# Patient Record
Sex: MALE | Race: WHITE | NOT HISPANIC OR LATINO
[De-identification: names, ages, dates, MRNs, and addresses within clinical notes are randomized per-mention and may not be internally consistent; named-entity substitution may affect disease eponyms.]

---

## 2017-02-15 ENCOUNTER — APPOINTMENT (OUTPATIENT)
Dept: COLORECTAL SURGERY | Facility: CLINIC | Age: 67
End: 2017-02-15

## 2017-02-15 VITALS
OXYGEN SATURATION: 90 % | HEART RATE: 64 BPM | SYSTOLIC BLOOD PRESSURE: 111 MMHG | TEMPERATURE: 98.3 F | DIASTOLIC BLOOD PRESSURE: 70 MMHG

## 2017-02-15 VITALS — HEIGHT: 71 IN | BODY MASS INDEX: 35.7 KG/M2 | WEIGHT: 255 LBS

## 2017-02-15 DIAGNOSIS — Z86.79 PERSONAL HISTORY OF OTHER DISEASES OF THE CIRCULATORY SYSTEM: ICD-10-CM

## 2017-02-15 DIAGNOSIS — Z87.891 PERSONAL HISTORY OF NICOTINE DEPENDENCE: ICD-10-CM

## 2017-02-15 DIAGNOSIS — Z80.9 FAMILY HISTORY OF MALIGNANT NEOPLASM, UNSPECIFIED: ICD-10-CM

## 2017-02-15 DIAGNOSIS — Z86.39 PERSONAL HISTORY OF OTHER ENDOCRINE, NUTRITIONAL AND METABOLIC DISEASE: ICD-10-CM

## 2017-02-15 DIAGNOSIS — E78.00 PURE HYPERCHOLESTEROLEMIA, UNSPECIFIED: ICD-10-CM

## 2017-02-17 ENCOUNTER — TRANSCRIPTION ENCOUNTER (OUTPATIENT)
Age: 67
End: 2017-02-17

## 2017-04-13 ENCOUNTER — APPOINTMENT (OUTPATIENT)
Dept: COLORECTAL SURGERY | Facility: CLINIC | Age: 67
End: 2017-04-13

## 2017-04-20 ENCOUNTER — RESULT REVIEW (OUTPATIENT)
Age: 67
End: 2017-04-20

## 2017-04-21 ENCOUNTER — OUTPATIENT (OUTPATIENT)
Dept: OUTPATIENT SERVICES | Facility: HOSPITAL | Age: 67
LOS: 1 days | End: 2017-04-21
Payer: MEDICARE

## 2017-04-21 ENCOUNTER — APPOINTMENT (OUTPATIENT)
Dept: COLORECTAL SURGERY | Facility: HOSPITAL | Age: 67
End: 2017-04-21

## 2017-04-21 DIAGNOSIS — K62.5 HEMORRHAGE OF ANUS AND RECTUM: ICD-10-CM

## 2017-04-21 DIAGNOSIS — Z90.89 ACQUIRED ABSENCE OF OTHER ORGANS: Chronic | ICD-10-CM

## 2017-04-21 DIAGNOSIS — Z90.49 ACQUIRED ABSENCE OF OTHER SPECIFIED PARTS OF DIGESTIVE TRACT: Chronic | ICD-10-CM

## 2017-04-21 DIAGNOSIS — Z95.5 PRESENCE OF CORONARY ANGIOPLASTY IMPLANT AND GRAFT: Chronic | ICD-10-CM

## 2017-04-21 DIAGNOSIS — Z98.890 OTHER SPECIFIED POSTPROCEDURAL STATES: Chronic | ICD-10-CM

## 2017-04-21 PROCEDURE — 88305 TISSUE EXAM BY PATHOLOGIST: CPT

## 2017-04-21 PROCEDURE — 88305 TISSUE EXAM BY PATHOLOGIST: CPT | Mod: 26

## 2017-04-21 PROCEDURE — 45380 COLONOSCOPY AND BIOPSY: CPT | Mod: PT

## 2017-04-25 LAB — SURGICAL PATHOLOGY STUDY: SIGNIFICANT CHANGE UP

## 2017-05-10 ENCOUNTER — APPOINTMENT (OUTPATIENT)
Dept: COLORECTAL SURGERY | Facility: CLINIC | Age: 67
End: 2017-05-10

## 2017-05-23 ENCOUNTER — OUTPATIENT (OUTPATIENT)
Dept: OUTPATIENT SERVICES | Facility: HOSPITAL | Age: 67
LOS: 1 days | End: 2017-05-23
Payer: MEDICARE

## 2017-05-23 ENCOUNTER — APPOINTMENT (OUTPATIENT)
Dept: CT IMAGING | Facility: CLINIC | Age: 67
End: 2017-05-23

## 2017-05-23 DIAGNOSIS — Z90.49 ACQUIRED ABSENCE OF OTHER SPECIFIED PARTS OF DIGESTIVE TRACT: Chronic | ICD-10-CM

## 2017-05-23 DIAGNOSIS — Z95.5 PRESENCE OF CORONARY ANGIOPLASTY IMPLANT AND GRAFT: Chronic | ICD-10-CM

## 2017-05-23 DIAGNOSIS — Z90.89 ACQUIRED ABSENCE OF OTHER ORGANS: Chronic | ICD-10-CM

## 2017-05-23 DIAGNOSIS — Z98.890 OTHER SPECIFIED POSTPROCEDURAL STATES: Chronic | ICD-10-CM

## 2017-05-23 DIAGNOSIS — Z00.8 ENCOUNTER FOR OTHER GENERAL EXAMINATION: ICD-10-CM

## 2017-05-23 PROCEDURE — 82565 ASSAY OF CREATININE: CPT

## 2017-05-23 PROCEDURE — 74177 CT ABD & PELVIS W/CONTRAST: CPT

## 2017-06-22 ENCOUNTER — OUTPATIENT (OUTPATIENT)
Dept: OUTPATIENT SERVICES | Facility: HOSPITAL | Age: 67
LOS: 1 days | End: 2017-06-22
Payer: MEDICARE

## 2017-06-22 VITALS
SYSTOLIC BLOOD PRESSURE: 128 MMHG | WEIGHT: 270.95 LBS | HEART RATE: 61 BPM | RESPIRATION RATE: 14 BRPM | DIASTOLIC BLOOD PRESSURE: 80 MMHG | OXYGEN SATURATION: 98 % | HEIGHT: 71 IN | TEMPERATURE: 98 F

## 2017-06-22 DIAGNOSIS — K62.9 DISEASE OF ANUS AND RECTUM, UNSPECIFIED: ICD-10-CM

## 2017-06-22 DIAGNOSIS — Z01.818 ENCOUNTER FOR OTHER PREPROCEDURAL EXAMINATION: ICD-10-CM

## 2017-06-22 DIAGNOSIS — G47.30 SLEEP APNEA, UNSPECIFIED: ICD-10-CM

## 2017-06-22 DIAGNOSIS — Z90.89 ACQUIRED ABSENCE OF OTHER ORGANS: Chronic | ICD-10-CM

## 2017-06-22 DIAGNOSIS — Z90.49 ACQUIRED ABSENCE OF OTHER SPECIFIED PARTS OF DIGESTIVE TRACT: Chronic | ICD-10-CM

## 2017-06-22 DIAGNOSIS — E11.9 TYPE 2 DIABETES MELLITUS WITHOUT COMPLICATIONS: ICD-10-CM

## 2017-06-22 DIAGNOSIS — Z98.890 OTHER SPECIFIED POSTPROCEDURAL STATES: Chronic | ICD-10-CM

## 2017-06-22 DIAGNOSIS — Z95.5 PRESENCE OF CORONARY ANGIOPLASTY IMPLANT AND GRAFT: ICD-10-CM

## 2017-06-22 DIAGNOSIS — Z95.5 PRESENCE OF CORONARY ANGIOPLASTY IMPLANT AND GRAFT: Chronic | ICD-10-CM

## 2017-06-22 LAB
ANION GAP SERPL CALC-SCNC: 16 MMOL/L — SIGNIFICANT CHANGE UP (ref 5–17)
BLD GP AB SCN SERPL QL: NEGATIVE — SIGNIFICANT CHANGE UP
BUN SERPL-MCNC: 18 MG/DL — SIGNIFICANT CHANGE UP (ref 7–23)
CALCIUM SERPL-MCNC: 9.9 MG/DL — SIGNIFICANT CHANGE UP (ref 8.4–10.5)
CHLORIDE SERPL-SCNC: 99 MMOL/L — SIGNIFICANT CHANGE UP (ref 96–108)
CO2 SERPL-SCNC: 26 MMOL/L — SIGNIFICANT CHANGE UP (ref 22–31)
CREAT SERPL-MCNC: 1.44 MG/DL — HIGH (ref 0.5–1.3)
GLUCOSE SERPL-MCNC: 152 MG/DL — HIGH (ref 70–99)
HBA1C BLD-MCNC: 7.8 % — HIGH (ref 4–5.6)
HCT VFR BLD CALC: 42.7 % — SIGNIFICANT CHANGE UP (ref 39–50)
HGB BLD-MCNC: 14.4 G/DL — SIGNIFICANT CHANGE UP (ref 13–17)
MCHC RBC-ENTMCNC: 29.5 PG — SIGNIFICANT CHANGE UP (ref 27–34)
MCHC RBC-ENTMCNC: 33.7 GM/DL — SIGNIFICANT CHANGE UP (ref 32–36)
MCV RBC AUTO: 87.5 FL — SIGNIFICANT CHANGE UP (ref 80–100)
PLATELET # BLD AUTO: 268 K/UL — SIGNIFICANT CHANGE UP (ref 150–400)
POTASSIUM SERPL-MCNC: 3.7 MMOL/L — SIGNIFICANT CHANGE UP (ref 3.5–5.3)
POTASSIUM SERPL-SCNC: 3.7 MMOL/L — SIGNIFICANT CHANGE UP (ref 3.5–5.3)
RBC # BLD: 4.88 M/UL — SIGNIFICANT CHANGE UP (ref 4.2–5.8)
RBC # FLD: 13.4 % — SIGNIFICANT CHANGE UP (ref 10.3–14.5)
RH IG SCN BLD-IMP: NEGATIVE — SIGNIFICANT CHANGE UP
SODIUM SERPL-SCNC: 141 MMOL/L — SIGNIFICANT CHANGE UP (ref 135–145)
WBC # BLD: 6.27 K/UL — SIGNIFICANT CHANGE UP (ref 3.8–10.5)
WBC # FLD AUTO: 6.27 K/UL — SIGNIFICANT CHANGE UP (ref 3.8–10.5)

## 2017-06-22 PROCEDURE — 86900 BLOOD TYPING SEROLOGIC ABO: CPT

## 2017-06-22 PROCEDURE — 85027 COMPLETE CBC AUTOMATED: CPT

## 2017-06-22 PROCEDURE — 86850 RBC ANTIBODY SCREEN: CPT

## 2017-06-22 PROCEDURE — 86901 BLOOD TYPING SEROLOGIC RH(D): CPT

## 2017-06-22 PROCEDURE — 80048 BASIC METABOLIC PNL TOTAL CA: CPT

## 2017-06-22 PROCEDURE — 83036 HEMOGLOBIN GLYCOSYLATED A1C: CPT

## 2017-06-22 PROCEDURE — G0463: CPT

## 2017-06-22 RX ORDER — CEFOTETAN DISODIUM 1 G
2 VIAL (EA) INJECTION ONCE
Qty: 0 | Refills: 0 | Status: DISCONTINUED | OUTPATIENT
Start: 2017-07-06 | End: 2017-07-06

## 2017-06-22 RX ORDER — SODIUM CHLORIDE 9 MG/ML
3 INJECTION INTRAMUSCULAR; INTRAVENOUS; SUBCUTANEOUS EVERY 8 HOURS
Qty: 0 | Refills: 0 | Status: DISCONTINUED | OUTPATIENT
Start: 2017-07-06 | End: 2017-07-06

## 2017-06-22 NOTE — H&P PST ADULT - HISTORY OF PRESENT ILLNESS
66 y/o male for robotic excision of rectal mass /transanal endoscopic microsurgical approach for excision of rectal tumor. Patient went for routine colonoscopy which was positive for mass. Patient had previous procedure but was not successful, needed further more  intervention internally. Referred to Dr Conde, evaluated and scheduled this procedure for treatment.

## 2017-06-22 NOTE — H&P PST ADULT - PSH
H/O heart artery stent  stent x 1 and ballon angioplasty - 2009  History of cholecystectomy    S/P tonsillectomy and adenoidectomy

## 2017-06-22 NOTE — H&P PST ADULT - PROBLEM SELECTOR PLAN 1
Robotic excision of rectal mass transanal endoscopic microsurgical approach for excision of rectal tumor to follow surgeons instruction bowel prep preop

## 2017-06-22 NOTE — H&P PST ADULT - PMH
Benign neoplasm of colon, unspecified part of colon  recent diagnosis positive colonoscopy tried outpatient  excision unable to deeper needed resurgery  Cataracts, bilateral    Diabetes mellitus, type 2    Essential (primary) hypertension    Hyperlipidemia, unspecified hyperlipidemia type    Obesity (BMI 30-39.9)    PVD (peripheral vascular disease)    Stented coronary artery    Tobacco use  quit Jan 2017  Vitiligo  hands bilaterally

## 2017-06-22 NOTE — H&P PST ADULT - FAMILY HISTORY
Mother  Still living? Unknown  Diabetes mellitus, type 2, Age at diagnosis: Age Unknown     Father  Still living? No  Family history of essential hypertension, Age at diagnosis: Age Unknown

## 2017-06-22 NOTE — H&P PST ADULT - PROBLEM SELECTOR PLAN 2
to continue asa until dos , seen  and evaluated by cardiologist  June 20,2017    to continue present medications as prescribed

## 2017-06-29 ENCOUNTER — APPOINTMENT (OUTPATIENT)
Dept: COLORECTAL SURGERY | Facility: HOSPITAL | Age: 67
End: 2017-06-29

## 2017-07-06 ENCOUNTER — INPATIENT (INPATIENT)
Facility: HOSPITAL | Age: 67
LOS: 0 days | Discharge: HOME | DRG: 376 | End: 2017-07-06
Attending: SURGERY | Admitting: SURGERY
Payer: MEDICARE

## 2017-07-06 ENCOUNTER — APPOINTMENT (OUTPATIENT)
Dept: COLORECTAL SURGERY | Facility: HOSPITAL | Age: 67
End: 2017-07-06

## 2017-07-06 ENCOUNTER — RESULT REVIEW (OUTPATIENT)
Age: 67
End: 2017-07-06

## 2017-07-06 VITALS
HEART RATE: 69 BPM | WEIGHT: 268.96 LBS | HEIGHT: 71 IN | DIASTOLIC BLOOD PRESSURE: 74 MMHG | OXYGEN SATURATION: 92 % | SYSTOLIC BLOOD PRESSURE: 131 MMHG | TEMPERATURE: 98 F | RESPIRATION RATE: 18 BRPM

## 2017-07-06 DIAGNOSIS — Z90.89 ACQUIRED ABSENCE OF OTHER ORGANS: Chronic | ICD-10-CM

## 2017-07-06 DIAGNOSIS — Z95.5 PRESENCE OF CORONARY ANGIOPLASTY IMPLANT AND GRAFT: Chronic | ICD-10-CM

## 2017-07-06 DIAGNOSIS — K62.9 DISEASE OF ANUS AND RECTUM, UNSPECIFIED: ICD-10-CM

## 2017-07-06 DIAGNOSIS — Z90.49 ACQUIRED ABSENCE OF OTHER SPECIFIED PARTS OF DIGESTIVE TRACT: Chronic | ICD-10-CM

## 2017-07-06 LAB
APPEARANCE UR: CLEAR — SIGNIFICANT CHANGE UP
BACTERIA # UR AUTO: ABNORMAL /HPF
BILIRUB UR-MCNC: NEGATIVE — SIGNIFICANT CHANGE UP
COLOR SPEC: YELLOW — SIGNIFICANT CHANGE UP
DIFF PNL FLD: ABNORMAL
GLUCOSE UR QL: >1000
KETONES UR-MCNC: NEGATIVE — SIGNIFICANT CHANGE UP
LEUKOCYTE ESTERASE UR-ACNC: ABNORMAL
NITRITE UR-MCNC: NEGATIVE — SIGNIFICANT CHANGE UP
PH UR: 6 — SIGNIFICANT CHANGE UP (ref 5–8)
PROT UR-MCNC: SIGNIFICANT CHANGE UP
RBC CASTS # UR COMP ASSIST: >50 /HPF (ref 0–2)
SP GR SPEC: 1.02 — SIGNIFICANT CHANGE UP (ref 1.01–1.02)
UROBILINOGEN FLD QL: NEGATIVE — SIGNIFICANT CHANGE UP
WBC UR QL: ABNORMAL /HPF (ref 0–5)

## 2017-07-06 PROCEDURE — 88305 TISSUE EXAM BY PATHOLOGIST: CPT

## 2017-07-06 PROCEDURE — C1889: CPT

## 2017-07-06 PROCEDURE — 88331 PATH CONSLTJ SURG 1 BLK 1SPC: CPT

## 2017-07-06 PROCEDURE — 88333 PATH CONSLTJ SURG CYTO XM 1: CPT

## 2017-07-06 PROCEDURE — S2900: CPT

## 2017-07-06 PROCEDURE — 88331 PATH CONSLTJ SURG 1 BLK 1SPC: CPT | Mod: 26

## 2017-07-06 PROCEDURE — 81001 URINALYSIS AUTO W/SCOPE: CPT

## 2017-07-06 PROCEDURE — 88305 TISSUE EXAM BY PATHOLOGIST: CPT | Mod: 26

## 2017-07-06 RX ORDER — TAMSULOSIN HYDROCHLORIDE 0.4 MG/1
1 CAPSULE ORAL
Qty: 10 | Refills: 0 | OUTPATIENT
Start: 2017-07-06 | End: 2017-07-16

## 2017-07-07 ENCOUNTER — TRANSCRIPTION ENCOUNTER (OUTPATIENT)
Age: 67
End: 2017-07-07

## 2017-07-11 ENCOUNTER — APPOINTMENT (OUTPATIENT)
Dept: COLORECTAL SURGERY | Facility: CLINIC | Age: 67
End: 2017-07-11

## 2017-07-12 ENCOUNTER — OUTPATIENT (OUTPATIENT)
Dept: OUTPATIENT SERVICES | Facility: HOSPITAL | Age: 67
LOS: 1 days | Discharge: HOME | End: 2017-07-12

## 2017-07-12 DIAGNOSIS — Z95.5 PRESENCE OF CORONARY ANGIOPLASTY IMPLANT AND GRAFT: Chronic | ICD-10-CM

## 2017-07-12 DIAGNOSIS — C19 MALIGNANT NEOPLASM OF RECTOSIGMOID JUNCTION: ICD-10-CM

## 2017-07-12 DIAGNOSIS — Z90.49 ACQUIRED ABSENCE OF OTHER SPECIFIED PARTS OF DIGESTIVE TRACT: Chronic | ICD-10-CM

## 2017-07-12 DIAGNOSIS — Z90.89 ACQUIRED ABSENCE OF OTHER ORGANS: Chronic | ICD-10-CM

## 2017-07-17 ENCOUNTER — RESULT REVIEW (OUTPATIENT)
Age: 67
End: 2017-07-17

## 2017-07-17 ENCOUNTER — APPOINTMENT (OUTPATIENT)
Dept: HEMATOLOGY ONCOLOGY | Facility: CLINIC | Age: 67
End: 2017-07-17

## 2017-07-17 VITALS
BODY MASS INDEX: 38.83 KG/M2 | WEIGHT: 277.34 LBS | SYSTOLIC BLOOD PRESSURE: 147 MMHG | RESPIRATION RATE: 16 BRPM | DIASTOLIC BLOOD PRESSURE: 81 MMHG | TEMPERATURE: 98 F | HEIGHT: 70.98 IN | HEART RATE: 62 BPM

## 2017-07-17 DIAGNOSIS — Z80.3 FAMILY HISTORY OF MALIGNANT NEOPLASM OF BREAST: ICD-10-CM

## 2017-07-17 LAB
BASOPHILS # BLD AUTO: 0.1 K/UL — SIGNIFICANT CHANGE UP (ref 0–0.2)
BASOPHILS NFR BLD AUTO: 0.9 % — SIGNIFICANT CHANGE UP (ref 0–2)
EOSINOPHIL # BLD AUTO: 0.2 K/UL — SIGNIFICANT CHANGE UP (ref 0–0.5)
EOSINOPHIL NFR BLD AUTO: 2.9 % — SIGNIFICANT CHANGE UP (ref 0–6)
HCT VFR BLD CALC: 41.3 % — SIGNIFICANT CHANGE UP (ref 39–50)
HGB BLD-MCNC: 14.4 G/DL — SIGNIFICANT CHANGE UP (ref 13–17)
LYMPHOCYTES # BLD AUTO: 1.1 K/UL — SIGNIFICANT CHANGE UP (ref 1–3.3)
LYMPHOCYTES # BLD AUTO: 17 % — SIGNIFICANT CHANGE UP (ref 13–44)
MCHC RBC-ENTMCNC: 31.2 PG — SIGNIFICANT CHANGE UP (ref 27–34)
MCHC RBC-ENTMCNC: 34.8 G/DL — SIGNIFICANT CHANGE UP (ref 32–36)
MCV RBC AUTO: 89.7 FL — SIGNIFICANT CHANGE UP (ref 80–100)
MONOCYTES # BLD AUTO: 0.5 K/UL — SIGNIFICANT CHANGE UP (ref 0–0.9)
MONOCYTES NFR BLD AUTO: 8 % — SIGNIFICANT CHANGE UP (ref 2–14)
NEUTROPHILS # BLD AUTO: 4.6 K/UL — SIGNIFICANT CHANGE UP (ref 1.8–7.4)
NEUTROPHILS NFR BLD AUTO: 71.2 % — SIGNIFICANT CHANGE UP (ref 43–77)
PLATELET # BLD AUTO: 237 K/UL — SIGNIFICANT CHANGE UP (ref 150–400)
RBC # BLD: 4.6 M/UL — SIGNIFICANT CHANGE UP (ref 4.2–5.8)
RBC # FLD: 12.7 % — SIGNIFICANT CHANGE UP (ref 10.3–14.5)
WBC # BLD: 6.5 K/UL — SIGNIFICANT CHANGE UP (ref 3.8–10.5)
WBC # FLD AUTO: 6.5 K/UL — SIGNIFICANT CHANGE UP (ref 3.8–10.5)

## 2017-07-17 RX ORDER — HYDROCORTISONE 1 %
12 CREAM (GRAM) TOPICAL
Qty: 400 | Refills: 0 | Status: DISCONTINUED | COMMUNITY
Start: 2016-08-06 | End: 2017-07-17

## 2017-07-17 RX ORDER — ATORVASTATIN CALCIUM 40 MG/1
40 TABLET, FILM COATED ORAL
Refills: 0 | Status: ACTIVE | COMMUNITY

## 2017-07-17 RX ORDER — POLYETHYLENE GLYCOL 3350, SODIUM SULFATE, SODIUM CHLORIDE, POTASSIUM CHLORIDE, ASCORBIC ACID, SODIUM ASCORBATE 7.5-2.691G
100 KIT ORAL
Qty: 1 | Refills: 0 | Status: DISCONTINUED | COMMUNITY
Start: 2017-02-15 | End: 2017-07-17

## 2017-07-17 RX ORDER — ISOSORBIDE DINITRATE 30 MG/1
30 TABLET ORAL
Refills: 0 | Status: DISCONTINUED | COMMUNITY
End: 2017-07-17

## 2017-07-17 RX ORDER — GLIPIZIDE 10 MG/1
TABLET, EXTENDED RELEASE ORAL
Refills: 0 | Status: DISCONTINUED | COMMUNITY
End: 2017-07-17

## 2017-07-18 DIAGNOSIS — C20 MALIGNANT NEOPLASM OF RECTUM: ICD-10-CM

## 2017-07-18 LAB
ALBUMIN SERPL ELPH-MCNC: 3.9 G/DL
ALP BLD-CCNC: 125 U/L
ALT SERPL-CCNC: 27 U/L
ANION GAP SERPL CALC-SCNC: 15 MMOL/L
AST SERPL-CCNC: 16 U/L
BILIRUB SERPL-MCNC: 0.7 MG/DL
BUN SERPL-MCNC: 17 MG/DL
CALCIUM SERPL-MCNC: 9.3 MG/DL
CEA SERPL-MCNC: 32.3 NG/ML
CHLORIDE SERPL-SCNC: 102 MMOL/L
CO2 SERPL-SCNC: 27 MMOL/L
CREAT SERPL-MCNC: 1.34 MG/DL
FERRITIN SERPL-MCNC: 135 NG/ML
GLUCOSE SERPL-MCNC: 148 MG/DL
IRON SATN MFR SERPL: 26 %
IRON SERPL-MCNC: 73 UG/DL
POTASSIUM SERPL-SCNC: 3.9 MMOL/L
PROT SERPL-MCNC: 7.6 G/DL
SODIUM SERPL-SCNC: 144 MMOL/L
TIBC SERPL-MCNC: 283 UG/DL
UIBC SERPL-MCNC: 210 UG/DL

## 2017-07-19 ENCOUNTER — RESULT REVIEW (OUTPATIENT)
Age: 67
End: 2017-07-19

## 2017-07-19 DIAGNOSIS — E55.9 VITAMIN D DEFICIENCY, UNSPECIFIED: ICD-10-CM

## 2017-07-19 LAB — 25(OH)D3 SERPL-MCNC: 19.8 NG/ML

## 2017-07-23 ENCOUNTER — FORM ENCOUNTER (OUTPATIENT)
Age: 67
End: 2017-07-23

## 2017-07-24 ENCOUNTER — APPOINTMENT (OUTPATIENT)
Dept: MRI IMAGING | Facility: IMAGING CENTER | Age: 67
End: 2017-07-24

## 2017-07-24 ENCOUNTER — OUTPATIENT (OUTPATIENT)
Dept: OUTPATIENT SERVICES | Facility: HOSPITAL | Age: 67
LOS: 1 days | End: 2017-07-24
Payer: MEDICARE

## 2017-07-24 ENCOUNTER — APPOINTMENT (OUTPATIENT)
Dept: CT IMAGING | Facility: IMAGING CENTER | Age: 67
End: 2017-07-24

## 2017-07-24 DIAGNOSIS — Z90.89 ACQUIRED ABSENCE OF OTHER ORGANS: Chronic | ICD-10-CM

## 2017-07-24 DIAGNOSIS — C20 MALIGNANT NEOPLASM OF RECTUM: ICD-10-CM

## 2017-07-24 DIAGNOSIS — Z90.49 ACQUIRED ABSENCE OF OTHER SPECIFIED PARTS OF DIGESTIVE TRACT: Chronic | ICD-10-CM

## 2017-07-24 DIAGNOSIS — Z95.5 PRESENCE OF CORONARY ANGIOPLASTY IMPLANT AND GRAFT: Chronic | ICD-10-CM

## 2017-07-24 PROCEDURE — 71260 CT THORAX DX C+: CPT

## 2017-07-24 PROCEDURE — A9585: CPT

## 2017-07-24 PROCEDURE — 72197 MRI PELVIS W/O & W/DYE: CPT

## 2017-08-01 ENCOUNTER — OUTPATIENT (OUTPATIENT)
Dept: OUTPATIENT SERVICES | Facility: HOSPITAL | Age: 67
LOS: 1 days | Discharge: HOME | End: 2017-08-01

## 2017-08-01 ENCOUNTER — APPOINTMENT (OUTPATIENT)
Dept: RADIATION ONCOLOGY | Facility: CLINIC | Age: 67
End: 2017-08-01
Payer: MEDICARE

## 2017-08-01 VITALS
HEART RATE: 61 BPM | DIASTOLIC BLOOD PRESSURE: 81 MMHG | SYSTOLIC BLOOD PRESSURE: 151 MMHG | HEIGHT: 71 IN | WEIGHT: 275.66 LBS | RESPIRATION RATE: 94 BRPM | BODY MASS INDEX: 38.59 KG/M2

## 2017-08-01 DIAGNOSIS — Z95.5 PRESENCE OF CORONARY ANGIOPLASTY IMPLANT AND GRAFT: Chronic | ICD-10-CM

## 2017-08-01 DIAGNOSIS — Z90.49 ACQUIRED ABSENCE OF OTHER SPECIFIED PARTS OF DIGESTIVE TRACT: Chronic | ICD-10-CM

## 2017-08-01 DIAGNOSIS — Z86.69 PERSONAL HISTORY OF OTHER DISEASES OF THE NERVOUS SYSTEM AND SENSE ORGANS: ICD-10-CM

## 2017-08-01 DIAGNOSIS — Z90.89 ACQUIRED ABSENCE OF OTHER ORGANS: Chronic | ICD-10-CM

## 2017-08-01 PROCEDURE — 99205 OFFICE O/P NEW HI 60 MIN: CPT | Mod: 25

## 2017-08-01 PROCEDURE — 77263 THER RADIOLOGY TX PLNG CPLX: CPT

## 2017-08-04 PROCEDURE — 77290 THER RAD SIMULAJ FIELD CPLX: CPT | Mod: 26

## 2017-08-04 PROCEDURE — 77333 RADIATION TREATMENT AID(S): CPT | Mod: 26

## 2017-08-07 PROCEDURE — 77470 SPECIAL RADIATION TREATMENT: CPT | Mod: 26

## 2017-08-11 ENCOUNTER — APPOINTMENT (OUTPATIENT)
Dept: HEMATOLOGY ONCOLOGY | Facility: CLINIC | Age: 67
End: 2017-08-11
Payer: MEDICARE

## 2017-08-11 VITALS
HEART RATE: 66 BPM | BODY MASS INDEX: 39.05 KG/M2 | SYSTOLIC BLOOD PRESSURE: 140 MMHG | DIASTOLIC BLOOD PRESSURE: 80 MMHG | TEMPERATURE: 98.3 F | WEIGHT: 279.99 LBS | OXYGEN SATURATION: 93 % | RESPIRATION RATE: 16 BRPM

## 2017-08-11 PROCEDURE — 99214 OFFICE O/P EST MOD 30 MIN: CPT

## 2017-08-15 PROCEDURE — 77300 RADIATION THERAPY DOSE PLAN: CPT | Mod: 26

## 2017-08-15 PROCEDURE — 77334 RADIATION TREATMENT AID(S): CPT | Mod: 26

## 2017-08-15 PROCEDURE — 77295 3-D RADIOTHERAPY PLAN: CPT | Mod: 26

## 2017-08-18 PROCEDURE — 77280 THER RAD SIMULAJ FIELD SMPL: CPT | Mod: 26

## 2017-08-21 PROCEDURE — 77387B: CUSTOM | Mod: 26

## 2017-08-22 PROCEDURE — 77387B: CUSTOM | Mod: 26

## 2017-08-23 VITALS
BODY MASS INDEX: 39.52 KG/M2 | OXYGEN SATURATION: 94 % | WEIGHT: 282.3 LBS | HEART RATE: 64 BPM | HEIGHT: 71 IN | DIASTOLIC BLOOD PRESSURE: 83 MMHG | RESPIRATION RATE: 16 BRPM | SYSTOLIC BLOOD PRESSURE: 155 MMHG | TEMPERATURE: 97.8 F

## 2017-08-23 PROCEDURE — 77387B: CUSTOM | Mod: 26

## 2017-08-24 PROCEDURE — 77387B: CUSTOM | Mod: 26

## 2017-08-25 PROCEDURE — 77427 RADIATION TX MANAGEMENT X5: CPT

## 2017-08-25 PROCEDURE — 77387B: CUSTOM | Mod: 26

## 2017-08-28 PROCEDURE — 77387B: CUSTOM | Mod: 26

## 2017-08-29 PROCEDURE — 77387B: CUSTOM | Mod: 26

## 2017-08-30 PROCEDURE — 77387B: CUSTOM | Mod: 26

## 2017-08-31 PROCEDURE — 77387B: CUSTOM | Mod: 26

## 2017-09-01 VITALS
SYSTOLIC BLOOD PRESSURE: 152 MMHG | DIASTOLIC BLOOD PRESSURE: 70 MMHG | HEART RATE: 70 BPM | WEIGHT: 282.63 LBS | BODY MASS INDEX: 39.42 KG/M2 | OXYGEN SATURATION: 93 % | RESPIRATION RATE: 18 BRPM

## 2017-09-01 PROCEDURE — 77387B: CUSTOM | Mod: 26

## 2017-09-01 PROCEDURE — 77427 RADIATION TX MANAGEMENT X5: CPT

## 2017-09-05 ENCOUNTER — OUTPATIENT (OUTPATIENT)
Dept: OUTPATIENT SERVICES | Facility: HOSPITAL | Age: 67
LOS: 1 days | Discharge: HOME | End: 2017-09-05

## 2017-09-05 DIAGNOSIS — Z90.89 ACQUIRED ABSENCE OF OTHER ORGANS: Chronic | ICD-10-CM

## 2017-09-05 DIAGNOSIS — C20 MALIGNANT NEOPLASM OF RECTUM: ICD-10-CM

## 2017-09-05 DIAGNOSIS — Z90.49 ACQUIRED ABSENCE OF OTHER SPECIFIED PARTS OF DIGESTIVE TRACT: Chronic | ICD-10-CM

## 2017-09-05 DIAGNOSIS — Z95.5 PRESENCE OF CORONARY ANGIOPLASTY IMPLANT AND GRAFT: Chronic | ICD-10-CM

## 2017-09-05 PROCEDURE — 77387B: CUSTOM | Mod: 26

## 2017-09-06 PROCEDURE — 77387B: CUSTOM | Mod: 26

## 2017-09-07 VITALS
HEART RATE: 64 BPM | DIASTOLIC BLOOD PRESSURE: 77 MMHG | WEIGHT: 280.85 LBS | OXYGEN SATURATION: 96 % | SYSTOLIC BLOOD PRESSURE: 142 MMHG | HEIGHT: 71 IN | BODY MASS INDEX: 39.32 KG/M2

## 2017-09-07 PROCEDURE — 77387B: CUSTOM | Mod: 26

## 2017-09-08 ENCOUNTER — APPOINTMENT (OUTPATIENT)
Dept: HEMATOLOGY ONCOLOGY | Facility: CLINIC | Age: 67
End: 2017-09-08
Payer: MEDICARE

## 2017-09-08 ENCOUNTER — RESULT REVIEW (OUTPATIENT)
Age: 67
End: 2017-09-08

## 2017-09-08 VITALS
BODY MASS INDEX: 39.36 KG/M2 | DIASTOLIC BLOOD PRESSURE: 70 MMHG | SYSTOLIC BLOOD PRESSURE: 120 MMHG | HEART RATE: 70 BPM | RESPIRATION RATE: 16 BRPM | WEIGHT: 282.19 LBS | TEMPERATURE: 98.4 F | OXYGEN SATURATION: 94 %

## 2017-09-08 LAB
BASOPHILS # BLD AUTO: 0 K/UL — SIGNIFICANT CHANGE UP (ref 0–0.2)
BASOPHILS NFR BLD AUTO: 0.4 % — SIGNIFICANT CHANGE UP (ref 0–2)
EOSINOPHIL # BLD AUTO: 0.2 K/UL — SIGNIFICANT CHANGE UP (ref 0–0.5)
EOSINOPHIL NFR BLD AUTO: 4.3 % — SIGNIFICANT CHANGE UP (ref 0–6)
HCT VFR BLD CALC: 39.3 % — SIGNIFICANT CHANGE UP (ref 39–50)
HGB BLD-MCNC: 14 G/DL — SIGNIFICANT CHANGE UP (ref 13–17)
LYMPHOCYTES # BLD AUTO: 0.5 K/UL — LOW (ref 1–3.3)
LYMPHOCYTES # BLD AUTO: 9.2 % — LOW (ref 13–44)
MCHC RBC-ENTMCNC: 32 PG — SIGNIFICANT CHANGE UP (ref 27–34)
MCHC RBC-ENTMCNC: 35.5 G/DL — SIGNIFICANT CHANGE UP (ref 32–36)
MCV RBC AUTO: 90.1 FL — SIGNIFICANT CHANGE UP (ref 80–100)
MONOCYTES # BLD AUTO: 0.5 K/UL — SIGNIFICANT CHANGE UP (ref 0–0.9)
MONOCYTES NFR BLD AUTO: 8.8 % — SIGNIFICANT CHANGE UP (ref 2–14)
NEUTROPHILS # BLD AUTO: 4.1 K/UL — SIGNIFICANT CHANGE UP (ref 1.8–7.4)
NEUTROPHILS NFR BLD AUTO: 77.3 % — HIGH (ref 43–77)
PLATELET # BLD AUTO: 156 K/UL — SIGNIFICANT CHANGE UP (ref 150–400)
RBC # BLD: 4.37 M/UL — SIGNIFICANT CHANGE UP (ref 4.2–5.8)
RBC # FLD: 14.2 % — SIGNIFICANT CHANGE UP (ref 10.3–14.5)
WBC # BLD: 5.3 K/UL — SIGNIFICANT CHANGE UP (ref 3.8–10.5)
WBC # FLD AUTO: 5.3 K/UL — SIGNIFICANT CHANGE UP (ref 3.8–10.5)

## 2017-09-08 PROCEDURE — 77387B: CUSTOM | Mod: 26

## 2017-09-08 PROCEDURE — 99214 OFFICE O/P EST MOD 30 MIN: CPT

## 2017-09-11 LAB
25(OH)D3 SERPL-MCNC: 53.5 NG/ML
ALBUMIN SERPL ELPH-MCNC: 3.9 G/DL
ALP BLD-CCNC: 118 U/L
ALT SERPL-CCNC: 25 U/L
ANION GAP SERPL CALC-SCNC: 13 MMOL/L
AST SERPL-CCNC: 20 U/L
BILIRUB SERPL-MCNC: 0.8 MG/DL
BUN SERPL-MCNC: 21 MG/DL
CALCIUM SERPL-MCNC: 9.5 MG/DL
CEA SERPL-MCNC: 15.4 NG/ML
CHLORIDE SERPL-SCNC: 100 MMOL/L
CO2 SERPL-SCNC: 29 MMOL/L
CREAT SERPL-MCNC: 1.41 MG/DL
GLUCOSE SERPL-MCNC: 171 MG/DL
POTASSIUM SERPL-SCNC: 4 MMOL/L
PROT SERPL-MCNC: 7.6 G/DL
SODIUM SERPL-SCNC: 142 MMOL/L

## 2017-09-11 PROCEDURE — 77387B: CUSTOM | Mod: 26

## 2017-09-11 PROCEDURE — 77427 RADIATION TX MANAGEMENT X5: CPT

## 2017-09-12 PROCEDURE — 77387B: CUSTOM | Mod: 26

## 2017-09-13 PROCEDURE — 77387B: CUSTOM | Mod: 26

## 2017-09-14 VITALS
BODY MASS INDEX: 39.48 KG/M2 | RESPIRATION RATE: 16 BRPM | WEIGHT: 281.97 LBS | HEART RATE: 70 BPM | OXYGEN SATURATION: 94 % | DIASTOLIC BLOOD PRESSURE: 80 MMHG | TEMPERATURE: 98.2 F | HEIGHT: 71 IN | SYSTOLIC BLOOD PRESSURE: 135 MMHG

## 2017-09-14 PROCEDURE — 77387B: CUSTOM | Mod: 26

## 2017-09-15 PROCEDURE — 77387B: CUSTOM | Mod: 26

## 2017-09-18 PROCEDURE — 77387B: CUSTOM | Mod: 26

## 2017-09-18 PROCEDURE — 77427 RADIATION TX MANAGEMENT X5: CPT

## 2017-09-19 PROCEDURE — 77387B: CUSTOM | Mod: 26

## 2017-09-20 PROCEDURE — 77387B: CUSTOM | Mod: 26

## 2017-09-21 PROCEDURE — 77387B: CUSTOM | Mod: 26

## 2017-09-21 PROCEDURE — 77280 THER RAD SIMULAJ FIELD SMPL: CPT | Mod: 26

## 2017-09-22 PROCEDURE — 77387B: CUSTOM | Mod: 26

## 2017-09-25 PROCEDURE — 77427 RADIATION TX MANAGEMENT X5: CPT

## 2017-09-25 PROCEDURE — 77387B: CUSTOM | Mod: 26

## 2017-09-26 PROCEDURE — 77387B: CUSTOM | Mod: 26

## 2017-09-27 ENCOUNTER — RESULT REVIEW (OUTPATIENT)
Age: 67
End: 2017-09-27

## 2017-09-27 ENCOUNTER — APPOINTMENT (OUTPATIENT)
Dept: HEMATOLOGY ONCOLOGY | Facility: CLINIC | Age: 67
End: 2017-09-27
Payer: MEDICARE

## 2017-09-27 VITALS
WEIGHT: 281.75 LBS | OXYGEN SATURATION: 91 % | HEART RATE: 64 BPM | SYSTOLIC BLOOD PRESSURE: 114 MMHG | BODY MASS INDEX: 39.3 KG/M2 | TEMPERATURE: 98.5 F | RESPIRATION RATE: 16 BRPM | DIASTOLIC BLOOD PRESSURE: 76 MMHG

## 2017-09-27 VITALS
HEIGHT: 71 IN | BODY MASS INDEX: 39.44 KG/M2 | DIASTOLIC BLOOD PRESSURE: 76 MMHG | OXYGEN SATURATION: 93 % | HEART RATE: 72 BPM | WEIGHT: 281.75 LBS | SYSTOLIC BLOOD PRESSURE: 147 MMHG | TEMPERATURE: 98.2 F | RESPIRATION RATE: 16 BRPM

## 2017-09-27 LAB
BASOPHILS # BLD AUTO: 0 K/UL — SIGNIFICANT CHANGE UP (ref 0–0.2)
BASOPHILS NFR BLD AUTO: 0.2 % — SIGNIFICANT CHANGE UP (ref 0–2)
EOSINOPHIL # BLD AUTO: 0.2 K/UL — SIGNIFICANT CHANGE UP (ref 0–0.5)
EOSINOPHIL NFR BLD AUTO: 4 % — SIGNIFICANT CHANGE UP (ref 0–6)
HCT VFR BLD CALC: 39 % — SIGNIFICANT CHANGE UP (ref 39–50)
HGB BLD-MCNC: 13.4 G/DL — SIGNIFICANT CHANGE UP (ref 13–17)
LYMPHOCYTES # BLD AUTO: 0.3 K/UL — LOW (ref 1–3.3)
LYMPHOCYTES # BLD AUTO: 6.4 % — LOW (ref 13–44)
MCHC RBC-ENTMCNC: 31.9 PG — SIGNIFICANT CHANGE UP (ref 27–34)
MCHC RBC-ENTMCNC: 34.3 G/DL — SIGNIFICANT CHANGE UP (ref 32–36)
MCV RBC AUTO: 92.9 FL — SIGNIFICANT CHANGE UP (ref 80–100)
MONOCYTES # BLD AUTO: 0.4 K/UL — SIGNIFICANT CHANGE UP (ref 0–0.9)
MONOCYTES NFR BLD AUTO: 8.2 % — SIGNIFICANT CHANGE UP (ref 2–14)
NEUTROPHILS # BLD AUTO: 4 K/UL — SIGNIFICANT CHANGE UP (ref 1.8–7.4)
NEUTROPHILS NFR BLD AUTO: 81.1 % — HIGH (ref 43–77)
PLATELET # BLD AUTO: 196 K/UL — SIGNIFICANT CHANGE UP (ref 150–400)
RBC # BLD: 4.2 M/UL — SIGNIFICANT CHANGE UP (ref 4.2–5.8)
RBC # FLD: 16.9 % — HIGH (ref 10.3–14.5)
WBC # BLD: 5 K/UL — SIGNIFICANT CHANGE UP (ref 3.8–10.5)
WBC # FLD AUTO: 5 K/UL — SIGNIFICANT CHANGE UP (ref 3.8–10.5)

## 2017-09-27 PROCEDURE — 99214 OFFICE O/P EST MOD 30 MIN: CPT

## 2017-09-27 PROCEDURE — 77387B: CUSTOM | Mod: 26

## 2017-09-28 LAB
ALBUMIN SERPL ELPH-MCNC: 3.7 G/DL
ALP BLD-CCNC: 116 U/L
ALT SERPL-CCNC: 23 U/L
ANION GAP SERPL CALC-SCNC: 15 MMOL/L
AST SERPL-CCNC: 17 U/L
BILIRUB SERPL-MCNC: 0.8 MG/DL
BUN SERPL-MCNC: 17 MG/DL
CALCIUM SERPL-MCNC: 8.8 MG/DL
CEA SERPL-MCNC: 8.3 NG/ML
CHLORIDE SERPL-SCNC: 100 MMOL/L
CO2 SERPL-SCNC: 27 MMOL/L
CREAT SERPL-MCNC: 1.36 MG/DL
GLUCOSE SERPL-MCNC: 183 MG/DL
POTASSIUM SERPL-SCNC: 4 MMOL/L
PROT SERPL-MCNC: 7.1 G/DL
SODIUM SERPL-SCNC: 142 MMOL/L

## 2017-09-28 PROCEDURE — 77387B: CUSTOM | Mod: 26

## 2017-09-28 PROCEDURE — 77427 RADIATION TX MANAGEMENT X5: CPT

## 2017-10-27 ENCOUNTER — APPOINTMENT (OUTPATIENT)
Dept: COLORECTAL SURGERY | Facility: CLINIC | Age: 67
End: 2017-10-27
Payer: MEDICARE

## 2017-10-27 PROCEDURE — 99213 OFFICE O/P EST LOW 20 MIN: CPT

## 2017-11-02 ENCOUNTER — APPOINTMENT (OUTPATIENT)
Dept: RADIATION ONCOLOGY | Facility: CLINIC | Age: 67
End: 2017-11-02
Payer: MEDICARE

## 2017-11-02 VITALS
DIASTOLIC BLOOD PRESSURE: 80 MMHG | SYSTOLIC BLOOD PRESSURE: 155 MMHG | HEART RATE: 70 BPM | HEIGHT: 71 IN | RESPIRATION RATE: 16 BRPM | OXYGEN SATURATION: 93 % | TEMPERATURE: 98.1 F

## 2017-11-02 PROCEDURE — 99024 POSTOP FOLLOW-UP VISIT: CPT

## 2017-11-24 ENCOUNTER — OUTPATIENT (OUTPATIENT)
Dept: OUTPATIENT SERVICES | Facility: HOSPITAL | Age: 67
LOS: 1 days | End: 2017-11-24
Payer: MEDICARE

## 2017-11-24 VITALS
RESPIRATION RATE: 16 BRPM | HEIGHT: 71 IN | TEMPERATURE: 98 F | SYSTOLIC BLOOD PRESSURE: 150 MMHG | WEIGHT: 279.99 LBS | OXYGEN SATURATION: 96 % | HEART RATE: 61 BPM | DIASTOLIC BLOOD PRESSURE: 81 MMHG

## 2017-11-24 DIAGNOSIS — Z90.89 ACQUIRED ABSENCE OF OTHER ORGANS: Chronic | ICD-10-CM

## 2017-11-24 DIAGNOSIS — E11.9 TYPE 2 DIABETES MELLITUS WITHOUT COMPLICATIONS: ICD-10-CM

## 2017-11-24 DIAGNOSIS — C20 MALIGNANT NEOPLASM OF RECTUM: ICD-10-CM

## 2017-11-24 DIAGNOSIS — Z95.5 PRESENCE OF CORONARY ANGIOPLASTY IMPLANT AND GRAFT: Chronic | ICD-10-CM

## 2017-11-24 DIAGNOSIS — Z95.5 PRESENCE OF CORONARY ANGIOPLASTY IMPLANT AND GRAFT: ICD-10-CM

## 2017-11-24 DIAGNOSIS — Z90.49 ACQUIRED ABSENCE OF OTHER SPECIFIED PARTS OF DIGESTIVE TRACT: Chronic | ICD-10-CM

## 2017-11-24 DIAGNOSIS — Z01.818 ENCOUNTER FOR OTHER PREPROCEDURAL EXAMINATION: ICD-10-CM

## 2017-11-24 DIAGNOSIS — G47.33 OBSTRUCTIVE SLEEP APNEA (ADULT) (PEDIATRIC): ICD-10-CM

## 2017-11-24 DIAGNOSIS — R89.7 ABNORMAL HISTOLOGICAL FINDINGS IN SPECIMENS FROM OTHER ORGANS, SYSTEMS AND TISSUES: Chronic | ICD-10-CM

## 2017-11-24 LAB
ALBUMIN SERPL ELPH-MCNC: 4.1 G/DL — SIGNIFICANT CHANGE UP (ref 3.3–5)
ALP SERPL-CCNC: 128 U/L — HIGH (ref 40–120)
ALT FLD-CCNC: 34 U/L — SIGNIFICANT CHANGE UP (ref 10–45)
ANION GAP SERPL CALC-SCNC: 16 MMOL/L — SIGNIFICANT CHANGE UP (ref 5–17)
AST SERPL-CCNC: 25 U/L — SIGNIFICANT CHANGE UP (ref 10–40)
BILIRUB SERPL-MCNC: 0.7 MG/DL — SIGNIFICANT CHANGE UP (ref 0.2–1.2)
BLD GP AB SCN SERPL QL: NEGATIVE — SIGNIFICANT CHANGE UP
BUN SERPL-MCNC: 21 MG/DL — SIGNIFICANT CHANGE UP (ref 7–23)
CALCIUM SERPL-MCNC: 9.8 MG/DL — SIGNIFICANT CHANGE UP (ref 8.4–10.5)
CHLORIDE SERPL-SCNC: 99 MMOL/L — SIGNIFICANT CHANGE UP (ref 96–108)
CO2 SERPL-SCNC: 25 MMOL/L — SIGNIFICANT CHANGE UP (ref 22–31)
CREAT SERPL-MCNC: 1.45 MG/DL — HIGH (ref 0.5–1.3)
GLUCOSE SERPL-MCNC: 187 MG/DL — HIGH (ref 70–99)
HBA1C BLD-MCNC: 7.6 % — HIGH (ref 4–5.6)
HCT VFR BLD CALC: 41.8 % — SIGNIFICANT CHANGE UP (ref 39–50)
HGB BLD-MCNC: 14.2 G/DL — SIGNIFICANT CHANGE UP (ref 13–17)
MCHC RBC-ENTMCNC: 32 PG — SIGNIFICANT CHANGE UP (ref 27–34)
MCHC RBC-ENTMCNC: 34 GM/DL — SIGNIFICANT CHANGE UP (ref 32–36)
MCV RBC AUTO: 94.1 FL — SIGNIFICANT CHANGE UP (ref 80–100)
MRSA PCR RESULT.: SIGNIFICANT CHANGE UP
PLATELET # BLD AUTO: 234 K/UL — SIGNIFICANT CHANGE UP (ref 150–400)
POTASSIUM SERPL-MCNC: 3.7 MMOL/L — SIGNIFICANT CHANGE UP (ref 3.5–5.3)
POTASSIUM SERPL-SCNC: 3.7 MMOL/L — SIGNIFICANT CHANGE UP (ref 3.5–5.3)
PROT SERPL-MCNC: 8.2 G/DL — SIGNIFICANT CHANGE UP (ref 6–8.3)
RBC # BLD: 4.44 M/UL — SIGNIFICANT CHANGE UP (ref 4.2–5.8)
RBC # FLD: 13.7 % — SIGNIFICANT CHANGE UP (ref 10.3–14.5)
RH IG SCN BLD-IMP: NEGATIVE — SIGNIFICANT CHANGE UP
S AUREUS DNA NOSE QL NAA+PROBE: SIGNIFICANT CHANGE UP
SODIUM SERPL-SCNC: 140 MMOL/L — SIGNIFICANT CHANGE UP (ref 135–145)
WBC # BLD: 4.93 K/UL — SIGNIFICANT CHANGE UP (ref 3.8–10.5)
WBC # FLD AUTO: 4.93 K/UL — SIGNIFICANT CHANGE UP (ref 3.8–10.5)

## 2017-11-24 PROCEDURE — 86850 RBC ANTIBODY SCREEN: CPT

## 2017-11-24 PROCEDURE — 85027 COMPLETE CBC AUTOMATED: CPT

## 2017-11-24 PROCEDURE — 87640 STAPH A DNA AMP PROBE: CPT

## 2017-11-24 PROCEDURE — 87641 MR-STAPH DNA AMP PROBE: CPT

## 2017-11-24 PROCEDURE — 87086 URINE CULTURE/COLONY COUNT: CPT

## 2017-11-24 PROCEDURE — 83036 HEMOGLOBIN GLYCOSYLATED A1C: CPT

## 2017-11-24 PROCEDURE — 80053 COMPREHEN METABOLIC PANEL: CPT

## 2017-11-24 PROCEDURE — 86901 BLOOD TYPING SEROLOGIC RH(D): CPT

## 2017-11-24 PROCEDURE — G0463: CPT

## 2017-11-24 PROCEDURE — 86900 BLOOD TYPING SEROLOGIC ABO: CPT

## 2017-11-24 RX ORDER — SODIUM CHLORIDE 9 MG/ML
3 INJECTION INTRAMUSCULAR; INTRAVENOUS; SUBCUTANEOUS EVERY 8 HOURS
Qty: 0 | Refills: 0 | Status: DISCONTINUED | OUTPATIENT
Start: 2017-12-06 | End: 2017-12-06

## 2017-11-24 RX ORDER — LIDOCAINE HCL 20 MG/ML
0.2 VIAL (ML) INJECTION ONCE
Qty: 0 | Refills: 0 | Status: DISCONTINUED | OUTPATIENT
Start: 2017-12-06 | End: 2017-12-06

## 2017-11-24 RX ORDER — CEFOTETAN DISODIUM 1 G
2 VIAL (EA) INJECTION ONCE
Qty: 0 | Refills: 0 | Status: DISCONTINUED | OUTPATIENT
Start: 2017-12-06 | End: 2017-12-07

## 2017-11-24 NOTE — H&P PST ADULT - HISTORY OF PRESENT ILLNESS
68 y/o male for robotic excision of rectal mass /transanal endoscopic microsurgical approach for excision of rectal tumor. Patient went for routine colonoscopy which was positive for mass. Patient had previous procedure but was not successful, needed further more  intervention internally. Referred to Dr Conde, evaluated and scheduled this procedure for treatment. 67 year old obese male with PMH of HTN, MI/CAD with stent x 1 2009, GLENYS cpap use with rectal mass s/p transanal excision/ biopsy 7/2017 s/p neoadjuvant chemoradiation planned for laparoscopic robotic assisted low anterior resection, cystoscopy with insertion of ureteral catheters.

## 2017-11-24 NOTE — H&P PST ADULT - ACTIVITY
Walks 4 blocks, ADLs, Climbs 2 FOS w/o symptoms Walks 4 blocks, ADLs, Climbs 2 flights of stairs w/o symptoms

## 2017-11-24 NOTE — H&P PST ADULT - PEDAL EDEMA TYPE
non-pitting/chronic  bilateral with lower leg erythema benign non-pitting/chronic bilateral LE edema non-pitting/chronic bilateral LE edema, dry scaly hard LE skin

## 2017-11-24 NOTE — H&P PST ADULT - OTHER CARE PROVIDERS
pulmonologist  Dr Silva [phone], cardiologist  Dr Houston Fuchs ( same office as PCP). pulmonologist  Dr Silva [phone], cardiologist  Dr Houston Fuchs ( same office as PCP). Dr. Cheema Uro

## 2017-11-24 NOTE — H&P PST ADULT - MUSCULOSKELETAL
details… No joint pain, swelling or deformity; no limitation of movement detailed exam no joint swelling/no joint erythema/no joint warmth/no calf tenderness

## 2017-11-24 NOTE — H&P PST ADULT - CONSTITUTIONAL DETAILS
well-nourished/well-developed/obese/well-groomed well-nourished/well-developed/well-groomed/no distress/obese

## 2017-11-24 NOTE — H&P PST ADULT - GASTROINTESTINAL DETAILS
obese surgical scar well healed left side of abdomen/bowel sounds normal/normal/soft/nontender bowel sounds normal/normal/Obese abdomen, areas of vitiligo noted/no rebound tenderness/nontender/soft

## 2017-11-24 NOTE — H&P PST ADULT - PMH
Benign neoplasm of colon, unspecified part of colon  recent diagnosis positive colonoscopy tried outpatient  excision unable to deeper needed resurgery  Cataracts, bilateral    Diabetes mellitus, type 2    Essential (primary) hypertension    Hyperlipidemia, unspecified hyperlipidemia type    Obesity (BMI 30-39.9)    PVD (peripheral vascular disease)    Stented coronary artery    Tobacco use  quit Jan 2017  Vitiligo  hands bilaterally Cataracts, bilateral    Diabetes mellitus, type 2    Essential (primary) hypertension    Hyperlipidemia, unspecified hyperlipidemia type    Obesity (BMI 30-39.9)    PVD (peripheral vascular disease)    Rectal cancer  s/p chemo radiation  Stented coronary artery    Tobacco use  quit Jan 2017  Vitiligo

## 2017-11-24 NOTE — H&P PST ADULT - PSH
H/O heart artery stent  stent x 1 and ballon angioplasty - 2009  History of cholecystectomy    S/P tonsillectomy and adenoidectomy Abnormal rectal biopsy  7/2017 rectal tumor excision  H/O heart artery stent  stent x 1 and balloon angioplasty - 2009  History of cholecystectomy    S/P tonsillectomy and adenoidectomy

## 2017-11-24 NOTE — H&P PST ADULT - VENOUS THROMBOEMBOLISM CURRENT STATUS
swollen legs/major surgery, including arthroscopic and laparoscopic (greater than 1 hr) varicose veins/present cancer or chemotherapy/swollen legs/major surgery, including arthroscopic and laparoscopic (greater than 1 hr)

## 2017-11-24 NOTE — H&P PST ADULT - PROBLEM SELECTOR PLAN 1
planned for laparoscopic robotic assisted low anterior resection, cystoscopy with insertion of ureteral catheters. planned for laparoscopic robotic assisted low anterior resection, cystoscopy with insertion of ureteral catheters.  PST Labs labs, MRSA send  preprocedure surgical scrub instructions discussed

## 2017-11-24 NOTE — H&P PST ADULT - ANESTHESIA, PREVIOUS REACTION, PROFILE
2016 Polypectomy aborted due to respiratory complication with [place] hospital stay/ diagnosed with GLENYS 2016 Polypectomy aborted due to respiratory complication with [facility] hospital stay/ diagnosed with GLENYS ( denies any intubation hx). 2016 Polypectomy aborted due difficulty breathing with [facility] stay/ diagnosed with GLENYS ( denies any intubation hx). 7/2017 [facility] s/p rectal tumor excision with no reactions.

## 2017-11-25 LAB
CULTURE RESULTS: NO GROWTH — SIGNIFICANT CHANGE UP
SPECIMEN SOURCE: SIGNIFICANT CHANGE UP

## 2017-12-04 ENCOUNTER — LABORATORY RESULT (OUTPATIENT)
Age: 67
End: 2017-12-04

## 2017-12-04 ENCOUNTER — APPOINTMENT (OUTPATIENT)
Dept: UROLOGY | Facility: CLINIC | Age: 67
End: 2017-12-04
Payer: MEDICARE

## 2017-12-04 VITALS
WEIGHT: 280 LBS | BODY MASS INDEX: 39.05 KG/M2 | RESPIRATION RATE: 16 BRPM | OXYGEN SATURATION: 92 % | HEART RATE: 92 BPM | DIASTOLIC BLOOD PRESSURE: 74 MMHG | SYSTOLIC BLOOD PRESSURE: 132 MMHG | TEMPERATURE: 98.5 F

## 2017-12-04 PROCEDURE — 51798 US URINE CAPACITY MEASURE: CPT

## 2017-12-04 PROCEDURE — 99203 OFFICE O/P NEW LOW 30 MIN: CPT

## 2017-12-06 ENCOUNTER — APPOINTMENT (OUTPATIENT)
Dept: COLORECTAL SURGERY | Facility: HOSPITAL | Age: 67
End: 2017-12-06
Payer: MEDICARE

## 2017-12-06 ENCOUNTER — INPATIENT (INPATIENT)
Facility: HOSPITAL | Age: 67
LOS: 15 days | Discharge: NURSING, REHAB OR LONG-TERM CARE | DRG: 329 | End: 2017-12-22
Attending: SURGERY | Admitting: SURGERY
Payer: MEDICARE

## 2017-12-06 ENCOUNTER — RESULT REVIEW (OUTPATIENT)
Age: 67
End: 2017-12-06

## 2017-12-06 ENCOUNTER — TRANSCRIPTION ENCOUNTER (OUTPATIENT)
Age: 67
End: 2017-12-06

## 2017-12-06 VITALS
HEART RATE: 74 BPM | SYSTOLIC BLOOD PRESSURE: 128 MMHG | TEMPERATURE: 98 F | DIASTOLIC BLOOD PRESSURE: 66 MMHG | OXYGEN SATURATION: 98 % | RESPIRATION RATE: 16 BRPM

## 2017-12-06 DIAGNOSIS — Z90.49 ACQUIRED ABSENCE OF OTHER SPECIFIED PARTS OF DIGESTIVE TRACT: Chronic | ICD-10-CM

## 2017-12-06 DIAGNOSIS — Z90.89 ACQUIRED ABSENCE OF OTHER ORGANS: Chronic | ICD-10-CM

## 2017-12-06 DIAGNOSIS — Z95.5 PRESENCE OF CORONARY ANGIOPLASTY IMPLANT AND GRAFT: Chronic | ICD-10-CM

## 2017-12-06 DIAGNOSIS — C20 MALIGNANT NEOPLASM OF RECTUM: ICD-10-CM

## 2017-12-06 DIAGNOSIS — R89.7 ABNORMAL HISTOLOGICAL FINDINGS IN SPECIMENS FROM OTHER ORGANS, SYSTEMS AND TISSUES: Chronic | ICD-10-CM

## 2017-12-06 LAB
ANION GAP SERPL CALC-SCNC: 14 MMOL/L — SIGNIFICANT CHANGE UP (ref 5–17)
ANION GAP SERPL CALC-SCNC: 16 MMOL/L — SIGNIFICANT CHANGE UP (ref 5–17)
APTT BLD: 27.3 SEC — LOW (ref 27.5–37.4)
BUN SERPL-MCNC: 16 MG/DL — SIGNIFICANT CHANGE UP (ref 7–23)
BUN SERPL-MCNC: 19 MG/DL — SIGNIFICANT CHANGE UP (ref 7–23)
CALCIUM SERPL-MCNC: 6.8 MG/DL — LOW (ref 8.4–10.5)
CALCIUM SERPL-MCNC: 7.3 MG/DL — LOW (ref 8.4–10.5)
CHLORIDE SERPL-SCNC: 103 MMOL/L — SIGNIFICANT CHANGE UP (ref 96–108)
CHLORIDE SERPL-SCNC: 104 MMOL/L — SIGNIFICANT CHANGE UP (ref 96–108)
CO2 SERPL-SCNC: 21 MMOL/L — LOW (ref 22–31)
CO2 SERPL-SCNC: 21 MMOL/L — LOW (ref 22–31)
CREAT SERPL-MCNC: 1.48 MG/DL — HIGH (ref 0.5–1.3)
CREAT SERPL-MCNC: 1.68 MG/DL — HIGH (ref 0.5–1.3)
GAS PNL BLDA: SIGNIFICANT CHANGE UP
GLUCOSE BLDC GLUCOMTR-MCNC: 15 MG/DL — CRITICAL LOW (ref 70–99)
GLUCOSE BLDC GLUCOMTR-MCNC: 156 MG/DL — HIGH (ref 70–99)
GLUCOSE BLDC GLUCOMTR-MCNC: 192 MG/DL — HIGH (ref 70–99)
GLUCOSE BLDC GLUCOMTR-MCNC: 212 MG/DL — HIGH (ref 70–99)
GLUCOSE BLDC GLUCOMTR-MCNC: 214 MG/DL — HIGH (ref 70–99)
GLUCOSE SERPL-MCNC: 192 MG/DL — HIGH (ref 70–99)
GLUCOSE SERPL-MCNC: 234 MG/DL — HIGH (ref 70–99)
HCT VFR BLD CALC: 31.7 % — LOW (ref 39–50)
HCT VFR BLD CALC: 34.6 % — LOW (ref 39–50)
HGB BLD-MCNC: 11.4 G/DL — LOW (ref 13–17)
HGB BLD-MCNC: 12.3 G/DL — LOW (ref 13–17)
INR BLD: 1.24 RATIO — HIGH (ref 0.88–1.16)
MAGNESIUM SERPL-MCNC: 1.9 MG/DL — SIGNIFICANT CHANGE UP (ref 1.6–2.6)
MAGNESIUM SERPL-MCNC: 1.9 MG/DL — SIGNIFICANT CHANGE UP (ref 1.6–2.6)
MCHC RBC-ENTMCNC: 33.8 PG — SIGNIFICANT CHANGE UP (ref 27–34)
MCHC RBC-ENTMCNC: 34.4 PG — HIGH (ref 27–34)
MCHC RBC-ENTMCNC: 35.6 GM/DL — SIGNIFICANT CHANGE UP (ref 32–36)
MCHC RBC-ENTMCNC: 36.1 GM/DL — HIGH (ref 32–36)
MCV RBC AUTO: 94.9 FL — SIGNIFICANT CHANGE UP (ref 80–100)
MCV RBC AUTO: 95.3 FL — SIGNIFICANT CHANGE UP (ref 80–100)
PHOSPHATE SERPL-MCNC: 3 MG/DL — SIGNIFICANT CHANGE UP (ref 2.5–4.5)
PHOSPHATE SERPL-MCNC: 3.4 MG/DL — SIGNIFICANT CHANGE UP (ref 2.5–4.5)
PLATELET # BLD AUTO: 246 K/UL — SIGNIFICANT CHANGE UP (ref 150–400)
PLATELET # BLD AUTO: 278 K/UL — SIGNIFICANT CHANGE UP (ref 150–400)
POTASSIUM SERPL-MCNC: 3.6 MMOL/L — SIGNIFICANT CHANGE UP (ref 3.5–5.3)
POTASSIUM SERPL-MCNC: 3.7 MMOL/L — SIGNIFICANT CHANGE UP (ref 3.5–5.3)
POTASSIUM SERPL-SCNC: 3.6 MMOL/L — SIGNIFICANT CHANGE UP (ref 3.5–5.3)
POTASSIUM SERPL-SCNC: 3.7 MMOL/L — SIGNIFICANT CHANGE UP (ref 3.5–5.3)
PROTHROM AB SERPL-ACNC: 13.4 SEC — HIGH (ref 9.8–12.7)
RBC # BLD: 3.33 M/UL — LOW (ref 4.2–5.8)
RBC # BLD: 3.65 M/UL — LOW (ref 4.2–5.8)
RBC # FLD: 12.6 % — SIGNIFICANT CHANGE UP (ref 10.3–14.5)
RBC # FLD: 12.8 % — SIGNIFICANT CHANGE UP (ref 10.3–14.5)
SODIUM SERPL-SCNC: 139 MMOL/L — SIGNIFICANT CHANGE UP (ref 135–145)
SODIUM SERPL-SCNC: 140 MMOL/L — SIGNIFICANT CHANGE UP (ref 135–145)
WBC # BLD: 10 K/UL — SIGNIFICANT CHANGE UP (ref 3.8–10.5)
WBC # BLD: 12.9 K/UL — HIGH (ref 3.8–10.5)
WBC # FLD AUTO: 10 K/UL — SIGNIFICANT CHANGE UP (ref 3.8–10.5)
WBC # FLD AUTO: 12.9 K/UL — HIGH (ref 3.8–10.5)

## 2017-12-06 PROCEDURE — 71010: CPT | Mod: 26

## 2017-12-06 PROCEDURE — 45110 REMOVAL OF RECTUM: CPT

## 2017-12-06 PROCEDURE — 52000 CYSTOURETHROSCOPY: CPT

## 2017-12-06 PROCEDURE — 88309 TISSUE EXAM BY PATHOLOGIST: CPT | Mod: 26

## 2017-12-06 PROCEDURE — 45110 REMOVAL OF RECTUM: CPT | Mod: 80

## 2017-12-06 PROCEDURE — 45330 DIAGNOSTIC SIGMOIDOSCOPY: CPT

## 2017-12-06 PROCEDURE — 93010 ELECTROCARDIOGRAM REPORT: CPT

## 2017-12-06 PROCEDURE — 88341 IMHCHEM/IMCYTCHM EA ADD ANTB: CPT | Mod: 26

## 2017-12-06 PROCEDURE — 88342 IMHCHEM/IMCYTCHM 1ST ANTB: CPT | Mod: 26

## 2017-12-06 PROCEDURE — 99291 CRITICAL CARE FIRST HOUR: CPT

## 2017-12-06 RX ORDER — ALBUMIN HUMAN 25 %
250 VIAL (ML) INTRAVENOUS ONCE
Qty: 0 | Refills: 0 | Status: COMPLETED | OUTPATIENT
Start: 2017-12-06 | End: 2017-12-06

## 2017-12-06 RX ORDER — GLUCAGON INJECTION, SOLUTION 0.5 MG/.1ML
1 INJECTION, SOLUTION SUBCUTANEOUS ONCE
Qty: 0 | Refills: 0 | Status: DISCONTINUED | OUTPATIENT
Start: 2017-12-06 | End: 2017-12-07

## 2017-12-06 RX ORDER — ONDANSETRON 8 MG/1
4 TABLET, FILM COATED ORAL EVERY 6 HOURS
Qty: 0 | Refills: 0 | Status: DISCONTINUED | OUTPATIENT
Start: 2017-12-06 | End: 2017-12-10

## 2017-12-06 RX ORDER — HYDROMORPHONE HYDROCHLORIDE 2 MG/ML
30 INJECTION INTRAMUSCULAR; INTRAVENOUS; SUBCUTANEOUS
Qty: 0 | Refills: 0 | Status: DISCONTINUED | OUTPATIENT
Start: 2017-12-06 | End: 2017-12-10

## 2017-12-06 RX ORDER — DEXTROSE 50 % IN WATER 50 %
25 SYRINGE (ML) INTRAVENOUS ONCE
Qty: 0 | Refills: 0 | Status: DISCONTINUED | OUTPATIENT
Start: 2017-12-06 | End: 2017-12-07

## 2017-12-06 RX ORDER — SODIUM CHLORIDE 9 MG/ML
1000 INJECTION, SOLUTION INTRAVENOUS
Qty: 0 | Refills: 0 | Status: DISCONTINUED | OUTPATIENT
Start: 2017-12-06 | End: 2017-12-07

## 2017-12-06 RX ORDER — HYDROMORPHONE HYDROCHLORIDE 2 MG/ML
0.5 INJECTION INTRAMUSCULAR; INTRAVENOUS; SUBCUTANEOUS
Qty: 0 | Refills: 0 | Status: DISCONTINUED | OUTPATIENT
Start: 2017-12-06 | End: 2017-12-10

## 2017-12-06 RX ORDER — PHENYLEPHRINE HYDROCHLORIDE 10 MG/ML
1 INJECTION INTRAVENOUS
Qty: 80 | Refills: 0 | Status: DISCONTINUED | OUTPATIENT
Start: 2017-12-06 | End: 2017-12-06

## 2017-12-06 RX ORDER — ONDANSETRON 8 MG/1
4 TABLET, FILM COATED ORAL ONCE
Qty: 0 | Refills: 0 | Status: DISCONTINUED | OUTPATIENT
Start: 2017-12-06 | End: 2017-12-06

## 2017-12-06 RX ORDER — DEXTROSE 50 % IN WATER 50 %
12.5 SYRINGE (ML) INTRAVENOUS ONCE
Qty: 0 | Refills: 0 | Status: DISCONTINUED | OUTPATIENT
Start: 2017-12-06 | End: 2017-12-07

## 2017-12-06 RX ORDER — NALOXONE HYDROCHLORIDE 4 MG/.1ML
0.1 SPRAY NASAL
Qty: 0 | Refills: 0 | Status: DISCONTINUED | OUTPATIENT
Start: 2017-12-06 | End: 2017-12-10

## 2017-12-06 RX ORDER — CALCIUM GLUCONATE 100 MG/ML
1 VIAL (ML) INTRAVENOUS ONCE
Qty: 0 | Refills: 0 | Status: COMPLETED | OUTPATIENT
Start: 2017-12-06 | End: 2017-12-06

## 2017-12-06 RX ORDER — INSULIN LISPRO 100/ML
VIAL (ML) SUBCUTANEOUS
Qty: 0 | Refills: 0 | Status: DISCONTINUED | OUTPATIENT
Start: 2017-12-06 | End: 2017-12-07

## 2017-12-06 RX ORDER — HYDROMORPHONE HYDROCHLORIDE 2 MG/ML
0.5 INJECTION INTRAMUSCULAR; INTRAVENOUS; SUBCUTANEOUS
Qty: 0 | Refills: 0 | Status: DISCONTINUED | OUTPATIENT
Start: 2017-12-06 | End: 2017-12-06

## 2017-12-06 RX ORDER — SODIUM CHLORIDE 9 MG/ML
1000 INJECTION, SOLUTION INTRAVENOUS
Qty: 0 | Refills: 0 | Status: DISCONTINUED | OUTPATIENT
Start: 2017-12-06 | End: 2017-12-10

## 2017-12-06 RX ORDER — HEPARIN SODIUM 5000 [USP'U]/ML
5000 INJECTION INTRAVENOUS; SUBCUTANEOUS EVERY 8 HOURS
Qty: 0 | Refills: 0 | Status: DISCONTINUED | OUTPATIENT
Start: 2017-12-06 | End: 2017-12-13

## 2017-12-06 RX ORDER — DEXTROSE 50 % IN WATER 50 %
1 SYRINGE (ML) INTRAVENOUS ONCE
Qty: 0 | Refills: 0 | Status: DISCONTINUED | OUTPATIENT
Start: 2017-12-06 | End: 2017-12-07

## 2017-12-06 RX ADMIN — Medication 200 GRAM(S): at 18:56

## 2017-12-06 RX ADMIN — PHENYLEPHRINE HYDROCHLORIDE 47.63 MICROGRAM(S)/KG/MIN: 10 INJECTION INTRAVENOUS at 18:12

## 2017-12-06 RX ADMIN — SODIUM CHLORIDE 125 MILLILITER(S): 9 INJECTION, SOLUTION INTRAVENOUS at 21:12

## 2017-12-06 RX ADMIN — HYDROMORPHONE HYDROCHLORIDE 0.5 MILLIGRAM(S): 2 INJECTION INTRAMUSCULAR; INTRAVENOUS; SUBCUTANEOUS at 19:00

## 2017-12-06 RX ADMIN — HYDROMORPHONE HYDROCHLORIDE 0.5 MILLIGRAM(S): 2 INJECTION INTRAMUSCULAR; INTRAVENOUS; SUBCUTANEOUS at 19:15

## 2017-12-06 RX ADMIN — HYDROMORPHONE HYDROCHLORIDE 30 MILLILITER(S): 2 INJECTION INTRAMUSCULAR; INTRAVENOUS; SUBCUTANEOUS at 22:05

## 2017-12-06 RX ADMIN — Medication 250 MILLILITER(S): at 19:04

## 2017-12-06 RX ADMIN — HEPARIN SODIUM 5000 UNIT(S): 5000 INJECTION INTRAVENOUS; SUBCUTANEOUS at 21:11

## 2017-12-06 RX ADMIN — SODIUM CHLORIDE 125 MILLILITER(S): 9 INJECTION, SOLUTION INTRAVENOUS at 17:11

## 2017-12-06 RX ADMIN — HYDROMORPHONE HYDROCHLORIDE 30 MILLILITER(S): 2 INJECTION INTRAMUSCULAR; INTRAVENOUS; SUBCUTANEOUS at 19:52

## 2017-12-06 RX ADMIN — Medication 4: at 18:21

## 2017-12-06 RX ADMIN — Medication 2: at 21:12

## 2017-12-06 NOTE — CHART NOTE - NSCHARTNOTEFT_GEN_A_CORE
Called to Bedside@ 1735 By Primary RN Pt's SBP in 80's . Pt on CPAP of 12, Abd moderately distended, non tender HR 72 SBP 82. Started Phenylephrine gtt @ 1mcg, ABG with LActate sent. Called [facility] and Dr. Brito Pt on increasing doses of Phenylephrine. Lactate 2.7. Reduced Cpap to 8 with minimal effect on SBP. Pt receiving pushed of Phenylephrine still with minimal effect on B/P Phenylephrine gtt at 2mcg/kg/min. Pt completely mentating A and O times 4 At 1805 removed Cpap. Dr Khan at bedside. Pt SBP slowly increasing to 140's. [facility] resident at bedside. Currently Phenylephrine gtt at 0.5 mcg. Pts ionized Calcium 0.95 giving 1gm Calcium gluconate, D/W Dr Khan, pt to be given 5% albumin. D/W [facility] resident.

## 2017-12-06 NOTE — CHART NOTE - NSCHARTNOTEFT_GEN_A_CORE
Post Operative Check    Patient is post op from a Abdominal perineal resection and is seen at bedside.  Denies chest pain, dyspnea, nausea, vomiting.    Vitals    T(C): 36 (12-06-17 @ 18:00), Max: 36.9 (12-06-17 @ 06:20)  HR: 89 (12-06-17 @ 20:30) (66 - 92)  BP: 111/64 (12-06-17 @ 20:00) (82/44 - 145/91)  RR: 18 (12-06-17 @ 20:30) (14 - 18)  SpO2: 98% (12-06-17 @ 20:30) (92% - 100%)      12-06 @ 07:01  -  12-06 @ 20:38  --------------------------------------------------------  IN:    Albumin 5%  - 250 mL: 250 mL    lactated ringers.: 500 mL    phenylephrine   Infusion: 119 mL  Total IN: 869 mL    OUT:    Indwelling Catheter - Urethral: 125 mL  Total OUT: 125 mL    Total NET: 744 mL          Labs                        12.3   12.9  )-----------( 278      ( 06 Dec 2017 17:17 )             34.6       CBC Full  -  ( 06 Dec 2017 17:17 )  WBC Count : 12.9 K/uL  Hemoglobin : 12.3 g/dL  Hematocrit : 34.6 %  Platelet Count - Automated : 278 K/uL  Mean Cell Volume : 94.9 fl  Mean Cell Hemoglobin : 33.8 pg  Mean Cell Hemoglobin Concentration : 35.6 gm/dL  Auto Neutrophil # : x  Auto Lymphocyte # : x  Auto Monocyte # : x  Auto Eosinophil # : x  Auto Basophil # : x  Auto Neutrophil % : x  Auto Lymphocyte % : x  Auto Monocyte % : x  Auto Eosinophil % : x  Auto Basophil % : x      Physical Exam  General: Lying in bed, NAD  CV: RRR  Pulm: clear, no increased work of breathing  Abdomen: soft, ND, NT  Extremities: warm and well perfused. Grossly intact.      Patient is a 67y old Male s/p robotic low anterior resection converted to open abdominal perineal resection    - Pain control PCA  - DVT ppx  - NPO  - F/u SICU consult Post Operative Check    Patient is post op from robotic low anterior resection converted to open abdominal perineal resection and is seen at bedside.  Denies chest pain, dyspnea, nausea, vomiting.    Vitals    T(C): 36 (12-06-17 @ 18:00), Max: 36.9 (12-06-17 @ 06:20)  HR: 89 (12-06-17 @ 20:30) (66 - 92)  BP: 111/64 (12-06-17 @ 20:00) (82/44 - 145/91)  RR: 18 (12-06-17 @ 20:30) (14 - 18)  SpO2: 98% (12-06-17 @ 20:30) (92% - 100%)      12-06 @ 07:01  -  12-06 @ 20:38  --------------------------------------------------------  IN:    Albumin 5%  - 250 mL: 250 mL    lactated ringers.: 500 mL    phenylephrine   Infusion: 119 mL  Total IN: 869 mL    OUT:    Indwelling Catheter - Urethral: 125 mL  Total OUT: 125 mL    Total NET: 744 mL          Labs                        12.3   12.9  )-----------( 278      ( 06 Dec 2017 17:17 )             34.6       CBC Full  -  ( 06 Dec 2017 17:17 )  WBC Count : 12.9 K/uL  Hemoglobin : 12.3 g/dL  Hematocrit : 34.6 %  Platelet Count - Automated : 278 K/uL  Mean Cell Volume : 94.9 fl  Mean Cell Hemoglobin : 33.8 pg  Mean Cell Hemoglobin Concentration : 35.6 gm/dL      Physical Exam  General: Lying in bed, NAD  CV: RRR  Pulm: clear, no increased work of breathing  Abdomen: Distended, NT, staples with small amounts of bleeding, ostomy without function, wound vac midline  Extremities: warm and well perfused. Grossly intact.  D[place] in place    Patient is a 67y old Male s/p robotic low anterior resection converted to open abdominal perineal resection    - Pain control w/ PCA  - DVT ppx  - NPO  - F/u SICU consult

## 2017-12-06 NOTE — CONSULT NOTE ADULT - SUBJECTIVE AND OBJECTIVE BOX
SICU Consult Note      HISTORY OF PRESENT ILLNESS:  67M PMHx CKD, HLD, CAD s/p PCI, DM, HTN, s/p choleystectomy, Rectal CA who presented to [facility] for planned robotic LAR. Patient went to OR and underwent robotic converted to laparoscopic converted to open APR on 12/6/17. The details of his OR course are as follows:    Anesthesia time: 9h20m  Surgical time: 6h46m  Procedure: robotic low anterior resection converted to open abdominal perineal resection  IVF: 6L; 1U pRBC; UOP 1L;     PAST MEDICAL HISTORY: Rectal cancer  Vitiligo  Stented coronary artery  CKD (chronic kidney disease), stage 3 (moderate)  Obesity (BMI 30-39.9)  Benign neoplasm of colon, unspecified part of colon  Cataracts, bilateral  Hyperlipidemia, unspecified hyperlipidemia type  Tobacco use  PVD (peripheral vascular disease)  PVD (posterior vitreous detachment), bilateral  Diabetes mellitus, type 2  Essential (primary) hypertension      PAST SURGICAL HISTORY: Abnormal rectal biopsy  History of myringotomy  History of cholecystectomy  S/P tonsillectomy and adenoidectomy  H/O heart artery stent      FAMILY HISTORY: Family history of essential hypertension  Diabetes mellitus, type 2      SOCIAL HISTORY:    CODE STATUS:     HOME MEDICATIONS:    ALLERGIES: No Known Allergies      VITAL SIGNS:  ICU Vital Signs Last 24 Hrs  T(C): 36 (06 Dec 2017 18:00), Max: 36.9 (06 Dec 2017 06:20)  T(F): 96.8 (06 Dec 2017 18:00), Max: 98.4 (06 Dec 2017 06:20)  HR: 82 (06 Dec 2017 19:30) (66 - 92)  BP: 97/81 (06 Dec 2017 18:45) (82/44 - 145/91)  BP(mean): 86 (06 Dec 2017 18:45) (55 - 113)  ABP: 123/66 (06 Dec 2017 19:30) (88/53 - 131/67)  ABP(mean): 83 (06 Dec 2017 19:30) (63 - 88)  RR: 15 (06 Dec 2017 19:30) (14 - 18)  SpO2: 96% (06 Dec 2017 19:30) (92% - 100%)      NEURO  Exam:  Meds:HYDROmorphone  Injectable 0.5 milliGRAM(s) IV Push every 10 minutes PRN Moderate Pain (4 - 6)  HYDROmorphone PCA (1 mG/mL) 30 milliLiter(s) PCA Continuous PCA Continuous  HYDROmorphone PCA (1 mG/mL) Rescue Clinician Bolus 0.5 milliGRAM(s) IV Push every 15 minutes PRN for Pain Scale GREATER THAN 6  ondansetron Injectable 4 milliGRAM(s) IV Push every 6 hours PRN Nausea  ondansetron Injectable 4 milliGRAM(s) IV Push once PRN Nausea and/or Vomiting      RESPIRATORY  Mechanical Ventilation:   ABG - ( 06 Dec 2017 17:50 )  pH: 7.33  /  pCO2: 43    /  pO2: 59    / HCO3: 22    / Base Excess: -3.3  /  SaO2: 88      Lactate: x                Exam:  Meds:    CARDIOVASCULAR    Exam:  Cardiac Rhythm:  Meds:phenylephrine    Infusion 1 MICROgram(s)/kG/Min IV Continuous <Continuous>      GI/NUTRITION  Exam:  Diet:  Meds:    GENITOURINARY/RENAL  Meds:dextrose 5%. 1000 milliLiter(s) IV Continuous <Continuous>  lactated ringers. 1000 milliLiter(s) IV Continuous <Continuous>      12-06 @ 07:01  -  12-06 @ 19:49  --------------------------------------------------------  IN:    Albumin 5%  - 250 mL: 250 mL    lactated ringers.: 500 mL    phenylephrine   Infusion: 119 mL  Total IN: 869 mL    OUT:    Indwelling Catheter - Urethral: 125 mL  Total OUT: 125 mL    Total NET: 744 mL        Weight (kg): 127.006 (12-06 @ 14:56)  12-06    140  |  103  |  16  ----------------------------<  234<H>  3.7   |  21<L>  |  1.48<H>    Ca    6.8<L>      06 Dec 2017 17:17  Phos  3.4     12-06  Mg     1.9     12-06      [ ] Daley catheter, indication: urine output monitoring in critically ill patient    HEMATOLOGIC  [ ] VTE Prophylaxis:  heparin  Injectable 5000 Unit(s) SubCutaneous every 8 hours                          12.3   12.9  )-----------( 278      ( 06 Dec 2017 17:17 )             34.6       Transfusion: [ ] PRBC	[ ] Platelets	[ ] FFP	[ ] Cryoprecipitate      INFECTIOUS DISEASES  Meds:cefoTEtan  IVPB 2 Gram(s) IV Intermittent once    RECENT CULTURES:      ENDOCRINE  Meds:dextrose 50% Injectable 12.5 Gram(s) IV Push once  dextrose 50% Injectable 25 Gram(s) IV Push once  dextrose 50% Injectable 25 Gram(s) IV Push once  dextrose Gel 1 Dose(s) Oral once PRN  glucagon  Injectable 1 milliGRAM(s) IntraMuscular once PRN  insulin lispro (HumaLOG) corrective regimen sliding scale   SubCutaneous every 3 hours    CAPILLARY BLOOD GLUCOSE      POCT Blood Glucose.: 214 mg/dL (06 Dec 2017 18:20)  POCT Blood Glucose.: 212 mg/dL (06 Dec 2017 17:08)  POCT Blood Glucose.: 156 mg/dL (06 Dec 2017 06:41)  POCT Blood Glucose.: 15 mg/dL (06 Dec 2017 06:39)      PATIENT CARE ACCESS DEVICES:  [ ] Peripheral IV  [ ] Central Venous Line	[ ] R	[ ] L	[ ] IJ	[ ] Fem	[ ] SC	Placed:   [ ] Arterial Line		[ ] R	[ ] L	[ ] Fem	[ ] Rad	[ ] Ax	Placed:   [ ] PICC:					[ ] Mediport  [ ] Urinary Catheter, Date Placed:   [x] Necessity of urinary, arterial, and venous catheters discussed    OTHER MEDICATIONS: naloxone Injectable 0.1 milliGRAM(s) IV Push every 3 minutes PRN      IMAGING STUDIES: SICU Consult Note      HISTORY OF PRESENT ILLNESS:  67M PMHx CKD, HLD, CAD s/p PCI (2009), GLENYS (home bipap), DM, HTN, s/p choleystectomy, Rectal CA s/p transanal excision/biopsy (7/2017), s/p neoadjuvant chemoradiation who presented to [facility] for planned robotic LAR resection of Rectal CA. Patient went to OR and underwent robotic converted to laparoscopic converted to open APR on 12/6/17. The details of his OR course are as follows:    Anesthesia time: 9h20m  Surgical time: 6h46m  Procedure: robotic low anterior resection converted to open abdominal perineal resection  IVF: 6L, 1U pRBC. UOP: 1L, EBL: 1L.   Airway: Grade 2, Mac 3, 8.0 ETT.     In PACU patient was started on CPAP, and became hypotensive to SBP 80's. Started on phenylephrine gtt, w/ increasing doses and rising lactate on ABG, but he maintained his mental status throughout. CPAP removed & BP recovered. Received 250mL 5% Albumen & SICU consulted as patient continued to required phenylephrine to maintain MAP>65mmHg.      Out patient providers:  Pulmonologist  Dr Silva [phone],   Cardiologist  Dr Houston Fuchs (same office as PCP).   PMD: Dr. Nikki Russell  [phone]  Uro: Dr. Cheema    PAST MEDICAL HISTORY:   Rectal cancer  Vitiligo  Stented coronary artery  CKD (chronic kidney disease), stage 3 (moderate)  Obesity (BMI 30-39.9)  Benign neoplasm of colon, unspecified part of colon  Cataracts, bilateral  Hyperlipidemia, unspecified hyperlipidemia type  Tobacco use  PVD (peripheral vascular disease)  PVD (posterior vitreous detachment), bilateral  Diabetes mellitus, type 2  Essential (primary) hypertension      PAST SURGICAL HISTORY: Abnormal rectal biopsy  History of myringotomy  History of cholecystectomy  S/P tonsillectomy and adenoidectomy  H/O heart artery stent      FAMILY HISTORY: Family history of essential hypertension  Diabetes mellitus, type 2      SOCIAL HISTORY: .     CODE STATUS: FULL    HOME MEDICATIONS:  amLODIPine 10 mg oral tablet: 1 tab(s) orally once a day (06 Dec 2017 06:27)  aspirin 81 mg oral tablet: 1 tab(s) orally once a day (06 Dec 2017 06:27)  atorvastatin 40 mg oral tablet: 1 tab(s) orally once a day (06 Dec 2017 06:27)  carvedilol 25 mg oral tablet: 1 tab(s) orally 2 times a day (06 Dec 2017 06:27)  furosemide 20 mg oral tablet: 1 tab(s) orally once a day (06 Dec 2017 06:27)  glipiZIDE 2.5 mg oral tablet, extended release: 1 tab(s) orally 2 times a day (06 Dec 2017 06:27)  isosorbide mononitrate 30 mg oral tablet, extended release: 1 tab(s) orally once a day (in the morning) (06 Dec 2017 06:27)  ramipril 10 mg oral capsule: 1 cap(s) orally once a day (06 Dec 2017 06:27)  Vitamin D3 2000 intl units oral tablet: 1 tab(s) orally once a day (06 Dec 2017 06:27)      ALLERGIES: No Known Allergies      VITAL SIGNS:  ICU Vital Signs Last 24 Hrs  T(C): 36 (06 Dec 2017 18:00), Max: 36.9 (06 Dec 2017 06:20)  T(F): 96.8 (06 Dec 2017 18:00), Max: 98.4 (06 Dec 2017 06:20)  HR: 82 (06 Dec 2017 19:30) (66 - 92)  BP: 97/81 (06 Dec 2017 18:45) (82/44 - 145/91)  BP(mean): 86 (06 Dec 2017 18:45) (55 - 113)  ABP: 123/66 (06 Dec 2017 19:30) (88/53 - 131/67)  ABP(mean): 83 (06 Dec 2017 19:30) (63 - 88)  RR: 15 (06 Dec 2017 19:30) (14 - 18)  SpO2: 96% (06 Dec 2017 19:30) (92% - 100%)      NEURO  Exam: A&Ox3, reports pain controlled  Meds:HYDROmorphone  Injectable 0.5 milliGRAM(s) IV Push every 10 minutes PRN Moderate Pain (4 - 6)  HYDROmorphone PCA (1 mG/mL) 30 milliLiter(s) PCA Continuous PCA Continuous  HYDROmorphone PCA (1 mG/mL) Rescue Clinician Bolus 0.5 milliGRAM(s) IV Push every 15 minutes PRN for Pain Scale GREATER THAN 6  ondansetron Injectable 4 milliGRAM(s) IV Push every 6 hours PRN Nausea  ondansetron Injectable 4 milliGRAM(s) IV Push once PRN Nausea and/or Vomiting      RESPIRATORY  Mechanical Ventilation: none   ABG - ( 06 Dec 2017 17:50 )  pH: 7.33  /  pCO2: 43    /  pO2: 59    / HCO3: 22    / Base Excess: -3.3  /  SaO2: 88      Lactate: x        Exam: breathing comfortably on NC  Meds: none      CARDIOVASCULAR    Exam: regular  Cardiac Rhythm: sinus on telemetry  Meds: phenylephrine    Infusion 1 MICROgram(s)/kG/Min IV Continuous <Continuous>      GI/NUTRITION  Exam: soft, mildly protuberant abdomen; midline line incision w/ VAC dressing in place holding vaccuum. L ostomy, pink, no output in appliance.  Diet: NPO  Meds: none    GENITOURINARY/RENAL  Meds:  dextrose 5%. 1000 milliLiter(s) IV Continuous <Continuous>  lactated ringers. 1000 milliLiter(s) IV Continuous <Continuous>      12-06 @ 07:01  -  12-06 @ 19:49  --------------------------------------------------------  IN:    Albumin 5%  - 250 mL: 250 mL    lactated ringers.: 500 mL    phenylephrine   Infusion: 119 mL  Total IN: 869 mL    OUT:    Indwelling Catheter - Urethral: 125 mL  Total OUT: 125 mL    Total NET: 744 mL        Weight (kg): 127.006 (12-06 @ 14:56)  12-06    140  |  103  |  16  ----------------------------<  234<H>  3.7   |  21<L>  |  1.48<H>    Ca    6.8<L>      06 Dec 2017 17:17  Phos  3.4     12-06  Mg     1.9     12-06      [x ] Daley catheter, indication: urine output monitoring in critically ill patient  Exam: draining Red tinged urine    HEMATOLOGIC  [x ] VTE Prophylaxis:  heparin  Injectable 5000 Unit(s) SubCutaneous every 8 hours                          12.3   12.9  )-----------( 278      ( 06 Dec 2017 17:17 )             34.6       Transfusion: [1 ] PRBC	[ ] Platelets	[ ] FFP	[ ] Cryoprecipitate      INFECTIOUS DISEASES  Meds:  cefoTEtan  IVPB 2 Gram(s) IV Intermittent once    RECENT CULTURES: none      ENDOCRINE  Meds:  dextrose 50% Injectable 12.5 Gram(s) IV Push once  dextrose 50% Injectable 25 Gram(s) IV Push once  dextrose 50% Injectable 25 Gram(s) IV Push once  dextrose Gel 1 Dose(s) Oral once PRN  glucagon  Injectable 1 milliGRAM(s) IntraMuscular once PRN  insulin lispro (HumaLOG) corrective regimen sliding scale   SubCutaneous every 3 hours    CAPILLARY BLOOD GLUCOSE      POCT Blood Glucose.: 214 mg/dL (06 Dec 2017 18:20)  POCT Blood Glucose.: 212 mg/dL (06 Dec 2017 17:08)  POCT Blood Glucose.: 156 mg/dL (06 Dec 2017 06:41)  POCT Blood Glucose.: 15 mg/dL (06 Dec 2017 06:39)      PATIENT CARE ACCESS DEVICES:  [x ] Peripheral IV  [ ] Central Venous Line	[ ] R	[ ] L	[ ] IJ	[ ] Fem	[ ] SC	Placed:   [x ] Arterial Line		[x ] R	[ ] L	[ ] Fem	[x ] Rad	[ ] Ax	Placed: 12/6/17  [ ] PICC:					[ ] Mediport  [x ] Urinary Catheter, Date Placed: 12/6/17  [x] Necessity of urinary, arterial, and venous catheters discussed    OTHER MEDICATIONS: naloxone Injectable 0.1 milliGRAM(s) IV Push every 3 minutes PRN      IMAGING STUDIES:  CT Abdomen and Pelvis w/ Oral Cont and w/ IV Cont (05.23.17 @ 12:16) >  BOWEL: No bowel obstruction. Appendix unremarkable.  PERITONEUM: No ascites.  VESSELS:  Mild ectasia of the infrarenal abdominal aorta which measures   2.6 cm.  RETROPERITONEUM: Subcentimeter  common iliac nodes.  Mildly prominent   bilateral inguinal nodes mostly measuring 1 cm on the right.  ABDOMINAL WALL: Within normal limits.  BONES: Sclerosis of the left inferior ischio tuberosity.    IMPRESSION: No riki pelvic mass nor significant intra-abdominal   adenopathy.  Moderate prostatic hypertrophy.      CT Chest w/ IV Cont (07.24.17 @ 11:26) >  PRESSION:     Peripheral patchy opacity within the dependent portion of the right upper   lobe may represent dependent atelectasis.  Supine and prone three-month   follow-up CT can be performed for further evaluation.    2 tiny nonspecific left upper lobe pulmonary nodules may represent foci   of impacted distal airways. These can be monitored on the follow-up   short-term CT given the above history.    Ectasia of the ascending aorta measuring about 4.1 cm.          MRI Pelvis w/wo Cont (07.24.17 @ 14:21) >  TUMOR LOCATION: Mid (5.1-10.0 cm)  Distance of lowest extent of tumor from anal verge (cm): 7.1 cm.  Distance of lowest extent of tumor from top of anal sphincter: 2.6 cm  Relationship to anterior peritoneal reflection: Superior margin of the   tumor is at the level of the anterior peritoneal reflection    TUMOR CHARACTERISTICS:   The lesion measures 3.2 x 1.5 x 3.3 cm (AP by transverse by craniocaudal)   and is located at the right lateral wall. The lesion demonstrates   internal increased T2 signal suggesting mucinous component. There is a   focal bulge of the right lateral wall however the overlying muscularis   appears intact.    T CATEGORY: T2    DISTANCE TO THE MESORECTAL FASCIA (MRF):  Shortest distance of definitive tumor border/spiculations to the MRF: 2.4   cm    EXTRAMURAL VASCULAR INVASION (EMVI): Absent    MESORECTAL LYMPH NODES AND TUMOR DEPOSITS:No    EXTRAMESORECTAL LYMPH NODES: No    ADDITIONAL COMMENTS: Enlarged prostate.    IMPRESSION:     T2 mid rectal cancer. Heterogeneous internal T2 signal suggests mucinous   component.    No evidence of marlyn disease. SICU Consult Note      HISTORY OF PRESENT ILLNESS:  67M PMHx CKD, HLD, CAD s/p PCI (2009), GLENYS (home bipap), DM, HTN, s/p choleystectomy, Rectal CA s/p transanal excision/biopsy (7/2017), s/p neoadjuvant chemoradiation who presented to [facility] for planned robotic LAR resection of Rectal CA. Patient went to OR and underwent robotic converted to laparoscopic converted to open APR on 12/6/17. The details of his OR course are as follows:    Anesthesia time: 9h20m  Surgical time: 6h46m  Procedure: robotic low anterior resection converted to open abdominal perineal resection  IVF: 6L, 1U pRBC. UOP: 1L, EBL: 1L.   Airway: Grade 2, Mac 3, 8.0 ETT.     In PACU patient was started on CPAP, and became hypotensive to SBP 80's. Started on phenylephrine gtt, w/ increasing doses and rising lactate on ABG, but he maintained his mental status throughout. CPAP removed & BP recovered. Received 250mL 5% Albumen & SICU consulted as patient continued to required phenylephrine to maintain MAP>65mmHg.      Out patient providers:  Pulmonologist  Dr Silva [phone],   Cardiologist  Dr Houston Fuchs (same office as PCP).   PMD: Dr. Nikki Russell  [phone]  Uro: Dr. Lyle    PAST MEDICAL HISTORY:   Rectal cancer  Vitiligo  Stented coronary artery  CKD (chronic kidney disease), stage 3 (moderate)  Obesity (BMI 30-39.9)  Benign neoplasm of colon, unspecified part of colon  Cataracts, bilateral  Hyperlipidemia, unspecified hyperlipidemia type  Tobacco use  PVD (peripheral vascular disease)  PVD (posterior vitreous detachment), bilateral  Diabetes mellitus, type 2  Essential (primary) hypertension      PAST SURGICAL HISTORY: Abnormal rectal biopsy  History of myringotomy  History of cholecystectomy  S/P tonsillectomy and adenoidectomy  H/O heart artery stent      FAMILY HISTORY: Family history of essential hypertension  Diabetes mellitus, type 2      SOCIAL HISTORY: .     CODE STATUS: FULL    HOME MEDICATIONS:  amLODIPine 10 mg oral tablet: 1 tab(s) orally once a day (06 Dec 2017 06:27)  aspirin 81 mg oral tablet: 1 tab(s) orally once a day (06 Dec 2017 06:27)  atorvastatin 40 mg oral tablet: 1 tab(s) orally once a day (06 Dec 2017 06:27)  carvedilol 25 mg oral tablet: 1 tab(s) orally 2 times a day (06 Dec 2017 06:27)  furosemide 20 mg oral tablet: 1 tab(s) orally once a day (06 Dec 2017 06:27)  glipiZIDE 2.5 mg oral tablet, extended release: 1 tab(s) orally 2 times a day (06 Dec 2017 06:27)  isosorbide mononitrate 30 mg oral tablet, extended release: 1 tab(s) orally once a day (in the morning) (06 Dec 2017 06:27)  ramipril 10 mg oral capsule: 1 cap(s) orally once a day (06 Dec 2017 06:27)  Vitamin D3 2000 intl units oral tablet: 1 tab(s) orally once a day (06 Dec 2017 06:27)      ALLERGIES: No Known Allergies      VITAL SIGNS:  ICU Vital Signs Last 24 Hrs  T(C): 36 (06 Dec 2017 18:00), Max: 36.9 (06 Dec 2017 06:20)  T(F): 96.8 (06 Dec 2017 18:00), Max: 98.4 (06 Dec 2017 06:20)  HR: 82 (06 Dec 2017 19:30) (66 - 92)  BP: 97/81 (06 Dec 2017 18:45) (82/44 - 145/91)  BP(mean): 86 (06 Dec 2017 18:45) (55 - 113)  ABP: 123/66 (06 Dec 2017 19:30) (88/53 - 131/67)  ABP(mean): 83 (06 Dec 2017 19:30) (63 - 88)  RR: 15 (06 Dec 2017 19:30) (14 - 18)  SpO2: 96% (06 Dec 2017 19:30) (92% - 100%)      NEURO  Exam: A&Ox3, reports pain controlled  Meds:HYDROmorphone  Injectable 0.5 milliGRAM(s) IV Push every 10 minutes PRN Moderate Pain (4 - 6)  HYDROmorphone PCA (1 mG/mL) 30 milliLiter(s) PCA Continuous PCA Continuous  HYDROmorphone PCA (1 mG/mL) Rescue Clinician Bolus 0.5 milliGRAM(s) IV Push every 15 minutes PRN for Pain Scale GREATER THAN 6  ondansetron Injectable 4 milliGRAM(s) IV Push every 6 hours PRN Nausea  ondansetron Injectable 4 milliGRAM(s) IV Push once PRN Nausea and/or Vomiting      RESPIRATORY  Mechanical Ventilation: none   ABG - ( 06 Dec 2017 17:50 )  pH: 7.33  /  pCO2: 43    /  pO2: 59    / HCO3: 22    / Base Excess: -3.3  /  SaO2: 88      Lactate: x        Exam: breathing comfortably on NC  Meds: none      CARDIOVASCULAR    Exam: regular  Cardiac Rhythm: sinus on telemetry  Meds: phenylephrine    Infusion 1 MICROgram(s)/kG/Min IV Continuous <Continuous>      GI/NUTRITION  Exam: soft, mildly protuberant abdomen; midline line incision w/ VAC dressing in place holding vaccuum. L ostomy, pink, no output in appliance.  Diet: NPO  Meds: none    GENITOURINARY/RENAL  Meds:  dextrose 5%. 1000 milliLiter(s) IV Continuous <Continuous>  lactated ringers. 1000 milliLiter(s) IV Continuous <Continuous>      12-06 @ 07:01  -  12-06 @ 19:49  --------------------------------------------------------  IN:    Albumin 5%  - 250 mL: 250 mL    lactated ringers.: 500 mL    phenylephrine   Infusion: 119 mL  Total IN: 869 mL    OUT:    Indwelling Catheter - Urethral: 125 mL  Total OUT: 125 mL    Total NET: 744 mL        Weight (kg): 127.006 (12-06 @ 14:56)  12-06    140  |  103  |  16  ----------------------------<  234<H>  3.7   |  21<L>  |  1.48<H>    Ca    6.8<L>      06 Dec 2017 17:17  Phos  3.4     12-06  Mg     1.9     12-06      [x ] Daley catheter, indication: urine output monitoring in critically ill patient  Exam: draining Red tinged urine    HEMATOLOGIC  [x ] VTE Prophylaxis:  heparin  Injectable 5000 Unit(s) SubCutaneous every 8 hours                          12.3   12.9  )-----------( 278      ( 06 Dec 2017 17:17 )             34.6       Transfusion: [1 ] PRBC	[ ] Platelets	[ ] FFP	[ ] Cryoprecipitate      INFECTIOUS DISEASES  Meds:  cefoTEtan  IVPB 2 Gram(s) IV Intermittent once    RECENT CULTURES: none      ENDOCRINE  Meds:  dextrose 50% Injectable 12.5 Gram(s) IV Push once  dextrose 50% Injectable 25 Gram(s) IV Push once  dextrose 50% Injectable 25 Gram(s) IV Push once  dextrose Gel 1 Dose(s) Oral once PRN  glucagon  Injectable 1 milliGRAM(s) IntraMuscular once PRN  insulin lispro (HumaLOG) corrective regimen sliding scale   SubCutaneous every 3 hours    CAPILLARY BLOOD GLUCOSE      POCT Blood Glucose.: 214 mg/dL (06 Dec 2017 18:20)  POCT Blood Glucose.: 212 mg/dL (06 Dec 2017 17:08)  POCT Blood Glucose.: 156 mg/dL (06 Dec 2017 06:41)  POCT Blood Glucose.: 15 mg/dL (06 Dec 2017 06:39)      PATIENT CARE ACCESS DEVICES:  [x ] Peripheral IV  [ ] Central Venous Line	[ ] R	[ ] L	[ ] IJ	[ ] Fem	[ ] SC	Placed:   [x ] Arterial Line		[x ] R	[ ] L	[ ] Fem	[x ] Rad	[ ] Ax	Placed: 12/6/17  [ ] PICC:					[ ] Mediport  [x ] Urinary Catheter, Date Placed: 12/6/17  [x] Necessity of urinary, arterial, and venous catheters discussed    OTHER MEDICATIONS: naloxone Injectable 0.1 milliGRAM(s) IV Push every 3 minutes PRN      IMAGING STUDIES:  CT Abdomen and Pelvis w/ Oral Cont and w/ IV Cont (05.23.17 @ 12:16) >  BOWEL: No bowel obstruction. Appendix unremarkable.  PERITONEUM: No ascites.  VESSELS:  Mild ectasia of the infrarenal abdominal aorta which measures   2.6 cm.  RETROPERITONEUM: Subcentimeter  common iliac nodes.  Mildly prominent   bilateral inguinal nodes mostly measuring 1 cm on the right.  ABDOMINAL WALL: Within normal limits.  BONES: Sclerosis of the left inferior ischio tuberosity.    IMPRESSION: No riki pelvic mass nor significant intra-abdominal   adenopathy.  Moderate prostatic hypertrophy.      CT Chest w/ IV Cont (07.24.17 @ 11:26) >  PRESSION:     Peripheral patchy opacity within the dependent portion of the right upper   lobe may represent dependent atelectasis.  Supine and prone three-month   follow-up CT can be performed for further evaluation.    2 tiny nonspecific left upper lobe pulmonary nodules may represent foci   of impacted distal airways. These can be monitored on the follow-up   short-term CT given the above history.    Ectasia of the ascending aorta measuring about 4.1 cm.          MRI Pelvis w/wo Cont (07.24.17 @ 14:21) >  TUMOR LOCATION: Mid (5.1-10.0 cm)  Distance of lowest extent of tumor from anal verge (cm): 7.1 cm.  Distance of lowest extent of tumor from top of anal sphincter: 2.6 cm  Relationship to anterior peritoneal reflection: Superior margin of the   tumor is at the level of the anterior peritoneal reflection    TUMOR CHARACTERISTICS:   The lesion measures 3.2 x 1.5 x 3.3 cm (AP by transverse by craniocaudal)   and is located at the right lateral wall. The lesion demonstrates   internal increased T2 signal suggesting mucinous component. There is a   focal bulge of the right lateral wall however the overlying muscularis   appears intact.    T CATEGORY: T2    DISTANCE TO THE MESORECTAL FASCIA (MRF):  Shortest distance of definitive tumor border/spiculations to the MRF: 2.4   cm    EXTRAMURAL VASCULAR INVASION (EMVI): Absent    MESORECTAL LYMPH NODES AND TUMOR DEPOSITS:No    EXTRAMESORECTAL LYMPH NODES: No    ADDITIONAL COMMENTS: Enlarged prostate.    IMPRESSION:     T2 mid rectal cancer. Heterogeneous internal T2 signal suggests mucinous   component.    No evidence of marlyn disease.

## 2017-12-06 NOTE — CONSULT NOTE ADULT - ASSESSMENT
67M PMHx Rectal CA s/p transanal excision/biopsy (7/2017), s/p neoadjuvant chemoradiation now s/p open APR, admitted to SICU for hemodynamic monitoring requiring phenylephrine gtt post-operatively.     Neuro: post-operative pain  Dilaudid PCA, acute pain following    Pulm: GLENYS   -CPAP at night as tolerated    CV: post-operative hypotension, HTN, HLD  -con't phenylephrine gtt, MAP>65mmHg   -hold home amlodipine atorvastatin, carvedilol, isosorbide mononitrate, ramipril, ASA    GI: Rectal CA s/p open APR  -NPO    /Renal: CKD  -con't bartholomew  -hold home lasix    Endo: DM, Hgb A1C 7.6%  -lispro ISS    ID: no acute issues    Dispo:   -FULL CODE  -con't SICU care      Please contact SICU resident [phone] or [phone] with questions/concerns. 67M PMHx Rectal CA s/p transanal excision/biopsy (7/2017), s/p neoadjuvant chemoradiation now s/p open APR, admitted to SICU for hemodynamic monitoring requiring phenylephrine gtt post-operatively.     Neuro: post-operative pain  -Dilaudid PCA, acute pain following    Pulm: GLENYS   -CPAP at night as tolerated    CV: post-operative hypotension, HTN, HLD  -con't phenylephrine gtt, MAP>65mmHg  -con't R radial A-line  -hold home amlodipine atorvastatin, carvedilol, isosorbide mononitrate, ramipril, ASA    GI: Rectal CA s/p open APR  -NPO    Heme: DVTppx; operative blood loss  -SQHq8  -serial CBC    /Renal: CKD  -con't bartholomew  -hold home lasix    Endo: DM, Hgb A1C 7.6%  -lispro ISS    ID: no acute issues    Dispo:   -FULL CODE  -con't SICU care      Please contact SICU resident [phone] or [phone] with questions/concerns.

## 2017-12-07 ENCOUNTER — APPOINTMENT (OUTPATIENT)
Age: 67
End: 2017-12-07

## 2017-12-07 LAB
ANION GAP SERPL CALC-SCNC: 13 MMOL/L — SIGNIFICANT CHANGE UP (ref 5–17)
APPEARANCE: CLEAR
BACTERIA UR CULT: NORMAL
BILIRUBIN URINE: NEGATIVE
BLD GP AB SCN SERPL QL: NEGATIVE — SIGNIFICANT CHANGE UP
BLOOD URINE: NEGATIVE
BUN SERPL-MCNC: 22 MG/DL — SIGNIFICANT CHANGE UP (ref 7–23)
CALCIUM SERPL-MCNC: 7.5 MG/DL — LOW (ref 8.4–10.5)
CHLORIDE SERPL-SCNC: 102 MMOL/L — SIGNIFICANT CHANGE UP (ref 96–108)
CO2 SERPL-SCNC: 23 MMOL/L — SIGNIFICANT CHANGE UP (ref 22–31)
COLOR: YELLOW
CREAT ?TM UR-MCNC: 222 MG/DL — SIGNIFICANT CHANGE UP
CREAT SERPL-MCNC: 1.86 MG/DL — HIGH (ref 0.5–1.3)
GAS PNL BLDA: SIGNIFICANT CHANGE UP
GLUCOSE BLDC GLUCOMTR-MCNC: 156 MG/DL — HIGH (ref 70–99)
GLUCOSE BLDC GLUCOMTR-MCNC: 167 MG/DL — HIGH (ref 70–99)
GLUCOSE BLDC GLUCOMTR-MCNC: 178 MG/DL — HIGH (ref 70–99)
GLUCOSE BLDC GLUCOMTR-MCNC: 183 MG/DL — HIGH (ref 70–99)
GLUCOSE BLDC GLUCOMTR-MCNC: 186 MG/DL — HIGH (ref 70–99)
GLUCOSE BLDC GLUCOMTR-MCNC: 193 MG/DL — HIGH (ref 70–99)
GLUCOSE BLDC GLUCOMTR-MCNC: 199 MG/DL — HIGH (ref 70–99)
GLUCOSE QUALITATIVE U: 100 MG/DL
GLUCOSE SERPL-MCNC: 211 MG/DL — HIGH (ref 70–99)
HCT VFR BLD CALC: 32.1 % — LOW (ref 39–50)
HGB BLD-MCNC: 11 G/DL — LOW (ref 13–17)
KETONES URINE: NEGATIVE
LEUKOCYTE ESTERASE URINE: NEGATIVE
MAGNESIUM SERPL-MCNC: 2.3 MG/DL — SIGNIFICANT CHANGE UP (ref 1.6–2.6)
MCHC RBC-ENTMCNC: 32.8 PG — SIGNIFICANT CHANGE UP (ref 27–34)
MCHC RBC-ENTMCNC: 34.2 GM/DL — SIGNIFICANT CHANGE UP (ref 32–36)
MCV RBC AUTO: 95.8 FL — SIGNIFICANT CHANGE UP (ref 80–100)
NITRITE URINE: NEGATIVE
OSMOLALITY UR: 605 MOS/KG — SIGNIFICANT CHANGE UP (ref 300–900)
PH URINE: 6.5
PHOSPHATE SERPL-MCNC: 3.2 MG/DL — SIGNIFICANT CHANGE UP (ref 2.5–4.5)
PLATELET # BLD AUTO: 198 K/UL — SIGNIFICANT CHANGE UP (ref 150–400)
POTASSIUM SERPL-MCNC: 3.9 MMOL/L — SIGNIFICANT CHANGE UP (ref 3.5–5.3)
POTASSIUM SERPL-SCNC: 3.9 MMOL/L — SIGNIFICANT CHANGE UP (ref 3.5–5.3)
PROTEIN URINE: 100 MG/DL
RBC # BLD: 3.35 M/UL — LOW (ref 4.2–5.8)
RBC # FLD: 12.9 % — SIGNIFICANT CHANGE UP (ref 10.3–14.5)
RH IG SCN BLD-IMP: NEGATIVE — SIGNIFICANT CHANGE UP
SODIUM SERPL-SCNC: 138 MMOL/L — SIGNIFICANT CHANGE UP (ref 135–145)
SODIUM UR-SCNC: 20 MMOL/L — SIGNIFICANT CHANGE UP
SPECIFIC GRAVITY URINE: 1.02
UROBILINOGEN URINE: 1 MG/DL
WBC # BLD: 9.7 K/UL — SIGNIFICANT CHANGE UP (ref 3.8–10.5)
WBC # FLD AUTO: 9.7 K/UL — SIGNIFICANT CHANGE UP (ref 3.8–10.5)

## 2017-12-07 PROCEDURE — 71010: CPT | Mod: 26

## 2017-12-07 PROCEDURE — 99232 SBSQ HOSP IP/OBS MODERATE 35: CPT

## 2017-12-07 RX ORDER — CALCIUM GLUCONATE 100 MG/ML
2 VIAL (ML) INTRAVENOUS ONCE
Qty: 0 | Refills: 0 | Status: COMPLETED | OUTPATIENT
Start: 2017-12-07 | End: 2017-12-07

## 2017-12-07 RX ORDER — DEXTROSE 50 % IN WATER 50 %
1 SYRINGE (ML) INTRAVENOUS ONCE
Qty: 0 | Refills: 0 | Status: DISCONTINUED | OUTPATIENT
Start: 2017-12-07 | End: 2017-12-12

## 2017-12-07 RX ORDER — INSULIN LISPRO 100/ML
VIAL (ML) SUBCUTANEOUS EVERY 4 HOURS
Qty: 0 | Refills: 0 | Status: DISCONTINUED | OUTPATIENT
Start: 2017-12-07 | End: 2017-12-07

## 2017-12-07 RX ORDER — POTASSIUM CHLORIDE 20 MEQ
10 PACKET (EA) ORAL
Qty: 0 | Refills: 0 | Status: COMPLETED | OUTPATIENT
Start: 2017-12-07 | End: 2017-12-07

## 2017-12-07 RX ORDER — GLUCAGON INJECTION, SOLUTION 0.5 MG/.1ML
1 INJECTION, SOLUTION SUBCUTANEOUS ONCE
Qty: 0 | Refills: 0 | Status: DISCONTINUED | OUTPATIENT
Start: 2017-12-07 | End: 2017-12-12

## 2017-12-07 RX ORDER — DEXTROSE 50 % IN WATER 50 %
12.5 SYRINGE (ML) INTRAVENOUS ONCE
Qty: 0 | Refills: 0 | Status: DISCONTINUED | OUTPATIENT
Start: 2017-12-07 | End: 2017-12-12

## 2017-12-07 RX ORDER — MAGNESIUM SULFATE 500 MG/ML
1 VIAL (ML) INJECTION ONCE
Qty: 0 | Refills: 0 | Status: COMPLETED | OUTPATIENT
Start: 2017-12-07 | End: 2017-12-07

## 2017-12-07 RX ORDER — INSULIN LISPRO 100/ML
VIAL (ML) SUBCUTANEOUS AT BEDTIME
Qty: 0 | Refills: 0 | Status: DISCONTINUED | OUTPATIENT
Start: 2017-12-07 | End: 2017-12-12

## 2017-12-07 RX ORDER — DEXTROSE 50 % IN WATER 50 %
25 SYRINGE (ML) INTRAVENOUS ONCE
Qty: 0 | Refills: 0 | Status: DISCONTINUED | OUTPATIENT
Start: 2017-12-07 | End: 2017-12-12

## 2017-12-07 RX ORDER — INSULIN LISPRO 100/ML
VIAL (ML) SUBCUTANEOUS
Qty: 0 | Refills: 0 | Status: DISCONTINUED | OUTPATIENT
Start: 2017-12-07 | End: 2017-12-12

## 2017-12-07 RX ORDER — SODIUM CHLORIDE 9 MG/ML
1000 INJECTION, SOLUTION INTRAVENOUS
Qty: 0 | Refills: 0 | Status: DISCONTINUED | OUTPATIENT
Start: 2017-12-07 | End: 2017-12-12

## 2017-12-07 RX ADMIN — Medication 100 MILLIEQUIVALENT(S): at 04:14

## 2017-12-07 RX ADMIN — Medication 2: at 05:57

## 2017-12-07 RX ADMIN — Medication 100 GRAM(S): at 01:23

## 2017-12-07 RX ADMIN — Medication 200 GRAM(S): at 06:07

## 2017-12-07 RX ADMIN — HYDROMORPHONE HYDROCHLORIDE 30 MILLILITER(S): 2 INJECTION INTRAMUSCULAR; INTRAVENOUS; SUBCUTANEOUS at 07:20

## 2017-12-07 RX ADMIN — HYDROMORPHONE HYDROCHLORIDE 30 MILLILITER(S): 2 INJECTION INTRAMUSCULAR; INTRAVENOUS; SUBCUTANEOUS at 19:03

## 2017-12-07 RX ADMIN — HEPARIN SODIUM 5000 UNIT(S): 5000 INJECTION INTRAVENOUS; SUBCUTANEOUS at 14:02

## 2017-12-07 RX ADMIN — Medication 2: at 04:09

## 2017-12-07 RX ADMIN — Medication 100 MILLIEQUIVALENT(S): at 01:23

## 2017-12-07 RX ADMIN — HEPARIN SODIUM 5000 UNIT(S): 5000 INJECTION INTRAVENOUS; SUBCUTANEOUS at 05:57

## 2017-12-07 RX ADMIN — SODIUM CHLORIDE 125 MILLILITER(S): 9 INJECTION, SOLUTION INTRAVENOUS at 01:22

## 2017-12-07 RX ADMIN — SODIUM CHLORIDE 125 MILLILITER(S): 9 INJECTION, SOLUTION INTRAVENOUS at 13:02

## 2017-12-07 RX ADMIN — Medication 2: at 09:25

## 2017-12-07 RX ADMIN — Medication 2: at 18:42

## 2017-12-07 RX ADMIN — Medication 2: at 01:22

## 2017-12-07 RX ADMIN — Medication 100 MILLIEQUIVALENT(S): at 02:53

## 2017-12-07 RX ADMIN — HEPARIN SODIUM 5000 UNIT(S): 5000 INJECTION INTRAVENOUS; SUBCUTANEOUS at 21:49

## 2017-12-07 RX ADMIN — Medication 2: at 14:03

## 2017-12-07 NOTE — PROGRESS NOTE ADULT - SUBJECTIVE AND OBJECTIVE BOX
SICU Progress Note    HISTORY  67M PMHx CKD, HLD, CAD s/p PCI (2009), GLENYS (home bipap), DM, HTN, s/p choleystectomy, Rectal CA s/p transanal excision/biopsy (7/2017), s/p neoadjuvant chemoradiation who presented to [facility] for planned robotic LAR resection of Rectal CA. Patient went to OR and underwent robotic converted to laparoscopic converted to open APR on 12/6/17. The details of his OR course are as follows:    Anesthesia time: 9h20m  Surgical time: 6h46m  Procedure: robotic low anterior resection converted to open abdominal perineal resection  IVF: 6L, 1U pRBC. UOP: 1L, EBL: 1L.   Airway: Grade 2, Mac 3, 8.0 ETT.     In PACU patient was started on CPAP, and became hypotensive to SBP 80's. Started on phenylephrine gtt, w/ increasing doses and rising lactate on ABG, but he maintained his mental status throughout. CPAP removed & BP recovered. Received 250mL 5% Albumen & SICU consulted as patient continued to required phenylephrine to maintain MAP>65mmHg.      24 HOUR EVENTS:  -weaned off Alexandre  -tolerating Bipap    SUBJECTIVE/ROS:  [x ] A ten-point review of systems was otherwise negative except as noted.  [ ] Due to altered mental status/intubation, subjective information were not able to be obtained from the patient. History was obtained, to the extent possible, from review of the chart and collateral sources of information.      NEURO  RASS: 0  Exam: A&Ox3  Meds: HYDROmorphone PCA (1 mG/mL) 30 milliLiter(s) PCA Continuous PCA Continuous  HYDROmorphone PCA (1 mG/mL) Rescue Clinician Bolus 0.5 milliGRAM(s) IV Push every 15 minutes PRN for Pain Scale GREATER THAN 6  ondansetron Injectable 4 milliGRAM(s) IV Push every 6 hours PRN Nausea    [x] Adequacy of sedation and pain control has been assessed and adjusted      RESPIRATORY  RR: 14 (12-07-17 @ 04:00) (14 - 26)  SpO2: 94% (12-07-17 @ 04:34) (84% - 100%)  Wt(kg): --  Exam: unlabored, breathing comfortably on NC  Mechanical Ventilation:   ABG - ( 07 Dec 2017 03:06 )  pH: 7.32  /  pCO2: 47    /  pO2: 104   / HCO3: 23    / Base Excess: -2.2  /  SaO2: 98      Lactate: x        [n/a ] Extubation Readiness Assessed  Meds: none      CARDIOVASCULAR  HR: 91 (12-07-17 @ 04:34) (66 - 98)  BP: 106/62 (12-06-17 @ 21:50) (82/44 - 145/91)  BP(mean): 78 (12-06-17 @ 21:50) (55 - 113)  ABP: 116/62 (12-07-17 @ 04:00) (74/62 - 131/67)  ABP(mean): 79 (12-07-17 @ 04:00) (63 - 88)  Wt(kg): --  CVP(cm H2O): --      Exam: regular  Cardiac Rhythm: sinus  Perfusion     [ x]Adequate   [ ]Inadequate  Mentation   [ x]Normal       [ ]Reduced  Extremities  [ x]Warm         [ ]Cool  Volume Status [ ]Hypervolemic [x ]Euvolemic [ ]Hypovolemic  Meds: none      GI/NUTRITION  Exam: soft, midline incision w/ VAC dressing in place; ostomy pink, no output in appliance  Diet: NPO  Meds: none    GENITOURINARY  I&O's Detail    12-06 @ 07:01  -  12-07 @ 05:06  --------------------------------------------------------  IN:    Albumin 5%  - 250 mL: 250 mL    lactated ringers.: 1500 mL    phenylephrine   Infusion: 129 mL    Solution: 100 mL    Solution: 300 mL  Total IN: 2279 mL    OUT:    Indwelling Catheter - Urethral: 515 mL  Total OUT: 515 mL    Total NET: 1764 mL        Weight (kg): 130.9 (12-06 @ 21:50)  12-07    138  |  102  |  22  ----------------------------<  211<H>  3.9   |  23  |  1.86<H>    Ca    7.5<L>      07 Dec 2017 03:04  Phos  3.2     12-07  Mg     2.3     12-07      [x] Daley catheter, indication: critically ill   Meds: calcium gluconate IVPB 2 Gram(s) IV Intermittent once  lactated ringers. 1000 milliLiter(s) IV Continuous <Continuous>        HEMATOLOGIC  Meds: heparin  Injectable 5000 Unit(s) SubCutaneous every 8 hours    [x] VTE Prophylaxis                        11.0   9.7   )-----------( 198      ( 07 Dec 2017 03:04 )             32.1     PT/INR - ( 06 Dec 2017 23:02 )   PT: 13.4 sec;   INR: 1.24 ratio    PTT - ( 06 Dec 2017 23:02 )  PTT:27.3 sec    Transfusion: 24hr     [1 ] PRBC   [ ] Platelets   [ ] FFP   [ ] Cryoprecipitate      INFECTIOUS DISEASES  T(C): 36.9 (12-07-17 @ 03:00), Max: 36.9 (12-06-17 @ 06:20)  Wt(kg): --  WBC Count: 9.7 K/uL (12-07 @ 03:04)  WBC Count: 10.0 K/uL (12-06 @ 23:02)  WBC Count: 12.9 K/uL (12-06 @ 17:17)    Recent Cultures: none    Meds: none      ENDOCRINE  Capillary Blood Glucose    Meds: insulin lispro (HumaLOG) corrective regimen sliding scale   SubCutaneous every 3 hours        ACCESS DEVICES:  [x ] Peripheral IV  [ ] Central Venous Line	[ ] R	[ ] L	[ ] IJ	[ ] Fem	[ ] SC	Placed:   [x ] Arterial Line		[x ] R	[ ] L	[ ] Fem	[x ] Rad	[ ] Ax	Placed: 12/6/17  [ ] PICC:					[ ] Mediport  [x ] Urinary Catheter, Date Placed: 12/6/17  [x] Necessity of urinary, arterial, and venous catheters discussed    OTHER MEDICATIONS:  naloxone Injectable 0.1 milliGRAM(s) IV Push every 3 minutes PRN      CODE STATUS: FULL    IMAGING: none past 24hr SICU Progress Note    HISTORY  67M PMHx CKD, HLD, CAD s/p PCI (2009), GLENYS (home bipap), DM, HTN, s/p choleystectomy, Rectal CA s/p transanal excision/biopsy (7/2017), s/p neoadjuvant chemoradiation who presented to [facility] for planned robotic LAR resection of Rectal CA. Patient went to OR and underwent robotic converted to laparoscopic converted to open APR on 12/6/17. The details of his OR course are as follows:    Anesthesia time: 9h20m  Surgical time: 6h46m  Procedure: robotic low anterior resection converted to open abdominal perineal resection  IVF: 6L, 1U pRBC. UOP: 1L, EBL: 1L.   Airway: Grade 2, Mac 3, 8.0 ETT.     In PACU patient was started on CPAP, and became hypotensive to SBP 80's. Started on phenylephrine gtt, w/ increasing doses and rising lactate on ABG, but he maintained his mental status throughout. CPAP removed & BP recovered. Received 250mL 5% Albumen & SICU consulted as patient continued to required phenylephrine to maintain MAP>65mmHg.      24 HOUR EVENTS:  -weaned off Alexandre  -tolerating Bipap    SUBJECTIVE/ROS:  [x ] A ten-point review of systems was otherwise negative except as noted.  [ ] Due to altered mental status/intubation, subjective information were not able to be obtained from the patient. History was obtained, to the extent possible, from review of the chart and collateral sources of information.      NEURO  RASS: 0  Exam: A&Ox3  Meds: HYDROmorphone PCA (1 mG/mL) 30 milliLiter(s) PCA Continuous PCA Continuous  HYDROmorphone PCA (1 mG/mL) Rescue Clinician Bolus 0.5 milliGRAM(s) IV Push every 15 minutes PRN for Pain Scale GREATER THAN 6  ondansetron Injectable 4 milliGRAM(s) IV Push every 6 hours PRN Nausea    [x] Adequacy of sedation and pain control has been assessed and adjusted      RESPIRATORY  RR: 14 (12-07-17 @ 04:00) (14 - 26)  SpO2: 94% (12-07-17 @ 04:34) (84% - 100%)  Wt(kg): --  Exam: unlabored, breathing comfortably on NC  Mechanical Ventilation:   ABG - ( 07 Dec 2017 03:06 )  pH: 7.32  /  pCO2: 47    /  pO2: 104   / HCO3: 23    / Base Excess: -2.2  /  SaO2: 98      Lactate: x        [n/a ] Extubation Readiness Assessed  Meds: none      CARDIOVASCULAR  HR: 91 (12-07-17 @ 04:34) (66 - 98)  BP: 106/62 (12-06-17 @ 21:50) (82/44 - 145/91)  BP(mean): 78 (12-06-17 @ 21:50) (55 - 113)  ABP: 116/62 (12-07-17 @ 04:00) (74/62 - 131/67)  ABP(mean): 79 (12-07-17 @ 04:00) (63 - 88)  Wt(kg): --  CVP(cm H2O): --      Exam: regular  Cardiac Rhythm: sinus  Perfusion     [ x]Adequate   [ ]Inadequate  Mentation   [ x]Normal       [ ]Reduced  Extremities  [ x]Warm         [ ]Cool  Volume Status [ ]Hypervolemic [x ]Euvolemic [ ]Hypovolemic  Meds: none      GI/NUTRITION  Exam: soft, midline incision w/ VAC dressing in place; ostomy pink, no output in appliance  Diet: NPO  Meds: none    GENITOURINARY  I&O's Detail    06 Dec 2017 07:01  -  07 Dec 2017 07:00  --------------------------------------------------------  IN:    Albumin 5%  - 250 mL: 250 mL    lactated ringers.: 1875 mL    phenylephrine   Infusion: 129 mL    Solution: 100 mL    Solution: 300 mL    Solution: 100 mL  Total IN: 2754 mL    OUT:    Indwelling Catheter - Urethral: 610 mL  Total OUT: 610 mL    Total NET: 2144 mL          Weight (kg): 130.9 (12-06 @ 21:50)  12-07    138  |  102  |  22  ----------------------------<  211<H>  3.9   |  23  |  1.86<H>    Ca    7.5<L>      07 Dec 2017 03:04  Phos  3.2     12-07  Mg     2.3     12-07      [x] Daley catheter, indication: critically ill   Meds: calcium gluconate IVPB 2 Gram(s) IV Intermittent once  lactated ringers. 1000 milliLiter(s) IV Continuous <Continuous>        HEMATOLOGIC  Meds: heparin  Injectable 5000 Unit(s) SubCutaneous every 8 hours    [x] VTE Prophylaxis                        11.0   9.7   )-----------( 198      ( 07 Dec 2017 03:04 )             32.1     PT/INR - ( 06 Dec 2017 23:02 )   PT: 13.4 sec;   INR: 1.24 ratio    PTT - ( 06 Dec 2017 23:02 )  PTT:27.3 sec    Transfusion: 24hr     [1 ] PRBC   [ ] Platelets   [ ] FFP   [ ] Cryoprecipitate      INFECTIOUS DISEASES  T(C): 36.9 (12-07-17 @ 03:00), Max: 36.9 (12-06-17 @ 06:20)  Wt(kg): --  WBC Count: 9.7 K/uL (12-07 @ 03:04)  WBC Count: 10.0 K/uL (12-06 @ 23:02)  WBC Count: 12.9 K/uL (12-06 @ 17:17)    Recent Cultures: none    Meds: none      ENDOCRINE  Capillary Blood Glucose    Meds: insulin lispro (HumaLOG) corrective regimen sliding scale   SubCutaneous every 3 hours        ACCESS DEVICES:  [x ] Peripheral IV  [ ] Central Venous Line	[ ] R	[ ] L	[ ] IJ	[ ] Fem	[ ] SC	Placed:   [x ] Arterial Line		[x ] R	[ ] L	[ ] Fem	[x ] Rad	[ ] Ax	Placed: 12/6/17  [ ] PICC:					[ ] Mediport  [x ] Urinary Catheter, Date Placed: 12/6/17  [x] Necessity of urinary, arterial, and venous catheters discussed    OTHER MEDICATIONS:  naloxone Injectable 0.1 milliGRAM(s) IV Push every 3 minutes PRN      CODE STATUS: FULL    IMAGING: none past 24hr

## 2017-12-07 NOTE — PROGRESS NOTE ADULT - SUBJECTIVE AND OBJECTIVE BOX
Red Team Surgery Floor Accept Note    SUBJECTIVE: Pt seen and examined at bedside. Patient comfortable and in no-apparent distress. Patient has nasal cannula on 4 liters and his saturation is in the low 90's. He does not have chest pain or SOB. No nausea, vomiting, or diarrhea. Tolerating diet. Pain is controlled with PCA. Patient is waiting for assistance to get into chair. Wife at bedside.     Vital Signs Last 24 Hrs  T(C): 36.8 (07 Dec 2017 15:01), Max: 37 (07 Dec 2017 07:00)  T(F): 98.3 (07 Dec 2017 15:01), Max: 98.6 (07 Dec 2017 07:00)  HR: 91 (07 Dec 2017 15:01) (66 - 98)  BP: 127/76 (07 Dec 2017 15:01) (82/44 - 131/72)  BP(mean): 88 (07 Dec 2017 12:00) (55 - 96)  RR: 20 (07 Dec 2017 15:01) (12 - 26)  SpO2: 92% (07 Dec 2017 15:01) (84% - 100%)    Physical Exam:  General Appearance: Appears well, NAD  Chest: Able to speak in full sentences, no labored breathing  CV: Pulse regular presently  Abdomen: Soft, nontense. Distended, Mildly tense. Mildly tender to palpation appropriate to context of surgery. VAC in place to suction. Incisions CDI.   Extremities: Grossly symmetric    LABS:                        11.0   9.7   )-----------( 198      ( 07 Dec 2017 03:04 )             32.1     12-07    138  |  102  |  22  ----------------------------<  211<H>  3.9   |  23  |  1.86<H>    Ca    7.5<L>      07 Dec 2017 03:04  Phos  3.2     12-07  Mg     2.3     12-07      PT/INR - ( 06 Dec 2017 23:02 )   PT: 13.4 sec;   INR: 1.24 ratio         PTT - ( 06 Dec 2017 23:02 )  PTT:27.3 sec      INs and OUTs:    12-06-17 @ 07:01  -  12-07-17 @ 07:00  --------------------------------------------------------  IN: 2754 mL / OUT: 610 mL / NET: 2144 mL    12-07-17 @ 07:01  -  12-07-17 @ 17:17  --------------------------------------------------------  IN: 875 mL / OUT: 290 mL / NET: 585 mL

## 2017-12-07 NOTE — PROGRESS NOTE ADULT - ASSESSMENT
67M PMHx Rectal CA s/p transanal excision/biopsy (7/2017), s/p neoadjuvant chemoradiation now s/p open APR, admitted to SICU for hemodynamic monitoring requiring phenylephrine gtt post-operatively.     Neuro: post-operative pain  -Dilaudid PCA, acute pain following    Pulm: GLENYS   -CPAP at night as tolerated    CV: post-operative hypotension, HTN, HLD  -off phenylephrine gtt, MAP>65mmHg  -con't R radial A-line  -hold home amlodipine atorvastatin, carvedilol, isosorbide mononitrate, ramipril, ASA    GI: Rectal CA s/p open APR  -NPO    Heme: DVTppx; operative blood loss  -SQHq8  -serial CBC    /Renal: CKD  -con't bartholomew  -hold home lasix    Endo: DM, Hgb A1C 7.6%  -lispro ISS    ID: no acute issues    Dispo:   -FULL CODE  -con't SICU care      Please contact SICU resident [phone] or [phone] with questions/concerns.

## 2017-12-07 NOTE — PROGRESS NOTE ADULT - SUBJECTIVE AND OBJECTIVE BOX
Pain Management Attending Addendum    SUBJECTIVE:    Therapy:	  [x ] IV PCA	   [ ] Epidural           [ ] s/p Spinal Opoid              [ ] Postpartum infusion	  [ ] Patient controlled regional anesthesia (PCRA)    [ ] prn Analgesics    OBJECTIVE:   [ x] No new signs     [ ] Other:    Side Effects:  [ x] None			[ ] Other:    Assessment of Catheter Site:		[ ] Intact		[ ] Other:    ASSESSMENT/PLAN  [x ] Continue current therapy    [ ] Therapy changed to:    [ ] IV PCA       [ ] Epidural     [ ] prn Analgesics     [ ] post partum infusion    Comments:

## 2017-12-07 NOTE — PROGRESS NOTE ADULT - ASSESSMENT
67M s/p APR     - C/w VAC therapy, monitor for leaks  - Pain control with PCA  - IVF with LR at 125 cc/hr  - BIPAP tonight, at bedside  - Clear liquid diet  - Nausea control with zofran IV  - Continuous pulse oximetry  - OOB to chair with assistance  - C/w puma  - F/u am labs

## 2017-12-07 NOTE — PROGRESS NOTE ADULT - ASSESSMENT
67M PMHx Rectal CA s/p transanal excision/biopsy (7/2017), s/p neoadjuvant chemoradiation now s/p open APR, admitted to SICU for hemodynamic monitoring requiring phenylephrine gtt post-operatively. Currently hemodynamically stable but with low oxygen saturations ( low 90's). Currently off pressors  - Appreciate SICU care  - c/w bartholomew  - Trend urine output, Trend serum creatinine.   - Monitor ostomy function   - Currently NPO  - OOB if possible  - PCA for pain control  - IVF  - HSQ for DVT ppx

## 2017-12-07 NOTE — PROGRESS NOTE ADULT - SUBJECTIVE AND OBJECTIVE BOX
Red Team Surgery Progress Note    SUBJECTIVE: Pt seen and examined at bedside. No overnight events. Patient comfortable and in no-apparent distress. No nausea, vomiting. Patient was on bipap overnight, on nasal cannula when seen on rounds.  Pain is controlled with medications    Vital Signs Last 24 Hrs  T(C): 37 (07 Dec 2017 07:00), Max: 37 (07 Dec 2017 07:00)  T(F): 98.6 (07 Dec 2017 07:00), Max: 98.6 (07 Dec 2017 07:00)  HR: 92 (07 Dec 2017 08:52) (66 - 98)  BP: 128/62 (07 Dec 2017 07:00) (82/44 - 145/91)  BP(mean): 78 (07 Dec 2017 07:00) (55 - 113)  RR: 18 (07 Dec 2017 08:52) (12 - 26)  SpO2: 91% (07 Dec 2017 08:52) (84% - 100%)    Physical Exam:  General Appearance: Appears well, NAD  Chest: Able to speak in full sentences, no labored breathing  CV: Pulse regular presently  Abdomen: Soft, nontense, TTP appropriate in context of surgery, mildly distended, ostomy viable with bowel sweat in the bag. Vac in place with suctions  Extremities: Grossly symmetric    LABS:                        11.0   9.7   )-----------( 198      ( 07 Dec 2017 03:04 )             32.1     12-07    138  |  102  |  22  ----------------------------<  211<H>  3.9   |  23  |  1.86<H>    Ca    7.5<L>      07 Dec 2017 03:04  Phos  3.2     12-07  Mg     2.3     12-07      PT/INR - ( 06 Dec 2017 23:02 )   PT: 13.4 sec;   INR: 1.24 ratio         PTT - ( 06 Dec 2017 23:02 )  PTT:27.3 sec      INs and OUTs:    12-06-17 @ 07:01  -  12-07-17 @ 07:00  --------------------------------------------------------  IN: 2754 mL / OUT: 610 mL / NET: 2144 mL    12-07-17 @ 07:01  -  12-07-17 @ 09:05  --------------------------------------------------------  IN: 125 mL / OUT: 50 mL / NET: 75 mL

## 2017-12-07 NOTE — PROGRESS NOTE ADULT - SUBJECTIVE AND OBJECTIVE BOX
Day 1 of Anesthesia Pain Management Service    SUBJECTIVE: Patient is in OR    Pain Scale Score:	[X] Refer to charted pain scores    THERAPY:    [ ] IV PCA Morphine		[ ] 5 mg/mL	[ ] 1 mg/mL  [X] IV PCA Hydromorphone	[ ] 5 mg/mL	[X] 1 mg/mL  [ ] IV PCA Fentanyl		[ ] 50 micrograms/mL    Demand dose: 0.3 mg     Lockout: 6 minutes   Continuous Rate: 0 mg/hr  4 Hour Limit: 4 mg    MEDICATIONS  (STANDING):  heparin  Injectable 5000 Unit(s) SubCutaneous every 8 hours  HYDROmorphone PCA (1 mG/mL) 30 milliLiter(s) PCA Continuous PCA Continuous  insulin lispro (HumaLOG) corrective regimen sliding scale   SubCutaneous every 3 hours  lactated ringers. 1000 milliLiter(s) (125 mL/Hr) IV Continuous <Continuous>    MEDICATIONS  (PRN):  HYDROmorphone PCA (1 mG/mL) Rescue Clinician Bolus 0.5 milliGRAM(s) IV Push every 15 minutes PRN for Pain Scale GREATER THAN 6  naloxone Injectable 0.1 milliGRAM(s) IV Push every 3 minutes PRN For ANY of the following changes in patient status:  A. RR LESS THAN 10 breaths per minute, B. Oxygen saturation LESS THAN 90%, C. Sedation score of 6  ondansetron Injectable 4 milliGRAM(s) IV Push every 6 hours PRN Nausea      OBJECTIVE:    Sedation Score:	[  ] Alert	[ ] Drowsy 	[ ] Arousable	[ ] Asleep	[ ] Unresponsive    Side Effects:	[  ] None	[ ] Nausea	[ ] Vomiting	[ ] Pruritus  		[ ] Other:    Vital Signs Last 24 Hrs  T(C): 37 (07 Dec 2017 07:00), Max: 37 (07 Dec 2017 07:00)  T(F): 98.6 (07 Dec 2017 07:00), Max: 98.6 (07 Dec 2017 07:00)  HR: 88 (07 Dec 2017 10:00) (66 - 98)  BP: 128/62 (07 Dec 2017 07:00) (82/44 - 145/91)  BP(mean): 78 (07 Dec 2017 07:00) (55 - 113)  RR: 15 (07 Dec 2017 10:00) (12 - 26)  SpO2: 91% (07 Dec 2017 10:00) (84% - 100%)    ASSESSMENT/ PLAN    Therapy to  be:               [X] Continued   [ ] Discontinued   [ ] Changed to PRN Analgesics    Documentation and Verification of current medications:   [X] Done	[ ] Not done, not eligible    Comments: PCA D\C'd prior to OR Day 1 of Anesthesia Pain Management Service    SUBJECTIVE: Pain is controlled    Pain Scale Score:	[X] Refer to charted pain scores    THERAPY:    [ ] IV PCA Morphine		[ ] 5 mg/mL	[ ] 1 mg/mL  [X] IV PCA Hydromorphone	[ ] 5 mg/mL	[X] 1 mg/mL  [ ] IV PCA Fentanyl		[ ] 50 micrograms/mL    Demand dose: 0.2 mg     Lockout: 6 minutes   Continuous Rate: 0 mg/hr  4 Hour Limit: 4 mg    MEDICATIONS  (STANDING):  heparin  Injectable 5000 Unit(s) SubCutaneous every 8 hours  HYDROmorphone PCA (1 mG/mL) 30 milliLiter(s) PCA Continuous PCA Continuous  insulin lispro (HumaLOG) corrective regimen sliding scale   SubCutaneous every 3 hours  lactated ringers. 1000 milliLiter(s) (125 mL/Hr) IV Continuous <Continuous>    MEDICATIONS  (PRN):  HYDROmorphone PCA (1 mG/mL) Rescue Clinician Bolus 0.5 milliGRAM(s) IV Push every 15 minutes PRN for Pain Scale GREATER THAN 6  naloxone Injectable 0.1 milliGRAM(s) IV Push every 3 minutes PRN For ANY of the following changes in patient status:  A. RR LESS THAN 10 breaths per minute, B. Oxygen saturation LESS THAN 90%, C. Sedation score of 6  ondansetron Injectable 4 milliGRAM(s) IV Push every 6 hours PRN Nausea      OBJECTIVE:    Sedation Score:	[X  ] Alert	[ ] Drowsy 	[ ] Arousable	[ ] Asleep	[ ] Unresponsive    Side Effects:	[ X ] None	[ ] Nausea	[ ] Vomiting	[ ] Pruritus  		[ ] Other:    Vital Signs Last 24 Hrs  T(C): 37 (07 Dec 2017 07:00), Max: 37 (07 Dec 2017 07:00)  T(F): 98.6 (07 Dec 2017 07:00), Max: 98.6 (07 Dec 2017 07:00)  HR: 88 (07 Dec 2017 10:00) (66 - 98)  BP: 128/62 (07 Dec 2017 07:00) (82/44 - 145/91)  BP(mean): 78 (07 Dec 2017 07:00) (55 - 113)  RR: 15 (07 Dec 2017 10:00) (12 - 26)  SpO2: 91% (07 Dec 2017 10:00) (84% - 100%)    ASSESSMENT/ PLAN    Therapy to  be:               [X] Continued   [ ] Discontinued   [ ] Changed to PRN Analgesics    Documentation and Verification of current medications:   [X] Done	[ ] Not done, not eligible    Comments: Using 1-2x/hr. Reeducated to use.

## 2017-12-07 NOTE — PROGRESS NOTE ADULT - ATTENDING COMMENTS
weaned off brayden o/n, pain controlled    AAOx3  abd soft, VAC in place, colostomy pink/viable    A/P POD#1 s/p robotic converted to open APR  clears  BIPAP  OOB ambulate  pain control PACU events noted, weaned off brayden o/n, pain controlled    AAOx3  abd soft, VAC in place, colostomy pink/viable    A/P POD#1 s/p robotic converted to open APR  likely hypovolemic shock that responded to albumin  clears  BIPAP  OOB ambulate  pain control

## 2017-12-07 NOTE — PROGRESS NOTE ADULT - ASSESSMENT
Mr. Ballard is a 67 man with PMHx Rectal CA s/p transanal excision/biopsy (7/2017), s/p neoadjuvant chemoradiation who presented to [facility] on 12/6 and is now s/p open APR POD1, admitted to SICU for hemodynamic monitoring requiring phenylephrine gtt post-operatively. Currently hemodynamically stable but with low oxygen saturations ( low 90's). Currently off pressors    - DVT ppx: SQH  - Diet: NPO  - Pain control: PCA  - Monitor ostomy function  - Replete electrolyte as necessary  - c/w bartholomew, monitor urine output ans serum creatinine (FeNa 0.1%)  - Appreciate SICU care

## 2017-12-07 NOTE — PROGRESS NOTE ADULT - SUBJECTIVE AND OBJECTIVE BOX
Surgery Red Team Progress Note    STATUS POST:  Lap robotic assisted LAR, cystoscopy insertion of ureteral catheters     POST OPERATIVE DAY # 1    SUBJECTIVE:   Patient was seen and examined this morning. There was no acute event overnight. He is resting comfortably in bed. He has been intermittently on BiPAP overnight. This morning, he is on 3L of NC. He is off pressors now. He was hypotensive post operation, but it has been resolved now . He does not report pain, fever, n/v, chest pain, or shortness of breath.     OBJECTIVE:   T(C): 37 (12-07-17 @ 07:00), Max: 37 (12-07-17 @ 07:00)  HR: 88 (12-07-17 @ 10:00) (66 - 98)  BP: 128/62 (12-07-17 @ 07:00) (82/44 - 145/91)  RR: 15 (12-07-17 @ 10:00) (12 - 26)  SpO2: 91% (12-07-17 @ 10:00) (84% - 100%)  Wt(kg): --  CAPILLARY BLOOD GLUCOSE      POCT Blood Glucose.: 183 mg/dL (07 Dec 2017 09:20)  POCT Blood Glucose.: 199 mg/dL (07 Dec 2017 05:52)  POCT Blood Glucose.: 193 mg/dL (07 Dec 2017 04:08)  POCT Blood Glucose.: 186 mg/dL (07 Dec 2017 01:16)  POCT Blood Glucose.: 192 mg/dL (06 Dec 2017 21:08)  POCT Blood Glucose.: 214 mg/dL (06 Dec 2017 18:20)  POCT Blood Glucose.: 212 mg/dL (06 Dec 2017 17:08)    I&O's Detail    06 Dec 2017 07:01  -  07 Dec 2017 07:00  --------------------------------------------------------  IN:    Albumin 5%  - 250 mL: 250 mL    lactated ringers.: 1875 mL    phenylephrine   Infusion: 129 mL    Solution: 100 mL    Solution: 300 mL    Solution: 100 mL  Total IN: 2754 mL    OUT:    Indwelling Catheter - Urethral: 610 mL  Total OUT: 610 mL    Total NET: 2144 mL      07 Dec 2017 07:01  -  07 Dec 2017 10:07  --------------------------------------------------------  IN:    lactated ringers.: 375 mL  Total IN: 375 mL    OUT:    Indwelling Catheter - Urethral: 160 mL  Total OUT: 160 mL    Total NET: 215 mL        PHYSICAL EXAM:  Gen: Well-nourished, well-developed, A&O x3, resting in bed in no acute distress  CV: RRR  Resp: Patent airways, unlabored, wheezing  Abdomen: Soft, appropiriately tender, distended, with midline vac and ostomy viable  Extremities: All 4 extremities warm and well perfused, no edema

## 2017-12-08 LAB
ANION GAP SERPL CALC-SCNC: 12 MMOL/L — SIGNIFICANT CHANGE UP (ref 5–17)
APTT BLD: 30.3 SEC — SIGNIFICANT CHANGE UP (ref 27.5–37.4)
BUN SERPL-MCNC: 23 MG/DL — SIGNIFICANT CHANGE UP (ref 7–23)
CA-I BLD-SCNC: 1.11 MMOL/L — LOW (ref 1.12–1.3)
CALCIUM SERPL-MCNC: 8 MG/DL — LOW (ref 8.4–10.5)
CHLORIDE SERPL-SCNC: 101 MMOL/L — SIGNIFICANT CHANGE UP (ref 96–108)
CO2 SERPL-SCNC: 24 MMOL/L — SIGNIFICANT CHANGE UP (ref 22–31)
CREAT SERPL-MCNC: 1.42 MG/DL — HIGH (ref 0.5–1.3)
GLUCOSE BLDC GLUCOMTR-MCNC: 136 MG/DL — HIGH (ref 70–99)
GLUCOSE BLDC GLUCOMTR-MCNC: 148 MG/DL — HIGH (ref 70–99)
GLUCOSE BLDC GLUCOMTR-MCNC: 169 MG/DL — HIGH (ref 70–99)
GLUCOSE BLDC GLUCOMTR-MCNC: 184 MG/DL — HIGH (ref 70–99)
GLUCOSE SERPL-MCNC: 157 MG/DL — HIGH (ref 70–99)
HCT VFR BLD CALC: 26 % — LOW (ref 39–50)
HCT VFR BLD CALC: 27 % — LOW (ref 39–50)
HGB BLD-MCNC: 8.5 G/DL — LOW (ref 13–17)
HGB BLD-MCNC: 9.4 G/DL — LOW (ref 13–17)
INR BLD: 1.15 RATIO — SIGNIFICANT CHANGE UP (ref 0.88–1.16)
MAGNESIUM SERPL-MCNC: 2.4 MG/DL — SIGNIFICANT CHANGE UP (ref 1.6–2.6)
MCHC RBC-ENTMCNC: 31.1 PG — SIGNIFICANT CHANGE UP (ref 27–34)
MCHC RBC-ENTMCNC: 32.7 GM/DL — SIGNIFICANT CHANGE UP (ref 32–36)
MCHC RBC-ENTMCNC: 34.2 PG — HIGH (ref 27–34)
MCHC RBC-ENTMCNC: 34.9 GM/DL — SIGNIFICANT CHANGE UP (ref 32–36)
MCV RBC AUTO: 95.2 FL — SIGNIFICANT CHANGE UP (ref 80–100)
MCV RBC AUTO: 97.8 FL — SIGNIFICANT CHANGE UP (ref 80–100)
PHOSPHATE SERPL-MCNC: 2.5 MG/DL — SIGNIFICANT CHANGE UP (ref 2.5–4.5)
PLATELET # BLD AUTO: 204 K/UL — SIGNIFICANT CHANGE UP (ref 150–400)
PLATELET # BLD AUTO: 214 K/UL — SIGNIFICANT CHANGE UP (ref 150–400)
POTASSIUM SERPL-MCNC: 4.9 MMOL/L — SIGNIFICANT CHANGE UP (ref 3.5–5.3)
POTASSIUM SERPL-SCNC: 4.9 MMOL/L — SIGNIFICANT CHANGE UP (ref 3.5–5.3)
PROTHROM AB SERPL-ACNC: 13 SEC — SIGNIFICANT CHANGE UP (ref 10–13.1)
RBC # BLD: 2.73 M/UL — LOW (ref 4.2–5.8)
RBC # BLD: 2.76 M/UL — LOW (ref 4.2–5.8)
RBC # FLD: 13.1 % — SIGNIFICANT CHANGE UP (ref 10.3–14.5)
RBC # FLD: 14.4 % — SIGNIFICANT CHANGE UP (ref 10.3–14.5)
SODIUM SERPL-SCNC: 137 MMOL/L — SIGNIFICANT CHANGE UP (ref 135–145)
WBC # BLD: 11.2 K/UL — HIGH (ref 3.8–10.5)
WBC # BLD: 9.2 K/UL — SIGNIFICANT CHANGE UP (ref 3.8–10.5)
WBC # FLD AUTO: 11.2 K/UL — HIGH (ref 3.8–10.5)
WBC # FLD AUTO: 9.2 K/UL — SIGNIFICANT CHANGE UP (ref 3.8–10.5)

## 2017-12-08 PROCEDURE — 71010: CPT | Mod: 26

## 2017-12-08 RX ORDER — TAMSULOSIN HYDROCHLORIDE 0.4 MG/1
0.4 CAPSULE ORAL AT BEDTIME
Qty: 0 | Refills: 0 | Status: DISCONTINUED | OUTPATIENT
Start: 2017-12-08 | End: 2017-12-10

## 2017-12-08 RX ADMIN — HEPARIN SODIUM 5000 UNIT(S): 5000 INJECTION INTRAVENOUS; SUBCUTANEOUS at 21:51

## 2017-12-08 RX ADMIN — HYDROMORPHONE HYDROCHLORIDE 30 MILLILITER(S): 2 INJECTION INTRAMUSCULAR; INTRAVENOUS; SUBCUTANEOUS at 08:21

## 2017-12-08 RX ADMIN — Medication 2: at 08:48

## 2017-12-08 RX ADMIN — HEPARIN SODIUM 5000 UNIT(S): 5000 INJECTION INTRAVENOUS; SUBCUTANEOUS at 07:03

## 2017-12-08 RX ADMIN — TAMSULOSIN HYDROCHLORIDE 0.4 MILLIGRAM(S): 0.4 CAPSULE ORAL at 21:48

## 2017-12-08 RX ADMIN — HEPARIN SODIUM 5000 UNIT(S): 5000 INJECTION INTRAVENOUS; SUBCUTANEOUS at 13:35

## 2017-12-08 RX ADMIN — Medication 2: at 13:35

## 2017-12-08 RX ADMIN — HYDROMORPHONE HYDROCHLORIDE 30 MILLILITER(S): 2 INJECTION INTRAMUSCULAR; INTRAVENOUS; SUBCUTANEOUS at 19:04

## 2017-12-08 NOTE — PHYSICAL THERAPY INITIAL EVALUATION ADULT - PERTINENT HX OF CURRENT PROBLEM, REHAB EVAL
Pt is a 67 year old male admitted to [facility] for rectal mass pt with PMH of HTN, MI/CAD with stent x 1 2009, GLENYS cpap use with rectal mass s/p transanal excision/ biopsy 7/2017 s/p neoadjuvant chemoradiation planned for laparoscopic robotic assisted low anterior resection, cystoscopy with insertion of ureteral catheters.

## 2017-12-08 NOTE — PROGRESS NOTE ADULT - ASSESSMENT
68yo M POD 2 s/p APR    -VAC change with PT today  -Monitor GI function  -C/W CLD   -gentle IVF  -BiPAP overnight (home device)  -OOB/IS  -PT eval  -DVT ppx ([facility])  -Possibly YE bartholomew today if OOB

## 2017-12-08 NOTE — PHYSICAL THERAPY INITIAL EVALUATION ADULT - PLANNED THERAPY INTERVENTIONS, PT EVAL
gait training/strengthening/transfer training/PT vac 3x aweek 10-12 wks/balance training/bed mobility training

## 2017-12-08 NOTE — PROGRESS NOTE ADULT - SUBJECTIVE AND OBJECTIVE BOX
Day 2 of Anesthesia Pain Management Service    SUBJECTIVE: Patient is doing well with IV PCA    Pain Scale Score:	[X] Refer to charted pain scores    THERAPY:    [ ] IV PCA Morphine		[ ] 5 mg/mL	[ ] 1 mg/mL  [X] IV PCA Hydromorphone	[ ] 5 mg/mL	[X] 1 mg/mL  [ ] IV PCA Fentanyl		[ ] 50 micrograms/mL    Demand dose: 0.2 mg     Lockout: 6 minutes   Continuous Rate: 0 mg/hr  4 Hour Limit: 4 mg    MEDICATIONS  (STANDING):  dextrose 5%. 1000 milliLiter(s) (50 mL/Hr) IV Continuous <Continuous>  dextrose 50% Injectable 12.5 Gram(s) IV Push once  dextrose 50% Injectable 25 Gram(s) IV Push once  dextrose 50% Injectable 25 Gram(s) IV Push once  heparin  Injectable 5000 Unit(s) SubCutaneous every 8 hours  HYDROmorphone PCA (1 mG/mL) 30 milliLiter(s) PCA Continuous PCA Continuous  insulin lispro (HumaLOG) corrective regimen sliding scale   SubCutaneous three times a day before meals  insulin lispro (HumaLOG) corrective regimen sliding scale   SubCutaneous at bedtime  lactated ringers. 1000 milliLiter(s) (125 mL/Hr) IV Continuous <Continuous>  tamsulosin 0.4 milliGRAM(s) Oral at bedtime    MEDICATIONS  (PRN):  dextrose Gel 1 Dose(s) Oral once PRN Blood Glucose LESS THAN 70 milliGRAM(s)/deciliter  glucagon  Injectable 1 milliGRAM(s) IntraMuscular once PRN Glucose LESS THAN 70 milligrams/deciliter  HYDROmorphone PCA (1 mG/mL) Rescue Clinician Bolus 0.5 milliGRAM(s) IV Push every 15 minutes PRN for Pain Scale GREATER THAN 6  naloxone Injectable 0.1 milliGRAM(s) IV Push every 3 minutes PRN For ANY of the following changes in patient status:  A. RR LESS THAN 10 breaths per minute, B. Oxygen saturation LESS THAN 90%, C. Sedation score of 6  ondansetron Injectable 4 milliGRAM(s) IV Push every 6 hours PRN Nausea      OBJECTIVE:    Sedation Score:	[ X] Alert	[ ] Drowsy 	[ ] Arousable	[ ] Asleep	[ ] Unresponsive    Side Effects:	[X ] None	[ ] Nausea	[ ] Vomiting	[ ] Pruritus  		[ ] Other:    Vital Signs Last 24 Hrs  T(C): 36.7 (08 Dec 2017 06:53), Max: 37 (07 Dec 2017 11:00)  T(F): 98.1 (08 Dec 2017 06:53), Max: 98.6 (07 Dec 2017 11:00)  HR: 96 (08 Dec 2017 06:53) (88 - 96)  BP: 109/67 (08 Dec 2017 06:53) (107/73 - 138/79)  BP(mean): 88 (07 Dec 2017 12:00) (88 - 96)  RR: 18 (08 Dec 2017 06:53) (14 - 20)  SpO2: 94% (08 Dec 2017 06:53) (88% - 95%)    ASSESSMENT/ PLAN    Therapy to  be:               [X] Continued   [ ] Discontinued   [ ] Changed to PRN Analgesics    Documentation and Verification of current medications:   [X] Done	[ ] Not done, not eligible    Comments: Doing well. Continue PCA

## 2017-12-08 NOTE — CHART NOTE - NSCHARTNOTEFT_GEN_A_CORE
Pain Management Attending Addendum    SUBJECTIVE: Patient doing well with IV PCA, will continue current therapy.    Therapy:    [X] IV PCA         [ ] PRN Analgesics    OBJECTIVE:   [X] Pain appropriately controlled    [ ] Other:    Side Effects:  [X] None	             [ ] Nausea              [ ] Pruritis                	[ ] Other:    ASSESSMENT/PLAN: Continue current therapy

## 2017-12-08 NOTE — PROGRESS NOTE ADULT - SUBJECTIVE AND OBJECTIVE BOX
STATUS POST:  Abdominal perineal resection      POST OPERATIVE DAY #: 2    Vital Signs Last 24 Hrs  T(C): 36.7 (08 Dec 2017 06:53), Max: 37 (07 Dec 2017 07:00)  T(F): 98.1 (08 Dec 2017 06:53), Max: 98.6 (07 Dec 2017 07:00)  HR: 96 (08 Dec 2017 06:53) (86 - 96)  BP: 109/67 (08 Dec 2017 06:53) (107/73 - 138/79)  BP(mean): 88 (07 Dec 2017 12:00) (78 - 96)  RR: 18 (08 Dec 2017 06:53) (12 - 20)  SpO2: 94% (08 Dec 2017 06:53) (88% - 95%)    I&O's Summary    06 Dec 2017 07:01  -  07 Dec 2017 07:00  --------------------------------------------------------  IN: 2754 mL / OUT: 610 mL / NET: 2144 mL    07 Dec 2017 07:01  -  08 Dec 2017 06:57  --------------------------------------------------------  IN: 2615 mL / OUT: 890 mL / NET: 1725 mL        SUBJECTIVE: Pt seen and examined.     Pain: [ ] YES [ ] NO  Pain (0-10):              Pain Control Adequate: [ ] YES [ ] NO  SOB: [ ]YES [ ] NO  Chest Discomfort: [ ] YES [ ] NO    Nausea: [ ] YES [ ] NO           Vomiting: [ ] YES [ ] NO  Flatus: [ ] YES [ ] NO             Bowel Movement: [ ] YES [ ] NO     Void: [ ]YES [ ]No    Physical Exam:  General Appearance: Appears well, NAD  Neck: Supple  Chest: Equal expansion bilaterally, equal breath sounds  CV: Pulse regular presently  Abdomen: Soft, nontense, appropriate incisional tenderness, dressings clean and dry and intact  Extremities: Grossly symmetric, SCD's in place     LABS:                        11.0   9.7   )-----------( 198      ( 07 Dec 2017 03:04 )             32.1     12-07    138  |  102  |  22  ----------------------------<  211<H>  3.9   |  23  |  1.86<H>    Ca    7.5<L>      07 Dec 2017 03:04  Phos  3.2     12-07  Mg     2.3     12-07      PT/INR - ( 06 Dec 2017 23:02 )   PT: 13.4 sec;   INR: 1.24 ratio         PTT - ( 06 Dec 2017 23:02 )  PTT:27.3 sec      RADIOLOGY & ADDITIONAL STUDIES:      Kennedi Davila PA-C  p#[phone] STATUS POST:  Abdominal perineal resection      POST OPERATIVE DAY #: 2    Vital Signs Last 24 Hrs  T(C): 36.7 (08 Dec 2017 06:53), Max: 37 (07 Dec 2017 07:00)  T(F): 98.1 (08 Dec 2017 06:53), Max: 98.6 (07 Dec 2017 07:00)  HR: 96 (08 Dec 2017 06:53) (86 - 96)  BP: 109/67 (08 Dec 2017 06:53) (107/73 - 138/79)  BP(mean): 88 (07 Dec 2017 12:00) (78 - 96)  RR: 18 (08 Dec 2017 06:53) (12 - 20)  SpO2: 94% (08 Dec 2017 06:53) (88% - 95%)    I&O's Summary    06 Dec 2017 07:01  -  07 Dec 2017 07:00  --------------------------------------------------------  IN: 2754 mL / OUT: 610 mL / NET: 2144 mL    07 Dec 2017 07:01  -  08 Dec 2017 06:57  --------------------------------------------------------  IN: 2615 mL / OUT: 890 mL / NET: 1725 mL        SUBJECTIVE: Pt seen and examined. No complaints this AM. Tolerating BiPAP overnight. Denies N/V and ABD pain. Tolerating clears. Yet to ambulate. No ostomy function at this time but patient reports passing air.       Physical Exam:  General Appearance: Appears well, NAD  Abdomen: Soft, distended, appropriate incisional tenderness, dressings clean and dry and intact, VAC in place, ostomy pink and viable  Extremities: Grossly symmetric, SCD's in place     LABS:                        11.0   9.7   )-----------( 198      ( 07 Dec 2017 03:04 )             32.1     12-07    138  |  102  |  22  ----------------------------<  211<H>  3.9   |  23  |  1.86<H>    Ca    7.5<L>      07 Dec 2017 03:04  Phos  3.2     12-07  Mg     2.3     12-07      PT/INR - ( 06 Dec 2017 23:02 )   PT: 13.4 sec;   INR: 1.24 ratio         PTT - ( 06 Dec 2017 23:02 )  PTT:27.3 sec        Kennedi Davila PA-C  p#[phone]

## 2017-12-08 NOTE — PROGRESS NOTE ADULT - ATTENDING COMMENTS
Pt reports some abd pain that is well controlled with meds. Not OOB yet since OR. Jammie clears.     AAOx3  abd obese, softly distended, tender at incisions, VAC in place, LUQ colostomy edematous with dark spots but with visible areas of pink mucosa, viable.     A/P POD#2 s/p robotic - open APR  OOB ambulate  PT to evaluate  cont BIPAP as needed  colostomy with dark spots but viable - cont to monitor.   clears, await colostomy function.   pain control

## 2017-12-09 LAB
ANION GAP SERPL CALC-SCNC: 10 MMOL/L — SIGNIFICANT CHANGE UP (ref 5–17)
BUN SERPL-MCNC: 18 MG/DL — SIGNIFICANT CHANGE UP (ref 7–23)
CALCIUM SERPL-MCNC: 8 MG/DL — LOW (ref 8.4–10.5)
CHLORIDE SERPL-SCNC: 103 MMOL/L — SIGNIFICANT CHANGE UP (ref 96–108)
CO2 SERPL-SCNC: 26 MMOL/L — SIGNIFICANT CHANGE UP (ref 22–31)
CREAT SERPL-MCNC: 1.16 MG/DL — SIGNIFICANT CHANGE UP (ref 0.5–1.3)
GLUCOSE BLDC GLUCOMTR-MCNC: 133 MG/DL — HIGH (ref 70–99)
GLUCOSE BLDC GLUCOMTR-MCNC: 136 MG/DL — HIGH (ref 70–99)
GLUCOSE BLDC GLUCOMTR-MCNC: 146 MG/DL — HIGH (ref 70–99)
GLUCOSE BLDC GLUCOMTR-MCNC: 157 MG/DL — HIGH (ref 70–99)
GLUCOSE SERPL-MCNC: 142 MG/DL — HIGH (ref 70–99)
HCT VFR BLD CALC: 24.9 % — LOW (ref 39–50)
HCT VFR BLD CALC: 25.5 % — LOW (ref 39–50)
HGB BLD-MCNC: 8.1 G/DL — LOW (ref 13–17)
HGB BLD-MCNC: 9 G/DL — LOW (ref 13–17)
MAGNESIUM SERPL-MCNC: 2.3 MG/DL — SIGNIFICANT CHANGE UP (ref 1.6–2.6)
MCHC RBC-ENTMCNC: 31.2 PG — SIGNIFICANT CHANGE UP (ref 27–34)
MCHC RBC-ENTMCNC: 32.5 GM/DL — SIGNIFICANT CHANGE UP (ref 32–36)
MCHC RBC-ENTMCNC: 34.7 PG — HIGH (ref 27–34)
MCHC RBC-ENTMCNC: 35.4 GM/DL — SIGNIFICANT CHANGE UP (ref 32–36)
MCV RBC AUTO: 95.8 FL — SIGNIFICANT CHANGE UP (ref 80–100)
MCV RBC AUTO: 98.1 FL — SIGNIFICANT CHANGE UP (ref 80–100)
PHOSPHATE SERPL-MCNC: 1.6 MG/DL — LOW (ref 2.5–4.5)
PLATELET # BLD AUTO: 186 K/UL — SIGNIFICANT CHANGE UP (ref 150–400)
PLATELET # BLD AUTO: 213 K/UL — SIGNIFICANT CHANGE UP (ref 150–400)
POTASSIUM SERPL-MCNC: 4.2 MMOL/L — SIGNIFICANT CHANGE UP (ref 3.5–5.3)
POTASSIUM SERPL-SCNC: 4.2 MMOL/L — SIGNIFICANT CHANGE UP (ref 3.5–5.3)
RBC # BLD: 2.6 M/UL — LOW (ref 4.2–5.8)
RBC # BLD: 2.6 M/UL — LOW (ref 4.2–5.8)
RBC # FLD: 12.9 % — SIGNIFICANT CHANGE UP (ref 10.3–14.5)
RBC # FLD: 14.4 % — SIGNIFICANT CHANGE UP (ref 10.3–14.5)
SODIUM SERPL-SCNC: 139 MMOL/L — SIGNIFICANT CHANGE UP (ref 135–145)
WBC # BLD: 8.2 K/UL — SIGNIFICANT CHANGE UP (ref 3.8–10.5)
WBC # BLD: 9.5 K/UL — SIGNIFICANT CHANGE UP (ref 3.8–10.5)
WBC # FLD AUTO: 8.2 K/UL — SIGNIFICANT CHANGE UP (ref 3.8–10.5)
WBC # FLD AUTO: 9.5 K/UL — SIGNIFICANT CHANGE UP (ref 3.8–10.5)

## 2017-12-09 RX ORDER — LISINOPRIL 2.5 MG/1
40 TABLET ORAL DAILY
Qty: 0 | Refills: 0 | Status: DISCONTINUED | OUTPATIENT
Start: 2017-12-09 | End: 2017-12-12

## 2017-12-09 RX ORDER — AMLODIPINE BESYLATE 2.5 MG/1
10 TABLET ORAL DAILY
Qty: 0 | Refills: 0 | Status: DISCONTINUED | OUTPATIENT
Start: 2017-12-09 | End: 2017-12-12

## 2017-12-09 RX ORDER — ATORVASTATIN CALCIUM 80 MG/1
40 TABLET, FILM COATED ORAL AT BEDTIME
Qty: 0 | Refills: 0 | Status: DISCONTINUED | OUTPATIENT
Start: 2017-12-09 | End: 2017-12-22

## 2017-12-09 RX ORDER — MAGNESIUM SULFATE 500 MG/ML
2 VIAL (ML) INJECTION ONCE
Qty: 0 | Refills: 0 | Status: DISCONTINUED | OUTPATIENT
Start: 2017-12-09 | End: 2017-12-09

## 2017-12-09 RX ADMIN — ONDANSETRON 4 MILLIGRAM(S): 8 TABLET, FILM COATED ORAL at 22:18

## 2017-12-09 RX ADMIN — HEPARIN SODIUM 5000 UNIT(S): 5000 INJECTION INTRAVENOUS; SUBCUTANEOUS at 06:23

## 2017-12-09 RX ADMIN — AMLODIPINE BESYLATE 10 MILLIGRAM(S): 2.5 TABLET ORAL at 22:17

## 2017-12-09 RX ADMIN — HEPARIN SODIUM 5000 UNIT(S): 5000 INJECTION INTRAVENOUS; SUBCUTANEOUS at 13:40

## 2017-12-09 RX ADMIN — TAMSULOSIN HYDROCHLORIDE 0.4 MILLIGRAM(S): 0.4 CAPSULE ORAL at 22:17

## 2017-12-09 RX ADMIN — HYDROMORPHONE HYDROCHLORIDE 30 MILLILITER(S): 2 INJECTION INTRAMUSCULAR; INTRAVENOUS; SUBCUTANEOUS at 19:07

## 2017-12-09 RX ADMIN — HEPARIN SODIUM 5000 UNIT(S): 5000 INJECTION INTRAVENOUS; SUBCUTANEOUS at 22:17

## 2017-12-09 RX ADMIN — ATORVASTATIN CALCIUM 40 MILLIGRAM(S): 80 TABLET, FILM COATED ORAL at 22:24

## 2017-12-09 RX ADMIN — LISINOPRIL 40 MILLIGRAM(S): 2.5 TABLET ORAL at 12:47

## 2017-12-09 RX ADMIN — Medication 2: at 12:47

## 2017-12-09 RX ADMIN — Medication 63.75 MILLIMOLE(S): at 12:47

## 2017-12-09 RX ADMIN — HYDROMORPHONE HYDROCHLORIDE 30 MILLILITER(S): 2 INJECTION INTRAMUSCULAR; INTRAVENOUS; SUBCUTANEOUS at 07:17

## 2017-12-09 NOTE — PROVIDER CONTACT NOTE (OTHER) - ACTION/TREATMENT ORDERED:
MD assessed the stoma. Stoma is viable and no change from shift hand off. MD assessed the stoma. Stoma is viable and no change from shift hand off. will continue to monitor

## 2017-12-09 NOTE — PROVIDER CONTACT NOTE (OTHER) - ASSESSMENT
Patient denies pain. Abd is distention with bowel sounds. Pt is passing flatus. Stoma is red with black spots with scant serosanguinous drainage Patient denies pain. Abd is distended with + bowel sounds. Pt w/ + flatus. Stoma is red with black spots with scant serosanguinous drainage

## 2017-12-09 NOTE — PROGRESS NOTE ADULT - ASSESSMENT
66yo M POD 2 s/p APR    -VAC changed yesterday  -Monitor GI/Ostomy function  -C/W CLD   -gentle IVF  - PO meds  -BiPAP overnight (home device)  -OOB/IS  -PT eval  -DVT ppx ([facility])  - C/w puma

## 2017-12-09 NOTE — PROGRESS NOTE ADULT - SUBJECTIVE AND OBJECTIVE BOX
Red Team Surgery Progress Note    SUBJECTIVE: Pt seen and examined at bedside. No overnight events. Patient comfortable and in no-apparent distress. No nausea, vomiting, or diarrhea.       Vital Signs Last 24 Hrs  T(C): 37.2 (09 Dec 2017 08:55), Max: 37.3 (09 Dec 2017 06:23)  T(F): 98.9 (09 Dec 2017 08:55), Max: 99.1 (09 Dec 2017 06:23)  HR: 88 (09 Dec 2017 08:55) (86 - 99)  BP: 121/70 (09 Dec 2017 08:55) (112/68 - 138/78)  BP(mean): --  RR: 18 (09 Dec 2017 08:55) (18 - 18)  SpO2: 92% (09 Dec 2017 08:55) (90% - 96%)    Physical Exam:  General Appearance: Appears well, NAD  Chest: Able to speak in full sentences, no labored breathing  CV: Pulse regular presently  Abdomen: Soft, nontense, Distended, ttp appropriate in context of surgery. Ostomy dark appearing with dark output. Will monitor function  Extremities: Grossly symmetric    LABS:                        9.4    11.2  )-----------( 214      ( 08 Dec 2017 14:18 )             27.0     12-08    137  |  101  |  23  ----------------------------<  157<H>  4.9   |  24  |  1.42<H>    Ca    8.0<L>      08 Dec 2017 10:16  Phos  2.5     12-08  Mg     2.4     12-08      PT/INR - ( 08 Dec 2017 10:16 )   PT: 13.0 sec;   INR: 1.15 ratio         PTT - ( 08 Dec 2017 10:16 )  PTT:30.3 sec      INs and OUTs:    12-08-17 @ 07:01  -  12-09-17 @ 07:00  --------------------------------------------------------  IN: 3300 mL / OUT: 1585 mL / NET: 1715 mL    12-09-17 @ 07:01  -  12-09-17 @ 10:11  --------------------------------------------------------  IN: 300 mL / OUT: 0 mL / NET: 300 mL

## 2017-12-09 NOTE — PROGRESS NOTE ADULT - SUBJECTIVE AND OBJECTIVE BOX
s/p APR.  Pain well controlled.  tolerating clears    abd soft, inc intact.  stoma dusky, but with decreased edema      Objective:    MEDICATIONS  (STANDING):  dextrose 5%. 1000 milliLiter(s) (50 mL/Hr) IV Continuous <Continuous>  dextrose 50% Injectable 12.5 Gram(s) IV Push once  dextrose 50% Injectable 25 Gram(s) IV Push once  dextrose 50% Injectable 25 Gram(s) IV Push once  heparin  Injectable 5000 Unit(s) SubCutaneous every 8 hours  HYDROmorphone PCA (1 mG/mL) 30 milliLiter(s) PCA Continuous PCA Continuous  insulin lispro (HumaLOG) corrective regimen sliding scale   SubCutaneous three times a day before meals  insulin lispro (HumaLOG) corrective regimen sliding scale   SubCutaneous at bedtime  lactated ringers. 1000 milliLiter(s) (125 mL/Hr) IV Continuous <Continuous>  tamsulosin 0.4 milliGRAM(s) Oral at bedtime    MEDICATIONS  (PRN):  dextrose Gel 1 Dose(s) Oral once PRN Blood Glucose LESS THAN 70 milliGRAM(s)/deciliter  glucagon  Injectable 1 milliGRAM(s) IntraMuscular once PRN Glucose LESS THAN 70 milligrams/deciliter  HYDROmorphone PCA (1 mG/mL) Rescue Clinician Bolus 0.5 milliGRAM(s) IV Push every 15 minutes PRN for Pain Scale GREATER THAN 6  naloxone Injectable 0.1 milliGRAM(s) IV Push every 3 minutes PRN For ANY of the following changes in patient status:  A. RR LESS THAN 10 breaths per minute, B. Oxygen saturation LESS THAN 90%, C. Sedation score of 6  ondansetron Injectable 4 milliGRAM(s) IV Push every 6 hours PRN Nausea      Vital Signs Last 24 Hrs  T(C): 37.3 (09 Dec 2017 06:23), Max: 37.3 (09 Dec 2017 06:23)  T(F): 99.1 (09 Dec 2017 06:23), Max: 99.1 (09 Dec 2017 06:23)  HR: 93 (09 Dec 2017 06:23) (86 - 99)  BP: 138/78 (09 Dec 2017 06:23) (112/68 - 138/78)  BP(mean): --  RR: 18 (09 Dec 2017 06:23) (18 - 18)  SpO2: 93% (09 Dec 2017 06:23) (90% - 96%)    I&O's Detail    08 Dec 2017 07:01  -  09 Dec 2017 07:00  --------------------------------------------------------  IN:    lactated ringers.: 3000 mL    Oral Fluid: 300 mL  Total IN: 3300 mL    OUT:    Colostomy: 60 mL    Indwelling Catheter - Urethral: 1525 mL  Total OUT: 1585 mL    Total NET: 1715 mL          Daily     Daily     LABS:                        9.4    11.2  )-----------( 214      ( 08 Dec 2017 14:18 )             27.0     12-08    137  |  101  |  23  ----------------------------<  157<H>  4.9   |  24  |  1.42<H>    Ca    8.0<L>      08 Dec 2017 10:16  Phos  2.5     12-08  Mg     2.4     12-08      PT/INR - ( 08 Dec 2017 10:16 )   PT: 13.0 sec;   INR: 1.15 ratio         PTT - ( 08 Dec 2017 10:16 )  PTT:30.3 sec      RADIOLOGY & ADDITIONAL STUDIES:

## 2017-12-09 NOTE — PROGRESS NOTE ADULT - SUBJECTIVE AND OBJECTIVE BOX
Day __3_ of Anesthesia Pain Management Service    SUBJECTIVE:    Pain Scale Score	At rest: ___ 	With Activity: ___ 	[x ] Refer to charted pain scores    THERAPY:    [ ] IV PCA Morphine		[ ] 5 mg/mL	[ ] 1 mg/mL  [x ] IV PCA Hydromorphone	[ ] 5 mg/mL	[ ] 1 mg/mL  [ ] IV PCA Fentanyl		[ ] 50 micrograms/mL    Demand dose    lockout    (minutes) Continuous Rate    Total:     Daily      MEDICATIONS  (STANDING):  amLODIPine   Tablet 10 milliGRAM(s) Oral daily  atorvastatin 40 milliGRAM(s) Oral at bedtime  dextrose 5%. 1000 milliLiter(s) (50 mL/Hr) IV Continuous <Continuous>  dextrose 50% Injectable 12.5 Gram(s) IV Push once  dextrose 50% Injectable 25 Gram(s) IV Push once  dextrose 50% Injectable 25 Gram(s) IV Push once  heparin  Injectable 5000 Unit(s) SubCutaneous every 8 hours  HYDROmorphone PCA (1 mG/mL) 30 milliLiter(s) PCA Continuous PCA Continuous  insulin lispro (HumaLOG) corrective regimen sliding scale   SubCutaneous three times a day before meals  insulin lispro (HumaLOG) corrective regimen sliding scale   SubCutaneous at bedtime  lactated ringers. 1000 milliLiter(s) (125 mL/Hr) IV Continuous <Continuous>  lisinopril 40 milliGRAM(s) Oral daily  tamsulosin 0.4 milliGRAM(s) Oral at bedtime    MEDICATIONS  (PRN):  dextrose Gel 1 Dose(s) Oral once PRN Blood Glucose LESS THAN 70 milliGRAM(s)/deciliter  glucagon  Injectable 1 milliGRAM(s) IntraMuscular once PRN Glucose LESS THAN 70 milligrams/deciliter  HYDROmorphone PCA (1 mG/mL) Rescue Clinician Bolus 0.5 milliGRAM(s) IV Push every 15 minutes PRN for Pain Scale GREATER THAN 6  naloxone Injectable 0.1 milliGRAM(s) IV Push every 3 minutes PRN For ANY of the following changes in patient status:  A. RR LESS THAN 10 breaths per minute, B. Oxygen saturation LESS THAN 90%, C. Sedation score of 6  ondansetron Injectable 4 milliGRAM(s) IV Push every 6 hours PRN Nausea      OBJECTIVE:    Sedation Score:	x[ ] Alert	[ ] Drowsy 	[ ] Arousable	[ ] Asleep	[ ] Unresponsive    Side Effects:	[ x] None	[ ] Nausea	[ ] Vomiting	[ ] Pruritus  		[ ] Other:    Vital Signs Last 24 Hrs  T(C): 37.2 (09 Dec 2017 08:55), Max: 37.3 (09 Dec 2017 06:23)  T(F): 98.9 (09 Dec 2017 08:55), Max: 99.1 (09 Dec 2017 06:23)  HR: 88 (09 Dec 2017 12:30) (86 - 99)  BP: 120/70 (09 Dec 2017 12:30) (112/68 - 138/78)  BP(mean): --  RR: 18 (09 Dec 2017 08:55) (18 - 18)  SpO2: 92% (09 Dec 2017 08:55) (92% - 96%)    ASSESSMENT/ PLAN    Therapy to  be:	[x ] Continue   [ ] Discontinued   [ ] Change to prn Analgesics    Documentation and Verification of current medications:   [X] Done	[ ] Not done, not elligible    Comments:

## 2017-12-09 NOTE — PROVIDER CONTACT NOTE (OTHER) - BACKGROUND
s/p LAR converted to abd perineal resection, cysto, intra ab sigmoidoscopy, colostomy wound vac s/p LAR converted to abd perineal resection, cysto, flex sigmoidoscopy, colostomy, wound vac

## 2017-12-10 LAB
ANION GAP SERPL CALC-SCNC: 8 MMOL/L — SIGNIFICANT CHANGE UP (ref 5–17)
BUN SERPL-MCNC: 15 MG/DL — SIGNIFICANT CHANGE UP (ref 7–23)
CALCIUM SERPL-MCNC: 8.3 MG/DL — LOW (ref 8.4–10.5)
CHLORIDE SERPL-SCNC: 100 MMOL/L — SIGNIFICANT CHANGE UP (ref 96–108)
CO2 SERPL-SCNC: 28 MMOL/L — SIGNIFICANT CHANGE UP (ref 22–31)
CREAT SERPL-MCNC: 0.96 MG/DL — SIGNIFICANT CHANGE UP (ref 0.5–1.3)
GLUCOSE BLDC GLUCOMTR-MCNC: 128 MG/DL — HIGH (ref 70–99)
GLUCOSE BLDC GLUCOMTR-MCNC: 132 MG/DL — HIGH (ref 70–99)
GLUCOSE BLDC GLUCOMTR-MCNC: 135 MG/DL — HIGH (ref 70–99)
GLUCOSE BLDC GLUCOMTR-MCNC: 164 MG/DL — HIGH (ref 70–99)
GLUCOSE SERPL-MCNC: 132 MG/DL — HIGH (ref 70–99)
HCT VFR BLD CALC: 27.2 % — LOW (ref 39–50)
HGB BLD-MCNC: 8.7 G/DL — LOW (ref 13–17)
MAGNESIUM SERPL-MCNC: 2.1 MG/DL — SIGNIFICANT CHANGE UP (ref 1.6–2.6)
MCHC RBC-ENTMCNC: 31.3 PG — SIGNIFICANT CHANGE UP (ref 27–34)
MCHC RBC-ENTMCNC: 32 GM/DL — SIGNIFICANT CHANGE UP (ref 32–36)
MCV RBC AUTO: 97.8 FL — SIGNIFICANT CHANGE UP (ref 80–100)
PHOSPHATE SERPL-MCNC: 2 MG/DL — LOW (ref 2.5–4.5)
PLATELET # BLD AUTO: 237 K/UL — SIGNIFICANT CHANGE UP (ref 150–400)
POTASSIUM SERPL-MCNC: 3.8 MMOL/L — SIGNIFICANT CHANGE UP (ref 3.5–5.3)
POTASSIUM SERPL-SCNC: 3.8 MMOL/L — SIGNIFICANT CHANGE UP (ref 3.5–5.3)
RBC # BLD: 2.78 M/UL — LOW (ref 4.2–5.8)
RBC # FLD: 14 % — SIGNIFICANT CHANGE UP (ref 10.3–14.5)
SODIUM SERPL-SCNC: 136 MMOL/L — SIGNIFICANT CHANGE UP (ref 135–145)
WBC # BLD: 8.1 K/UL — SIGNIFICANT CHANGE UP (ref 3.8–10.5)
WBC # FLD AUTO: 8.1 K/UL — SIGNIFICANT CHANGE UP (ref 3.8–10.5)

## 2017-12-10 RX ORDER — OXYCODONE HYDROCHLORIDE 5 MG/1
5 TABLET ORAL EVERY 6 HOURS
Qty: 0 | Refills: 0 | Status: DISCONTINUED | OUTPATIENT
Start: 2017-12-10 | End: 2017-12-10

## 2017-12-10 RX ORDER — POTASSIUM PHOSPHATE, MONOBASIC POTASSIUM PHOSPHATE, DIBASIC 236; 224 MG/ML; MG/ML
30 INJECTION, SOLUTION INTRAVENOUS ONCE
Qty: 0 | Refills: 0 | Status: DISCONTINUED | OUTPATIENT
Start: 2017-12-10 | End: 2017-12-10

## 2017-12-10 RX ORDER — ACETAMINOPHEN 500 MG
650 TABLET ORAL EVERY 6 HOURS
Qty: 0 | Refills: 0 | Status: DISCONTINUED | OUTPATIENT
Start: 2017-12-10 | End: 2017-12-14

## 2017-12-10 RX ORDER — TAMSULOSIN HYDROCHLORIDE 0.4 MG/1
0.8 CAPSULE ORAL AT BEDTIME
Qty: 0 | Refills: 0 | Status: DISCONTINUED | OUTPATIENT
Start: 2017-12-10 | End: 2017-12-12

## 2017-12-10 RX ORDER — OXYCODONE HYDROCHLORIDE 5 MG/1
10 TABLET ORAL EVERY 6 HOURS
Qty: 0 | Refills: 0 | Status: DISCONTINUED | OUTPATIENT
Start: 2017-12-10 | End: 2017-12-10

## 2017-12-10 RX ORDER — OXYCODONE HYDROCHLORIDE 5 MG/1
10 TABLET ORAL EVERY 4 HOURS
Qty: 0 | Refills: 0 | Status: DISCONTINUED | OUTPATIENT
Start: 2017-12-10 | End: 2017-12-12

## 2017-12-10 RX ORDER — OXYCODONE HYDROCHLORIDE 5 MG/1
5 TABLET ORAL EVERY 4 HOURS
Qty: 0 | Refills: 0 | Status: DISCONTINUED | OUTPATIENT
Start: 2017-12-10 | End: 2017-12-12

## 2017-12-10 RX ORDER — SODIUM CHLORIDE 9 MG/ML
1000 INJECTION, SOLUTION INTRAVENOUS
Qty: 0 | Refills: 0 | Status: DISCONTINUED | OUTPATIENT
Start: 2017-12-10 | End: 2017-12-11

## 2017-12-10 RX ADMIN — OXYCODONE HYDROCHLORIDE 10 MILLIGRAM(S): 5 TABLET ORAL at 16:43

## 2017-12-10 RX ADMIN — HYDROMORPHONE HYDROCHLORIDE 30 MILLILITER(S): 2 INJECTION INTRAMUSCULAR; INTRAVENOUS; SUBCUTANEOUS at 06:59

## 2017-12-10 RX ADMIN — OXYCODONE HYDROCHLORIDE 10 MILLIGRAM(S): 5 TABLET ORAL at 16:13

## 2017-12-10 RX ADMIN — HEPARIN SODIUM 5000 UNIT(S): 5000 INJECTION INTRAVENOUS; SUBCUTANEOUS at 05:49

## 2017-12-10 RX ADMIN — HEPARIN SODIUM 5000 UNIT(S): 5000 INJECTION INTRAVENOUS; SUBCUTANEOUS at 14:48

## 2017-12-10 RX ADMIN — LISINOPRIL 40 MILLIGRAM(S): 2.5 TABLET ORAL at 05:49

## 2017-12-10 RX ADMIN — TAMSULOSIN HYDROCHLORIDE 0.8 MILLIGRAM(S): 0.4 CAPSULE ORAL at 21:39

## 2017-12-10 RX ADMIN — OXYCODONE HYDROCHLORIDE 10 MILLIGRAM(S): 5 TABLET ORAL at 21:38

## 2017-12-10 RX ADMIN — Medication 63.75 MILLIMOLE(S): at 11:01

## 2017-12-10 RX ADMIN — ATORVASTATIN CALCIUM 40 MILLIGRAM(S): 80 TABLET, FILM COATED ORAL at 21:38

## 2017-12-10 RX ADMIN — Medication 63.75 MILLIMOLE(S): at 14:48

## 2017-12-10 RX ADMIN — AMLODIPINE BESYLATE 10 MILLIGRAM(S): 2.5 TABLET ORAL at 05:50

## 2017-12-10 RX ADMIN — OXYCODONE HYDROCHLORIDE 10 MILLIGRAM(S): 5 TABLET ORAL at 22:08

## 2017-12-10 RX ADMIN — OXYCODONE HYDROCHLORIDE 10 MILLIGRAM(S): 5 TABLET ORAL at 11:31

## 2017-12-10 RX ADMIN — Medication 2: at 18:19

## 2017-12-10 RX ADMIN — OXYCODONE HYDROCHLORIDE 10 MILLIGRAM(S): 5 TABLET ORAL at 11:01

## 2017-12-10 RX ADMIN — HEPARIN SODIUM 5000 UNIT(S): 5000 INJECTION INTRAVENOUS; SUBCUTANEOUS at 21:39

## 2017-12-10 NOTE — PROGRESS NOTE ADULT - SUBJECTIVE AND OBJECTIVE BOX
Day _4__ of Anesthesia Pain Management Service    SUBJECTIVE:    Pain Scale Score	At rest: __2_ 	With Activity: ___ 	[ ] Refer to charted pain scores    THERAPY:    [ ] IV PCA Morphine		[ ] 5 mg/mL	[ ] 1 mg/mL  [x ] IV PCA Hydromorphone	[ ] 5 mg/mL	[ ] 1 mg/mL  [ ] IV PCA Fentanyl		[ ] 50 micrograms/mL    Demand dose0.2    lockout 6  (minutes) Continuous Rate  0  Total:     Daily      MEDICATIONS  (STANDING):  amLODIPine   Tablet 10 milliGRAM(s) Oral daily  atorvastatin 40 milliGRAM(s) Oral at bedtime  dextrose 5%. 1000 milliLiter(s) (50 mL/Hr) IV Continuous <Continuous>  dextrose 50% Injectable 12.5 Gram(s) IV Push once  dextrose 50% Injectable 25 Gram(s) IV Push once  dextrose 50% Injectable 25 Gram(s) IV Push once  heparin  Injectable 5000 Unit(s) SubCutaneous every 8 hours  HYDROmorphone PCA (1 mG/mL) 30 milliLiter(s) PCA Continuous PCA Continuous  insulin lispro (HumaLOG) corrective regimen sliding scale   SubCutaneous three times a day before meals  insulin lispro (HumaLOG) corrective regimen sliding scale   SubCutaneous at bedtime  lactated ringers. 1000 milliLiter(s) (50 mL/Hr) IV Continuous <Continuous>  lisinopril 40 milliGRAM(s) Oral daily  tamsulosin 0.4 milliGRAM(s) Oral at bedtime    MEDICATIONS  (PRN):  dextrose Gel 1 Dose(s) Oral once PRN Blood Glucose LESS THAN 70 milliGRAM(s)/deciliter  glucagon  Injectable 1 milliGRAM(s) IntraMuscular once PRN Glucose LESS THAN 70 milligrams/deciliter  HYDROmorphone PCA (1 mG/mL) Rescue Clinician Bolus 0.5 milliGRAM(s) IV Push every 15 minutes PRN for Pain Scale GREATER THAN 6  naloxone Injectable 0.1 milliGRAM(s) IV Push every 3 minutes PRN For ANY of the following changes in patient status:  A. RR LESS THAN 10 breaths per minute, B. Oxygen saturation LESS THAN 90%, C. Sedation score of 6  ondansetron Injectable 4 milliGRAM(s) IV Push every 6 hours PRN Nausea      OBJECTIVE:    Sedation Score:	[x ] Alert	[ ] Drowsy 	[ ] Arousable	[ ] Asleep	[ ] Unresponsive    Side Effects:	[x ] None	[ ] Nausea	[ ] Vomiting	[ ] Pruritus  		[ ] Other:    Vital Signs Last 24 Hrs  T(C): 36.8 (10 Dec 2017 05:08), Max: 36.9 (10 Dec 2017 01:43)  T(F): 98.2 (10 Dec 2017 05:08), Max: 98.5 (10 Dec 2017 01:43)  HR: 91 (10 Dec 2017 05:08) (88 - 96)  BP: 130/78 (10 Dec 2017 05:08) (108/61 - 135/80)  BP(mean): --  RR: 18 (10 Dec 2017 05:08) (18 - 18)  SpO2: 97% (10 Dec 2017 05:08) (95% - 98%)    ASSESSMENT/ PLAN    Therapy to  be:	[ ] Continue   [x ] Discontinued    [ x] Change to prn Analgesics    Documentation and Verification of current medications:   [X] Done	[ ] Not done, not elligible    Comments:

## 2017-12-10 NOTE — PROGRESS NOTE ADULT - SUBJECTIVE AND OBJECTIVE BOX
Surgery Red Team Progress Note    STATUS POST:  LAR converted to APR    POST OPERATIVE DAY # 5    SUBJECTIVE:   Patient was seen and examined this morning. There was no acute event overnight. He is resting comfortably in bed. Pain is well controlled. He does not report pain, fever, n/v, chest pain, or shortness of breath. He is on 3 L O2 NC with O2 sat 93%.     OBJECTIVE:   T(C): 36.8 (12-10-17 @ 05:08), Max: 36.9 (12-10-17 @ 01:43)  HR: 91 (12-10-17 @ 05:08) (88 - 96)  BP: 130/78 (12-10-17 @ 05:08) (108/61 - 135/80)  RR: 18 (12-10-17 @ 05:08) (18 - 18)  SpO2: 97% (12-10-17 @ 05:08) (95% - 98%)  Wt(kg): --  CAPILLARY BLOOD GLUCOSE      POCT Blood Glucose.: 132 mg/dL (10 Dec 2017 08:35)  POCT Blood Glucose.: 136 mg/dL (09 Dec 2017 21:44)  POCT Blood Glucose.: 133 mg/dL (09 Dec 2017 18:24)  POCT Blood Glucose.: 157 mg/dL (09 Dec 2017 12:44)    I&O's Detail    09 Dec 2017 07:01  -  10 Dec 2017 07:00  --------------------------------------------------------  IN:    lactated ringers.: 3000 mL    Oral Fluid: 420 mL    Solution: 250 mL  Total IN: 3670 mL    OUT:    Colostomy: 100 mL    Indwelling Catheter - Urethral: 1500 mL  Total OUT: 1600 mL    Total NET: 2070 mL        PHYSICAL EXAM:  Gen: Well-nourished, well-developed, A&O x3, resting in bed in no acute distress  CV: RRR  Resp: Patent airways, unlabored, on 3L O2   Abdomen: Soft, nontender, nondistended, incision: clean/ Dry/ Intact , ostomy : functional  Extremities: All 4 extremities warm and well perfused, moderate edema on UE bilaterally

## 2017-12-10 NOTE — PROGRESS NOTE ADULT - SUBJECTIVE AND OBJECTIVE BOX
improving pain.  no events      abd soft obese.  inc intact.  stoma w/ stable duskiness and viable mucosa at internal orfice        Objective:    MEDICATIONS  (STANDING):  amLODIPine   Tablet 10 milliGRAM(s) Oral daily  atorvastatin 40 milliGRAM(s) Oral at bedtime  dextrose 5%. 1000 milliLiter(s) (50 mL/Hr) IV Continuous <Continuous>  dextrose 50% Injectable 12.5 Gram(s) IV Push once  dextrose 50% Injectable 25 Gram(s) IV Push once  dextrose 50% Injectable 25 Gram(s) IV Push once  heparin  Injectable 5000 Unit(s) SubCutaneous every 8 hours  HYDROmorphone PCA (1 mG/mL) 30 milliLiter(s) PCA Continuous PCA Continuous  insulin lispro (HumaLOG) corrective regimen sliding scale   SubCutaneous three times a day before meals  insulin lispro (HumaLOG) corrective regimen sliding scale   SubCutaneous at bedtime  lactated ringers. 1000 milliLiter(s) (50 mL/Hr) IV Continuous <Continuous>  lisinopril 40 milliGRAM(s) Oral daily  tamsulosin 0.4 milliGRAM(s) Oral at bedtime    MEDICATIONS  (PRN):  dextrose Gel 1 Dose(s) Oral once PRN Blood Glucose LESS THAN 70 milliGRAM(s)/deciliter  glucagon  Injectable 1 milliGRAM(s) IntraMuscular once PRN Glucose LESS THAN 70 milligrams/deciliter  HYDROmorphone PCA (1 mG/mL) Rescue Clinician Bolus 0.5 milliGRAM(s) IV Push every 15 minutes PRN for Pain Scale GREATER THAN 6  naloxone Injectable 0.1 milliGRAM(s) IV Push every 3 minutes PRN For ANY of the following changes in patient status:  A. RR LESS THAN 10 breaths per minute, B. Oxygen saturation LESS THAN 90%, C. Sedation score of 6  ondansetron Injectable 4 milliGRAM(s) IV Push every 6 hours PRN Nausea      Vital Signs Last 24 Hrs  T(C): 36.8 (10 Dec 2017 05:08), Max: 37.2 (09 Dec 2017 08:55)  T(F): 98.2 (10 Dec 2017 05:08), Max: 98.9 (09 Dec 2017 08:55)  HR: 91 (10 Dec 2017 05:08) (88 - 96)  BP: 130/78 (10 Dec 2017 05:08) (108/61 - 135/80)  BP(mean): --  RR: 18 (10 Dec 2017 05:08) (18 - 18)  SpO2: 97% (10 Dec 2017 05:08) (92% - 98%)    I&O's Detail    09 Dec 2017 07:01  -  10 Dec 2017 07:00  --------------------------------------------------------  IN:    lactated ringers.: 3000 mL    Oral Fluid: 420 mL    Solution: 250 mL  Total IN: 3670 mL    OUT:    Colostomy: 100 mL    Indwelling Catheter - Urethral: 1500 mL  Total OUT: 1600 mL    Total NET: 2070 mL          Daily     Daily     LABS:                        9.0    9.5   )-----------( 186      ( 09 Dec 2017 13:33 )             25.5     12-09    139  |  103  |  18  ----------------------------<  142<H>  4.2   |  26  |  1.16    Ca    8.0<L>      09 Dec 2017 10:15  Phos  1.6     12-09  Mg     2.3     12-09      PT/INR - ( 08 Dec 2017 10:16 )   PT: 13.0 sec;   INR: 1.15 ratio         PTT - ( 08 Dec 2017 10:16 )  PTT:30.3 sec      RADIOLOGY & ADDITIONAL STUDIES:

## 2017-12-10 NOTE — PROGRESS NOTE ADULT - ASSESSMENT
Mr. Ballard is a 67 year old patient with PMHx Rectal CA s/p transanal excision/biopsy (7/2017), s/p neoadjuvant chemoradiation who presented to [facility] on 12/6 and is now s/p open APR POD5. He is hemodynamically stable. AM labs are pending.    - DVT ppx: SQH  - Diet: on CLD  - ABx regimen:   - on IVF, decreased the rate to 50 cc/hr because of edema  - Monitor GI function  - will f/u with AM labs and replete electrolyte as necessary  - Dispo: THANIA

## 2017-12-11 LAB
ANION GAP SERPL CALC-SCNC: 8 MMOL/L — SIGNIFICANT CHANGE UP (ref 5–17)
BUN SERPL-MCNC: 12 MG/DL — SIGNIFICANT CHANGE UP (ref 7–23)
CALCIUM SERPL-MCNC: 8.5 MG/DL — SIGNIFICANT CHANGE UP (ref 8.4–10.5)
CHLORIDE SERPL-SCNC: 104 MMOL/L — SIGNIFICANT CHANGE UP (ref 96–108)
CO2 SERPL-SCNC: 32 MMOL/L — HIGH (ref 22–31)
CREAT SERPL-MCNC: 1.07 MG/DL — SIGNIFICANT CHANGE UP (ref 0.5–1.3)
GLUCOSE BLDC GLUCOMTR-MCNC: 140 MG/DL — HIGH (ref 70–99)
GLUCOSE BLDC GLUCOMTR-MCNC: 148 MG/DL — HIGH (ref 70–99)
GLUCOSE BLDC GLUCOMTR-MCNC: 176 MG/DL — HIGH (ref 70–99)
GLUCOSE BLDC GLUCOMTR-MCNC: 181 MG/DL — HIGH (ref 70–99)
GLUCOSE SERPL-MCNC: 157 MG/DL — HIGH (ref 70–99)
HCT VFR BLD CALC: 26.1 % — LOW (ref 39–50)
HGB BLD-MCNC: 8.8 G/DL — LOW (ref 13–17)
MAGNESIUM SERPL-MCNC: 2.1 MG/DL — SIGNIFICANT CHANGE UP (ref 1.6–2.6)
MCHC RBC-ENTMCNC: 33.4 PG — SIGNIFICANT CHANGE UP (ref 27–34)
MCHC RBC-ENTMCNC: 33.7 GM/DL — SIGNIFICANT CHANGE UP (ref 32–36)
MCV RBC AUTO: 98.9 FL — SIGNIFICANT CHANGE UP (ref 80–100)
PHOSPHATE SERPL-MCNC: 2.5 MG/DL — SIGNIFICANT CHANGE UP (ref 2.5–4.5)
PLATELET # BLD AUTO: 245 K/UL — SIGNIFICANT CHANGE UP (ref 150–400)
POTASSIUM SERPL-MCNC: 3.7 MMOL/L — SIGNIFICANT CHANGE UP (ref 3.5–5.3)
POTASSIUM SERPL-SCNC: 3.7 MMOL/L — SIGNIFICANT CHANGE UP (ref 3.5–5.3)
RBC # BLD: 2.64 M/UL — LOW (ref 4.2–5.8)
RBC # FLD: 12.8 % — SIGNIFICANT CHANGE UP (ref 10.3–14.5)
SODIUM SERPL-SCNC: 144 MMOL/L — SIGNIFICANT CHANGE UP (ref 135–145)
WBC # BLD: 6.8 K/UL — SIGNIFICANT CHANGE UP (ref 3.8–10.5)
WBC # FLD AUTO: 6.8 K/UL — SIGNIFICANT CHANGE UP (ref 3.8–10.5)

## 2017-12-11 RX ORDER — FUROSEMIDE 40 MG
20 TABLET ORAL DAILY
Qty: 0 | Refills: 0 | Status: DISCONTINUED | OUTPATIENT
Start: 2017-12-11 | End: 2017-12-13

## 2017-12-11 RX ADMIN — OXYCODONE HYDROCHLORIDE 10 MILLIGRAM(S): 5 TABLET ORAL at 03:54

## 2017-12-11 RX ADMIN — OXYCODONE HYDROCHLORIDE 10 MILLIGRAM(S): 5 TABLET ORAL at 09:30

## 2017-12-11 RX ADMIN — HEPARIN SODIUM 5000 UNIT(S): 5000 INJECTION INTRAVENOUS; SUBCUTANEOUS at 05:30

## 2017-12-11 RX ADMIN — AMLODIPINE BESYLATE 10 MILLIGRAM(S): 2.5 TABLET ORAL at 05:30

## 2017-12-11 RX ADMIN — OXYCODONE HYDROCHLORIDE 10 MILLIGRAM(S): 5 TABLET ORAL at 13:27

## 2017-12-11 RX ADMIN — OXYCODONE HYDROCHLORIDE 5 MILLIGRAM(S): 5 TABLET ORAL at 21:40

## 2017-12-11 RX ADMIN — Medication 20 MILLIGRAM(S): at 13:28

## 2017-12-11 RX ADMIN — OXYCODONE HYDROCHLORIDE 5 MILLIGRAM(S): 5 TABLET ORAL at 21:10

## 2017-12-11 RX ADMIN — HEPARIN SODIUM 5000 UNIT(S): 5000 INJECTION INTRAVENOUS; SUBCUTANEOUS at 21:05

## 2017-12-11 RX ADMIN — HEPARIN SODIUM 5000 UNIT(S): 5000 INJECTION INTRAVENOUS; SUBCUTANEOUS at 13:27

## 2017-12-11 RX ADMIN — Medication 2: at 17:38

## 2017-12-11 RX ADMIN — TAMSULOSIN HYDROCHLORIDE 0.8 MILLIGRAM(S): 0.4 CAPSULE ORAL at 21:05

## 2017-12-11 RX ADMIN — OXYCODONE HYDROCHLORIDE 10 MILLIGRAM(S): 5 TABLET ORAL at 03:24

## 2017-12-11 RX ADMIN — OXYCODONE HYDROCHLORIDE 10 MILLIGRAM(S): 5 TABLET ORAL at 09:00

## 2017-12-11 RX ADMIN — OXYCODONE HYDROCHLORIDE 10 MILLIGRAM(S): 5 TABLET ORAL at 13:57

## 2017-12-11 RX ADMIN — LISINOPRIL 40 MILLIGRAM(S): 2.5 TABLET ORAL at 05:30

## 2017-12-11 RX ADMIN — ATORVASTATIN CALCIUM 40 MILLIGRAM(S): 80 TABLET, FILM COATED ORAL at 21:05

## 2017-12-11 NOTE — PROGRESS NOTE ADULT - ASSESSMENT
Mr. Ballard is a 67 year old patient with PMHx Rectal CA s/p transanal excision/biopsy (7/2017), s/p neoadjuvant chemoradiation who presented to [facility] on 12/6 and is now s/p open APR POD6. He is hemodynamically stable. AM labs are pending.    - DVT ppx: SQH  - Pain medication: PCA was DC yesterday, he is on Oxycodone and Tylenol. pain is well managed.  - Diet: on CLD, will advance once have ostomy is functional with gas and more formed stool  - on IVF at decreased rate to 50 cc/hr in order to prevent BL edema, will restart home dose of Lasix (20 mg PO daily)  - Monitor ostomy output  - will f/u with AM labs and replete electrolyte as necessary  - continue with Flomax 0.8 mg  - Dispo: THANIA Mr. Ballard is a 67 year old patient with PMHx Rectal CA s/p transanal excision/biopsy (7/2017), s/p neoadjuvant chemoradiation who presented to [facility] on 12/6 and is now s/p open APR POD6. He is hemodynamically stable. AM labs are pending.    - DVT ppx: SQH  - Pain medication: PCA was DC yesterday, he is on Oxycodone and Tylenol. pain is well managed.  - Diet: on CLD, will advance once have ostomy is functional with gas and more formed stool  - on IVF at decreased rate to 50 cc/hr in order to prevent BL edema, will restart home dose of Lasix (20 mg PO daily)  - Monitor ostomy output  - will f/u with AM labs and replete electrolyte as necessary  - continue with Flomax 0.8 mg, likely to DC bartholomew today  - PT ordered  - Dispo: THANIA

## 2017-12-11 NOTE — PROGRESS NOTE ADULT - ATTENDING COMMENTS
+ gas in colostomy, OOB to chair yesterday, kelsey clears, pain controlled    AAOx3  Abd softly distended, obese, tender at incisions, incisions c/d/i, colostomy dusky but viable, edematous    A/P POD #4 s/p robotic APR  LRD  d/c IVF  D/C bartholomew  restart home meds including lasix  PT for ambulation + gas in colostomy, OOB to chair yesterday, kelsey clears, pain controlled    AAOx3  Abd softly distended, obese, tender at incisions, incisions c/d/i, colostomy dusky but viable, edematous    A/P POD #5 s/p robotic APR  LRD  d/c IVF  D/C bartholomew  restart home meds including lasix  PT for ambulation

## 2017-12-11 NOTE — PROGRESS NOTE ADULT - SUBJECTIVE AND OBJECTIVE BOX
STATUS POST:  LAR converted to APR    POST OPERATIVE DAY # 6    SUBJECTIVE:   Patient was seen and examined this morning. There was no acute event overnight. He is resting comfortably in bed. Pain is well controlled. He does not report pain, fever, n/v, chest pain, or shortness of breath. He is on  O2 NC with O2 sat around 91-95%. He is on CLD and is tolerating it well.      OBJECTIVE:   T(C): 37 (12-11-17 @ 01:35), Max: 37 (12-11-17 @ 01:35)  HR: 84 (12-11-17 @ 01:35) (84 - 92)  BP: 104/64 (12-11-17 @ 01:35) (104/64 - 145/74)  RR: 18 (12-11-17 @ 01:35) (18 - 18)  SpO2: 93% (12-11-17 @ 01:35) (91% - 97%)  Wt(kg): --  CAPILLARY BLOOD GLUCOSE      POCT Blood Glucose.: 135 mg/dL (10 Dec 2017 21:17)  POCT Blood Glucose.: 164 mg/dL (10 Dec 2017 18:17)  POCT Blood Glucose.: 128 mg/dL (10 Dec 2017 12:01)  POCT Blood Glucose.: 132 mg/dL (10 Dec 2017 08:35)    I&O's Detail    09 Dec 2017 07:01  -  10 Dec 2017 07:00  --------------------------------------------------------  IN:    lactated ringers.: 3000 mL    Oral Fluid: 420 mL    Solution: 250 mL  Total IN: 3670 mL    OUT:    Colostomy: 100 mL    Indwelling Catheter - Urethral: 1500 mL  Total OUT: 1600 mL    Total NET: 2070 mL      10 Dec 2017 07:01  -  11 Dec 2017 01:59  --------------------------------------------------------  IN:    lactated ringers.: 600 mL    Oral Fluid: 600 mL    Solution: 250 mL  Total IN: 1450 mL    OUT:    Colostomy: 75 mL    Indwelling Catheter - Urethral: 1500 mL  Total OUT: 1575 mL    Total NET: -125 mL      PHYSICAL EXAM:  Gen: Well-nourished, well-developed, A&O x3, resting in bed in no acute distress  CV: RRR  Resp: Patent airways, unlabored, on 3L O2   Abdomen: Soft, nontender, nondistended, incision: clean/ Dry/ Intact , ostomy : functional  Extremities: All 4 extremities warm and well perfused, UE are less edematous

## 2017-12-12 LAB
ANION GAP SERPL CALC-SCNC: 10 MMOL/L — SIGNIFICANT CHANGE UP (ref 5–17)
ANION GAP SERPL CALC-SCNC: 9 MMOL/L — SIGNIFICANT CHANGE UP (ref 5–17)
APTT BLD: 24.2 SEC — LOW (ref 27.5–37.4)
BASE EXCESS BLDA CALC-SCNC: 12.6 MMOL/L — HIGH (ref -2–2)
BASOPHILS # BLD AUTO: 0.1 K/UL — SIGNIFICANT CHANGE UP (ref 0–0.2)
BLD GP AB SCN SERPL QL: NEGATIVE — SIGNIFICANT CHANGE UP
BUN SERPL-MCNC: 7 MG/DL — SIGNIFICANT CHANGE UP (ref 7–23)
BUN SERPL-MCNC: 9 MG/DL — SIGNIFICANT CHANGE UP (ref 7–23)
CALCIUM SERPL-MCNC: 8.7 MG/DL — SIGNIFICANT CHANGE UP (ref 8.4–10.5)
CALCIUM SERPL-MCNC: 9 MG/DL — SIGNIFICANT CHANGE UP (ref 8.4–10.5)
CHLORIDE SERPL-SCNC: 97 MMOL/L — SIGNIFICANT CHANGE UP (ref 96–108)
CHLORIDE SERPL-SCNC: 97 MMOL/L — SIGNIFICANT CHANGE UP (ref 96–108)
CK MB BLD-MCNC: 0.6 % — SIGNIFICANT CHANGE UP (ref 0–3.5)
CK MB CFR SERPL CALC: 1 NG/ML — SIGNIFICANT CHANGE UP (ref 0–6.7)
CK SERPL-CCNC: 154 U/L — SIGNIFICANT CHANGE UP (ref 30–200)
CO2 BLDA-SCNC: 40 MMOL/L — HIGH (ref 22–30)
CO2 SERPL-SCNC: 37 MMOL/L — HIGH (ref 22–31)
CO2 SERPL-SCNC: 38 MMOL/L — HIGH (ref 22–31)
CREAT SERPL-MCNC: 1 MG/DL — SIGNIFICANT CHANGE UP (ref 0.5–1.3)
CREAT SERPL-MCNC: 1.03 MG/DL — SIGNIFICANT CHANGE UP (ref 0.5–1.3)
EOSINOPHIL # BLD AUTO: 0.1 K/UL — SIGNIFICANT CHANGE UP (ref 0–0.5)
EOSINOPHIL NFR BLD AUTO: 2 % — SIGNIFICANT CHANGE UP (ref 0–6)
GAS PNL BLDA: SIGNIFICANT CHANGE UP
GAS PNL BLDA: SIGNIFICANT CHANGE UP
GAS PNL BLDV: SIGNIFICANT CHANGE UP
GLUCOSE BLDC GLUCOMTR-MCNC: 160 MG/DL — HIGH (ref 70–99)
GLUCOSE BLDC GLUCOMTR-MCNC: 174 MG/DL — HIGH (ref 70–99)
GLUCOSE SERPL-MCNC: 154 MG/DL — HIGH (ref 70–99)
GLUCOSE SERPL-MCNC: 178 MG/DL — HIGH (ref 70–99)
HCO3 BLDA-SCNC: 38 MMOL/L — HIGH (ref 21–29)
HCT VFR BLD CALC: 26.5 % — LOW (ref 39–50)
HCT VFR BLD CALC: 28.5 % — LOW (ref 39–50)
HGB BLD-MCNC: 9.3 G/DL — LOW (ref 13–17)
HGB BLD-MCNC: 9.6 G/DL — LOW (ref 13–17)
INR BLD: 1.13 RATIO — SIGNIFICANT CHANGE UP (ref 0.88–1.16)
LYMPHOCYTES # BLD AUTO: 0.5 K/UL — LOW (ref 1–3.3)
LYMPHOCYTES # BLD AUTO: 6 % — LOW (ref 13–44)
MAGNESIUM SERPL-MCNC: 1.9 MG/DL — SIGNIFICANT CHANGE UP (ref 1.6–2.6)
MAGNESIUM SERPL-MCNC: 1.9 MG/DL — SIGNIFICANT CHANGE UP (ref 1.6–2.6)
MCHC RBC-ENTMCNC: 32.7 PG — SIGNIFICANT CHANGE UP (ref 27–34)
MCHC RBC-ENTMCNC: 33.7 GM/DL — SIGNIFICANT CHANGE UP (ref 32–36)
MCHC RBC-ENTMCNC: 34.3 PG — HIGH (ref 27–34)
MCHC RBC-ENTMCNC: 35.1 GM/DL — SIGNIFICANT CHANGE UP (ref 32–36)
MCV RBC AUTO: 96.9 FL — SIGNIFICANT CHANGE UP (ref 80–100)
MCV RBC AUTO: 97.8 FL — SIGNIFICANT CHANGE UP (ref 80–100)
MONOCYTES # BLD AUTO: 0.8 K/UL — SIGNIFICANT CHANGE UP (ref 0–0.9)
MONOCYTES NFR BLD AUTO: 8 % — SIGNIFICANT CHANGE UP (ref 2–14)
NEUTROPHILS # BLD AUTO: 7.5 K/UL — HIGH (ref 1.8–7.4)
NEUTROPHILS NFR BLD AUTO: 83 % — HIGH (ref 43–77)
NT-PROBNP SERPL-SCNC: 1322 PG/ML — HIGH (ref 0–300)
PCO2 BLDA: 56 MMHG — HIGH (ref 32–46)
PH BLDA: 7.45 — SIGNIFICANT CHANGE UP (ref 7.35–7.45)
PHOSPHATE SERPL-MCNC: 1.8 MG/DL — LOW (ref 2.5–4.5)
PHOSPHATE SERPL-MCNC: 2.2 MG/DL — LOW (ref 2.5–4.5)
PLATELET # BLD AUTO: 277 K/UL — SIGNIFICANT CHANGE UP (ref 150–400)
PLATELET # BLD AUTO: 294 K/UL — SIGNIFICANT CHANGE UP (ref 150–400)
PO2 BLDA: 42 MMHG — CRITICAL LOW (ref 74–108)
POTASSIUM SERPL-MCNC: 3.4 MMOL/L — LOW (ref 3.5–5.3)
POTASSIUM SERPL-MCNC: 3.7 MMOL/L — SIGNIFICANT CHANGE UP (ref 3.5–5.3)
POTASSIUM SERPL-SCNC: 3.4 MMOL/L — LOW (ref 3.5–5.3)
POTASSIUM SERPL-SCNC: 3.7 MMOL/L — SIGNIFICANT CHANGE UP (ref 3.5–5.3)
PROCALCITONIN SERPL-MCNC: 0.65 NG/ML — HIGH (ref 0–0.04)
PROTHROM AB SERPL-ACNC: 12.3 SEC — SIGNIFICANT CHANGE UP (ref 9.8–12.7)
RBC # BLD: 2.71 M/UL — LOW (ref 4.2–5.8)
RBC # BLD: 2.94 M/UL — LOW (ref 4.2–5.8)
RBC # FLD: 12.7 % — SIGNIFICANT CHANGE UP (ref 10.3–14.5)
RBC # FLD: 12.9 % — SIGNIFICANT CHANGE UP (ref 10.3–14.5)
RH IG SCN BLD-IMP: NEGATIVE — SIGNIFICANT CHANGE UP
SAO2 % BLDA: 78 % — LOW (ref 92–96)
SODIUM SERPL-SCNC: 144 MMOL/L — SIGNIFICANT CHANGE UP (ref 135–145)
SODIUM SERPL-SCNC: 144 MMOL/L — SIGNIFICANT CHANGE UP (ref 135–145)
TROPONIN T SERPL-MCNC: 0.22 NG/ML — HIGH (ref 0–0.06)
WBC # BLD: 8.6 K/UL — SIGNIFICANT CHANGE UP (ref 3.8–10.5)
WBC # BLD: 8.9 K/UL — SIGNIFICANT CHANGE UP (ref 3.8–10.5)
WBC # FLD AUTO: 8.6 K/UL — SIGNIFICANT CHANGE UP (ref 3.8–10.5)
WBC # FLD AUTO: 8.9 K/UL — SIGNIFICANT CHANGE UP (ref 3.8–10.5)

## 2017-12-12 PROCEDURE — 74000: CPT | Mod: 26

## 2017-12-12 PROCEDURE — 71010: CPT | Mod: 26

## 2017-12-12 PROCEDURE — 74177 CT ABD & PELVIS W/CONTRAST: CPT | Mod: 26

## 2017-12-12 PROCEDURE — 71275 CT ANGIOGRAPHY CHEST: CPT | Mod: 26

## 2017-12-12 PROCEDURE — 93010 ELECTROCARDIOGRAM REPORT: CPT

## 2017-12-12 PROCEDURE — 99291 CRITICAL CARE FIRST HOUR: CPT

## 2017-12-12 RX ORDER — POTASSIUM CHLORIDE 20 MEQ
10 PACKET (EA) ORAL
Qty: 0 | Refills: 0 | Status: DISCONTINUED | OUTPATIENT
Start: 2017-12-12 | End: 2017-12-12

## 2017-12-12 RX ORDER — CARVEDILOL PHOSPHATE 80 MG/1
25 CAPSULE, EXTENDED RELEASE ORAL EVERY 12 HOURS
Qty: 0 | Refills: 0 | Status: DISCONTINUED | OUTPATIENT
Start: 2017-12-12 | End: 2017-12-13

## 2017-12-12 RX ORDER — CARVEDILOL PHOSPHATE 80 MG/1
25 CAPSULE, EXTENDED RELEASE ORAL EVERY 12 HOURS
Qty: 0 | Refills: 0 | Status: DISCONTINUED | OUTPATIENT
Start: 2017-12-12 | End: 2017-12-12

## 2017-12-12 RX ORDER — FUROSEMIDE 40 MG
20 TABLET ORAL ONCE
Qty: 0 | Refills: 0 | Status: COMPLETED | OUTPATIENT
Start: 2017-12-12 | End: 2017-12-12

## 2017-12-12 RX ORDER — IPRATROPIUM/ALBUTEROL SULFATE 18-103MCG
3 AEROSOL WITH ADAPTER (GRAM) INHALATION EVERY 6 HOURS
Qty: 0 | Refills: 0 | Status: DISCONTINUED | OUTPATIENT
Start: 2017-12-12 | End: 2017-12-22

## 2017-12-12 RX ORDER — MAGNESIUM SULFATE 500 MG/ML
1 VIAL (ML) INJECTION ONCE
Qty: 0 | Refills: 0 | Status: DISCONTINUED | OUTPATIENT
Start: 2017-12-12 | End: 2017-12-12

## 2017-12-12 RX ORDER — POTASSIUM PHOSPHATE, MONOBASIC POTASSIUM PHOSPHATE, DIBASIC 236; 224 MG/ML; MG/ML
15 INJECTION, SOLUTION INTRAVENOUS ONCE
Qty: 0 | Refills: 0 | Status: COMPLETED | OUTPATIENT
Start: 2017-12-12 | End: 2017-12-12

## 2017-12-12 RX ORDER — POTASSIUM CHLORIDE 20 MEQ
10 PACKET (EA) ORAL
Qty: 0 | Refills: 0 | Status: COMPLETED | OUTPATIENT
Start: 2017-12-12 | End: 2017-12-13

## 2017-12-12 RX ORDER — BUDESONIDE, MICRONIZED 100 %
0.5 POWDER (GRAM) MISCELLANEOUS EVERY 12 HOURS
Qty: 0 | Refills: 0 | Status: DISCONTINUED | OUTPATIENT
Start: 2017-12-12 | End: 2017-12-12

## 2017-12-12 RX ORDER — LISINOPRIL 2.5 MG/1
40 TABLET ORAL DAILY
Qty: 0 | Refills: 0 | Status: DISCONTINUED | OUTPATIENT
Start: 2017-12-12 | End: 2017-12-13

## 2017-12-12 RX ORDER — AMLODIPINE BESYLATE 2.5 MG/1
10 TABLET ORAL DAILY
Qty: 0 | Refills: 0 | Status: DISCONTINUED | OUTPATIENT
Start: 2017-12-12 | End: 2017-12-22

## 2017-12-12 RX ORDER — NYSTATIN CREAM 100000 [USP'U]/G
1 CREAM TOPICAL
Qty: 0 | Refills: 0 | Status: DISCONTINUED | OUTPATIENT
Start: 2017-12-12 | End: 2017-12-22

## 2017-12-12 RX ORDER — INSULIN LISPRO 100/ML
VIAL (ML) SUBCUTANEOUS EVERY 6 HOURS
Qty: 0 | Refills: 0 | Status: DISCONTINUED | OUTPATIENT
Start: 2017-12-12 | End: 2017-12-16

## 2017-12-12 RX ORDER — MAGNESIUM SULFATE 500 MG/ML
2 VIAL (ML) INJECTION ONCE
Qty: 0 | Refills: 0 | Status: COMPLETED | OUTPATIENT
Start: 2017-12-12 | End: 2017-12-13

## 2017-12-12 RX ADMIN — Medication 20 MILLIGRAM(S): at 06:34

## 2017-12-12 RX ADMIN — Medication 2: at 13:42

## 2017-12-12 RX ADMIN — Medication 50 MILLIEQUIVALENT(S): at 22:39

## 2017-12-12 RX ADMIN — POTASSIUM PHOSPHATE, MONOBASIC POTASSIUM PHOSPHATE, DIBASIC 62.5 MILLIMOLE(S): 236; 224 INJECTION, SOLUTION INTRAVENOUS at 22:38

## 2017-12-12 RX ADMIN — Medication 2: at 13:41

## 2017-12-12 RX ADMIN — HEPARIN SODIUM 5000 UNIT(S): 5000 INJECTION INTRAVENOUS; SUBCUTANEOUS at 22:28

## 2017-12-12 RX ADMIN — HEPARIN SODIUM 5000 UNIT(S): 5000 INJECTION INTRAVENOUS; SUBCUTANEOUS at 06:34

## 2017-12-12 RX ADMIN — Medication 20 MILLIGRAM(S): at 12:23

## 2017-12-12 RX ADMIN — OXYCODONE HYDROCHLORIDE 5 MILLIGRAM(S): 5 TABLET ORAL at 04:03

## 2017-12-12 RX ADMIN — LISINOPRIL 40 MILLIGRAM(S): 2.5 TABLET ORAL at 06:34

## 2017-12-12 RX ADMIN — OXYCODONE HYDROCHLORIDE 10 MILLIGRAM(S): 5 TABLET ORAL at 16:20

## 2017-12-12 RX ADMIN — OXYCODONE HYDROCHLORIDE 5 MILLIGRAM(S): 5 TABLET ORAL at 03:33

## 2017-12-12 RX ADMIN — NYSTATIN CREAM 1 APPLICATION(S): 100000 CREAM TOPICAL at 06:33

## 2017-12-12 RX ADMIN — AMLODIPINE BESYLATE 10 MILLIGRAM(S): 2.5 TABLET ORAL at 06:34

## 2017-12-12 RX ADMIN — HEPARIN SODIUM 5000 UNIT(S): 5000 INJECTION INTRAVENOUS; SUBCUTANEOUS at 13:43

## 2017-12-12 NOTE — PROGRESS NOTE ADULT - SUBJECTIVE AND OBJECTIVE BOX
RRT called for hypoxia - desaturation Abg result 7.45 56 38  78%. At the time of the rapid the patient was lethargic but easily arousable on home CPAP machine. Denies chest, jaw, arm, or back pain. The patient also denies SOB. (+) anasarca. (+) dusky colostomy.  S/P Lasix earlier today for concerns of fluid overload.   The patient was placed on hospital Bi PAP. ABG should be repeated in 1/2hr. Phos, mg and Potassium has been supplemented. Cardiac enzymes should be trended with repeat EKG BNP (P).   I have discussed with primary Surgical team     Recommendations   F/u with blood gas  trend Cardiac enzymes   f/u on BNP/ CMP/ Mg/ Phos/ - may consider TTE and cards consult   if primary team would like to further diurese please monitor for contraction alkalosis may consider Diamox   nutritional optimization per primary team   Primary sx team for colostomy evaluation RRT called for hypoxia - desaturation Abg result 7.45 56 38  78%. At the time of the rapid the patient was lethargic but easily arousable on home CPAP machine. Denies chest, jaw, arm, or back pain. The patient also denies SOB. (+) anasarca. (+) dusky colostomy.  S/P Lasix earlier today for concerns of fluid overload.   The patient was placed on hospital Bi PAP. ABG should be repeated in 1/2hr. Phos, mg and Potassium has been supplemented. Cardiac enzymes should be trended with repeat EKG BNP (P).   I have discussed with primary Surgical team     Recommendations   F/u with blood gas  trend Cardiac enzymes   f/u on BNP/ CMP/ Mg/ Phos/ - may consider TTE and cards consult   if primary team would like to further diurese please monitor for contraction alkalosis may consider Diamox   nutritional optimization per primary team   Primary sx team for colostomy evaluation  IF no DVT/ PE would increase heparin SC to 7500 u q 8 RRT called for hypoxia - desaturation Abg result 7.45 56, 42 38  78%. At the time of the rapid the patient was lethargic but easily arousable on home CPAP machine. Denies chest, jaw, arm, or back pain. The patient also denies SOB. (+) anasarca. (+) dusky colostomy.  S/P Lasix earlier today for concerns of fluid overload.   The patient was placed on hospital Bi PAP. ABG should be repeated in 1/2hr. Phos, mg and Potassium has been supplemented. Cardiac enzymes should be trended with repeat EKG BNP (P).   I have discussed with primary Surgical team     Recommendations   F/u with blood gas  trend Cardiac enzymes   f/u on BNP/ CMP/ Mg/ Phos/ - may consider TTE and cards consult   if primary team would like to further diurese please monitor for contraction alkalosis may consider Diamox   nutritional optimization per primary team   Primary sx team for colostomy evaluation  IF no DVT/ PE would increase heparin SC to 7500 u q 8

## 2017-12-12 NOTE — CONSULT NOTE ADULT - SUBJECTIVE AND OBJECTIVE BOX
Patient seen and evaluated @ 7:00 PM  Chief Complaint: Elevated troponin    HPI:  67 year old obese male with PMH of HTN, MI/CAD with ANIKET x 1 to mLAD , GLENYS with cpap use with rectal mass s/p transanal excision/ biopsy 2017 s/p neoadjuvant chemoradiation underwent laparoscopic robotic assisted low anterior resection, cystoscopy with insertion of ureteral catheters.    Post op course now complicated by acute episode of asymptomatic desaturation with O2Sat 70s on NC. Patient placed on BiPAP 15/8 with 40% FiO2 with improvement in O2Sat to 96%. Patient without chest pain or other complaints.    Troponin elevated at 0.22. Prior troponins have been negative. EKG unchanged from prior without acute ST segment changes concerning for ischemia. Q waves inferiorly and poor R wave progression.    PMH:   Rectal cancer  Vitiligo  Stented coronary artery  CKD (chronic kidney disease), stage 3 (moderate)  Obesity (BMI 30-39.9)  Benign neoplasm of colon, unspecified part of colon  Cataracts, bilateral  Hyperlipidemia, unspecified hyperlipidemia type  Tobacco use  PVD (peripheral vascular disease)  PVD (posterior vitreous detachment), bilateral  Diabetes mellitus, type 2  Essential (primary) hypertension    PSH:   Abnormal rectal biopsy  History of myringotomy  History of cholecystectomy  S/P tonsillectomy and adenoidectomy  H/O heart artery stent    Medications:   acetaminophen   Tablet. 650 milliGRAM(s) Oral every 6 hours PRN  ALBUTerol/ipratropium for Nebulization 3 milliLiter(s) Nebulizer every 6 hours  amLODIPine   Tablet 10 milliGRAM(s) Oral daily  atorvastatin 40 milliGRAM(s) Oral at bedtime  carvedilol 25 milliGRAM(s) Oral every 12 hours  dextrose 5%. 1000 milliLiter(s) IV Continuous <Continuous>  dextrose 50% Injectable 12.5 Gram(s) IV Push once  dextrose 50% Injectable 25 Gram(s) IV Push once  dextrose 50% Injectable 25 Gram(s) IV Push once  dextrose Gel 1 Dose(s) Oral once PRN  furosemide    Tablet 20 milliGRAM(s) Oral daily  glucagon  Injectable 1 milliGRAM(s) IntraMuscular once PRN  heparin  Injectable 5000 Unit(s) SubCutaneous every 8 hours  insulin lispro (HumaLOG) corrective regimen sliding scale   SubCutaneous three times a day before meals  insulin lispro (HumaLOG) corrective regimen sliding scale   SubCutaneous at bedtime  lisinopril 40 milliGRAM(s) Oral daily  magnesium sulfate  IVPB 1 Gram(s) IV Intermittent once  nystatin Powder 1 Application(s) Topical two times a day PRN  oxyCODONE    IR 5 milliGRAM(s) Oral every 4 hours PRN  oxyCODONE    IR 10 milliGRAM(s) Oral every 4 hours PRN  potassium chloride  10 mEq/50 mL IVPB 10 milliEquivalent(s) IV Intermittent every 1 hour  potassium phosphate IVPB 15 milliMole(s) IV Intermittent once    Allergies:  No Known Allergies    FAMILY HISTORY:  Family history of essential hypertension  Diabetes mellitus, type 2    Social History:  Former smoker  + EtOH    Review of Systems:  Constitutional: [ ] Fever [ ] Chills [ ] Fatigue [ ] Weight change   HEENT: [ ] Blurred vision [ ] Eye Pain [ ] Headache [ ] Runny nose [ ] Sore Throat   Respiratory: [ ] Cough [ ] Wheezing [x] Shortness of breath  Cardiovascular: [ ] Chest Pain [ ] Palpitations [ ] CEJA [ ] PND [ ] Orthopnea  Gastrointestinal: [ ] Abdominal Pain [ ] Diarrhea [ ] Constipation [ ] Hemorrhoids [ ] Nausea [ ] Vomiting  Genitourinary: [ ] Nocturia [ ] Dysuria [ ] Incontinence  Extremities: [ ] Swelling [ ] Joint Pain  Neurologic: [ ] Focal deficit [ ] Paresthesias [ ] Syncope  Lymphatic: [ ] Swelling [ ] Lymphadenopathy   Skin: [ ] Rash [ ] Ecchymoses [ ] Wounds [ ] Lesions  Psychiatry: [ ] Depression [ ] Suicidal/Homicidal Ideation [ ] Anxiety [ ] Sleep Disturbances  [ ] 10 point review of systems is otherwise negative except as mentioned above            [ ]Unable to obtain    Physical Exam:  T(C): 37.8 (17 @ 16:41), Max: 37.8 (17 @ 14:06)  HR: 94 (17 @ 21:12) (86 - 110)  BP: 149/83 (17 @ 20:27) (129/76 - 157/80)  RR: 18 (17 @ 20:27) (18 - 20)  SpO2: 99% (17 @ 21:12) (92% - 99%)  Wt(kg): --     @ 07:01  -   @ 07:00  --------------------------------------------------------  IN: 1200 mL / OUT: 1500 mL / NET: -300 mL     @ 07:01  -   @ 21:47  --------------------------------------------------------  IN: 180 mL / OUT: 0 mL / NET: 180 mL      Daily     Daily     Appearance: NAD  Eyes: PERRL, EOMI  HENT: Normal oral muscosa, NC/AT  Cardiovascular: normal S1 and S2, RRR, no m/r/g, no edema, normal JVP  Respiratory: Clear to auscultation bilaterally  Gastrointestinal: Soft, non-tender, non-distended, BS+  Musculoskeletal: No clubbing, no joint deformity   Neurologic: Non-focal  Lymphatic: No lymphadenopathy  Psychiatry: AAOx3, mood & affect appropriate  Skin: No rashes, no ecchymoses, no cyanosis    Cardiovascular Diagnostic Testing:  ECG: sinus tachycardia 114 bpm, Q waves inferiorly and poor R wave progression    Echo:  none    Stress Testin  IMPRESSIONS:Abnormal Study  * The left ventricle was normal in size.  * Tracer uptake was homogeneous throughout the left  ventricle.  * Abnormal study; There is a medium sized, mild defect in  septal wall that is fixed, suggestive of infarct. There is  also a small, mild defect in distal inferior wall that is  partially reversible, suggestive of infarction with  penelope-infarctischemia.  * Gated wall motion analysis is performed, and shows  inferior and septal wall hypokinesis with post stress LVEF  of 56%.  * Normal Regadenoson ECG.    Cath:  2009  Ventricles: No left ventriculogram was performed.  Coronary vessels: The coronary circulation is right dominant.  LM:      Proximal left main: Angiography showed minor luminal  irregularities with no flow limiting lesions.  LAD:      Mid LAD: There was a 90 % stenosis.  D2: There was a discrete 70 % stenosis.  CX:      Circumflex: Normal.  RCA:      RCA: Angiography showed minor luminal irregularities with no flow  limiting lesions.  Complications: There were no complications.  Recommendations:  Continue aspirin, 325 mg, PO, every day.  Add clopidogrel (Plavix), 75 mg, PO, every day.    Interpretation of Telemetry: sinus tachycardia    Imaging:  CXR with cardiomegaly    Labs:                        9.6    8.6   )-----------( 294      ( 12 Dec 2017 16:58 )             28.5     -    144  |  97  |  7   ----------------------------<  178<H>  3.4<L>   |  38<H>  |  1.03    Ca    9.0      12 Dec 2017 17:03  Phos  1.8       Mg     1.9             CARDIAC MARKERS ( 12 Dec 2017 17:03 )  x     / 0.22 ng/mL / 154 U/L / x     / 1.0 ng/mL

## 2017-12-12 NOTE — PROGRESS NOTE ADULT - SUBJECTIVE AND OBJECTIVE BOX
HISTORY  67M PMHx CKD, HLD, CAD s/p PCI (2009), GLENYS (home bipap), DM, HTN, s/p choleystectomy, Rectal CA s/p transanal excision/biopsy (7/2017), s/p neoadjuvant chemoradiation who presented to [facility] for planned robotic LAR resection of Rectal CA. Patient went to OR and underwent robotic converted to laparoscopic converted to open APR on 12/6/17. The details of his OR course are as follows:  Anesthesia time: 9h20m, Surgical time: 6h46m, IVF: 6L, 1U pRBC. UOP: 1L, EBL: 1L. Airway: Grade 2, Mac 3, 8.0 ETT.     In PACU patient was started on CPAP, and became hypotensive to SBP 80's. Started on phenylephrine gtt, w/ increasing doses and rising lactate on ABG, but he maintained his mental status throughout. CPAP removed & BP recovered. Received 250mL 5% Albumin & pt transported to SICU as patient continued to required phenylephrine to maintain MAP>65mmHg. Pt was quickly weaned off pressors in SICU and was subsequently transferred to floor.    SICU reconsulted on POD#7 for hypoxia and lethargy on the floor. Pt was noted to desat to ~70s%, was placed on CPAP with improvement to 90s%. Pt then noted to desat to 70s again, and was noted to be retaining CO2 (pCO2 56) on ABG, so switched from CPAP to BiPAP and an RRT was called. Pt also reportedly has been more lethargic and confused today than he is at baseline. CTA chest ordered to r/o PE, but IV access was lost in CT scanner so test was aborted and pt transported straight to SICU.  In SICU, repeat labs sent and additional IV access obtained via ultrasound guidance. CT scan called and CTA chest rescheduled for 11 pm, in addition to CT abdomen pelvis.       SUBJECTIVE/ROS: Pt not participating in ROS. However, following commands.  [ ] A ten-point review of systems was otherwise negative except as noted.  [x ] Due to altered mental status/intubation, subjective information were not able to be obtained from the patient. History was obtained, to the extent possible, from review of the chart and collateral sources of information.      NEURO  RASS:  0     CAM ICU: pt not participating.  Exam: asleep, arousable, follows commands, shakes head no and refuses to answer orientation questions.  Meds: acetaminophen   Tablet. 650 milliGRAM(s) Oral every 6 hours PRN Mild Pain (1 - 3)  oxyCODONE    IR 5 milliGRAM(s) Oral every 4 hours PRN Moderate Pain (4 - 6)  oxyCODONE    IR 10 milliGRAM(s) Oral every 4 hours PRN Severe Pain (7 - 10)    [x] Adequacy of sedation and pain control has been assessed and adjusted      RESPIRATORY  RR: 21 (12-12-17 @ 22:00) (18 - 21)  SpO2: 99% (12-12-17 @ 22:00) (92% - 99%)  Exam: unlabored, clear to auscultation bilaterally  ABG - ( 12 Dec 2017 22:05 )  pH: 7.45  /  pCO2: 57    /  pO2: 101   / HCO3: 39    / Base Excess: 13.1  /  SaO2: 99      Blood Gas Arterial, Lactate: 0.7 (12.12.17 @ 22:05)    [n/a ] Extubation Readiness Assessed  Meds: ALBUTerol/ipratropium for Nebulization 3 milliLiter(s) Nebulizer every 6 hours  buDESOnide   0.5 milliGRAM(s) Respule 0.5 milliGRAM(s) Inhalation every 12 hours        CARDIOVASCULAR  HR: 99 (12-12-17 @ 22:00) (86 - 110)  BP: 161/77 (12-12-17 @ 22:00) (129/76 - 163/83)  BP(mean): 111 (12-12-17 @ 22:00) (72 - 111)  VBG - ( 12 Dec 2017 21:41 )  pH: 7.42  /  pCO2: 64    /  pO2: 47    / HCO3: 40    / Base Excess: 13.9  /  SaO2: 82     Lactate: 1.1      Exam: regular rate and rhythm  Cardiac Rhythm: sinus rhythm  Perfusion     [x ]Adequate   [ ]Inadequate  Mentation   [ ]Normal       [x ]Reduced  Extremities  [x ]Warm         [ ]Cool  Volume Status [ ]Hypervolemic [ x]Euvolemic [ ]Hypovolemic  Meds: amLODIPine   Tablet 10 milliGRAM(s) Oral daily  carvedilol 25 milliGRAM(s) Oral every 12 hours  furosemide    Tablet 20 milliGRAM(s) Oral daily  lisinopril 40 milliGRAM(s) Oral daily        GI/NUTRITION  Exam: distended, nontender, incision c/d/i, ostomy dusky at periphery and pink in center, JEAN MARIE drains serosanguinous  Diet: NPO  Meds:  x    GENITOURINARY  I&O's Detail    12-11 @ 07:01  -  12-12 @ 07:00  --------------------------------------------------------  IN:    Oral Fluid: 1200 mL  Total IN: 1200 mL    OUT:    Colostomy: 25 mL    Indwelling Catheter - Urethral: 250 mL    Voided: 1225 mL  Total OUT: 1500 mL    Total NET: -300 mL      12-12 @ 07:01  -  12-12 @ 22:38  --------------------------------------------------------  IN:    Oral Fluid: 180 mL  Total IN: 180 mL    OUT:  Total OUT: 0 mL    Total NET: 180 mL          12-12    144  |  97  |  9   ----------------------------<  154<H>  3.7   |  37<H>  |  1.00    Ca    8.7      12 Dec 2017 21:51  Phos  2.2     12-12  Mg     1.9     12-12      [n/a ] Daley catheter, indication: N/A  Meds: magnesium sulfate  IVPB 1 Gram(s) IV Intermittent once  potassium chloride  10 mEq/50 mL IVPB 10 milliEquivalent(s) IV Intermittent every 1 hour  potassium phosphate IVPB 15 milliMole(s) IV Intermittent once        HEMATOLOGIC  Meds: heparin  Injectable 5000 Unit(s) SubCutaneous every 8 hours    [x] VTE Prophylaxis                        9.3    8.9   )-----------( 277      ( 12 Dec 2017 21:51 )             26.5     PT/INR - ( 12 Dec 2017 22:08 )   PT: 12.3 sec;   INR: 1.13 ratio         PTT - ( 12 Dec 2017 22:08 )  PTT:24.2 sec  Transfusion     [ ] PRBC   [ ] Platelets   [ ] FFP   [ ] Cryoprecipitate      INFECTIOUS DISEASES  T(C): 37.6 (12-12-17 @ 21:45), Max: 37.8 (12-12-17 @ 14:06)  WBC Count: 8.9 K/uL (12-12 @ 21:51)  WBC Count: 8.6 K/uL (12-12 @ 16:58)    Recent Cultures: x  Meds:  x      ENDOCRINE  Capillary Blood Glucose  POCT Blood Glucose.: 174 mg/dL (12 Dec 2017 17:27)  POCT Blood Glucose.: 160 mg/dL (12 Dec 2017 13:11)    Meds: atorvastatin 40 milliGRAM(s) Oral at bedtime  insulin lispro (HumaLOG) corrective regimen sliding scale   SubCutaneous every 6 hours        ACCESS DEVICES:  [x ] Peripheral IV  [ ] Central Venous Line	[ ] R	[ ] L	[ ] IJ	[ ] Fem	[ ] SC	Placed:   [ ] Arterial Line		[ ] R	[ ] L	[ ] Fem	[ ] Rad	[ ] Ax	Placed:   [ ] PICC:					[ ] Mediport  [ ] Urinary Catheter, Date Placed:   [x ] Necessity of urinary, arterial, and venous catheters discussed    OTHER MEDICATIONS:  nystatin Powder 1 Application(s) Topical two times a day PRN      CODE STATUS: Full code    IMAGING: < from: Xray Abdomen 1 View PORTABLE -Urgent (12.12.17 @ 18:44) >  ******PRELIMINARY REPORT******        INTERPRETATION:  Multiple gas distended loops of bowel throughout the   abdomen. If there is clinical concern for obstruction, cross sectional   imaging may be obtained for further evaluation. SICU READMISSION NOTE      HISTORY  67M PMHx CKD, HLD, CAD s/p PCI (2009), GLENYS (home bipap), DM, HTN, s/p choleystectomy, Rectal CA s/p transanal excision/biopsy (7/2017), s/p neoadjuvant chemoradiation who presented to [facility] for planned robotic LAR resection of Rectal CA. Patient went to OR and underwent robotic converted to laparoscopic converted to open APR on 12/6/17. The details of his OR course are as follows:  Anesthesia time: 9h20m, Surgical time: 6h46m, IVF: 6L, 1U pRBC. UOP: 1L, EBL: 1L. Airway: Grade 2, Mac 3, 8.0 ETT.     In PACU patient was started on CPAP, and became hypotensive to SBP 80's. Started on phenylephrine gtt, w/ increasing doses and rising lactate on ABG, but he maintained his mental status throughout. CPAP removed & BP recovered. Received 250mL 5% Albumin & pt transported to SICU as patient continued to required phenylephrine to maintain MAP>65mmHg. Pt was quickly weaned off pressors in SICU and was subsequently transferred to floor.    SICU reconsulted on POD#7 for hypoxia and lethargy on the floor. Pt was noted to desat to ~70s%, was placed on CPAP with improvement to 90s%. Pt then noted to desat to 70s again, and was noted to be retaining CO2 (pCO2 56) on ABG, so switched from CPAP to BiPAP and an RRT was called. Pt also reportedly has been more lethargic and confused today than he is at baseline. CTA chest ordered to r/o PE, but IV access was lost in CT scanner so test was aborted and pt transported straight to SICU.  In SICU, repeat labs sent and additional IV access obtained via ultrasound guidance. CT scan called and CTA chest rescheduled for 11 pm, in addition to CT abdomen pelvis.       SUBJECTIVE/ROS: Pt not participating in ROS. However, following commands.  [ ] A ten-point review of systems was otherwise negative except as noted.  [x ] Due to altered mental status/intubation, subjective information were not able to be obtained from the patient. History was obtained, to the extent possible, from review of the chart and collateral sources of information.      NEURO  RASS:  0     CAM ICU: pt not participating.  Exam: asleep, arousable, follows commands, shakes head no and refuses to answer orientation questions.  Meds: acetaminophen   Tablet. 650 milliGRAM(s) Oral every 6 hours PRN Mild Pain (1 - 3)  oxyCODONE    IR 5 milliGRAM(s) Oral every 4 hours PRN Moderate Pain (4 - 6)  oxyCODONE    IR 10 milliGRAM(s) Oral every 4 hours PRN Severe Pain (7 - 10)    [x] Adequacy of sedation and pain control has been assessed and adjusted      RESPIRATORY  RR: 21 (12-12-17 @ 22:00) (18 - 21)  SpO2: 99% (12-12-17 @ 22:00) (92% - 99%)  Exam: unlabored, clear to auscultation bilaterally  ABG - ( 12 Dec 2017 22:05 )  pH: 7.45  /  pCO2: 57    /  pO2: 101   / HCO3: 39    / Base Excess: 13.1  /  SaO2: 99      Blood Gas Arterial, Lactate: 0.7 (12.12.17 @ 22:05)    [n/a ] Extubation Readiness Assessed  Meds: ALBUTerol/ipratropium for Nebulization 3 milliLiter(s) Nebulizer every 6 hours  buDESOnide   0.5 milliGRAM(s) Respule 0.5 milliGRAM(s) Inhalation every 12 hours        CARDIOVASCULAR  HR: 99 (12-12-17 @ 22:00) (86 - 110)  BP: 161/77 (12-12-17 @ 22:00) (129/76 - 163/83)  BP(mean): 111 (12-12-17 @ 22:00) (72 - 111)  VBG - ( 12 Dec 2017 21:41 )  pH: 7.42  /  pCO2: 64    /  pO2: 47    / HCO3: 40    / Base Excess: 13.9  /  SaO2: 82     Lactate: 1.1      Exam: regular rate and rhythm  Cardiac Rhythm: sinus rhythm  Perfusion     [x ]Adequate   [ ]Inadequate  Mentation   [ ]Normal       [x ]Reduced  Extremities  [x ]Warm         [ ]Cool  Volume Status [ ]Hypervolemic [ x]Euvolemic [ ]Hypovolemic  Meds: amLODIPine   Tablet 10 milliGRAM(s) Oral daily  carvedilol 25 milliGRAM(s) Oral every 12 hours  furosemide    Tablet 20 milliGRAM(s) Oral daily  lisinopril 40 milliGRAM(s) Oral daily        GI/NUTRITION  Exam: distended, nontender, incision c/d/i, ostomy dusky at periphery and pink in center, JEAN MARIE drains serosanguinous  Diet: NPO  Meds:  x    GENITOURINARY  I&O's Detail    12-11 @ 07:01  -  12-12 @ 07:00  --------------------------------------------------------  IN:    Oral Fluid: 1200 mL  Total IN: 1200 mL    OUT:    Colostomy: 25 mL    Indwelling Catheter - Urethral: 250 mL    Voided: 1225 mL  Total OUT: 1500 mL    Total NET: -300 mL      12-12 @ 07:01  -  12-12 @ 22:38  --------------------------------------------------------  IN:    Oral Fluid: 180 mL  Total IN: 180 mL    OUT:  Total OUT: 0 mL    Total NET: 180 mL          12-12    144  |  97  |  9   ----------------------------<  154<H>  3.7   |  37<H>  |  1.00    Ca    8.7      12 Dec 2017 21:51  Phos  2.2     12-12  Mg     1.9     12-12      [n/a ] Daley catheter, indication: N/A  Meds: magnesium sulfate  IVPB 1 Gram(s) IV Intermittent once  potassium chloride  10 mEq/50 mL IVPB 10 milliEquivalent(s) IV Intermittent every 1 hour  potassium phosphate IVPB 15 milliMole(s) IV Intermittent once        HEMATOLOGIC  Meds: heparin  Injectable 5000 Unit(s) SubCutaneous every 8 hours    [x] VTE Prophylaxis                        9.3    8.9   )-----------( 277      ( 12 Dec 2017 21:51 )             26.5     PT/INR - ( 12 Dec 2017 22:08 )   PT: 12.3 sec;   INR: 1.13 ratio         PTT - ( 12 Dec 2017 22:08 )  PTT:24.2 sec  Transfusion     [ ] PRBC   [ ] Platelets   [ ] FFP   [ ] Cryoprecipitate      INFECTIOUS DISEASES  T(C): 37.6 (12-12-17 @ 21:45), Max: 37.8 (12-12-17 @ 14:06)  WBC Count: 8.9 K/uL (12-12 @ 21:51)  WBC Count: 8.6 K/uL (12-12 @ 16:58)    Recent Cultures: x  Meds:  x      ENDOCRINE  Capillary Blood Glucose  POCT Blood Glucose.: 174 mg/dL (12 Dec 2017 17:27)  POCT Blood Glucose.: 160 mg/dL (12 Dec 2017 13:11)    Meds: atorvastatin 40 milliGRAM(s) Oral at bedtime  insulin lispro (HumaLOG) corrective regimen sliding scale   SubCutaneous every 6 hours        ACCESS DEVICES:  [x ] Peripheral IV  [ ] Central Venous Line	[ ] R	[ ] L	[ ] IJ	[ ] Fem	[ ] SC	Placed:   [ ] Arterial Line		[ ] R	[ ] L	[ ] Fem	[ ] Rad	[ ] Ax	Placed:   [ ] PICC:					[ ] Mediport  [ ] Urinary Catheter, Date Placed:   [x ] Necessity of urinary, arterial, and venous catheters discussed    OTHER MEDICATIONS:  nystatin Powder 1 Application(s) Topical two times a day PRN      CODE STATUS: Full code    IMAGING: < from: Xray Abdomen 1 View PORTABLE -Urgent (12.12.17 @ 18:44) >  ******PRELIMINARY REPORT******        INTERPRETATION:  Multiple gas distended loops of bowel throughout the   abdomen. If there is clinical concern for obstruction, cross sectional   imaging may be obtained for further evaluation. SICU READMISSION NOTE      HISTORY  67M PMHx CKD, HLD, CAD s/p PCI (2009), GLENYS (home CPAP), DM, HTN, s/p choleystectomy, Rectal CA s/p transanal excision/biopsy (7/2017), s/p neoadjuvant chemoradiation who presented to [facility] for planned robotic LAR resection of Rectal CA. Patient went to OR and underwent robotic converted to laparoscopic converted to open APR on 12/6/17. The details of his OR course are as follows:  Anesthesia time: 9h20m, Surgical time: 6h46m, IVF: 6L, 1U pRBC. UOP: 1L, EBL: 1L. Airway: Grade 2, Mac 3, 8.0 ETT.     In PACU patient was started on CPAP, and became hypotensive to SBP 80's. Started on phenylephrine gtt, w/ increasing doses and rising lactate on ABG, but he maintained his mental status throughout. CPAP removed & BP recovered. Received 250mL 5% Albumin & pt transported to SICU as patient continued to required phenylephrine to maintain MAP>65mmHg. Pt was quickly weaned off pressors in SICU and was subsequently transferred to floor.    SICU reconsulted on POD#7 for hypoxia and lethargy on the floor. Pt was noted to desat to ~70s%, was placed on CPAP with improvement to 90s%. Pt then noted to desat to 70s again, and was noted to be retaining CO2 (pCO2 56) on ABG, so switched from CPAP to BiPAP and an RRT was called. Pt also reportedly has been more lethargic and confused today than he is at baseline. CTA chest ordered to r/o PE, but IV access was lost in CT scanner so test was aborted and pt transported straight to SICU.  In SICU, repeat labs sent and additional IV access obtained via ultrasound guidance. CT scan called and CTA chest rescheduled for 11 pm, in addition to CT abdomen pelvis.       SUBJECTIVE/ROS: Pt not participating in ROS. However, following commands.  [ ] A ten-point review of systems was otherwise negative except as noted.  [x ] Due to altered mental status/intubation, subjective information were not able to be obtained from the patient. History was obtained, to the extent possible, from review of the chart and collateral sources of information.      NEURO  RASS:  0     CAM ICU: pt not participating.  Exam: asleep, arousable, follows commands, shakes head no and refuses to answer orientation questions.  Meds: acetaminophen   Tablet. 650 milliGRAM(s) Oral every 6 hours PRN Mild Pain (1 - 3)  oxyCODONE    IR 5 milliGRAM(s) Oral every 4 hours PRN Moderate Pain (4 - 6)  oxyCODONE    IR 10 milliGRAM(s) Oral every 4 hours PRN Severe Pain (7 - 10)    [x] Adequacy of sedation and pain control has been assessed and adjusted      RESPIRATORY  RR: 21 (12-12-17 @ 22:00) (18 - 21)  SpO2: 99% (12-12-17 @ 22:00) (92% - 99%)  Exam: unlabored, clear to auscultation bilaterally  ABG - ( 12 Dec 2017 22:05 )  pH: 7.45  /  pCO2: 57    /  pO2: 101   / HCO3: 39    / Base Excess: 13.1  /  SaO2: 99      Blood Gas Arterial, Lactate: 0.7 (12.12.17 @ 22:05)    [n/a ] Extubation Readiness Assessed  Meds: ALBUTerol/ipratropium for Nebulization 3 milliLiter(s) Nebulizer every 6 hours  buDESOnide   0.5 milliGRAM(s) Respule 0.5 milliGRAM(s) Inhalation every 12 hours        CARDIOVASCULAR  HR: 99 (12-12-17 @ 22:00) (86 - 110)  BP: 161/77 (12-12-17 @ 22:00) (129/76 - 163/83)  BP(mean): 111 (12-12-17 @ 22:00) (72 - 111)  VBG - ( 12 Dec 2017 21:41 )  pH: 7.42  /  pCO2: 64    /  pO2: 47    / HCO3: 40    / Base Excess: 13.9  /  SaO2: 82     Lactate: 1.1      Exam: regular rate and rhythm  Cardiac Rhythm: sinus rhythm  Perfusion     [x ]Adequate   [ ]Inadequate  Mentation   [ ]Normal       [x ]Reduced  Extremities  [x ]Warm         [ ]Cool  Volume Status [ ]Hypervolemic [ x]Euvolemic [ ]Hypovolemic  Meds: amLODIPine   Tablet 10 milliGRAM(s) Oral daily  carvedilol 25 milliGRAM(s) Oral every 12 hours  furosemide    Tablet 20 milliGRAM(s) Oral daily  lisinopril 40 milliGRAM(s) Oral daily        GI/NUTRITION  Exam: distended, nontender, incision c/d/i, ostomy dusky at periphery and pink in center, JEAN MARIE drains serosanguinous  Diet: NPO  Meds:  x    GENITOURINARY  I&O's Detail    12-11 @ 07:01  -  12-12 @ 07:00  --------------------------------------------------------  IN:    Oral Fluid: 1200 mL  Total IN: 1200 mL    OUT:    Colostomy: 25 mL    Indwelling Catheter - Urethral: 250 mL    Voided: 1225 mL  Total OUT: 1500 mL    Total NET: -300 mL      12-12 @ 07:01  -  12-12 @ 22:38  --------------------------------------------------------  IN:    Oral Fluid: 180 mL  Total IN: 180 mL    OUT:  Total OUT: 0 mL    Total NET: 180 mL          12-12    144  |  97  |  9   ----------------------------<  154<H>  3.7   |  37<H>  |  1.00    Ca    8.7      12 Dec 2017 21:51  Phos  2.2     12-12  Mg     1.9     12-12      [n/a ] Daley catheter, indication: N/A  Meds: magnesium sulfate  IVPB 1 Gram(s) IV Intermittent once  potassium chloride  10 mEq/50 mL IVPB 10 milliEquivalent(s) IV Intermittent every 1 hour  potassium phosphate IVPB 15 milliMole(s) IV Intermittent once        HEMATOLOGIC  Meds: heparin  Injectable 5000 Unit(s) SubCutaneous every 8 hours    [x] VTE Prophylaxis                        9.3    8.9   )-----------( 277      ( 12 Dec 2017 21:51 )             26.5     PT/INR - ( 12 Dec 2017 22:08 )   PT: 12.3 sec;   INR: 1.13 ratio         PTT - ( 12 Dec 2017 22:08 )  PTT:24.2 sec  Transfusion     [ ] PRBC   [ ] Platelets   [ ] FFP   [ ] Cryoprecipitate      INFECTIOUS DISEASES  T(C): 37.6 (12-12-17 @ 21:45), Max: 37.8 (12-12-17 @ 14:06)  WBC Count: 8.9 K/uL (12-12 @ 21:51)  WBC Count: 8.6 K/uL (12-12 @ 16:58)    Recent Cultures: x  Meds:  x      ENDOCRINE  Capillary Blood Glucose  POCT Blood Glucose.: 174 mg/dL (12 Dec 2017 17:27)  POCT Blood Glucose.: 160 mg/dL (12 Dec 2017 13:11)    Meds: atorvastatin 40 milliGRAM(s) Oral at bedtime  insulin lispro (HumaLOG) corrective regimen sliding scale   SubCutaneous every 6 hours        ACCESS DEVICES:  [x ] Peripheral IV  [ ] Central Venous Line	[ ] R	[ ] L	[ ] IJ	[ ] Fem	[ ] SC	Placed:   [ ] Arterial Line		[ ] R	[ ] L	[ ] Fem	[ ] Rad	[ ] Ax	Placed:   [ ] PICC:					[ ] Mediport  [ ] Urinary Catheter, Date Placed:   [x ] Necessity of urinary, arterial, and venous catheters discussed    OTHER MEDICATIONS:  nystatin Powder 1 Application(s) Topical two times a day PRN      CODE STATUS: Full code    IMAGING: < from: Xray Abdomen 1 View PORTABLE -Urgent (12.12.17 @ 18:44) >  ******PRELIMINARY REPORT******        INTERPRETATION:  Multiple gas distended loops of bowel throughout the   abdomen. If there is clinical concern for obstruction, cross sectional   imaging may be obtained for further evaluation.

## 2017-12-12 NOTE — PROGRESS NOTE ADULT - ATTENDING COMMENTS
POD#6 s/p robotic APR    LRD, await increased colostomy function  OOB with PT  diuresis for edema  d/c flomax  d/c planning for rehab later this week

## 2017-12-12 NOTE — PROGRESS NOTE ADULT - ASSESSMENT
Assessment:    67y Male who presents with Malignant neoplasm of rectum s/p robotic LAR, converted to LAR     Plan:  -DVT PPX-  SQH   -Pain control   -OOB as tolerated with assistance   -Advance diet as tolerated-LRD  -Await Gi Fxn   -Dispo Planning     Jesusita Gilliam   #[id number] 12-12-17 @ 10:37

## 2017-12-12 NOTE — CONSULT NOTE ADULT - ASSESSMENT
67 year old obese male with PMH of HTN, MI/CAD with ANIKET x 1 to mLAD 2009, GLENYS with cpap use with rectal mass s/p transanal excision/ biopsy 7/2017 s/p neoadjuvant chemoradiation underwent laparoscopic robotic assisted low anterior resection, cystoscopy with insertion of ureteral catheters with acute desaturation in the absence of CP, mild troponin elevation.    -- check BNP  -- serial troponin  -- check CT PE protocol  -- order TTE  -- serial ECGs  -- if troponin rising can give  mg, atorvastatin 80 mg  -- would defer heparin gtt and brilinta unless severe rise in troponin  -- all anticoagulation decisions deferred to surgical team if there is concern for complications from recent surgery    Regulo Martínez MD

## 2017-12-12 NOTE — PROGRESS NOTE ADULT - SUBJECTIVE AND OBJECTIVE BOX
INTERVAL HPI/OVERNIGHT EVENTS:  Patient is a 67yMale  pt reports pain controlled with meds, unable to control his urine, kelsey LRD, no N/V    Vital Signs Last 24 Hrs  T(C): 37.1 (12 Dec 2017 05:02), Max: 37.5 (12 Dec 2017 00:37)  T(F): 98.7 (12 Dec 2017 05:02), Max: 99.5 (12 Dec 2017 00:37)  HR: 92 (12 Dec 2017 05:57) (86 - 99)  BP: 145/80 (12 Dec 2017 05:02) (127/76 - 157/80)  BP(mean): --  RR: 20 (12 Dec 2017 05:02) (20 - 20)  SpO2: 93% (12 Dec 2017 05:57) (92% - 93%)  12-11 @ 07:01  -  12-12 @ 07:00  --------------------------------------------------------  IN: 1200 mL / OUT: 1500 mL / NET: -300 mL        PHYSICAL EXAM:  Constitutional: well developed, well nourished, NAD  Eyes: anicteric  ENMT: normal facies, symmetric  Neck: supple  Respiratory: CTA bilat  Cardiovascular: RRR  Gastrointestinal: abdomen softly distended, tender at incisions, incisions c/d/i, colostomy pink with some dark spots, but viable  Extremities: FROM, warm, + edema 4/4 extremities  Neurological: intact, non-focal  Skin: no gross lesions  Lymph Nodes: no gross adenopathy  Musculoskeletal: equal strength bilateral upper and lower extremities  Psychiatric: oriented x 3; appropriate          LABS:                        8.8    6.8   )-----------( 245      ( 11 Dec 2017 09:29 )             26.1     12-11    144  |  104  |  12  ----------------------------<  157<H>  3.7   |  32<H>  |  1.07    Ca    8.5      11 Dec 2017 09:30  Phos  2.5     12-11  Mg     2.1     12-11          Magnesium, Serum: 2.1 mg/dL (12-11 @ 09:30)  Phosphorus Level, Serum: 2.5 mg/dL (12-11 @ 09:30)

## 2017-12-12 NOTE — PROVIDER CONTACT NOTE (CHANGE IN STATUS NOTIFICATION) - ACTION/TREATMENT ORDERED:
REMEDIOS Zamorano, NP ordered Repeat ABG, Electrolyte replacements, respiratory team applied CPAP .CT ordered. Repeat ABG ordered.

## 2017-12-12 NOTE — PROGRESS NOTE ADULT - ATTENDING COMMENTS
Patient seen and examined in SICU  Patient known to SICU team from previous SICU admission  Now acutely hypoxic on the surgical floor  Brought to SICU emergently  Sleeping but arousable and following commands  Hemodynamically stable on arrival in SICU  O2 saturations 100% on CPAP with 100% FiO2 in SICU  abd - distended, penelope-incisional tenderness, soft  WBC=WNL, electrolytes=WNL  After stabilized in SICU with CPAP, patient urgently brought to CT for imaging of chest / abdomen / pelvis to r/o PE or other source for hypoxia.  Prelim reading negative for PE or large abdominal abscess    - Will continue to observe closely in SICU  - Titrate down on FiO2 on CPAP as oxygenation allows  - Will follow up official reading of CT  - 35 minutes direct critical care on 12/12/17

## 2017-12-12 NOTE — PROGRESS NOTE ADULT - SUBJECTIVE AND OBJECTIVE BOX
GENERAL SURGERY DAILY PROGRESS NOTE:     Subjective: Patient seen and examined. Patient stable with no overnight events. Patient denies CP/ SOB/ Palpatations. Patient denies N/V. Reports flatus and out put noted in the ostomy sight.     MEDICATIONS  (STANDING):  amLODIPine   Tablet 10 milliGRAM(s) Oral daily  atorvastatin 40 milliGRAM(s) Oral at bedtime  carvedilol 25 milliGRAM(s) Oral every 12 hours  dextrose 5%. 1000 milliLiter(s) (50 mL/Hr) IV Continuous <Continuous>  dextrose 50% Injectable 12.5 Gram(s) IV Push once  dextrose 50% Injectable 25 Gram(s) IV Push once  dextrose 50% Injectable 25 Gram(s) IV Push once  furosemide    Tablet 20 milliGRAM(s) Oral daily  furosemide   Injectable 20 milliGRAM(s) IV Push once  heparin  Injectable 5000 Unit(s) SubCutaneous every 8 hours  insulin lispro (HumaLOG) corrective regimen sliding scale   SubCutaneous three times a day before meals  insulin lispro (HumaLOG) corrective regimen sliding scale   SubCutaneous at bedtime  lisinopril 40 milliGRAM(s) Oral daily    MEDICATIONS  (PRN):  acetaminophen   Tablet. 650 milliGRAM(s) Oral every 6 hours PRN Mild Pain (1 - 3)  dextrose Gel 1 Dose(s) Oral once PRN Blood Glucose LESS THAN 70 milliGRAM(s)/deciliter  glucagon  Injectable 1 milliGRAM(s) IntraMuscular once PRN Glucose LESS THAN 70 milligrams/deciliter  nystatin Powder 1 Application(s) Topical two times a day PRN Groin moisture  oxyCODONE    IR 5 milliGRAM(s) Oral every 4 hours PRN Moderate Pain (4 - 6)  oxyCODONE    IR 10 milliGRAM(s) Oral every 4 hours PRN Severe Pain (7 - 10)      Vital Signs Last 24 Hrs  T(C): 36.9 (12 Dec 2017 10:15), Max: 37.5 (12 Dec 2017 00:37)  T(F): 98.5 (12 Dec 2017 10:15), Max: 99.5 (12 Dec 2017 00:37)  HR: 100 (12 Dec 2017 10:15) (86 - 100)  BP: 135/79 (12 Dec 2017 10:15) (127/76 - 157/80)  BP(mean): --  RR: 18 (12 Dec 2017 10:15) (18 - 20)  SpO2: 94% (12 Dec 2017 10:15) (92% - 94%)    I&O's Detail    11 Dec 2017 07:01  -  12 Dec 2017 07:00  --------------------------------------------------------  IN:    Oral Fluid: 1200 mL  Total IN: 1200 mL    OUT:    Colostomy: 25 mL    Indwelling Catheter - Urethral: 250 mL    Voided: 1225 mL  Total OUT: 1500 mL    Total NET: -300 mL      12 Dec 2017 07:01  -  12 Dec 2017 10:37  --------------------------------------------------------  IN:  Total IN: 0 mL    OUT:  Total OUT: 0 mL    Total NET: 0 mL          Daily     Daily     LABS:                        8.8    6.8   )-----------( 245      ( 11 Dec 2017 09:29 )             26.1     12-11    144  |  104  |  12  ----------------------------<  157<H>  3.7   |  32<H>  |  1.07    Ca    8.5      11 Dec 2017 09:30  Phos  2.5     12-11  Mg     2.1     12-11    Physical Exam:   Gen: NAD, AAOx3  Abd: Soft, NT ,ND  clean /dry/ intact

## 2017-12-12 NOTE — PROVIDER CONTACT NOTE (CHANGE IN STATUS NOTIFICATION) - ASSESSMENT
Pt lethargic and hypoxic. POX improved 92% HR stable 92 when RRT arrived, RRT cancelled.  Stoma dusky. Generalize edema. Denies SOB or chest pain.

## 2017-12-12 NOTE — PROVIDER CONTACT NOTE (CHANGE IN STATUS NOTIFICATION) - SITUATION
Pt hypoxic, POX dropped to 77% on pt's own BIPAP,  tachycardic, lethargic. POX slowly increased to 91-94%. HR in 90's.

## 2017-12-12 NOTE — PROGRESS NOTE ADULT - ASSESSMENT
ASSESSMENT: 67M with PMH of Rectal CA s/p transanal excision/biopsy (7/2017), s/p neoadjuvant chemoradiation now s/p open APR, admitted to SICU for hypoxia and hypercarbia requiring BiPAP.    PLAN:  Neuro: post-operative pain  -Tylenol PRN for pain  - Avoid narcotics because of lethargy     Pulm: GLENYS, acute hypoxic/ hypercarbic respiratory failure  - Maintain BiPAP for now  - CTA chest to r/o PE    CV: HTN, HLD  -Continue home amlodipine atorvastatin, carvedilol, lisinopril with hold parameters    GI: Rectal CA s/p open APR  -NPO  -CT abdomen/pelvis to r/o intra-abdominal pathology as driving source of acute respiratory failure    Heme: DVT ppx; operative blood loss  -SQH q8    /Renal: CKD  -Continue home Lasix  - Monitor I&Os    Endo: DM, Hgb A1C 7.6%  -lispro ISS    ID: no acute issues    Dispo: Full code. Continue SICU care.      FLORECITA DenisC  [id number] ASSESSMENT: 67M with PMH of Rectal CA s/p transanal excision/biopsy (7/2017), s/p neoadjuvant chemoradiation now s/p open APR, admitted to SICU for hypoxia and hypercarbia requiring BiPAP.    PLAN:  Neuro: post-operative pain  -Tylenol PRN for pain  - Avoid narcotics because of lethargy     Resp: GLENYS, acute hypoxic/ hypercarbic respiratory failure  - Maintain BiPAP for now  - Continue Duonebs  - CTA chest to r/o PE    CV: HTN, HLD  -Continue home amlodipine atorvastatin, carvedilol, lisinopril with hold parameters    GI: Rectal CA s/p open APR  -NPO  -CT abdomen/pelvis to r/o intra-abdominal pathology as driving source of acute respiratory failure    /Renal: CKD  -Continue home Lasix  - Monitor I&Os    Heme: DVT ppx  -SQH q8    ID: no acute issues  -Culture for fever     Endo: DM, Hgb A1C 7.6%  -lispro ISS    Dispo: Full code. Continue SICU care.      FLORECITA DenisC  [id number]

## 2017-12-13 DIAGNOSIS — J96.91 RESPIRATORY FAILURE, UNSPECIFIED WITH HYPOXIA: ICD-10-CM

## 2017-12-13 DIAGNOSIS — J44.9 CHRONIC OBSTRUCTIVE PULMONARY DISEASE, UNSPECIFIED: ICD-10-CM

## 2017-12-13 DIAGNOSIS — G47.33 OBSTRUCTIVE SLEEP APNEA (ADULT) (PEDIATRIC): ICD-10-CM

## 2017-12-13 DIAGNOSIS — I50.30 UNSPECIFIED DIASTOLIC (CONGESTIVE) HEART FAILURE: ICD-10-CM

## 2017-12-13 LAB
ANION GAP SERPL CALC-SCNC: 12 MMOL/L — SIGNIFICANT CHANGE UP (ref 5–17)
BUN SERPL-MCNC: 8 MG/DL — SIGNIFICANT CHANGE UP (ref 7–23)
CALCIUM SERPL-MCNC: 8.5 MG/DL — SIGNIFICANT CHANGE UP (ref 8.4–10.5)
CHLORIDE SERPL-SCNC: 99 MMOL/L — SIGNIFICANT CHANGE UP (ref 96–108)
CK MB BLD-MCNC: 0.8 % — SIGNIFICANT CHANGE UP (ref 0–3.5)
CK MB BLD-MCNC: 0.8 % — SIGNIFICANT CHANGE UP (ref 0–3.5)
CK MB CFR SERPL CALC: 1 NG/ML — SIGNIFICANT CHANGE UP (ref 0–6.7)
CK MB CFR SERPL CALC: 1 NG/ML — SIGNIFICANT CHANGE UP (ref 0–6.7)
CK SERPL-CCNC: 120 U/L — SIGNIFICANT CHANGE UP (ref 30–200)
CK SERPL-CCNC: 122 U/L — SIGNIFICANT CHANGE UP (ref 30–200)
CO2 SERPL-SCNC: 32 MMOL/L — HIGH (ref 22–31)
CREAT SERPL-MCNC: 0.9 MG/DL — SIGNIFICANT CHANGE UP (ref 0.5–1.3)
GAS PNL BLDA: SIGNIFICANT CHANGE UP
GAS PNL BLDA: SIGNIFICANT CHANGE UP
GLUCOSE BLDC GLUCOMTR-MCNC: 149 MG/DL — HIGH (ref 70–99)
GLUCOSE BLDC GLUCOMTR-MCNC: 165 MG/DL — HIGH (ref 70–99)
GLUCOSE SERPL-MCNC: 150 MG/DL — HIGH (ref 70–99)
HCT VFR BLD CALC: 26 % — LOW (ref 39–50)
HGB BLD-MCNC: 9 G/DL — LOW (ref 13–17)
MAGNESIUM SERPL-MCNC: 2.3 MG/DL — SIGNIFICANT CHANGE UP (ref 1.6–2.6)
MCHC RBC-ENTMCNC: 33.7 PG — SIGNIFICANT CHANGE UP (ref 27–34)
MCHC RBC-ENTMCNC: 34.5 GM/DL — SIGNIFICANT CHANGE UP (ref 32–36)
MCV RBC AUTO: 97.7 FL — SIGNIFICANT CHANGE UP (ref 80–100)
PHOSPHATE SERPL-MCNC: 2.9 MG/DL — SIGNIFICANT CHANGE UP (ref 2.5–4.5)
PLATELET # BLD AUTO: 277 K/UL — SIGNIFICANT CHANGE UP (ref 150–400)
POTASSIUM SERPL-MCNC: 3.7 MMOL/L — SIGNIFICANT CHANGE UP (ref 3.5–5.3)
POTASSIUM SERPL-SCNC: 3.7 MMOL/L — SIGNIFICANT CHANGE UP (ref 3.5–5.3)
PROCALCITONIN SERPL-MCNC: 0.56 NG/ML — HIGH (ref 0–0.04)
RBC # BLD: 2.67 M/UL — LOW (ref 4.2–5.8)
RBC # FLD: 12.7 % — SIGNIFICANT CHANGE UP (ref 10.3–14.5)
SODIUM SERPL-SCNC: 143 MMOL/L — SIGNIFICANT CHANGE UP (ref 135–145)
TROPONIN T SERPL-MCNC: 0.15 NG/ML — HIGH (ref 0–0.06)
TROPONIN T SERPL-MCNC: 0.19 NG/ML — HIGH (ref 0–0.06)
WBC # BLD: 8.4 K/UL — SIGNIFICANT CHANGE UP (ref 3.8–10.5)
WBC # FLD AUTO: 8.4 K/UL — SIGNIFICANT CHANGE UP (ref 3.8–10.5)

## 2017-12-13 PROCEDURE — 99233 SBSQ HOSP IP/OBS HIGH 50: CPT

## 2017-12-13 PROCEDURE — 93010 ELECTROCARDIOGRAM REPORT: CPT | Mod: 76

## 2017-12-13 PROCEDURE — 93306 TTE W/DOPPLER COMPLETE: CPT | Mod: 26

## 2017-12-13 PROCEDURE — 99291 CRITICAL CARE FIRST HOUR: CPT

## 2017-12-13 PROCEDURE — 93970 EXTREMITY STUDY: CPT | Mod: 26

## 2017-12-13 RX ORDER — KETOROLAC TROMETHAMINE 30 MG/ML
15 SYRINGE (ML) INJECTION EVERY 6 HOURS
Qty: 0 | Refills: 0 | Status: DISCONTINUED | OUTPATIENT
Start: 2017-12-13 | End: 2017-12-13

## 2017-12-13 RX ORDER — KETOROLAC TROMETHAMINE 30 MG/ML
30 SYRINGE (ML) INJECTION EVERY 6 HOURS
Qty: 0 | Refills: 0 | Status: DISCONTINUED | OUTPATIENT
Start: 2017-12-13 | End: 2017-12-14

## 2017-12-13 RX ORDER — ASPIRIN/CALCIUM CARB/MAGNESIUM 324 MG
325 TABLET ORAL DAILY
Qty: 0 | Refills: 0 | Status: DISCONTINUED | OUTPATIENT
Start: 2017-12-13 | End: 2017-12-13

## 2017-12-13 RX ORDER — METOPROLOL TARTRATE 50 MG
5 TABLET ORAL ONCE
Qty: 0 | Refills: 0 | Status: COMPLETED | OUTPATIENT
Start: 2017-12-13 | End: 2017-12-13

## 2017-12-13 RX ORDER — ACETAMINOPHEN 500 MG
1000 TABLET ORAL ONCE
Qty: 0 | Refills: 0 | Status: COMPLETED | OUTPATIENT
Start: 2017-12-13 | End: 2017-12-13

## 2017-12-13 RX ORDER — METOPROLOL TARTRATE 50 MG
5 TABLET ORAL EVERY 6 HOURS
Qty: 0 | Refills: 0 | Status: DISCONTINUED | OUTPATIENT
Start: 2017-12-13 | End: 2017-12-16

## 2017-12-13 RX ORDER — POTASSIUM CHLORIDE 20 MEQ
10 PACKET (EA) ORAL
Qty: 0 | Refills: 0 | Status: COMPLETED | OUTPATIENT
Start: 2017-12-13 | End: 2017-12-13

## 2017-12-13 RX ORDER — ACETAZOLAMIDE 250 MG/1
500 TABLET ORAL ONCE
Qty: 0 | Refills: 0 | Status: DISCONTINUED | OUTPATIENT
Start: 2017-12-13 | End: 2017-12-13

## 2017-12-13 RX ORDER — TRAMADOL HYDROCHLORIDE 50 MG/1
25 TABLET ORAL ONCE
Qty: 0 | Refills: 0 | Status: DISCONTINUED | OUTPATIENT
Start: 2017-12-13 | End: 2017-12-13

## 2017-12-13 RX ORDER — TRAMADOL HYDROCHLORIDE 50 MG/1
25 TABLET ORAL ONCE
Qty: 0 | Refills: 0 | Status: DISCONTINUED | OUTPATIENT
Start: 2017-12-13 | End: 2017-12-14

## 2017-12-13 RX ORDER — ENOXAPARIN SODIUM 100 MG/ML
40 INJECTION SUBCUTANEOUS DAILY
Qty: 0 | Refills: 0 | Status: DISCONTINUED | OUTPATIENT
Start: 2017-12-13 | End: 2017-12-20

## 2017-12-13 RX ORDER — ONDANSETRON 8 MG/1
4 TABLET, FILM COATED ORAL ONCE
Qty: 0 | Refills: 0 | Status: COMPLETED | OUTPATIENT
Start: 2017-12-13 | End: 2017-12-13

## 2017-12-13 RX ORDER — BUDESONIDE, MICRONIZED 100 %
0.5 POWDER (GRAM) MISCELLANEOUS EVERY 12 HOURS
Qty: 0 | Refills: 0 | Status: DISCONTINUED | OUTPATIENT
Start: 2017-12-13 | End: 2017-12-22

## 2017-12-13 RX ORDER — ACETAMINOPHEN 500 MG
1000 TABLET ORAL
Qty: 0 | Refills: 0 | Status: DISCONTINUED | OUTPATIENT
Start: 2017-12-13 | End: 2017-12-13

## 2017-12-13 RX ORDER — ACETAZOLAMIDE 250 MG/1
500 TABLET ORAL ONCE
Qty: 0 | Refills: 0 | Status: COMPLETED | OUTPATIENT
Start: 2017-12-13 | End: 2017-12-13

## 2017-12-13 RX ORDER — FUROSEMIDE 40 MG
10 TABLET ORAL ONCE
Qty: 0 | Refills: 0 | Status: DISCONTINUED | OUTPATIENT
Start: 2017-12-13 | End: 2017-12-13

## 2017-12-13 RX ORDER — ASPIRIN/CALCIUM CARB/MAGNESIUM 324 MG
325 TABLET ORAL DAILY
Qty: 0 | Refills: 0 | Status: DISCONTINUED | OUTPATIENT
Start: 2017-12-13 | End: 2017-12-18

## 2017-12-13 RX ORDER — ACETAMINOPHEN 500 MG
1000 TABLET ORAL ONCE
Qty: 0 | Refills: 0 | Status: COMPLETED | OUTPATIENT
Start: 2017-12-14 | End: 2017-12-14

## 2017-12-13 RX ADMIN — Medication 1000 MILLIGRAM(S): at 23:28

## 2017-12-13 RX ADMIN — Medication 5 MILLIGRAM(S): at 17:02

## 2017-12-13 RX ADMIN — Medication 5 MILLIGRAM(S): at 05:01

## 2017-12-13 RX ADMIN — Medication 3 MILLILITER(S): at 00:43

## 2017-12-13 RX ADMIN — HEPARIN SODIUM 5000 UNIT(S): 5000 INJECTION INTRAVENOUS; SUBCUTANEOUS at 05:01

## 2017-12-13 RX ADMIN — Medication 100 MILLIEQUIVALENT(S): at 06:10

## 2017-12-13 RX ADMIN — Medication 400 MILLIGRAM(S): at 17:30

## 2017-12-13 RX ADMIN — Medication 2: at 23:01

## 2017-12-13 RX ADMIN — Medication 50 MILLIEQUIVALENT(S): at 04:03

## 2017-12-13 RX ADMIN — Medication 100 GRAM(S): at 00:59

## 2017-12-13 RX ADMIN — Medication 110 MILLIGRAM(S): at 12:30

## 2017-12-13 RX ADMIN — Medication 5 MILLIGRAM(S): at 23:34

## 2017-12-13 RX ADMIN — Medication 1000 MILLIGRAM(S): at 10:30

## 2017-12-13 RX ADMIN — ENOXAPARIN SODIUM 40 MILLIGRAM(S): 100 INJECTION SUBCUTANEOUS at 11:50

## 2017-12-13 RX ADMIN — Medication 0.5 MILLIGRAM(S): at 17:26

## 2017-12-13 RX ADMIN — TRAMADOL HYDROCHLORIDE 25 MILLIGRAM(S): 50 TABLET ORAL at 14:20

## 2017-12-13 RX ADMIN — Medication 3 MILLILITER(S): at 17:26

## 2017-12-13 RX ADMIN — Medication 3 MILLILITER(S): at 05:13

## 2017-12-13 RX ADMIN — ONDANSETRON 4 MILLIGRAM(S): 8 TABLET, FILM COATED ORAL at 22:49

## 2017-12-13 RX ADMIN — ACETAZOLAMIDE 500 MILLIGRAM(S): 250 TABLET ORAL at 11:50

## 2017-12-13 RX ADMIN — Medication 100 MILLIEQUIVALENT(S): at 08:34

## 2017-12-13 RX ADMIN — Medication 3 MILLILITER(S): at 13:16

## 2017-12-13 RX ADMIN — Medication 400 MILLIGRAM(S): at 10:00

## 2017-12-13 RX ADMIN — TRAMADOL HYDROCHLORIDE 25 MILLIGRAM(S): 50 TABLET ORAL at 14:59

## 2017-12-13 RX ADMIN — Medication 50 MILLIEQUIVALENT(S): at 02:16

## 2017-12-13 RX ADMIN — Medication 3 MILLILITER(S): at 23:53

## 2017-12-13 RX ADMIN — Medication 30 MILLIGRAM(S): at 18:00

## 2017-12-13 RX ADMIN — Medication 325 MILLIGRAM(S): at 11:51

## 2017-12-13 RX ADMIN — Medication 30 MILLIGRAM(S): at 18:04

## 2017-12-13 RX ADMIN — Medication 5 MILLIGRAM(S): at 11:51

## 2017-12-13 RX ADMIN — Medication 400 MILLIGRAM(S): at 22:32

## 2017-12-13 RX ADMIN — Medication 100 MILLIEQUIVALENT(S): at 04:50

## 2017-12-13 RX ADMIN — Medication 1000 MILLIGRAM(S): at 18:00

## 2017-12-13 NOTE — PROGRESS NOTE ADULT - SUBJECTIVE AND OBJECTIVE BOX
HISTORY  67M PMHx CKD, HLD, CAD s/p PCI (2009), GLENYS (home CPAP), DM, HTN, s/p choleystectomy, Rectal CA s/p transanal excision/biopsy (7/2017), s/p neoadjuvant chemoradiation who presented to [facility] for planned robotic LAR resection of Rectal CA. Patient went to OR and underwent robotic converted to laparoscopic converted to open APR on 12/6/17.  In PACU patient was started on CPAP, and became hypotensive, started on phenylephrine gtt. CPAP removed & BP recovered. Received 250mL 5% Albumin & pt transported to SICU, quickly weaned off pressors, and subsequently transferred to floor.    SICU reconsulted on POD#7 for hypoxia and lethargy on the floor. Pt was noted to desat to ~70s%, was placed on CPAP with improvement to 90s%. Pt then noted to desat to 70s again, and was noted to be retaining CO2 (pCO2 56) on ABG, so switched from CPAP to BiPAP and an RRT was called. Pt also reportedly has been more lethargic and confused today than he is at baseline. CTA chest ordered to r/o PE, but IV access was lost in CT scanner so test was aborted and pt transported straight to SICU.  In SICU, repeat labs sent and additional IV access obtained via ultrasound guidance.       24 HOUR EVENTS: CTA chest /abdomen/pelvis obtained. Prelim read negative for central PE and large abdominal abscess. Pt remained on BiPAP overnight.    SUBJECTIVE/ROS:  [x ] A ten-point review of systems was otherwise negative except as noted.  [ ] Due to altered mental status/intubation, subjective information were not able to be obtained from the patient. History was obtained, to the extent possible, from review of the chart and collateral sources of information.      NEURO  RASS:  0     Exam: AAOx3, following commands, NAD  Meds: acetaminophen   Tablet. 650 milliGRAM(s) Oral every 6 hours PRN Mild Pain (1 - 3)    [x] Adequacy of sedation and pain control has been assessed and adjusted      RESPIRATORY  RR: 16 (12-13-17 @ 06:00) (14 - 21)  SpO2: 100% (12-13-17 @ 06:00) (93% - 100%)  Exam: unlabored, clear to auscultation bilaterally  Mechanical Ventilation: BiPAP  ABG - ( 13 Dec 2017 03:35 )  pH: 7.46  /  pCO2: 53    /  pO2: 114   / HCO3: 37    / Base Excess: 12.2  /  SaO2: 99      Blood Gas Arterial, Lactate: 0.7 (12.13.17 @ 03:35)    [n/a ] Extubation Readiness Assessed  Meds: ALBUTerol/ipratropium for Nebulization 3 milliLiter(s) Nebulizer every 6 hours      CARDIOVASCULAR  HR: 78 (12-13-17 @ 06:00) (66 - 110)  BP: 128/62 (12-13-17 @ 06:00) (128/62 - 168/77)  BP(mean): 89 (12-13-17 @ 06:00) (70 - 111)  VBG - ( 12 Dec 2017 21:41 )  pH: 7.42  /  pCO2: 64    /  pO2: 47    / HCO3: 40    / Base Excess: 13.9  /  SaO2: 82     Lactate: 1.1      Exam: regular rate and rhythm  Cardiac Rhythm: sinus rhythm  Perfusion     [ x]Adequate   [ ]Inadequate  Mentation   [x ]Normal       [ ]Reduced  Extremities  [ x]Warm         [ ]Cool  Volume Status [ ]Hypervolemic [ x]Euvolemic [ ]Hypovolemic  Meds: amLODIPine   Tablet 10 milliGRAM(s) Oral daily  metoprolol    tartrate Injectable 5 milliGRAM(s) IV Push every 6 hours    GI/NUTRITION  Exam: distended, nontender, incision c/d/i, ostomy dusky at periphery and pink in center, JEAN MARIE drains serosanguinous  Diet: NPO  Meds:  x    GENITOURINARY  I&O's Detail    12-11 @ 07:01  -  12-12 @ 07:00  --------------------------------------------------------  IN:    Oral Fluid: 1200 mL  Total IN: 1200 mL    OUT:    Colostomy: 25 mL    Indwelling Catheter - Urethral: 250 mL    Voided: 1225 mL  Total OUT: 1500 mL    Total NET: -300 mL      12-12 @ 07:01  -  12-13 @ 06:59  --------------------------------------------------------  IN:    Oral Fluid: 180 mL  Total IN: 180 mL    OUT:  Total OUT: 0 mL    Total NET: 180 mL      12-13    143  |  99  |  8   ----------------------------<  150<H>  3.7   |  32<H>  |  0.90    Ca    8.5      13 Dec 2017 03:38  Phos  2.9     12-13  Mg     2.3     12-13      [n/a ] Daley catheter, indication: N/A  Meds: potassium chloride  10 mEq/100 mL IVPB 10 milliEquivalent(s) IV Intermittent every 1 hour        HEMATOLOGIC  Meds: heparin  Injectable 5000 Unit(s) SubCutaneous every 8 hours    [x] VTE Prophylaxis                        9.0    8.4   )-----------( 277      ( 13 Dec 2017 03:38 )             26.0     PT/INR - ( 12 Dec 2017 22:08 )   PT: 12.3 sec;   INR: 1.13 ratio         PTT - ( 12 Dec 2017 22:08 )  PTT:24.2 sec  Transfusion     [ ] PRBC   [ ] Platelets   [ ] FFP   [ ] Cryoprecipitate      INFECTIOUS DISEASES  T(C): 37.7 (12-13-17 @ 03:00), Max: 37.8 (12-12-17 @ 14:06)  WBC Count: 8.4 K/uL (12-13 @ 03:38)  WBC Count: 8.9 K/uL (12-12 @ 21:51)  WBC Count: 8.6 K/uL (12-12 @ 16:58)    Recent Cultures: x    Meds: x      ENDOCRINE  Capillary Blood Glucose  POCT Blood Glucose.: 174 mg/dL (12 Dec 2017 17:27)  POCT Blood Glucose.: 160 mg/dL (12 Dec 2017 13:11)    Meds: atorvastatin 40 milliGRAM(s) Oral at bedtime  insulin lispro (HumaLOG) corrective regimen sliding scale   SubCutaneous every 6 hours        ACCESS DEVICES:  [x ] Peripheral IV  [ ] Central Venous Line	[ ] R	[ ] L	[ ] IJ	[ ] Fem	[ ] SC	Placed:   [ ] Arterial Line		[ ] R	[ ] L	[ ] Fem	[ ] Rad	[ ] Ax	Placed:   [ ] PICC:					[ ] Mediport  [ ] Urinary Catheter, Date Placed:   [x ] Necessity of urinary, arterial, and venous catheters discussed    OTHER MEDICATIONS:  nystatin Powder 1 Application(s) Topical two times a day PRN      CODE STATUS: Full code    IMAGING: < from: CT Angio Chest w/ IV Cont (12.12.17 @ 23:58) >  ******PRELIMINARY REPORT******              INTERPRETATION:  No obvious central pulmonary embolism. Suboptimal   opacification of the pulmonary arterial tree    Heterogenous soft tissue and fluid density is noted within the rectal   vault which may represent postsurgical change or developing abscess.     < end of copied text > HISTORY  67M PMHx CKD, HLD, CAD s/p PCI (2009), GLENYS (home CPAP), DM, HTN, s/p choleystectomy, Rectal CA s/p transanal excision/biopsy (7/2017), s/p neoadjuvant chemoradiation who presented to [facility] for planned robotic LAR resection of Rectal CA. Patient went to OR and underwent robotic converted to laparoscopic converted to open APR on 12/6/17.  In PACU patient was started on CPAP, and became hypotensive, started on phenylephrine gtt. CPAP removed & BP recovered. Received 250mL 5% Albumin & pt transported to SICU, quickly weaned off pressors, and subsequently transferred to floor.    SICU reconsulted on POD#7 for hypoxia and lethargy on the floor. Pt was noted to desat to ~70s%, was placed on CPAP with improvement to 90s%. Pt then noted to desat to 70s again, and was noted to be retaining CO2 (pCO2 56) on ABG, so switched from CPAP to BiPAP and an RRT was called. Pt also reportedly has been more lethargic and confused today than he is at baseline. CTA chest ordered to r/o PE, but IV access was lost in CT scanner so test was aborted and pt transported straight to SICU.  In SICU, repeat labs sent and additional IV access obtained via ultrasound guidance.       24 HOUR EVENTS: CTA chest /abdomen/pelvis obtained. Prelim read negative for central PE and large abdominal abscess. Pt remained on BiPAP overnight.    SUBJECTIVE/ROS:  [x ] A ten-point review of systems was otherwise negative except as noted.  [ ] Due to altered mental status/intubation, subjective information were not able to be obtained from the patient. History was obtained, to the extent possible, from review of the chart and collateral sources of information.      NEURO  RASS:  0     Exam: AAOx3, following commands, NAD  Meds: acetaminophen   Tablet. 650 milliGRAM(s) Oral every 6 hours PRN Mild Pain (1 - 3)    [x] Adequacy of sedation and pain control has been assessed and adjusted      RESPIRATORY  RR: 16 (12-13-17 @ 06:00) (14 - 21)  SpO2: 100% (12-13-17 @ 06:00) (93% - 100%)  Exam: unlabored, clear to auscultation bilaterally  Mechanical Ventilation: BiPAP  ABG - ( 13 Dec 2017 03:35 )  pH: 7.46  /  pCO2: 53    /  pO2: 114   / HCO3: 37    / Base Excess: 12.2  /  SaO2: 99      Blood Gas Arterial, Lactate: 0.7 (12.13.17 @ 03:35)    [n/a ] Extubation Readiness Assessed  Meds: ALBUTerol/ipratropium for Nebulization 3 milliLiter(s) Nebulizer every 6 hours      CARDIOVASCULAR  HR: 78 (12-13-17 @ 06:00) (66 - 110)  BP: 128/62 (12-13-17 @ 06:00) (128/62 - 168/77)  BP(mean): 89 (12-13-17 @ 06:00) (70 - 111)  VBG - ( 12 Dec 2017 21:41 )  pH: 7.42  /  pCO2: 64    /  pO2: 47    / HCO3: 40    / Base Excess: 13.9  /  SaO2: 82     Lactate: 1.1      Exam: regular rate and rhythm  Cardiac Rhythm: sinus rhythm  Perfusion     [ x]Adequate   [ ]Inadequate  Mentation   [x ]Normal       [ ]Reduced  Extremities  [ x]Warm         [ ]Cool  Volume Status [ ]Hypervolemic [ x]Euvolemic [ ]Hypovolemic  Meds: amLODIPine   Tablet 10 milliGRAM(s) Oral daily  metoprolol    tartrate Injectable 5 milliGRAM(s) IV Push every 6 hours    GI/NUTRITION  Exam: distended, nontender, incision c/d/i, ostomy dusky at periphery and pink in center, JEAN MARIE drains serosanguinous  Diet: NPO  Meds:  x    GENITOURINARY  I&O's Detail    12 Dec 2017 07:01  -  13 Dec 2017 07:00  --------------------------------------------------------  IN:    Oral Fluid: 180 mL  Total IN: 180 mL    OUT:  Total OUT: 0 mL    Total NET: 180 mL        12-13    143  |  99  |  8   ----------------------------<  150<H>  3.7   |  32<H>  |  0.90    Ca    8.5      13 Dec 2017 03:38  Phos  2.9     12-13  Mg     2.3     12-13      [n/a ] Daley catheter, indication: N/A  Meds: potassium chloride  10 mEq/100 mL IVPB 10 milliEquivalent(s) IV Intermittent every 1 hour        HEMATOLOGIC  Meds: heparin  Injectable 5000 Unit(s) SubCutaneous every 8 hours    [x] VTE Prophylaxis                        9.0    8.4   )-----------( 277      ( 13 Dec 2017 03:38 )             26.0     PT/INR - ( 12 Dec 2017 22:08 )   PT: 12.3 sec;   INR: 1.13 ratio         PTT - ( 12 Dec 2017 22:08 )  PTT:24.2 sec  Transfusion     [ ] PRBC   [ ] Platelets   [ ] FFP   [ ] Cryoprecipitate      INFECTIOUS DISEASES  T(C): 37.7 (12-13-17 @ 03:00), Max: 37.8 (12-12-17 @ 14:06)  WBC Count: 8.4 K/uL (12-13 @ 03:38)  WBC Count: 8.9 K/uL (12-12 @ 21:51)  WBC Count: 8.6 K/uL (12-12 @ 16:58)    Recent Cultures: x    Meds: x      ENDOCRINE  Capillary Blood Glucose  POCT Blood Glucose.: 174 mg/dL (12 Dec 2017 17:27)  POCT Blood Glucose.: 160 mg/dL (12 Dec 2017 13:11)    Meds: atorvastatin 40 milliGRAM(s) Oral at bedtime  insulin lispro (HumaLOG) corrective regimen sliding scale   SubCutaneous every 6 hours        ACCESS DEVICES:  [x ] Peripheral IV  [ ] Central Venous Line	[ ] R	[ ] L	[ ] IJ	[ ] Fem	[ ] SC	Placed:   [ ] Arterial Line		[ ] R	[ ] L	[ ] Fem	[ ] Rad	[ ] Ax	Placed:   [ ] PICC:					[ ] Mediport  [ ] Urinary Catheter, Date Placed:   [x ] Necessity of urinary, arterial, and venous catheters discussed    OTHER MEDICATIONS:  nystatin Powder 1 Application(s) Topical two times a day PRN      CODE STATUS: Full code    IMAGING: < from: CT Angio Chest w/ IV Cont (12.12.17 @ 23:58) >  ******PRELIMINARY REPORT******              INTERPRETATION:  No obvious central pulmonary embolism. Suboptimal   opacification of the pulmonary arterial tree    Heterogenous soft tissue and fluid density is noted within the rectal   vault which may represent postsurgical change or developing abscess.     < end of copied text >

## 2017-12-13 NOTE — PROGRESS NOTE ADULT - ASSESSMENT
POD#8 s/p robotic APR    - Appreciate SICU care  - Awaiting return of GI function  - Monitor respiratory status, F/u ABG  - CTA negative for PE, possible repeat of CT ABD/pelvsi?  - Diet is currently NPO  - OOB with PT, Needs assistance   - diuresis for edema  - d/c planning for rehab later this week pending resolution of respiratory Issues

## 2017-12-13 NOTE — CONSULT NOTE ADULT - ATTENDING COMMENTS
seen and examined in PACU  67M s/p APR for rectal cancer s/p neoadjuvant chemo-radiation therapy.    acute on chronic respiratory acidosis  -restart BiPAP for GLENYS    acute blood loss anemia after surgery  -no metabolic acidosis or signs of hemorrhagic shock to suggest active bleeding  -continue phenylephrine infusion as needed for post-operative hypotension    post-op pain  -Dilaudid PCA    stage 3 CKD  -monitor urine output and avoid nephrotoxins to limit risk of BILL    requires SICU care for acute respiratory failure with hypercapnia and hypotension after surgery  Critical Care Time - 35 minutes
hypoxemia is multifactorial  atelactasis, obesity, GLENYS-related  ? COPD- awaiting prior PFT report  LE doppler negative  keep negative fluida balance  would change Bilevel to 14/10 at night

## 2017-12-13 NOTE — DIETITIAN INITIAL EVALUATION ADULT. - OTHER INFO
Nutrition Assessment warranted for length of stay in SICU. Pt is a 67M with PMH of Rectal CA s/p transanal excision/biopsy (7/2017), s/p neoadjuvant chemoradiation now S/P open abdominal perineal resection on 12/6, admitted to SICU for hypoxia and hypercarbia requiring BiPAP.

## 2017-12-13 NOTE — DIETITIAN INITIAL EVALUATION ADULT. - ENERGY NEEDS
ht: 5 feet 11 inches, current wt: 284 pounds, current BMI: 39.8 Kg/m2, IBW: 172 pounds (+/- 10%), 165% IBW. Edema: 3+ generalized, 4+ scrotum; Skin: no pressure injuries.

## 2017-12-13 NOTE — CONSULT NOTE ADULT - ASSESSMENT
66 y/o M with PMH of HTN, DMT2, PVD, HLD, MI/CAD with stent x 1 2009, GLENYS, 50 pack yr smoker (quit Jan 2017), morbid obesity, rectal cancer (diagnosed 7/2017 s/p chemo) admitted 12/6 for LAR. Hospital course c/b post-operative hypotension requiring SICU admission. Downgraded to floors on 12/7. On 12/12 he was noted to be hypoxic to 70s. Placed on bipap and readmitted to SICU.

## 2017-12-13 NOTE — PROGRESS NOTE ADULT - ATTENDING COMMENTS
Acute hypoxemic and compensated hypercapnic resp failure, etiology not clear, pt has chr hypoxemia, CTPA mistimed contrast but no central PE, PA not dilated, likely basal atelectasis small effusion  Diamox for met alkalosis, diuresis with Lasix at some point  Vent support with BiPAP HFNC  PO once off BiPAP

## 2017-12-13 NOTE — PROGRESS NOTE ADULT - ASSESSMENT
ASSESSMENT: 67M with PMH of Rectal CA s/p transanal excision/biopsy (7/2017), s/p neoadjuvant chemoradiation now s/p open APR, admitted to SICU for hypoxia and hypercarbia requiring BiPAP.    PLAN:  Neuro: post-operative pain  -Tylenol PRN for pain  - Avoid narcotics because of lethargy     Resp: GLENYS, acute hypoxic/ hypercarbic respiratory failure; hypercarbia likely 2/2 compensation for contraction alkalosis  - Consider Diamox, trial off BiPAP and repeat blood gas to see if improvement in alkalosis improves hypercarbia  - Continue Duonebs    CV: HTN, HLD  -Continue home amlodipine atorvastatin, carvedilol, lisinopril with hold parameters    GI: Rectal CA s/p open APR  -NPO    /Renal: CKD, likely contraction alkalosis  -Lasix D/Ronan, consider further diuresis with Diamox in setting of contraction alkalosis  - Monitor I&Os    Heme: DVT ppx  -SQH q8    ID: no acute issues  -Culture for fever     Endo: DM, Hgb A1C 7.6%  -lispro ISS    Dispo: Full code. Continue SICU care.      Cristina Gandhi PA-C  [id number]

## 2017-12-13 NOTE — CONSULT NOTE ADULT - SUBJECTIVE AND OBJECTIVE BOX
PULMONARY CONSULT      HPI: 66 y/o M with PMH of HTN, DMT2, PVD, HLD, MI/CAD with stent x 1 2009, GLENYS, 50 pack yr smoker (quit Jan 2017), morbid obesity, rectal cancer (diagnosed 7/2017 s/p chemo) admitted 12/6 for LAR. Hospital course c/b post-operative hypotension requiring SICU admission. Downgraded to floors on 12/7. On 12/12 he was noted to be hypoxic to 70s. Placed on bipap and readmitted to SICU. Currently he is slightly confused (alert and oriented x3, has trouble remembering the president), he denies dyspnea, cough, fever/chills, CP, pleuritic CP, hemoptysis, LE cramping or pain.     PAST MEDICAL & SURGICAL HISTORY:  Rectal cancer: s/p chemo radiation  Vitiligo  Stented coronary artery  Obesity (BMI 30-39.9)  Cataracts, bilateral  Hyperlipidemia, unspecified hyperlipidemia type  Tobacco use: quit Jan 2017  PVD (peripheral vascular disease)  Diabetes mellitus, type 2  Essential (primary) hypertension  Abnormal rectal biopsy: 7/2017 rectal tumor excision  History of cholecystectomy  S/P tonsillectomy and adenoidectomy  H/O heart artery stent: stent x 1 and balloon angioplasty - 2009    Allergies    No Known Allergies    Intolerances      FAMILY HISTORY:  Family history of essential hypertension  Diabetes mellitus, type 2    Social history: Former Smoker, quit 1 year ago  50 pack yr history     Review of Systems:  CONSTITUTIONAL: No fever, chills, or fatigue  EYES: No eye pain, visual disturbances, or discharge  ENMT:  No difficulty hearing, tinnitus, vertigo; No sinus or throat pain  NECK: No pain or stiffness  RESPIRATORY: Per above  CARDIOVASCULAR: No chest pain, palpitations, dizziness, or leg swelling  GASTROINTESTINAL: No abdominal or epigastric pain. No nausea, vomiting, or hematemesis; No diarrhea or constipation. No melena or hematochezia.  GENITOURINARY: No dysuria, frequency, hematuria, or incontinence  NEUROLOGICAL: No headaches, memory loss, loss of strength, numbness, or tremors  SKIN: No itching, burning, rashes, or lesions   MUSCULOSKELETAL: No joint pain or swelling; No muscle, back, or extremity pain  PSYCHIATRIC: No depression, anxiety, mood swings, or difficulty sleeping      Medications:  MEDICATIONS  (STANDING):  acetazolamide Injectable 500 milliGRAM(s) IV Push once  ALBUTerol/ipratropium for Nebulization 3 milliLiter(s) Nebulizer every 6 hours  amLODIPine   Tablet 10 milliGRAM(s) Oral daily  aspirin 325 milliGRAM(s) Oral daily  atorvastatin 40 milliGRAM(s) Oral at bedtime  enoxaparin Injectable 40 milliGRAM(s) SubCutaneous daily  insulin lispro (HumaLOG) corrective regimen sliding scale   SubCutaneous every 6 hours  metoprolol    tartrate Injectable 5 milliGRAM(s) IV Push every 6 hours    MEDICATIONS  (PRN):  acetaminophen   Tablet. 650 milliGRAM(s) Oral every 6 hours PRN Mild Pain (1 - 3)  nystatin Powder 1 Application(s) Topical two times a day PRN Groin moisture            Vital Signs Last 24 Hrs  T(C): 37.4 (13 Dec 2017 08:00), Max: 37.8 (12 Dec 2017 14:06)  T(F): 99.4 (13 Dec 2017 08:00), Max: 100 (12 Dec 2017 14:06)  HR: 90 (13 Dec 2017 10:00) (66 - 110)  BP: 151/71 (13 Dec 2017 10:00) (128/62 - 168/77)  BP(mean): 102 (13 Dec 2017 10:00) (70 - 111)  RR: 19 (13 Dec 2017 10:00) (14 - 21)  SpO2: 100% (13 Dec 2017 10:00) (93% - 100%) on Hi Jamel 35L/min 45%    ABG - ( 13 Dec 2017 03:35 )  pH: 7.46  /  pCO2: 53    /  pO2: 114   / HCO3: 37    / Base Excess: 12.2  /  SaO2: 99                VBG pH 7.42 12-12 @ 21:41  VBG pCO2 64 12-12 @ 21:41  VBG O2 sat 82 12-12 @ 21:41  VBG lactate 1.1 12-12 @ 21:41        12-12 @ 07:01  -  12-13 @ 07:00  --------------------------------------------------------  IN: 180 mL / OUT: 0 mL / NET: 180 mL          LABS:                        9.0    8.4   )-----------( 277      ( 13 Dec 2017 03:38 )             26.0     12-13    143  |  99  |  8   ----------------------------<  150<H>  3.7   |  32<H>  |  0.90    Ca    8.5      13 Dec 2017 03:38  Phos  2.9     12-13  Mg     2.3     12-13        CARDIAC MARKERS ( 13 Dec 2017 03:38 )  x     / 0.19 ng/mL / 120 U/L / x     / 1.0 ng/mL  CARDIAC MARKERS ( 12 Dec 2017 17:03 )  x     / 0.22 ng/mL / 154 U/L / x     / 1.0 ng/mL      CAPILLARY BLOOD GLUCOSE      POCT Blood Glucose.: 174 mg/dL (12 Dec 2017 17:27)    PT/INR - ( 12 Dec 2017 22:08 )   PT: 12.3 sec;   INR: 1.13 ratio         PTT - ( 12 Dec 2017 22:08 )  PTT:24.2 sec    Procalcitonin, Serum: 0.56 ng/mL (12-13-17 @ 03:37)  Procalcitonin, Serum: 0.65 ng/mL (12-12-17 @ 22:07)    Serum Pro-Brain Natriuretic Peptide: 1322 pg/mL (12-12-17 @ 21:51)              CULTURES: (if applicable)        Physical Examination:    General: No acute distress.      HEENT: Pupils equal, reactive to light.  Symmetric.    PULM: Decreased BS throughout     CVS: S1, S2 RRR    ABD: Distended, Mid-abd VAC in place    EXT: Pitting edema bilateral UE, LE    SKIN: Warm and well perfused, no rashes noted.    NEURO: Alert, oriented, interactive, nonfocal    RADIOLOGY REVIEWED  CTA chest: non-diagnostic for PE  Bilateral pleural effusions with compressive atelectasis PULMONARY CONSULT      HPI: 68 y/o M with PMH of HTN, DMT2, PVD, HLD, MI/CAD with stent x 1 2009, GLENYS, 50 pack yr smoker (quit Jan 2017), morbid obesity, rectal cancer (diagnosed 7/2017 s/p chemo) admitted 12/6 for LAR. Hospital course c/b post-operative hypotension requiring SICU admission. Downgraded to floors on 12/7. On 12/12 he was noted to be hypoxic to 70s. Placed on bipap and readmitted to SICU. Currently he is slightly confused (alert and oriented x3, has trouble remembering the president), he denies dyspnea, cough, fever/chills, CP, pleuritic CP, hemoptysis, LE cramping or pain.     PAST MEDICAL & SURGICAL HISTORY:  Rectal cancer: s/p chemo radiation  Vitiligo  Stented coronary artery  Obesity (BMI 30-39.9)  Cataracts, bilateral  Hyperlipidemia, unspecified hyperlipidemia type  Tobacco use: quit Jan 2017  PVD (peripheral vascular disease)  Diabetes mellitus, type 2  Essential (primary) hypertension  Abnormal rectal biopsy: 7/2017 rectal tumor excision  History of cholecystectomy  S/P tonsillectomy and adenoidectomy  H/O heart artery stent: stent x 1 and balloon angioplasty - 2009    Allergies    No Known Allergies    Intolerances      FAMILY HISTORY:  Family history of essential hypertension  Diabetes mellitus, type 2    Social history: Former Smoker, quit 1 year ago  50 pack yr history     Review of Systems:  CONSTITUTIONAL: No fever, chills, or fatigue  EYES: No eye pain, visual disturbances, or discharge  ENMT:  No difficulty hearing, tinnitus, vertigo; No sinus or throat pain  NECK: No pain or stiffness  RESPIRATORY: Per above  CARDIOVASCULAR: No chest pain, palpitations, dizziness, or leg swelling  GASTROINTESTINAL: No abdominal or epigastric pain. No nausea, vomiting, or hematemesis; No diarrhea or constipation. No melena or hematochezia.  GENITOURINARY: No dysuria, frequency, hematuria, or incontinence  NEUROLOGICAL: No headaches, memory loss, loss of strength, numbness, or tremors  SKIN: No itching, burning, rashes, or lesions   MUSCULOSKELETAL: No joint pain or swelling; No muscle, back, or extremity pain  PSYCHIATRIC: No depression, anxiety, mood swings, or difficulty sleeping      Medications:  MEDICATIONS  (STANDING):  acetazolamide Injectable 500 milliGRAM(s) IV Push once  ALBUTerol/ipratropium for Nebulization 3 milliLiter(s) Nebulizer every 6 hours  amLODIPine   Tablet 10 milliGRAM(s) Oral daily  aspirin 325 milliGRAM(s) Oral daily  atorvastatin 40 milliGRAM(s) Oral at bedtime  enoxaparin Injectable 40 milliGRAM(s) SubCutaneous daily  insulin lispro (HumaLOG) corrective regimen sliding scale   SubCutaneous every 6 hours  metoprolol    tartrate Injectable 5 milliGRAM(s) IV Push every 6 hours    MEDICATIONS  (PRN):  acetaminophen   Tablet. 650 milliGRAM(s) Oral every 6 hours PRN Mild Pain (1 - 3)  nystatin Powder 1 Application(s) Topical two times a day PRN Groin moisture    Vital Signs Last 24 Hrs  T(C): 37.4 (13 Dec 2017 08:00), Max: 37.8 (12 Dec 2017 14:06)  T(F): 99.4 (13 Dec 2017 08:00), Max: 100 (12 Dec 2017 14:06)  HR: 90 (13 Dec 2017 10:00) (66 - 110)  BP: 151/71 (13 Dec 2017 10:00) (128/62 - 168/77)  BP(mean): 102 (13 Dec 2017 10:00) (70 - 111)  RR: 19 (13 Dec 2017 10:00) (14 - 21)  SpO2: 100% (13 Dec 2017 10:00) (93% - 100%) on Hi Jamel 35L/min 45%    ABG - ( 13 Dec 2017 03:35 )  pH: 7.46  /  pCO2: 53    /  pO2: 114   / HCO3: 37    / Base Excess: 12.2  /  SaO2: 99        VBG pH 7.42 12-12 @ 21:41  VBG pCO2 64 12-12 @ 21:41  VBG O2 sat 82 12-12 @ 21:41  VBG lactate 1.1 12-12 @ 21:41    12-12 @ 07:01  -  12-13 @ 07:00  --------------------------------------------------------  IN: 180 mL / OUT: 0 mL / NET: 180 mL    LABS:                        9.0    8.4   )-----------( 277      ( 13 Dec 2017 03:38 )             26.0     12-13    143  |  99  |  8   ----------------------------<  150<H>  3.7   |  32<H>  |  0.90    Ca    8.5      13 Dec 2017 03:38  Phos  2.9     12-13  Mg     2.3     12-13    CARDIAC MARKERS ( 13 Dec 2017 03:38 )  x     / 0.19 ng/mL / 120 U/L / x     / 1.0 ng/mL  CARDIAC MARKERS ( 12 Dec 2017 17:03 )  x     / 0.22 ng/mL / 154 U/L / x     / 1.0 ng/mL      CAPILLARY BLOOD GLUCOSE      POCT Blood Glucose.: 174 mg/dL (12 Dec 2017 17:27)    PT/INR - ( 12 Dec 2017 22:08 )   PT: 12.3 sec;   INR: 1.13 ratio         PTT - ( 12 Dec 2017 22:08 )  PTT:24.2 sec    Procalcitonin, Serum: 0.56 ng/mL (12-13-17 @ 03:37)  Procalcitonin, Serum: 0.65 ng/mL (12-12-17 @ 22:07)    Serum Pro-Brain Natriuretic Peptide: 1322 pg/mL (12-12-17 @ 21:51)    CULTURES: (if applicable)    Physical Examination:    General: No acute distress.      HEENT: Pupils equal, reactive to light.  Symmetric.    PULM: Decreased BS throughout     CVS: S1, S2 RRR    ABD: Distended, Mid-abd VAC in place    EXT: Pitting edema bilateral UE, LE    SKIN: Warm and well perfused, no rashes noted.    NEURO: Alert, oriented, interactive, nonfocal    RADIOLOGY REVIEWED  CTA chest: non-diagnostic for PE  Bilateral pleural effusions with compressive atelectasis

## 2017-12-13 NOTE — PROGRESS NOTE ADULT - ATTENDING COMMENTS
o/n events noted, pt now on continuous bipap, c/o abd pain that is relieved with meds  + air in colostomy    drowsy, but arousable, answers questions appropriately  when asked to do incentive spirometry he takes very quick shallow breaths, when he is instructed to redo it properly he refuses.     Abd obese, softly distended, tender at incision, VAC intact, colostomy dusky with pink areas, + air    A/P S/P robotic converted to open APR, now with hypoxia  f/u pulm recommendations  Incentive spirometry  diet as kelsey  OOB AMBULATE  care per ICU team

## 2017-12-13 NOTE — DIETITIAN INITIAL EVALUATION ADULT. - NS AS NUTRI INTERV MEALS SNACK
Fiber - modified diet/Continue Low Fiber diet; Consistent Carbohydrate diet restriction not necessary at this time given reduced po intake

## 2017-12-13 NOTE — PROGRESS NOTE ADULT - SUBJECTIVE AND OBJECTIVE BOX
Red Team Surgery Progress Note    SUBJECTIVE: Patient seen and examined. Pt. comfortable, NAD. BIPAP mask on patient's face.  Patient stable with no overnight events. Patient denies CP/ SOB/ Palpatations. Patient denies N/V. Reports flatus and out put noted in the ostomy sight.       Vital Signs Last 24 Hrs  T(C): 37.7 (13 Dec 2017 03:00), Max: 37.8 (12 Dec 2017 14:06)  T(F): 99.9 (13 Dec 2017 03:00), Max: 100 (12 Dec 2017 14:06)  HR: 81 (13 Dec 2017 07:00) (66 - 110)  BP: 153/72 (13 Dec 2017 07:00) (128/62 - 168/77)  BP(mean): 103 (13 Dec 2017 07:00) (70 - 111)  RR: 20 (13 Dec 2017 07:00) (14 - 21)  SpO2: 98% (13 Dec 2017 07:00) (93% - 100%)    Physical Exam:  General Appearance: Appears well, NAD  Chest: Able to speak in full sentences, no labored breathing  CV: Pulse regular presently  Abdomen: Soft, nontense  Extremities: Grossly symmetric    LABS:                        9.0    8.4   )-----------( 277      ( 13 Dec 2017 03:38 )             26.0     12-13    143  |  99  |  8   ----------------------------<  150<H>  3.7   |  32<H>  |  0.90    Ca    8.5      13 Dec 2017 03:38  Phos  2.9     12-13  Mg     2.3     12-13      PT/INR - ( 12 Dec 2017 22:08 )   PT: 12.3 sec;   INR: 1.13 ratio         PTT - ( 12 Dec 2017 22:08 )  PTT:24.2 sec      INs and OUTs:    12-12-17 @ 07:01  -  12-13-17 @ 07:00  --------------------------------------------------------  IN: 180 mL / OUT: 0 mL / NET: 180 mL

## 2017-12-13 NOTE — DIETITIAN INITIAL EVALUATION ADULT. - ADHERENCE
Pt manages DM2 with Glipizide but does not check fingersticks other than at MD office every 6 months; HbA1c 7.6% noted,

## 2017-12-13 NOTE — DIETITIAN INITIAL EVALUATION ADULT. - NS AS NUTRI INTERV ED CONTENT
Reviewed Low Fiber diet and Consistent Carbohydrate diet; pt and wife were receptive and expressed understanding. Written diet education provided./Nutrition relationship to health/disease

## 2017-12-13 NOTE — DIETITIAN INITIAL EVALUATION ADULT. - FACTORS AFF FOOD INTAKE
Per wife, pt continues with good appetite but early satiety, feeling full after a few bites. Pt consumed eggs this morning with no GI distress. No colostomy output in past 24-hours, 25ml output as of 12/12./none

## 2017-12-13 NOTE — DIETITIAN INITIAL EVALUATION ADULT. - ORAL INTAKE PTA
Pt eats well at home with no chewing/swallowing difficulties and no change in weight. Pt takes vit D supplements; reports NKFA./good

## 2017-12-13 NOTE — PROGRESS NOTE ADULT - SUBJECTIVE AND OBJECTIVE BOX
Cardiology Attending Progress Note    CHIEF COMPLAINT/REASON FOR CONSULT: Elevated Cardiac Enzymes    HISTORY OF PRESENT ILLNESS:    67y.o. Male with HTN, CAD s/p ANIKET to mLAD , GLENYS on CPAP, rectal mass s/p transanal biopsy/excision 2017 s/p chemo/XRT, admitted 2017 for robotic low anterior resection ( converted to open abdominal perineal resection) + cystoscopy insertion of uretral catheters, post-op course c/b hypoxia on 2017, and found to have elevated cardiac enzymes (Trop: 0.22 -> 0.19 -> 0.15, CKMB 1.0 -> 1.0 ->1.0) for which cardiology was consulted.     INTERVAL EVENTS:   Patient seen and examined. Overall he states that his breathing is somewhat improved. No Chest Pain. No SOB. No HA/Dizziness. No Palpitations. No N/V/D/F/C.  -CT-Angio negative for cetral PE but suboptimal opacification to evaluate for segmental/subsegmental.      Allergies    No Known Allergies    Intolerances    MEDICATIONS:  amLODIPine   Tablet 10 milliGRAM(s) Oral daily  enoxaparin Injectable 40 milliGRAM(s) SubCutaneous daily  metoprolol    tartrate Injectable 5 milliGRAM(s) IV Push every 6 hours      ALBUTerol/ipratropium for Nebulization 3 milliLiter(s) Nebulizer every 6 hours    acetaminophen   Tablet. 650 milliGRAM(s) Oral every 6 hours PRN  aspirin 325 milliGRAM(s) Oral daily      atorvastatin 40 milliGRAM(s) Oral at bedtime  insulin lispro (HumaLOG) corrective regimen sliding scale   SubCutaneous every 6 hours    nystatin Powder 1 Application(s) Topical two times a day PRN      PAST MEDICAL & SURGICAL HISTORY:  Rectal cancer: s/p chemo radiation  Vitiligo  Stented coronary artery  Obesity (BMI 30-39.9)  Cataracts, bilateral  Hyperlipidemia, unspecified hyperlipidemia type  Tobacco use: quit 2017  PVD (peripheral vascular disease)  Diabetes mellitus, type 2  Essential (primary) hypertension  Abnormal rectal biopsy: 2017 rectal tumor excision  History of cholecystectomy  S/P tonsillectomy and adenoidectomy  H/O heart artery stent: stent x 1 and balloon angioplasty -       FAMILY HISTORY:  Family history of essential hypertension  Diabetes mellitus, type 2      REVIEW OF SYSTEMS:    CONSTITUTIONAL: +weakness, fevers or chills  EYES/ENT: No visual changes;  No vertigo or throat pain   NECK: No pain or stiffness  RESPIRATORY: No cough, wheezing, hemoptysis; +shortness of breath  CARDIOVASCULAR: No chest pain or palpitations  GASTROINTESTINAL: +abdominalpain. No nausea, vomiting, or hematemesis; No diarrhea or constipation. No melena or hematochezia.  GENITOURINARY: No dysuria, frequency or hematuria  NEUROLOGICAL: No numbness or weakness  SKIN: No itching, burning, rashes, or lesions   All other review of systems is negative unless indicated above.    PHYSICAL EXAM:  T(C): 37.4 (17 @ 08:00), Max: 37.8 (17 @ 14:06)  HR: 87 (17 @ 11:00) (66 - 110)  BP: 142/66 (17 @ 11:00) (128/62 - 168/77)  RR: 18 (17 @ 11:00) (14 - 21)  SpO2: 99% (17 @ 11:00) (93% - 100%)  Wt(kg): --  I&O's Summary    12 Dec 2017 07:01  -  13 Dec 2017 07:00  --------------------------------------------------------  IN: 180 mL / OUT: 0 mL / NET: 180 mL    13 Dec 2017 07:  -  13 Dec 2017 12:18  --------------------------------------------------------  IN: 480 mL / OUT: 560 mL / NET: -80 mL        Appearance: Normal	  HEENT:   Normal oral mucosa, PERRL, EOMI	  Lymphatic: No lymphadenopathy  Cardiovascular: Normal S1 S2, No JVD, 1/6 CIARRA  Respiratory: Lungs clear to auscultation	  Psychiatry: A & O x 3, Mood & affect appropriate  Gastrointestinal:  Soft, Non-tender, + BS	  Skin: No rashes, No ecchymoses, No cyanosis	  Neurologic: Non-focal  Extremities: Normal range of motion, No clubbing, 2+ pitting edema diffusely (anasarca)  Vascular: Peripheral pulses palpable 2+ bilaterally    LABS:	 	    CBC Full  -  ( 13 Dec 2017 03:38 )  WBC Count : 8.4 K/uL  Hemoglobin : 9.0 g/dL  Hematocrit : 26.0 %  Platelet Count - Automated : 277 K/uL  Mean Cell Volume : 97.7 fl  Mean Cell Hemoglobin : 33.7 pg  Mean Cell Hemoglobin Concentration : 34.5 gm/dL  Auto Neutrophil # : x  Auto Lymphocyte # : x  Auto Monocyte # : x  Auto Eosinophil # : x  Auto Basophil # : x  Auto Neutrophil % : x  Auto Lymphocyte % : x  Auto Monocyte % : x  Auto Eosinophil % : x  Auto Basophil % : x        143  |  99  |  8   ----------------------------<  150<H>  3.7   |  32<H>  |  0.90      144  |  97  |  9   ----------------------------<  154<H>  3.7   |  37<H>  |  1.00    Ca    8.5      13 Dec 2017 03:38  Ca    8.7      12 Dec 2017 21:51  Phos  2.9     -  Phos  2.2       Mg     2.3     12-  Mg     1.9             proBNP: Serum Pro-Brain Natriuretic Peptide: 1322 pg/mL ( @ 21:51)    CARDIAC MARKERS:    Trop: 0.22 -> 0.19 -> 0.15, CKMB 1.0 -> 1.0 ->1.0)     EC2017: NSR, +Qwaves II, III, AVF, low septal forces  Overall unchanged when compared with prior 2016    TELEMETRY: NSR , PVC's  	  CT-Angio:   MPRESSION:     Technically limited study. No central pulmonary embolism. Nondiagnostic   evaluation of the segmental and subsegmental pulmonary arteries due to   poor contrast opacification.    Small bilateral pleural effusions with bibasilar atelectasis.    Status post Gomez procedure. Soft tissue thickening and fullness with   areas of hyperdensity and inflammatory change of the rectal stump with   hemorrhagic components may represent postsurgical changes given the   recent surgery. Follow-up is recommended.    Tiny left pulmonary nodules are unchanged. Six-month follow-up CT is   recommended to ensure stability.      	    TTE: 2017:  EF (Visual Estimate): 75 %  ------------------------------------------------------------------------  Observations:  Mitral Valve: Normal mitral valve. Minimal mitral  regurgitation.  Aortic Valve/Aorta: Aortic valve not well visualized;  appears to be a calcified trileaflet valve with normal  opening. No aortic valve regurgitation seen.  Aortic Root: 4.1 cm.  Left Atrium: Left atrium not well visualized.  Left Ventricle: Endocardium not well visualized; grossly  hyperdynamic left ventricular systolic function. Normal  left ventricular internal dimensions and wall thicknesses.  Right Heart: Right atrium not well visualized, probably  normal. The right ventricle is not well visualized; grossly  normal right ventricular systolic function. Tricuspid valve  not well visualized, probably normal. Minimal tricuspid  regurgitation. Normal pulmonic valve.  Pericardium/Pleura: Trace pericardial effusion.  Hemodynamic: Inadequate tricuspid regurgitation Doppler  envelope precludes estimation of RVSP.  ------------------------------------------------------------------------  Conclusions:  1. Normal mitral valve. Minimal mitral regurgitation.  2. Aortic valve not well visualized; appears to be a  calcified trileaflet valve with normal opening. No aortic  valve regurgitation seen.  3. Endocardium not well visualized; grossly hyperdynamic  left ventricular systolic function.  4. The right ventricle is not well visualized; grossly  normal right ventricular systolic function.  *** No previous Echo exam.    A/P: 67y.o. Male with HTN, CAD s/p ANIKET to mLAD , GLENYS on CPAP, rectal mass s/p transanal biopsy/excision 2017 s/p chemo/XRT, admitted 2017 for robotic low anterior resection ( converted to open abdominal perineal resection) + cystoscopy insertion of uretral catheters, post-op course c/b hypoxia on 2017, and found to have elevated cardiac enzymes (Trop: 0.22 -> 0.19 -> 0.15, CKMB 1.0 -> 1.0 ->1.0) for which cardiology was consulted.     1. NSTEMI Type 2 - Suspect demand ischemia / Type 2 NSTEMI in the setting of recent surgery, hypoxia, anemia, CKD in this patient with known CAD s/p ANIKET to mLAD. Less likely represents an acute atherothrombotic event given normal CKMB, no significant EKG changes. Cardiac enzymes are downtrending at this time. Suspect troponin elevated in the setting of recent CKD (Cr recently 1.86 on , now improved).  -Cardiac enzymes downtrended Trop: 0.22 -> 0.19 -> 0.15, CKMB 1.0 -> 1.0 ->1.0.  -Suspect hypoxia multifactorial related to atelectasis, pleural effusions, COPD.   -CT-Angio done and suboptimal to exclude PE, pending LE dopplers at this time. Appreciate pulmonary input.  -Doubt significant diastolic dysfunction as TTE with underfilled hyperdynamic LV despite significant anasarca/edema.  -Cont ASA 81 mg po QD  -Cont Atorvastatin 40 gm po QHS    2. HTN  - /66  -Cont Norvasc 10 mg po QD    Thank you for this interesting consult. My team will continue to follow along with you.    Tyrell East MD  Cardiology Attending  Cell: [phone]

## 2017-12-14 DIAGNOSIS — I25.10 ATHEROSCLEROTIC HEART DISEASE OF NATIVE CORONARY ARTERY WITHOUT ANGINA PECTORIS: ICD-10-CM

## 2017-12-14 DIAGNOSIS — I10 ESSENTIAL (PRIMARY) HYPERTENSION: ICD-10-CM

## 2017-12-14 DIAGNOSIS — I21.4 NON-ST ELEVATION (NSTEMI) MYOCARDIAL INFARCTION: ICD-10-CM

## 2017-12-14 DIAGNOSIS — Z29.9 ENCOUNTER FOR PROPHYLACTIC MEASURES, UNSPECIFIED: ICD-10-CM

## 2017-12-14 LAB
ANION GAP SERPL CALC-SCNC: 10 MMOL/L — SIGNIFICANT CHANGE UP (ref 5–17)
APTT BLD: 26.6 SEC — LOW (ref 27.5–37.4)
BUN SERPL-MCNC: 13 MG/DL — SIGNIFICANT CHANGE UP (ref 7–23)
CA-I BLD-SCNC: 1.16 MMOL/L — SIGNIFICANT CHANGE UP (ref 1.12–1.3)
CALCIUM SERPL-MCNC: 8 MG/DL — LOW (ref 8.4–10.5)
CHLORIDE SERPL-SCNC: 98 MMOL/L — SIGNIFICANT CHANGE UP (ref 96–108)
CK MB BLD-MCNC: 0.7 % — SIGNIFICANT CHANGE UP (ref 0–3.5)
CK MB CFR SERPL CALC: 1 NG/ML — SIGNIFICANT CHANGE UP (ref 0–6.7)
CK SERPL-CCNC: 144 U/L — SIGNIFICANT CHANGE UP (ref 30–200)
CO2 SERPL-SCNC: 29 MMOL/L — SIGNIFICANT CHANGE UP (ref 22–31)
CREAT SERPL-MCNC: 1.06 MG/DL — SIGNIFICANT CHANGE UP (ref 0.5–1.3)
GAS PNL BLDA: SIGNIFICANT CHANGE UP
GLUCOSE BLDC GLUCOMTR-MCNC: 119 MG/DL — HIGH (ref 70–99)
GLUCOSE BLDC GLUCOMTR-MCNC: 147 MG/DL — HIGH (ref 70–99)
GLUCOSE SERPL-MCNC: 169 MG/DL — HIGH (ref 70–99)
HCT VFR BLD CALC: 28.2 % — LOW (ref 39–50)
HGB BLD-MCNC: 9.5 G/DL — LOW (ref 13–17)
INR BLD: 1.18 RATIO — HIGH (ref 0.88–1.16)
MAGNESIUM SERPL-MCNC: 2 MG/DL — SIGNIFICANT CHANGE UP (ref 1.6–2.6)
MCHC RBC-ENTMCNC: 32.8 PG — SIGNIFICANT CHANGE UP (ref 27–34)
MCHC RBC-ENTMCNC: 33.5 GM/DL — SIGNIFICANT CHANGE UP (ref 32–36)
MCV RBC AUTO: 98 FL — SIGNIFICANT CHANGE UP (ref 80–100)
PHOSPHATE SERPL-MCNC: 2.6 MG/DL — SIGNIFICANT CHANGE UP (ref 2.5–4.5)
PLATELET # BLD AUTO: 283 K/UL — SIGNIFICANT CHANGE UP (ref 150–400)
POTASSIUM SERPL-MCNC: 3.4 MMOL/L — LOW (ref 3.5–5.3)
POTASSIUM SERPL-SCNC: 3.4 MMOL/L — LOW (ref 3.5–5.3)
PROTHROM AB SERPL-ACNC: 12.8 SEC — HIGH (ref 9.8–12.7)
RBC # BLD: 2.88 M/UL — LOW (ref 4.2–5.8)
RBC # FLD: 13 % — SIGNIFICANT CHANGE UP (ref 10.3–14.5)
SODIUM SERPL-SCNC: 137 MMOL/L — SIGNIFICANT CHANGE UP (ref 135–145)
TROPONIN T SERPL-MCNC: 0.14 NG/ML — HIGH (ref 0–0.06)
WBC # BLD: 12.2 K/UL — HIGH (ref 3.8–10.5)
WBC # FLD AUTO: 12.2 K/UL — HIGH (ref 3.8–10.5)

## 2017-12-14 PROCEDURE — 71010: CPT | Mod: 26,76

## 2017-12-14 PROCEDURE — 74000: CPT | Mod: 26

## 2017-12-14 PROCEDURE — 99291 CRITICAL CARE FIRST HOUR: CPT

## 2017-12-14 PROCEDURE — 99233 SBSQ HOSP IP/OBS HIGH 50: CPT

## 2017-12-14 RX ORDER — ACETAZOLAMIDE 250 MG/1
250 TABLET ORAL EVERY 8 HOURS
Qty: 0 | Refills: 0 | Status: COMPLETED | OUTPATIENT
Start: 2017-12-14 | End: 2017-12-15

## 2017-12-14 RX ORDER — ACETAMINOPHEN 500 MG
1000 TABLET ORAL ONCE
Qty: 0 | Refills: 0 | Status: COMPLETED | OUTPATIENT
Start: 2017-12-15 | End: 2017-12-14

## 2017-12-14 RX ORDER — SODIUM CHLORIDE 9 MG/ML
1000 INJECTION, SOLUTION INTRAVENOUS
Qty: 0 | Refills: 0 | Status: DISCONTINUED | OUTPATIENT
Start: 2017-12-14 | End: 2017-12-15

## 2017-12-14 RX ORDER — OXYCODONE HYDROCHLORIDE 5 MG/1
5 TABLET ORAL ONCE
Qty: 0 | Refills: 0 | Status: DISCONTINUED | OUTPATIENT
Start: 2017-12-14 | End: 2017-12-14

## 2017-12-14 RX ORDER — POTASSIUM CHLORIDE 20 MEQ
10 PACKET (EA) ORAL
Qty: 0 | Refills: 0 | Status: COMPLETED | OUTPATIENT
Start: 2017-12-14 | End: 2017-12-14

## 2017-12-14 RX ORDER — TRAMADOL HYDROCHLORIDE 50 MG/1
50 TABLET ORAL ONCE
Qty: 0 | Refills: 0 | Status: DISCONTINUED | OUTPATIENT
Start: 2017-12-14 | End: 2017-12-14

## 2017-12-14 RX ORDER — KETOROLAC TROMETHAMINE 30 MG/ML
30 SYRINGE (ML) INJECTION EVERY 6 HOURS
Qty: 0 | Refills: 0 | Status: DISCONTINUED | OUTPATIENT
Start: 2017-12-14 | End: 2017-12-15

## 2017-12-14 RX ORDER — ACETAMINOPHEN 500 MG
1000 TABLET ORAL ONCE
Qty: 0 | Refills: 0 | Status: COMPLETED | OUTPATIENT
Start: 2017-12-15 | End: 2017-12-15

## 2017-12-14 RX ORDER — BENZOCAINE AND MENTHOL 5; 1 G/100ML; G/100ML
1 LIQUID ORAL
Qty: 0 | Refills: 0 | Status: DISCONTINUED | OUTPATIENT
Start: 2017-12-14 | End: 2017-12-22

## 2017-12-14 RX ORDER — METOCLOPRAMIDE HCL 10 MG
10 TABLET ORAL EVERY 6 HOURS
Qty: 0 | Refills: 0 | Status: COMPLETED | OUTPATIENT
Start: 2017-12-14 | End: 2017-12-14

## 2017-12-14 RX ORDER — ACETAMINOPHEN 500 MG
1000 TABLET ORAL ONCE
Qty: 0 | Refills: 0 | Status: COMPLETED | OUTPATIENT
Start: 2017-12-14 | End: 2017-12-14

## 2017-12-14 RX ADMIN — Medication 30 MILLIGRAM(S): at 11:29

## 2017-12-14 RX ADMIN — Medication 400 MILLIGRAM(S): at 17:40

## 2017-12-14 RX ADMIN — Medication 1000 MILLIGRAM(S): at 18:00

## 2017-12-14 RX ADMIN — Medication 10 MILLIGRAM(S): at 09:41

## 2017-12-14 RX ADMIN — Medication 30 MILLIGRAM(S): at 18:00

## 2017-12-14 RX ADMIN — Medication 3 MILLILITER(S): at 17:36

## 2017-12-14 RX ADMIN — Medication 30 MILLIGRAM(S): at 07:45

## 2017-12-14 RX ADMIN — ENOXAPARIN SODIUM 40 MILLIGRAM(S): 100 INJECTION SUBCUTANEOUS at 11:29

## 2017-12-14 RX ADMIN — Medication 400 MILLIGRAM(S): at 23:47

## 2017-12-14 RX ADMIN — Medication 0.5 MILLIGRAM(S): at 17:36

## 2017-12-14 RX ADMIN — Medication 30 MILLIGRAM(S): at 03:15

## 2017-12-14 RX ADMIN — Medication 63.75 MILLIMOLE(S): at 01:37

## 2017-12-14 RX ADMIN — BENZOCAINE AND MENTHOL 1 LOZENGE: 5; 1 LIQUID ORAL at 11:52

## 2017-12-14 RX ADMIN — Medication 1000 MILLIGRAM(S): at 05:03

## 2017-12-14 RX ADMIN — Medication 30 MILLIGRAM(S): at 12:06

## 2017-12-14 RX ADMIN — Medication: at 05:03

## 2017-12-14 RX ADMIN — BENZOCAINE AND MENTHOL 1 LOZENGE: 5; 1 LIQUID ORAL at 17:40

## 2017-12-14 RX ADMIN — TRAMADOL HYDROCHLORIDE 50 MILLIGRAM(S): 50 TABLET ORAL at 20:05

## 2017-12-14 RX ADMIN — Medication 30 MILLIGRAM(S): at 23:45

## 2017-12-14 RX ADMIN — BENZOCAINE AND MENTHOL 1 LOZENGE: 5; 1 LIQUID ORAL at 23:45

## 2017-12-14 RX ADMIN — Medication 5 MILLIGRAM(S): at 17:40

## 2017-12-14 RX ADMIN — ACETAZOLAMIDE 250 MILLIGRAM(S): 250 TABLET ORAL at 13:01

## 2017-12-14 RX ADMIN — Medication 30 MILLIGRAM(S): at 23:46

## 2017-12-14 RX ADMIN — Medication 100 MILLIEQUIVALENT(S): at 01:36

## 2017-12-14 RX ADMIN — Medication 100 MILLIEQUIVALENT(S): at 04:49

## 2017-12-14 RX ADMIN — Medication 5 MILLIGRAM(S): at 11:29

## 2017-12-14 RX ADMIN — ACETAZOLAMIDE 250 MILLIGRAM(S): 250 TABLET ORAL at 23:47

## 2017-12-14 RX ADMIN — OXYCODONE HYDROCHLORIDE 5 MILLIGRAM(S): 5 TABLET ORAL at 23:46

## 2017-12-14 RX ADMIN — Medication 1000 MILLIGRAM(S): at 12:06

## 2017-12-14 RX ADMIN — Medication 30 MILLIGRAM(S): at 17:40

## 2017-12-14 RX ADMIN — Medication 30 MILLIGRAM(S): at 01:37

## 2017-12-14 RX ADMIN — Medication 3 MILLILITER(S): at 05:15

## 2017-12-14 RX ADMIN — Medication 30 MILLIGRAM(S): at 07:27

## 2017-12-14 RX ADMIN — Medication 400 MILLIGRAM(S): at 11:29

## 2017-12-14 RX ADMIN — Medication 3 MILLILITER(S): at 12:01

## 2017-12-14 RX ADMIN — Medication 5 MILLIGRAM(S): at 23:45

## 2017-12-14 RX ADMIN — Medication 400 MILLIGRAM(S): at 04:50

## 2017-12-14 RX ADMIN — OXYCODONE HYDROCHLORIDE 5 MILLIGRAM(S): 5 TABLET ORAL at 23:39

## 2017-12-14 RX ADMIN — Medication 100 MILLIEQUIVALENT(S): at 03:15

## 2017-12-14 RX ADMIN — Medication 0.5 MILLIGRAM(S): at 05:15

## 2017-12-14 RX ADMIN — Medication 5 MILLIGRAM(S): at 05:04

## 2017-12-14 NOTE — PROGRESS NOTE ADULT - ASSESSMENT
POD#8 s/p robotic APR    - Appreciate SICU care  - OOB, Ambulate patient   - If patient vomits, insert NGT  - F/u am chest x-ray  - Monitor respiratory status, F/u Pulm recs  - Diet is currently NPO  - Continue to monitor ostomy viability and function  - d/c planning for rehab later this week pending resolution of respiratory Issues

## 2017-12-14 NOTE — PROGRESS NOTE ADULT - SUBJECTIVE AND OBJECTIVE BOX
Red Team Surgery Progress Note    SUBJECTIVE: Patient seen and examined. Pt. comfortable, NAD. High flow nasal cannula in place.  Patient stable with no overnight events. Patient denies CP/ SOB/ Palpatations. Patient had episode of vomit yesterday. Reports flatus and out put noted in the ostomy sight. Patient has not gotten out of bed.     Vital Signs Last 24 Hrs  T(C): 36.7 (14 Dec 2017 03:00), Max: 37.4 (13 Dec 2017 08:00)  T(F): 98 (14 Dec 2017 03:00), Max: 99.4 (13 Dec 2017 08:00)  HR: 77 (14 Dec 2017 07:00) (63 - 149)  BP: 140/70 (14 Dec 2017 07:00) (122/68 - 156/86)  BP(mean): 99 (14 Dec 2017 07:00) (84 - 111)  RR: 18 (14 Dec 2017 07:00) (0 - 25)  SpO2: 99% (14 Dec 2017 07:00) (95% - 100%)    Physical Exam:  General Appearance: Appears well, NAD  Chest: Able to speak in full sentences, no labored breathing  CV: Pulse regular presently  Abdomen: Soft, nontense, Distended , Ostomy putting out some solids and bilious liquid.   Extremities: Grossly symmetric    LABS:                        9.5    12.2  )-----------( 283      ( 14 Dec 2017 00:14 )             28.2     12-14    137  |  98  |  13  ----------------------------<  169<H>  3.4<L>   |  29  |  1.06    Ca    8.0<L>      14 Dec 2017 00:14  Phos  2.6     12-14  Mg     2.0     12-14      PT/INR - ( 14 Dec 2017 00:14 )   PT: 12.8 sec;   INR: 1.18 ratio         PTT - ( 14 Dec 2017 00:14 )  PTT:26.6 sec      INs and OUTs:    12-13-17 @ 07:01  -  12-14-17 @ 07:00  --------------------------------------------------------  IN: 1270 mL / OUT: 2640 mL / NET: -1370 mL

## 2017-12-14 NOTE — PROGRESS NOTE ADULT - ATTENDING COMMENTS
Acute hypoxemic and compensated hypercapnic resp failure, titrate HFNC, nBiPAP on hold for vomiting  NGT decompression, abd mildly distended, abx XRAY distended loop of bowel  Met alkalosis better with Diamox, will continue fo4r one more day.  Correction of electrolytes  Gentle hydration while NPO

## 2017-12-14 NOTE — PROGRESS NOTE ADULT - ATTENDING COMMENTS
Pt taken off BIPAP this am and placed on nasal cannula, so far tolerating it well. Pt able to do a few puffs on the incentive spirometer for me today, more awake today. + vomiting overnight after an episode of hiccups, the pt denies nausea    AAOx3  abd obese, softly distended, tender at incisions, incisions c/d/i, colostomy dark but with pink spots, + air in bag    A/P POD#8 s/p Robotic converted to open APR  f/u pulm recommendations  OOB with PT  incentive spirometry  diet as kelsey  care per SICU

## 2017-12-14 NOTE — PROGRESS NOTE ADULT - SUBJECTIVE AND OBJECTIVE BOX
PROGRESS NOTE  Reason for follow up: NSTEMI type II  Overnight: No new events.   Update: Patient resting in bed comfortably, NGT in place and draining. No complaints. No requiring pressors. BP not well controlled, 150'smmHG systolic.     Subjective: "I'm ok"   Complains of: none  Review of symptoms: Cardiac:  No chest pain. No dyspnea. No palpitations.  Respiratory:no cough. No dyspnea  Gastrointestinal: No diarrhea. No abdominal pain. No bleeding.     Past medical history: No updates.   Chronic conditions:  Hypertension: not well controlled. CHF: Stable. CAD: Stable ischemic heart disease.   	  Vitals:  T(C): 36.9 (12-14-17 @ 08:00), Max: 37.2 (12-13-17 @ 23:00)  HR: 75 (12-14-17 @ 12:12) (63 - 149)  BP: 165/75 (12-14-17 @ 11:00) (122/68 - 165/75)  RR: 18 (12-14-17 @ 12:12) (0 - 30)  SpO2: 100% (12-14-17 @ 12:12) (95% - 100%)  Wt(kg): --  I&O's Summary    13 Dec 2017 07:01  -  14 Dec 2017 07:00  --------------------------------------------------------  IN: 1270 mL / OUT: 2640 mL / NET: -1370 mL    14 Dec 2017 07:01  -  14 Dec 2017 12:22  --------------------------------------------------------  IN: 60 mL / OUT: 970 mL / NET: -910 mL          PHYSICAL EXAM:  Appearance: Comfortable. No acute distress  HEENT:  Head and neck: Atraumatic. Normocephalic.  Normal oral mucosa, PERRL, Neck is supple. No JVD, No carotid bruit.   Neurologic: A & O x 3, no focal deficits. EOMI   Lymphatic: No cervical lymphadenopathy  Cardiovascular: Normal S1 S2, No murmur, rubs/gallops. No JVD, No edema  Respiratory: Lungs clear to auscultation  Gastrointestinal:  Soft, Non-tender, + BS  Lower Extremities: No edema  Psychiatry: Patient is calm. No agitation. Mood & affect appropriate  Skin: No rashes/ ecchymoses/cyanosis/ulcers visualized on the face, hands or feet.    CURRENT MEDICATIONS:  acetazolamide Injectable 250 milliGRAM(s) IV Push every 8 hours  amLODIPine   Tablet 10 milliGRAM(s) Oral daily  metoprolol    tartrate Injectable 5 milliGRAM(s) IV Push every 6 hours    ALBUTerol/ipratropium for Nebulization  buDESOnide   0.5 milliGRAM(s) Respule  aspirin  ketorolac   Injectable  traMADol  atorvastatin  insulin lispro (HumaLOG) corrective regimen sliding scale  benzocaine 15 mG/menthol 3.6 mG Lozenge  enoxaparin Injectable  lactated ringers.      LABS:	 	  CARDIAC MARKERS ( 14 Dec 2017 00:14 )  x     / 0.14 ng/mL / 144 U/L / x     / 1.0 ng/mL  p-BNP 14 Dec 2017 00:14: x    , CARDIAC MARKERS ( 13 Dec 2017 11:27 )  x     / 0.15 ng/mL / 122 U/L / x     / 1.0 ng/mL  p-BNP 13 Dec 2017 11:27: x    , CARDIAC MARKERS ( 13 Dec 2017 03:38 )  x     / 0.19 ng/mL / 120 U/L / x     / 1.0 ng/mL  p-BNP 13 Dec 2017 03:38: x    , CARDIAC MARKERS ( 12 Dec 2017 21:51 )  x     / x     / x     / x     / x      p-BNP 12 Dec 2017 21:51: 1322 pg/mL, CARDIAC MARKERS ( 12 Dec 2017 17:03 )  x     / 0.22 ng/mL / 154 U/L / x     / 1.0 ng/mL  p-BNP 12 Dec 2017 17:03: x                                9.5    12.2  )-----------( 283      ( 14 Dec 2017 00:14 )             28.2     12-14    137  |  98  |  13  ----------------------------<  169<H>  3.4<L>   |  29  |  1.06    Ca    8.0<L>      14 Dec 2017 00:14  Phos  2.6     12-14  Mg     2.0     12-14      proBNP: Serum Pro-Brain Natriuretic Peptide: 1322 pg/mL (12-12 @ 21:51)    Lipid Profile:   HgA1c: TSH:     TELEMETRY: Reviewed by me, no events   ECG:  Reviewed by me. sinus rhythm, anteroseptal infarct, inferior infarct	    DIAGNOSTIC TESTING:  [ ] Echocardiogram:  < from: Transthoracic Echocardiogram (12.13.17 @ 22:36) >  1. Normal mitral valve. Minimal mitral regurgitation.  2. Aortic valve not well visualized; appears to be a  calcified trileaflet valve with normal opening. No aortic  valve regurgitation seen.  3. Endocardium not well visualized; grossly hyperdynamic  left ventricular systolic function.  4. The right ventricle is not well visualized; grossly  normal right ventricular systolic function.  *** No previous Echo exam.    < end of copied text >     [ ]  Catheterization:  < from: Cardiac Cath Lab (07.06.09 @ 12:49) >   The coronary circulation is right dominant.  LM:      Proximal left main: Angiography showed minor luminal  irregularities with no flow limiting lesions.  LAD:      Mid LAD: There was a 90 % stenosis.  D2: There was a discrete 70 % stenosis.  CX:      Circumflex: Normal.  RCA:      RCA: Angiography showed minor luminal irregularities with no flow  limiting lesions.    < end of copied text >  < from: Cardiac Cath Lab (07.06.09 @ 12:49) >  Intervention on D2: balloon angioplasty.    < end of copied text >    [ ] Stress Test:    < from: Nuclear Stress Test-Pharmacologic (11.02.16 @ 08:56) >  * The left ventricle was normal in size.  * Tracer uptake was homogeneous throughout the left  ventricle.  * Abnormal study; There is a medium sized, mild defect in  septal wall that is fixed, suggestive of infarct. There is  also a small, mild defect in distal inferior wall that is  partially reversible, suggestive of infarction with  penelope-infarctischemia.  * Gated wall motion analysis is performed, and shows  inferior and septal wall hypokinesis with post stress LVEF  of 56%.    < end of copied text >    OTHER:

## 2017-12-14 NOTE — PROGRESS NOTE ADULT - PROBLEM SELECTOR PLAN 2
CTPA non-diagnostic for PE  -LE duplex negative for DVT  -Well's Score is 2.5 (moderate) risk of PE, but patient has alternative explanations for hypoxia. Would defer repeat CTPA at this point

## 2017-12-14 NOTE — PROGRESS NOTE ADULT - SUBJECTIVE AND OBJECTIVE BOX
SICU Progress note      HISTORY  67M PMHx CKD, HLD, CAD s/p PCI (2009), GLENYS (home CPAP), DM, HTN, s/p choleystectomy, Rectal CA s/p transanal excision/biopsy (7/2017), s/p neoadjuvant chemoradiation who presented to [facility] for planned robotic LAR resection of Rectal CA. Patient went to OR and underwent robotic converted to laparoscopic converted to open APR on 12/6/17.  In PACU patient was started on CPAP, and became hypotensive, started on phenylephrine gtt. CPAP removed & BP recovered. Received 250mL 5% Albumin & pt transported to SICU, quickly weaned off pressors, and subsequently transferred to floor.    SICU reconsulted on POD#7 for hypoxia and lethargy on the floor. Pt was noted to desat to ~70s%, was placed on CPAP with improvement to 90s%. Pt then noted to desat to 70s again, and was noted to be retaining CO2 (pCO2 56) on ABG, so switched from CPAP to BiPAP and an RRT was called. Pt also reportedly has been more lethargic and confused today than he is at baseline. CTA chest ordered to r/o PE, but IV access was lost in CT scanner so test was aborted and pt transported straight to SICU.  In SICU, repeat labs sent and additional IV access obtained via ultrasound guidance.       24 HOUR EVENTS:  -LE duplex negative for DVT  -using HFNC  -Emesis x2 o/n; made NPO  -500mg acetazolamide      SUBJECTIVE/ROS:  [ ] A ten-point review of systems was otherwise negative except as noted.  [x ] Due to altered mental status/intubation, subjective information were not able to be obtained from the patient. History was obtained, to the extent possible, from review of the chart and collateral sources of information.      NEURO  RASS: 0 to +1  Exam: at times confused; NAD, follows commands  Meds: acetaminophen   Tablet. 650 milliGRAM(s) Oral every 6 hours PRN Mild Pain (1 - 3)  acetaminophen  IVPB. 1000 milliGRAM(s) IV Intermittent once  aspirin 325 milliGRAM(s) Oral daily  ketorolac   Injectable 30 milliGRAM(s) IV Push every 6 hours  traMADol 25 milliGRAM(s) Oral once    [x] Adequacy of sedation and pain control has been assessed and adjusted      RESPIRATORY  RR: 24 (12-14-17 @ 06:00) (0 - 25)  SpO2: 98% (12-14-17 @ 06:00) (95% - 100%)  Wt(kg): --  Exam: tolerating HFNC  Mechanical Ventilation: x  ABG - ( 14 Dec 2017 00:11 )  pH: 7.42  /  pCO2: 50    /  pO2: 78    / HCO3: 32    / Base Excess: 6.6   /  SaO2: 96      Lactate: x        [n/a ] Extubation Readiness Assessed  Meds:   ALBUTerol/ipratropium for Nebulization 3 milliLiter(s) Nebulizer every 6 hours  buDESOnide   0.5 milliGRAM(s) Respule 0.5 milliGRAM(s) Inhalation every 12 hours        CARDIOVASCULAR  HR: 75 (12-14-17 @ 06:00) (63 - 149)  BP: 144/69 (12-14-17 @ 06:00) (122/68 - 156/86)  BP(mean): 99 (12-14-17 @ 06:00) (84 - 111)  ABP: --  ABP(mean): --  Wt(kg): --  CVP(cm H2O): --  VBG - ( 12 Dec 2017 21:41 )  pH: 7.42  /  pCO2: 64    /  pO2: 47    / HCO3: 40    / Base Excess: 13.9  /  SaO2: 82     Lactate: 1.1        Exam: regular  Cardiac Rhythm: sinus  Perfusion     [x ]Adequate   [ ]Inadequate  Mentation   [ ]Normal       [x ]Reduced  Extremities  [x ]Warm         [ ]Cool  Volume Status [ ]Hypervolemic [x ]Euvolemic [ ]Hypovolemic  Meds:   amLODIPine   Tablet 10 milliGRAM(s) Oral daily  metoprolol    tartrate Injectable 5 milliGRAM(s) IV Push every 6 hours        GI/NUTRITION  Exam: distended, nontender; ostomy pink at center, dusky/dark at edges; JEAN MARIE drain serosang.  Diet: NPO  Meds: x      GENITOURINARY    I&O's Detail    13 Dec 2017 07:01  -  14 Dec 2017 07:00  --------------------------------------------------------  IN:    Oral Fluid: 720 mL    Solution: 300 mL    Solution: 250 mL  Total IN: 1270 mL    OUT:    Colostomy: 50 mL    Indwelling Catheter - Urethral: 2590 mL  Total OUT: 2640 mL    Total NET: -1370 mL          12-14    137  |  98  |  13  ----------------------------<  169<H>  3.4<L>   |  29  |  1.06    Ca    8.0<L>      14 Dec 2017 00:14  Phos  2.6     12-14  Mg     2.0     12-14      [x ] Daley catheter, indication: critically ill, strict I&O  Meds: x      HEMATOLOGIC  Meds: enoxaparin Injectable 40 milliGRAM(s) SubCutaneous daily    [x] VTE Prophylaxis                        9.5    12.2  )-----------( 283      ( 14 Dec 2017 00:14 )             28.2     PT/INR - ( 14 Dec 2017 00:14 )   PT: 12.8 sec;   INR: 1.18 ratio    PTT - ( 14 Dec 2017 00:14 )  PTT:26.6 sec    Transfusion: none past 24h     [ ] PRBC   [ ] Platelets   [ ] FFP   [ ] Cryoprecipitate      INFECTIOUS DISEASES  T(C): 36.7 (12-14-17 @ 03:00), Max: 37.4 (12-13-17 @ 08:00)  Wt(kg): --  WBC Count: 12.2 K/uL (12-14 @ 00:14)    Recent Cultures: x    Meds: x      ENDOCRINE  Capillary Blood Glucose    Meds:   atorvastatin 40 milliGRAM(s) Oral at bedtime  insulin lispro (HumaLOG) corrective regimen sliding scale   SubCutaneous every 6 hours        ACCESS DEVICES:  [x ] Peripheral IV  [ ] Central Venous Line	[ ] R	[ ] L	[ ] IJ	[ ] Fem	[ ] SC	Placed:   [ ] Arterial Line		[ ] R	[ ] L	[ ] Fem	[ ] Rad	[ ] Ax	Placed:   [ ] PICC:					[ ] Mediport  [x ] Urinary Catheter, Date Placed: 12/13/17  [x ] Necessity of urinary, arterial, and venous catheters discussed    OTHER MEDICATIONS:  nystatin Powder 1 Application(s) Topical two times a day PRN      CODE STATUS: FULL      IMAGING:   VA Duplex Lower Ext Vein Scan, Bilat (12.13.17 @ 13:46) >  Both common femoral, superficial femoral and popliteal veins are patent   and compressible without evidence of thrombus.    There is acute right peroneal vein DVT in the calf.    The right posterior tibial veins are free of thrombus.    The left posterior tibial and peroneal veins are patent.  No thrombus is   seen.    IMPRESSION: No evidence of deep vein thrombosis of either lower extremity.

## 2017-12-14 NOTE — PROGRESS NOTE ADULT - ASSESSMENT
ASSESSMENT: 67M with PMH of Rectal CA s/p transanal excision/biopsy (7/2017), s/p neoadjuvant chemoradiation now s/p open APR, admitted to SICU for hypoxia and hypercarbia requiring BiPAP.    PLAN:  Neuro: post-operative pain  -Tylenol PRN for pain  - Avoid narcotics because of lethargy     Resp: GLENYS, acute hypoxic/ hypercarbic respiratory failure; hypercarbia likely 2/2 compensation for contraction alkalosis  - Consider Diamox, trial off BiPAP and repeat blood gas to see if improvement in alkalosis improves hypercarbia  - Continue Duonebs    CV: HTN, HLD  -Continue home amlodipine atorvastatin, carvedilol, lisinopril with hold parameters    GI: Rectal CA s/p open APR  -NPO    /Renal: CKD, likely contraction alkalosis  -Lasix D/Ronan, consider further diuresis with Diamox in setting of contraction alkalosis  - Monitor I&Os    Heme: DVT ppx  -SQH q8    ID: no acute issues  -Culture for fever     Endo: DM, Hgb A1C 7.6%  -lispro ISS    Dispo:   -FULL CODE  -Continue SICU care.      Please contact SICU resident [phone] or [phone] with questions/concerns.

## 2017-12-14 NOTE — PROGRESS NOTE ADULT - PROBLEM SELECTOR PLAN 1
Continue ASA 81mg po daily, coreg 25mg po bid, lipitor 40mg po daily, imdur 30mg po daily.  No EKG changes suggestive of an acute event. Patient has a history of PCI and stress indicative of infarct.

## 2017-12-14 NOTE — PROGRESS NOTE ADULT - PROBLEM SELECTOR PLAN 1
Multifactorial 2nd shunt through compressive atelectasis from pleural effusions, abd distension, likely underlying COPD  -Lasix as tolerated  -Would avoid Diamox as he is not alkalotic

## 2017-12-14 NOTE — PROGRESS NOTE ADULT - ASSESSMENT
68 y/o M with PMH of HTN, DMT2, PVD, HLD, MI/CAD with stent x 1 2009, GLENYS, 50 pack yr smoker (quit Jan 2017), morbid obesity, rectal cancer (diagnosed 7/2017 s/p chemo) admitted 12/6 for LAR. Hospital course c/b post-operative hypotension requiring SICU admission. Downgraded to floors on 12/7. On 12/12 he was noted to be hypoxic to 70s. Placed on bipap and readmitted to SICU.

## 2017-12-14 NOTE — PROGRESS NOTE ADULT - ASSESSMENT
67y.o. Male with HTN, CAD s/p ANIKET to mLAD 2009, GLENYS on CPAP, rectal mass s/p transanal biopsy/excision 07/2017 s/p chemo/XRT, admitted 12/06/2017 for robotic low anterior resection ( converted to open abdominal perineal resection) + cystoscopy insertion of uretral catheters, post-op course c/b hypoxia on 12/12/2017, and found to have elevated cardiac enzymes (Trop: 0.22 -> 0.19 -> 0.15, CKMB 1.0 -> 1.0 ->1.0) for which cardiology was consulted.

## 2017-12-14 NOTE — PROGRESS NOTE ADULT - SUBJECTIVE AND OBJECTIVE BOX
Follow-up Pulm Progress Note    No new respiratory events overnight.  Denies SOB/CP.   Vomited overnight now with NGT to LWS  98% on 2L NC    Medications:  MEDICATIONS  (STANDING):  acetazolamide Injectable 250 milliGRAM(s) IV Push every 8 hours  ALBUTerol/ipratropium for Nebulization 3 milliLiter(s) Nebulizer every 6 hours  amLODIPine   Tablet 10 milliGRAM(s) Oral daily  aspirin 325 milliGRAM(s) Oral daily  atorvastatin 40 milliGRAM(s) Oral at bedtime  benzocaine 15 mG/menthol 3.6 mG Lozenge 1 Lozenge Oral four times a day  buDESOnide   0.5 milliGRAM(s) Respule 0.5 milliGRAM(s) Inhalation every 12 hours  enoxaparin Injectable 40 milliGRAM(s) SubCutaneous daily  insulin lispro (HumaLOG) corrective regimen sliding scale   SubCutaneous every 6 hours  ketorolac   Injectable 30 milliGRAM(s) IV Push every 6 hours  lactated ringers. 1000 milliLiter(s) (30 mL/Hr) IV Continuous <Continuous>  metoprolol    tartrate Injectable 5 milliGRAM(s) IV Push every 6 hours  traMADol 25 milliGRAM(s) Oral once    MEDICATIONS  (PRN):  acetaminophen   Tablet. 650 milliGRAM(s) Oral every 6 hours PRN Mild Pain (1 - 3)  nystatin Powder 1 Application(s) Topical two times a day PRN Groin moisture          Vital Signs Last 24 Hrs  T(C): 37.1 (14 Dec 2017 12:00), Max: 37.2 (13 Dec 2017 23:00)  T(F): 98.8 (14 Dec 2017 12:00), Max: 98.9 (13 Dec 2017 23:00)  HR: 92 (14 Dec 2017 14:36) (63 - 108)  BP: 133/60 (14 Dec 2017 14:36) (120/57 - 165/75)  BP(mean): 87 (14 Dec 2017 14:00) (82 - 108)  RR: 21 (14 Dec 2017 14:36) (0 - 30)  SpO2: 98% (14 Dec 2017 14:36) (95% - 100%) on 2L NC    ABG - ( 14 Dec 2017 00:11 )  pH: 7.42  /  pCO2: 50    /  pO2: 78    / HCO3: 32    / Base Excess: 6.6   /  SaO2: 96                VBG pH 7.42 12-12 @ 21:41    VBG pCO2 64 12-12 @ 21:41    VBG O2 sat 82 12-12 @ 21:41    VBG lactate 1.1 12-12 @ 21:41      12-13 @ 07:01  -  12-14 @ 07:00  --------------------------------------------------------  IN: 1270 mL / OUT: 2640 mL / NET: -1370 mL          LABS:                        9.5    12.2  )-----------( 283      ( 14 Dec 2017 00:14 )             28.2     12-14    137  |  98  |  13  ----------------------------<  169<H>  3.4<L>   |  29  |  1.06    Ca    8.0<L>      14 Dec 2017 00:14  Phos  2.6     12-14  Mg     2.0     12-14        CARDIAC MARKERS ( 14 Dec 2017 00:14 )  x     / 0.14 ng/mL / 144 U/L / x     / 1.0 ng/mL  CARDIAC MARKERS ( 13 Dec 2017 11:27 )  x     / 0.15 ng/mL / 122 U/L / x     / 1.0 ng/mL  CARDIAC MARKERS ( 13 Dec 2017 03:38 )  x     / 0.19 ng/mL / 120 U/L / x     / 1.0 ng/mL  CARDIAC MARKERS ( 12 Dec 2017 17:03 )  x     / 0.22 ng/mL / 154 U/L / x     / 1.0 ng/mL      CAPILLARY BLOOD GLUCOSE      POCT Blood Glucose.: 147 mg/dL (14 Dec 2017 11:44)    PT/INR - ( 14 Dec 2017 00:14 )   PT: 12.8 sec;   INR: 1.18 ratio         PTT - ( 14 Dec 2017 00:14 )  PTT:26.6 sec    Procalcitonin, Serum: 0.56 ng/mL (12-13-17 @ 03:37)  Procalcitonin, Serum: 0.65 ng/mL (12-12-17 @ 22:07)    Serum Pro-Brain Natriuretic Peptide: 1322 pg/mL (12-12-17 @ 21:51)                CULTURES: (if applicable)          Physical Examination:  PULM: Decreased BS throughout   CVS: S1, S2 RRR    RADIOLOGY REVIEWED  CTA chest: < from: CT Angio Chest w/ IV Cont (12.12.17 @ 23:58) >    LUNGS AND LARGE AIRWAYS: Patent central airways. Bibasilar subsegmental   atelectasis. Unchanged pulmonary nodules as follows:    2 mm left upper lobe pulmonary nodule (5:75).  2 mm left upper lobe pulmonary nodule (5:45).    PLEURA: Small bilateral pleural effusions.  VESSELS: Suboptimal opacification of the pulmonary arteries. No central   pulmonary embolism. Atherosclerosis of the aorta and coronary vessels.  HEART: Heart size is normal. Trace pericardial effusion.  MEDIASTINUM AND JOSE: Unchanged subcentimeter mediastinal lymph nodes.  CHEST WALL AND LOWER NECK: Bilateral gynecomastia.    ABDOMEN AND PELVIS:    LIVER: Cirrhosis.  BILE DUCTS: Normal caliber.  GALLBLADDER: Status post cholecystectomy.  SPLEEN: Within normal limits.  PANCREAS: Fatty atrophy.  ADRENALS: Within normal limits.  KIDNEYS/URETERS: Two 1.4 cm right renal cysts.    BLADDER: Under distended.  REPRODUCTIVE ORGANS: The prostate is enlarged.    BOWEL: Status post Gomez procedure. Soft tissue thickening and   fullness with areas of hyperdensity representing hemorrhagic component   and surroundinginflammatory change of the rectal vault and stump.  PERITONEUM: Small amount of ascites containing high density material   representing hemorrhagic components.  VESSELS:  Within normal limits.  RETROPERITONEUM: No lymphadenopathy.    ABDOMINAL WALL: Status post ventral abdominal wall surgical incision with   skin staples.  BONES: Degenerative changes. 4 mm lucency within the left third rib is   seen retrospectively on the prior study and is unchanged.    IMPRESSION:     Technically limited study.No central pulmonary embolism. Nondiagnostic   evaluation of the segmental and subsegmental pulmonary arteries due to   poor contrast opacification.    Small bilateral pleural effusions with bibasilar atelectasis.    Status post Gomez procedure. Soft tissue thickening and fullness with   areas of hyperdensity and inflammatory change of the rectal stump with   hemorrhagic components may represent postsurgical changes given the   recent surgery. Follow-up is recommended.    Tiny left pulmonary nodules are unchanged. Six-month follow-up CT is   recommended to ensure stability.    < end of copied text > Follow-up Pulm Progress Note    No new respiratory events overnight.  Denies SOB/CP.   Vomited overnight now with NGT to LWS  98% on 2L NC    Medications:  MEDICATIONS  (STANDING):  acetazolamide Injectable 250 milliGRAM(s) IV Push every 8 hours  ALBUTerol/ipratropium for Nebulization 3 milliLiter(s) Nebulizer every 6 hours  amLODIPine   Tablet 10 milliGRAM(s) Oral daily  aspirin 325 milliGRAM(s) Oral daily  atorvastatin 40 milliGRAM(s) Oral at bedtime  benzocaine 15 mG/menthol 3.6 mG Lozenge 1 Lozenge Oral four times a day  buDESOnide   0.5 milliGRAM(s) Respule 0.5 milliGRAM(s) Inhalation every 12 hours  enoxaparin Injectable 40 milliGRAM(s) SubCutaneous daily  insulin lispro (HumaLOG) corrective regimen sliding scale   SubCutaneous every 6 hours  ketorolac   Injectable 30 milliGRAM(s) IV Push every 6 hours  lactated ringers. 1000 milliLiter(s) (30 mL/Hr) IV Continuous <Continuous>  metoprolol    tartrate Injectable 5 milliGRAM(s) IV Push every 6 hours  traMADol 25 milliGRAM(s) Oral once    MEDICATIONS  (PRN):  acetaminophen   Tablet. 650 milliGRAM(s) Oral every 6 hours PRN Mild Pain (1 - 3)  nystatin Powder 1 Application(s) Topical two times a day PRN Groin moisture    Vital Signs Last 24 Hrs  T(C): 37.1 (14 Dec 2017 12:00), Max: 37.2 (13 Dec 2017 23:00)  T(F): 98.8 (14 Dec 2017 12:00), Max: 98.9 (13 Dec 2017 23:00)  HR: 92 (14 Dec 2017 14:36) (63 - 108)  BP: 133/60 (14 Dec 2017 14:36) (120/57 - 165/75)  BP(mean): 87 (14 Dec 2017 14:00) (82 - 108)  RR: 21 (14 Dec 2017 14:36) (0 - 30)  SpO2: 98% (14 Dec 2017 14:36) (95% - 100%) on 2L NC    ABG - ( 14 Dec 2017 00:11 )  pH: 7.42  /  pCO2: 50    /  pO2: 78    / HCO3: 32    / Base Excess: 6.6   /  SaO2: 96        VBG pH 7.42 12-12 @ 21:41    VBG pCO2 64 12-12 @ 21:41    VBG O2 sat 82 12-12 @ 21:41    VBG lactate 1.1 12-12 @ 21:41      12-13 @ 07:01  -  12-14 @ 07:00  --------------------------------------------------------  IN: 1270 mL / OUT: 2640 mL / NET: -1370 mL    LABS:                        9.5    12.2  )-----------( 283      ( 14 Dec 2017 00:14 )             28.2     12-14    137  |  98  |  13  ----------------------------<  169<H>  3.4<L>   |  29  |  1.06    Ca    8.0<L>      14 Dec 2017 00:14  Phos  2.6     12-14  Mg     2.0     12-14    CARDIAC MARKERS ( 14 Dec 2017 00:14 )  x     / 0.14 ng/mL / 144 U/L / x     / 1.0 ng/mL  CARDIAC MARKERS ( 13 Dec 2017 11:27 )  x     / 0.15 ng/mL / 122 U/L / x     / 1.0 ng/mL  CARDIAC MARKERS ( 13 Dec 2017 03:38 )  x     / 0.19 ng/mL / 120 U/L / x     / 1.0 ng/mL  CARDIAC MARKERS ( 12 Dec 2017 17:03 )  x     / 0.22 ng/mL / 154 U/L / x     / 1.0 ng/mL      CAPILLARY BLOOD GLUCOSE      POCT Blood Glucose.: 147 mg/dL (14 Dec 2017 11:44)    PT/INR - ( 14 Dec 2017 00:14 )   PT: 12.8 sec;   INR: 1.18 ratio         PTT - ( 14 Dec 2017 00:14 )  PTT:26.6 sec    Procalcitonin, Serum: 0.56 ng/mL (12-13-17 @ 03:37)  Procalcitonin, Serum: 0.65 ng/mL (12-12-17 @ 22:07)    Serum Pro-Brain Natriuretic Peptide: 1322 pg/mL (12-12-17 @ 21:51)    CULTURES: (if applicable)    Physical Examination:  PULM: Decreased BS throughout   CVS: S1, S2 RRR    RADIOLOGY REVIEWED  CTA chest: < from: CT Angio Chest w/ IV Cont (12.12.17 @ 23:58) >    LUNGS AND LARGE AIRWAYS: Patent central airways. Bibasilar subsegmental   atelectasis. Unchanged pulmonary nodules as follows:    2 mm left upper lobe pulmonary nodule (5:75).  2 mm left upper lobe pulmonary nodule (5:45).    PLEURA: Small bilateral pleural effusions.  VESSELS: Suboptimal opacification of the pulmonary arteries. No central   pulmonary embolism. Atherosclerosis of the aorta and coronary vessels.  HEART: Heart size is normal. Trace pericardial effusion.  MEDIASTINUM AND JOSE: Unchanged subcentimeter mediastinal lymph nodes.  CHEST WALL AND LOWER NECK: Bilateral gynecomastia.    ABDOMEN AND PELVIS:    LIVER: Cirrhosis.  BILE DUCTS: Normal caliber.  GALLBLADDER: Status post cholecystectomy.  SPLEEN: Within normal limits.  PANCREAS: Fatty atrophy.  ADRENALS: Within normal limits.  KIDNEYS/URETERS: Two 1.4 cm right renal cysts.    BLADDER: Under distended.  REPRODUCTIVE ORGANS: The prostate is enlarged.    BOWEL: Status post Gomez procedure. Soft tissue thickening and   fullness with areas of hyperdensity representing hemorrhagic component   and surroundinginflammatory change of the rectal vault and stump.  PERITONEUM: Small amount of ascites containing high density material   representing hemorrhagic components.  VESSELS:  Within normal limits.  RETROPERITONEUM: No lymphadenopathy.    ABDOMINAL WALL: Status post ventral abdominal wall surgical incision with   skin staples.  BONES: Degenerative changes. 4 mm lucency within the left third rib is   seen retrospectively on the prior study and is unchanged.    IMPRESSION:     Technically limited study.No central pulmonary embolism. Nondiagnostic   evaluation of the segmental and subsegmental pulmonary arteries due to   poor contrast opacification.    Small bilateral pleural effusions with bibasilar atelectasis.    Status post Gomez procedure. Soft tissue thickening and fullness with   areas of hyperdensity and inflammatory change of the rectal stump with   hemorrhagic components may represent postsurgical changes given the   recent surgery. Follow-up is recommended.    Tiny left pulmonary nodules are unchanged. Six-month follow-up CT is   recommended to ensure stability.    < end of copied text >

## 2017-12-14 NOTE — PROGRESS NOTE ADULT - ATTENDING COMMENTS
improved oxygenation  likely atelactasis and fluid overload  I have no objection to lasix as needed for fluid overload  defer to SICU on diuretic choice

## 2017-12-14 NOTE — PROGRESS NOTE ADULT - PROBLEM SELECTOR PLAN 2
Continue therapy for CAD. Recommend repeat TTE for better delineation of LV border, r/o wall motion abnormality, and estimation of ejection fraction. Continue therapy for CAD. Recommend repeat TTE with contrast for better delineation of LV border, r/o wall motion abnormality, and estimation of ejection fraction.

## 2017-12-15 DIAGNOSIS — E78.5 HYPERLIPIDEMIA, UNSPECIFIED: ICD-10-CM

## 2017-12-15 DIAGNOSIS — K91.30 POSTPROCEDURAL INTESTINAL OBSTRUCTION, UNSPECIFIED AS TO PARTIAL VERSUS COMPLETE: ICD-10-CM

## 2017-12-15 LAB
ANION GAP SERPL CALC-SCNC: 10 MMOL/L — SIGNIFICANT CHANGE UP (ref 5–17)
BUN SERPL-MCNC: 21 MG/DL — SIGNIFICANT CHANGE UP (ref 7–23)
CA-I BLD-SCNC: 1.1 MMOL/L — LOW (ref 1.12–1.3)
CALCIUM SERPL-MCNC: 8.3 MG/DL — LOW (ref 8.4–10.5)
CHLORIDE SERPL-SCNC: 98 MMOL/L — SIGNIFICANT CHANGE UP (ref 96–108)
CO2 SERPL-SCNC: 32 MMOL/L — HIGH (ref 22–31)
CREAT SERPL-MCNC: 1.23 MG/DL — SIGNIFICANT CHANGE UP (ref 0.5–1.3)
GLUCOSE BLDC GLUCOMTR-MCNC: 124 MG/DL — HIGH (ref 70–99)
GLUCOSE BLDC GLUCOMTR-MCNC: 144 MG/DL — HIGH (ref 70–99)
GLUCOSE SERPL-MCNC: 159 MG/DL — HIGH (ref 70–99)
HCT VFR BLD CALC: 26.1 % — LOW (ref 39–50)
HGB BLD-MCNC: 8.6 G/DL — LOW (ref 13–17)
MAGNESIUM SERPL-MCNC: 2.3 MG/DL — SIGNIFICANT CHANGE UP (ref 1.6–2.6)
MCHC RBC-ENTMCNC: 32.7 PG — SIGNIFICANT CHANGE UP (ref 27–34)
MCHC RBC-ENTMCNC: 32.9 GM/DL — SIGNIFICANT CHANGE UP (ref 32–36)
MCV RBC AUTO: 99.3 FL — SIGNIFICANT CHANGE UP (ref 80–100)
PHOSPHATE SERPL-MCNC: 3.5 MG/DL — SIGNIFICANT CHANGE UP (ref 2.5–4.5)
PLATELET # BLD AUTO: 258 K/UL — SIGNIFICANT CHANGE UP (ref 150–400)
POTASSIUM SERPL-MCNC: 3.4 MMOL/L — LOW (ref 3.5–5.3)
POTASSIUM SERPL-SCNC: 3.4 MMOL/L — LOW (ref 3.5–5.3)
RBC # BLD: 2.63 M/UL — LOW (ref 4.2–5.8)
RBC # FLD: 13 % — SIGNIFICANT CHANGE UP (ref 10.3–14.5)
SODIUM SERPL-SCNC: 140 MMOL/L — SIGNIFICANT CHANGE UP (ref 135–145)
WBC # BLD: 9.5 K/UL — SIGNIFICANT CHANGE UP (ref 3.8–10.5)
WBC # FLD AUTO: 9.5 K/UL — SIGNIFICANT CHANGE UP (ref 3.8–10.5)

## 2017-12-15 PROCEDURE — 99291 CRITICAL CARE FIRST HOUR: CPT

## 2017-12-15 PROCEDURE — 71010: CPT | Mod: 26

## 2017-12-15 PROCEDURE — 71010: CPT | Mod: 26,77

## 2017-12-15 RX ORDER — TRAMADOL HYDROCHLORIDE 50 MG/1
50 TABLET ORAL ONCE
Qty: 0 | Refills: 0 | Status: DISCONTINUED | OUTPATIENT
Start: 2017-12-15 | End: 2017-12-15

## 2017-12-15 RX ORDER — POTASSIUM CHLORIDE 20 MEQ
10 PACKET (EA) ORAL
Qty: 0 | Refills: 0 | Status: COMPLETED | OUTPATIENT
Start: 2017-12-15 | End: 2017-12-15

## 2017-12-15 RX ORDER — SODIUM CHLORIDE 9 MG/ML
1000 INJECTION, SOLUTION INTRAVENOUS
Qty: 0 | Refills: 0 | Status: DISCONTINUED | OUTPATIENT
Start: 2017-12-15 | End: 2017-12-16

## 2017-12-15 RX ORDER — CALCIUM GLUCONATE 100 MG/ML
1 VIAL (ML) INTRAVENOUS ONCE
Qty: 0 | Refills: 0 | Status: COMPLETED | OUTPATIENT
Start: 2017-12-15 | End: 2017-12-15

## 2017-12-15 RX ADMIN — Medication 200 GRAM(S): at 06:45

## 2017-12-15 RX ADMIN — Medication 3 MILLILITER(S): at 00:16

## 2017-12-15 RX ADMIN — Medication 400 MILLIGRAM(S): at 21:11

## 2017-12-15 RX ADMIN — BENZOCAINE AND MENTHOL 1 LOZENGE: 5; 1 LIQUID ORAL at 18:01

## 2017-12-15 RX ADMIN — ATORVASTATIN CALCIUM 40 MILLIGRAM(S): 80 TABLET, FILM COATED ORAL at 21:11

## 2017-12-15 RX ADMIN — Medication 1000 MILLIGRAM(S): at 02:52

## 2017-12-15 RX ADMIN — Medication 100 MILLIEQUIVALENT(S): at 10:14

## 2017-12-15 RX ADMIN — Medication 0.5 MILLIGRAM(S): at 05:49

## 2017-12-15 RX ADMIN — Medication 5 MILLIGRAM(S): at 06:48

## 2017-12-15 RX ADMIN — TRAMADOL HYDROCHLORIDE 50 MILLIGRAM(S): 50 TABLET ORAL at 03:50

## 2017-12-15 RX ADMIN — Medication 5 MILLIGRAM(S): at 12:23

## 2017-12-15 RX ADMIN — AMLODIPINE BESYLATE 10 MILLIGRAM(S): 2.5 TABLET ORAL at 10:05

## 2017-12-15 RX ADMIN — Medication 325 MILLIGRAM(S): at 12:23

## 2017-12-15 RX ADMIN — ACETAZOLAMIDE 250 MILLIGRAM(S): 250 TABLET ORAL at 10:04

## 2017-12-15 RX ADMIN — Medication 3 MILLILITER(S): at 05:48

## 2017-12-15 RX ADMIN — Medication 5 MILLIGRAM(S): at 18:01

## 2017-12-15 RX ADMIN — Medication 3 MILLILITER(S): at 17:45

## 2017-12-15 RX ADMIN — Medication 3 MILLILITER(S): at 11:26

## 2017-12-15 RX ADMIN — Medication 30 MILLIGRAM(S): at 06:49

## 2017-12-15 RX ADMIN — Medication 100 MILLIEQUIVALENT(S): at 08:00

## 2017-12-15 RX ADMIN — Medication 0.5 MILLIGRAM(S): at 17:45

## 2017-12-15 RX ADMIN — ENOXAPARIN SODIUM 40 MILLIGRAM(S): 100 INJECTION SUBCUTANEOUS at 12:23

## 2017-12-15 RX ADMIN — Medication 30 MILLIGRAM(S): at 06:56

## 2017-12-15 RX ADMIN — TRAMADOL HYDROCHLORIDE 50 MILLIGRAM(S): 50 TABLET ORAL at 03:12

## 2017-12-15 NOTE — PROGRESS NOTE ADULT - ASSESSMENT
ASSESSMENT: 67M with PMH of Rectal CA s/p transanal excision/biopsy (7/2017), s/p neoadjuvant chemoradiation now s/p open APR, admitted to SICU for hypoxia and hypercarbia requiring BiPAP.    PLAN:  Neuro: post-operative pain  -Tylenol and Toradol standing for pain  - Tramadol prn for pain    Resp: GLENYS, acute hypoxic/ hypercarbic respiratory failure; hypercarbia likely 2/2 compensation for contraction alkalosis  - Continue Diamox for diuresis as needed  - BiPAP overnight for GLENYS, nasal cannula during the day  - Continue Duonebs    CV: HTN, HLD  -Continue amlodipine and metoprolol for HTN with hold parameters    GI: Rectal CA s/p open APR; ileus  -NPO with NGT to suction    /Renal: CKD; contraction alkalosis  -Lasix D/Ronan, consider further diuresis with Diamox in setting of contraction alkalosis  - Monitor I&Os    Heme: DVT ppx  - Lovenox for VTE prophylaxis    ID: no acute issues  -Culture for fever     Endo: DM, Hgb A1C 7.6%  -lispro ISS    Dispo: Full code. Continue SICU care.    Cristina Gandhi PA-C  [phone]

## 2017-12-15 NOTE — PROGRESS NOTE ADULT - SUBJECTIVE AND OBJECTIVE BOX
Follow-up Pulm Progress Note    Oxygenation much improved.     Medications:  MEDICATIONS  (STANDING):  acetaminophen  IVPB. 1000 milliGRAM(s) IV Intermittent once  acetaminophen  IVPB. 1000 milliGRAM(s) IV Intermittent once  acetazolamide Injectable 250 milliGRAM(s) IV Push every 8 hours  ALBUTerol/ipratropium for Nebulization 3 milliLiter(s) Nebulizer every 6 hours  amLODIPine   Tablet 10 milliGRAM(s) Oral daily  aspirin 325 milliGRAM(s) Oral daily  atorvastatin 40 milliGRAM(s) Oral at bedtime  benzocaine 15 mG/menthol 3.6 mG Lozenge 1 Lozenge Oral four times a day  buDESOnide   0.5 milliGRAM(s) Respule 0.5 milliGRAM(s) Inhalation every 12 hours  enoxaparin Injectable 40 milliGRAM(s) SubCutaneous daily  insulin lispro (HumaLOG) corrective regimen sliding scale   SubCutaneous every 6 hours  lactated ringers. 1000 milliLiter(s) (50 mL/Hr) IV Continuous <Continuous>  metoprolol    tartrate Injectable 5 milliGRAM(s) IV Push every 6 hours  potassium chloride  10 mEq/100 mL IVPB 10 milliEquivalent(s) IV Intermittent every 1 hour    MEDICATIONS  (PRN):  nystatin Powder 1 Application(s) Topical two times a day PRN Groin moisture    Vital Signs Last 24 Hrs  T(C): 37.1 (15 Dec 2017 08:00), Max: 37.2 (14 Dec 2017 16:00)  T(F): 98.7 (15 Dec 2017 08:00), Max: 99 (14 Dec 2017 16:00)  HR: 87 (15 Dec 2017 08:10) (70 - 100)  BP: 135/63 (15 Dec 2017 08:00) (110/53 - 165/75)  BP(mean): 90 (15 Dec 2017 08:00) (76 - 108)  RR: 20 (15 Dec 2017 08:00) (17 - 30)  SpO2: 98% (15 Dec 2017 08:10) (94% - 100%)    ABG - ( 14 Dec 2017 00:11 )  pH: 7.42  /  pCO2: 50    /  pO2: 78    / HCO3: 32    / Base Excess: 6.6   /  SaO2: 96        12-14 @ 07:01  -  12-15 @ 07:00  --------------------------------------------------------  IN: 660 mL / OUT: 3780 mL / NET: -3120 mL    LABS:                        8.6    9.5   )-----------( 258      ( 15 Dec 2017 04:44 )             26.1     12-15    140  |  98  |  21  ----------------------------<  159<H>  3.4<L>   |  32<H>  |  1.23    Ca    8.3<L>      15 Dec 2017 04:44  Phos  3.5     12-15  Mg     2.3     12-15        CARDIAC MARKERS ( 14 Dec 2017 00:14 )  x     / 0.14 ng/mL / 144 U/L / x     / 1.0 ng/mL  CARDIAC MARKERS ( 13 Dec 2017 11:27 )  x     / 0.15 ng/mL / 122 U/L / x     / 1.0 ng/mL      CAPILLARY BLOOD GLUCOSE      POCT Blood Glucose.: 119 mg/dL (14 Dec 2017 17:47)    PT/INR - ( 14 Dec 2017 00:14 )   PT: 12.8 sec;   INR: 1.18 ratio         PTT - ( 14 Dec 2017 00:14 )  PTT:26.6 sec    Procalcitonin, Serum: 0.56 ng/mL (12-13-17 @ 03:37)  Procalcitonin, Serum: 0.65 ng/mL (12-12-17 @ 22:07)    Serum Pro-Brain Natriuretic Peptide: 1322 pg/mL (12-12-17 @ 21:51)    CULTURES: (if applicable)    Physical Examination:  PULM: Decreased BS at bases  CVS: S1, S2 heard    RADIOLOGY REVIEWED  CXR:     CT chest:    TTE:

## 2017-12-15 NOTE — PROGRESS NOTE ADULT - ATTENDING COMMENTS
s/p robotic - open APR with resp decompensation  IS, OOB, ambulate, PT  cont NGT until output decreases  pain control

## 2017-12-15 NOTE — CONSULT NOTE ADULT - SUBJECTIVE AND OBJECTIVE BOX
Patient is a 67y Male admitted for Malignant neoplasm of rectum who is now status post resection with an ileus of >7 days  His PMH is significant for:  Diabetes mellitus, type 2  Hyperlipidemia, unspecified hyperlipidemia type      Vital Signs Last 24 Hrs  T(C): 37.1 (15 Dec 2017 08:00), Max: 37.2 (14 Dec 2017 16:00)  T(F): 98.7 (15 Dec 2017 08:00), Max: 99 (14 Dec 2017 16:00)  HR: 98 (15 Dec 2017 10:00) (70 - 100)  BP: 149/69 (15 Dec 2017 10:00) (110/53 - 165/75)  BP(mean): 99 (15 Dec 2017 10:00) (76 - 108)  RR: 22 (15 Dec 2017 10:00) (17 - 30)  SpO2: 97% (15 Dec 2017 10:00) (94% - 100%)  12-15    140  |  98  |  21  ----------------------------<  159<H>  3.4<L>   |  32<H>  |  1.23    Ca    8.3<L>      15 Dec 2017 04:44  Phos  3.5     12-15  Mg     2.3     12-15      PT/INR - ( 14 Dec 2017 00:14 )   PT: 12.8 sec;   INR: 1.18 ratio         PTT - ( 14 Dec 2017 00:14 )  PTT:26.6 sec

## 2017-12-15 NOTE — CHART NOTE - NSCHARTNOTEFT_GEN_A_CORE
Pt seen for nutrition follow up, as per department protocol.     Hospital Course: Pt is a "67M with PMH of Rectal CA s/p transanal excision/biopsy (7/2017), s/p neoadjuvant chemoradiation now S/P open abdominal perineal resection on 12/6, admitted to SICU for hypoxia and hypercarbia requiring BiPAP."    Per team and Nutrition Support MD, pt with prolonged post-op ileus (>7 days); plan to initiate PN pending new HbA1c and triglyceride panel. Pt noted with emesis, now NPO as of 12/13 with NGT to low continuous suction; 24-hour output = 2L.    Source: Patient [ ]    Family [ ]     other [X ]; medical record, team rounds    Diet : NPO with plan for PN    GI: emesis noted last night; NGT output x 24-hours = 2L; colostomy output x 24-hours = 300ml     Enteral /Parenteral Nutrition: PN pending      Current Weight: 124.1Kg (12/15), 130.9Kg (12/6)  Edema: 1+ generalized    Pertinent Medications: MEDICATIONS  (STANDING):  acetaminophen  IVPB. 1000 milliGRAM(s) IV Intermittent once  acetaminophen  IVPB. 1000 milliGRAM(s) IV Intermittent once  ALBUTerol/ipratropium for Nebulization 3 milliLiter(s) Nebulizer every 6 hours  amLODIPine   Tablet 10 milliGRAM(s) Oral daily  aspirin 325 milliGRAM(s) Oral daily  atorvastatin 40 milliGRAM(s) Oral at bedtime  benzocaine 15 mG/menthol 3.6 mG Lozenge 1 Lozenge Oral four times a day  buDESOnide   0.5 milliGRAM(s) Respule 0.5 milliGRAM(s) Inhalation every 12 hours  enoxaparin Injectable 40 milliGRAM(s) SubCutaneous daily  insulin lispro (HumaLOG) corrective regimen sliding scale   SubCutaneous every 6 hours  lactated ringers. 1000 milliLiter(s) (50 mL/Hr) IV Continuous <Continuous>  metoprolol    tartrate Injectable 5 milliGRAM(s) IV Push every 6 hours  potassium chloride  10 mEq/100 mL IVPB 10 milliEquivalent(s) IV Intermittent every 1 hour    MEDICATIONS  (PRN):  nystatin Powder 1 Application(s) Topical two times a day PRN Groin moisture    Pertinent Labs:  12-15 Na140 mmol/L Glu 159 mg/dL<H> K+ 3.4 mmol/L<L> Cr  1.23 mg/dL BUN 21 mg/dL Phos 3.5 mg/dL   HbA1c (11/24/17) = 7.6%, Fingersticks x 24 hours: 119-147mg/dL      Skin: no pressure injuries    Estimated Needs:   [X ] no change since previous assessment  [ ] recalculated:       Previous Nutrition Diagnosis:     [X ] Inadequate Protein-Energy Intake      Nutrition Diagnosis is [ X] ongoing; being addressed with plan for parenteral nutrition    New Nutrition Diagnosis: [ X] not applicable     Interventions:     Recommend:  1) PN per MD Damian  2) Monitor weight, lab values, skin, PN provision and tolerance      Monitoring and Evaluation:   Follow up per protocol  RD to remain available for further nutritional interventions as indicated.   Mary Narvaez, MS RD CDN [facility], #[phone].

## 2017-12-15 NOTE — PROGRESS NOTE ADULT - SUBJECTIVE AND OBJECTIVE BOX
INTERVAL HPI/OVERNIGHT EVENTS:  Patient is a 67yMale  no acute on events, sats holding on nasal cannula, pt feels better with NGT decompression    Vital Signs Last 24 Hrs  T(C): 37.1 (15 Dec 2017 03:00), Max: 37.2 (14 Dec 2017 16:00)  T(F): 98.8 (15 Dec 2017 03:00), Max: 99 (14 Dec 2017 16:00)  HR: 75 (15 Dec 2017 07:00) (70 - 100)  BP: 145/65 (15 Dec 2017 07:00) (110/53 - 165/75)  BP(mean): 94 (15 Dec 2017 07:00) (76 - 108)  RR: 20 (15 Dec 2017 07:00) (10 - 30)  SpO2: 98% (15 Dec 2017 07:00) (94% - 100%)  12-14 @ 07:01  -  12-15 @ 07:00  --------------------------------------------------------  IN: 660 mL / OUT: 3780 mL / NET: -3120 mL        PHYSICAL EXAM:  Constitutional: well developed, well nourished, NAD  Eyes: anicteric  ENMT: normal facies, symmetric  Neck: supple  Respiratory: CTA bilat  Cardiovascular: RRR  Gastrointestinal: abdomen softly distended, colostomy dark but viable, incision with VAC  Extremities: FROM, warm  Neurological: intact, non-focal  Skin: no gross lesions  Lymph Nodes: no gross adenopathy  Musculoskeletal: equal strength bilateral upper and lower extremities  Psychiatric: oriented x 3; appropriate          LABS:                        8.6    9.5   )-----------( 258      ( 15 Dec 2017 04:44 )             26.1     12-15    140  |  98  |  21  ----------------------------<  159<H>  3.4<L>   |  32<H>  |  1.23    Ca    8.3<L>      15 Dec 2017 04:44  Phos  3.5     12-15  Mg     2.3     12-15      PT/INR - ( 14 Dec 2017 00:14 )   PT: 12.8 sec;   INR: 1.18 ratio         PTT - ( 14 Dec 2017 00:14 )  PTT:26.6 sec    Magnesium, Serum: 2.3 mg/dL (12-15 @ 04:44)  Phosphorus Level, Serum: 3.5 mg/dL (12-15 @ 04:44)

## 2017-12-15 NOTE — PROGRESS NOTE ADULT - ATTENDING COMMENTS
Acute hypoxemic resp failure resolving, O2 requirement decreasing  Mild increase in Cr, met alkalosis resolved, hold Diamox  NPO/NGT  TPN consult  Rx of hypokalemia  PT/mobilization

## 2017-12-15 NOTE — CONSULT NOTE ADULT - ASSESSMENT
Agree with need for PN to supply nutritional support until GI function returns but will need an A1C and triglyceride panel prior to starting.  Will order PICC for today so PT will have access when needed

## 2017-12-15 NOTE — CONSULT NOTE ADULT - PROBLEM SELECTOR RECOMMENDATION 9
2nd compressive atelectasis from pleural effusions, abd distension   -PE could not be ruled out in CTA  -f/u LE duplex today  -Consider checking ABG now, patient is mildly confused   -Hi Jamel as tolerated during the day, Bipap qHS   -Lasix as tolerated  -Would avoid Diamox
S/P resection management as per surgery

## 2017-12-15 NOTE — PROGRESS NOTE ADULT - SUBJECTIVE AND OBJECTIVE BOX
24 h: Pt weaned from high flow to nasal cannula in the AM. He had multiple episodes of emesis overnight and during the day and so an NGT was placed that put out >1L. He was diuresed with Diamox for contraction alkalosis. HISTORY  67M PMHx CKD 2, HLD, CAD s/p PCI (2009), GLENYS (home CPAP), DM, HTN, s/p cholecystectomy, Rectal CA s/p transanal excision/biopsy (7/2017), s/p neoadjuvant chemoradiation who presented to [facility] for planned robotic LAR resection of Rectal CA. Patient went to OR and underwent robotic converted to laparoscopic converted to open APR on 12/6/17.  In PACU patient was started on CPAP, and became hypotensive, started on phenylephrine gtt. CPAP removed & BP recovered. Received 250mL 5% Albumin & pt transported to SICU, quickly weaned off pressors, and subsequently transferred to floor.    SICU reconsulted on POD#7 for hypoxia and lethargy on the floor. Pt was noted to desat to ~70s%, was placed on CPAP with improvement to 90s%. Pt then noted to desat to 70s again, and was noted to be retaining CO2 (pCO2 56) on ABG, so switched from CPAP to BiPAP and an RRT was called. Pt also reportedly has been more lethargic and confused today than he is at baseline. CTA chest ordered to r/o PE, but IV access was lost in CT scanner so test was aborted and pt transported straight to SICU.  In SICU, repeat labs sent and additional IV access obtained via ultrasound guidance.     24 HOUR EVENTS: Pt weaned from high flow to nasal cannula in the AM. He had multiple episodes of emesis overnight and during the day and so an NGT was placed that put out >1L. He was diuresed with Diamox for contraction alkalosis.        SUBJECTIVE/ROS:  [x ] A ten-point review of systems was otherwise negative except as noted.  [ ] Due to altered mental status/intubation, subjective information were not able to be obtained from the patient. History was obtained, to the extent possible, from review of the chart and collateral sources of information.      NEURO  RASS: 0  Exam: awake, alert and oriented x3, following commands  Meds: acetaminophen  IVPB. 1000 milliGRAM(s) IV Intermittent once  acetaminophen  IVPB. 1000 milliGRAM(s) IV Intermittent once  aspirin 325 milliGRAM(s) Oral daily  ketorolac   Injectable 30 milliGRAM(s) IV Push every 6 hours    [x] Adequacy of sedation and pain control has been assessed and adjusted      RESPIRATORY  RR: 20 (12-14-17 @ 20:00) (10 - 30)  SpO2: 95% (12-14-17 @ 22:18) (95% - 100%)  Exam: unlabored, clear to auscultation bilaterally    [x ] Extubation Readiness Assessed  Meds: ALBUTerol/ipratropium for Nebulization 3 milliLiter(s) Nebulizer every 6 hours  buDESOnide   0.5 milliGRAM(s) Respule 0.5 milliGRAM(s) Inhalation every 12 hours        CARDIOVASCULAR  HR: 91 (12-14-17 @ 22:18) (75 - 100)  BP: 122/58 (12-14-17 @ 20:00) (110/53 - 165/75)  BP(mean): 84 (12-14-17 @ 20:00) (76 - 108)    Exam: regular rate and rhythm  Cardiac Rhythm: sinus  Perfusion     [x ]Adequate   [ ]Inadequate  Mentation   [x ]Normal       [ ]Reduced  Extremities  [x ]Warm         [ ]Cool  Volume Status [ ]Hypervolemic [x ]Euvolemic [ ]Hypovolemic  Meds: acetazolamide Injectable 250 milliGRAM(s) IV Push every 8 hours  amLODIPine   Tablet 10 milliGRAM(s) Oral daily  metoprolol    tartrate Injectable 5 milliGRAM(s) IV Push every 6 hours        GI/NUTRITION  Exam: distended, minimally tender, dressing c/d/i, ostomy dusky on edges, pink in center  Diet: NPO/NGT  Meds: x    GENITOURINARY  I&O's Detail    12-13 @ 07:01  -  12-14 @ 07:00  --------------------------------------------------------  IN:    Oral Fluid: 720 mL    Solution: 300 mL    Solution: 250 mL  Total IN: 1270 mL    OUT:    Colostomy: 50 mL    Indwelling Catheter - Urethral: 2590 mL  Total OUT: 2640 mL    Total NET: -1370 mL      12-14 @ 07:01  -  12-15 @ 05:51  --------------------------------------------------------  IN:    lactated ringers.: 300 mL  Total IN: 300 mL    OUT:    Colostomy: 225 mL    Indwelling Catheter - Urethral: 905 mL    Nasoenteral Tube: 1700 mL  Total OUT: 2830 mL    Total NET: -2530 mL      12-15    140  |  98  |  21  ----------------------------<  159<H>  3.4<L>   |  32<H>  |  1.23    Ca    8.3<L>      15 Dec 2017 04:44  Phos  3.5     12-15  Mg     2.3     12-15      [n/a ] Daley catheter, indication: N/A  Meds: calcium gluconate IVPB 1 Gram(s) IV Intermittent once  lactated ringers. 1000 milliLiter(s) IV Continuous <Continuous>  potassium chloride  10 mEq/100 mL IVPB 10 milliEquivalent(s) IV Intermittent every 1 hour        HEMATOLOGIC  Meds: enoxaparin Injectable 40 milliGRAM(s) SubCutaneous daily    [x] VTE Prophylaxis                        8.6    9.5   )-----------( 258      ( 15 Dec 2017 04:44 )             26.1     PT/INR - ( 14 Dec 2017 00:14 )   PT: 12.8 sec;   INR: 1.18 ratio         PTT - ( 14 Dec 2017 00:14 )  PTT:26.6 sec  Transfusion     [ ] PRBC   [ ] Platelets   [ ] FFP   [ ] Cryoprecipitate      INFECTIOUS DISEASES  T(C): 37.2 (12-14-17 @ 16:00), Max: 37.2 (12-14-17 @ 16:00)  WBC Count: 9.5 K/uL (12-15 @ 04:44)    Recent Cultures: x    Meds: x      ENDOCRINE  Capillary Blood Glucose  POCT Blood Glucose.: 119 mg/dL (14 Dec 2017 17:47)  POCT Blood Glucose.: 147 mg/dL (14 Dec 2017 11:44)    Meds: atorvastatin 40 milliGRAM(s) Oral at bedtime  insulin lispro (HumaLOG) corrective regimen sliding scale   SubCutaneous every 6 hours      ACCESS DEVICES:  [x ] Peripheral IV  [ ] Central Venous Line	[ ] R	[ ] L	[ ] IJ	[ ] Fem	[ ] SC	Placed:   [ ] Arterial Line		[ ] R	[ ] L	[ ] Fem	[ ] Rad	[ ] Ax	Placed:   [ ] PICC:					[ ] Mediport  [ ] Urinary Catheter, Date Placed:   [x ] Necessity of urinary, arterial, and venous catheters discussed    OTHER MEDICATIONS:  benzocaine 15 mG/menthol 3.6 mG Lozenge 1 Lozenge Oral four times a day  nystatin Powder 1 Application(s) Topical two times a day PRN      CODE STATUS: Full code    IMAGING: < from: Xray Chest 1 View AP- PORTABLE-Urgent (12.14.17 @ 11:52) >  IMPRESSION:  Clear lungs.  Small bilateral pleural effusions are unchanged.  Enteric tube courses below the diaphragm with tip overlying the region of   stomach.

## 2017-12-15 NOTE — CONSULT NOTE ADULT - PROBLEM SELECTOR RECOMMENDATION 4
PN to start after appropriate labs and placement of central venous access (which is ordered)
Likely COPD  -Duoneb q6  -Outpt PFT's

## 2017-12-16 LAB
ANION GAP SERPL CALC-SCNC: 12 MMOL/L — SIGNIFICANT CHANGE UP (ref 5–17)
BUN SERPL-MCNC: 24 MG/DL — HIGH (ref 7–23)
CA-I BLD-SCNC: 1.15 MMOL/L — SIGNIFICANT CHANGE UP (ref 1.12–1.3)
CALCIUM SERPL-MCNC: 8.6 MG/DL — SIGNIFICANT CHANGE UP (ref 8.4–10.5)
CHLORIDE SERPL-SCNC: 100 MMOL/L — SIGNIFICANT CHANGE UP (ref 96–108)
CHOLEST SERPL-MCNC: 140 MG/DL — SIGNIFICANT CHANGE UP (ref 10–199)
CO2 SERPL-SCNC: 35 MMOL/L — HIGH (ref 22–31)
CREAT SERPL-MCNC: 1.27 MG/DL — SIGNIFICANT CHANGE UP (ref 0.5–1.3)
GLUCOSE BLDC GLUCOMTR-MCNC: 134 MG/DL — HIGH (ref 70–99)
GLUCOSE BLDC GLUCOMTR-MCNC: 140 MG/DL — HIGH (ref 70–99)
GLUCOSE BLDC GLUCOMTR-MCNC: 156 MG/DL — HIGH (ref 70–99)
GLUCOSE BLDC GLUCOMTR-MCNC: 208 MG/DL — HIGH (ref 70–99)
GLUCOSE SERPL-MCNC: 136 MG/DL — HIGH (ref 70–99)
HBA1C BLD-MCNC: 6.7 % — HIGH (ref 4–5.6)
HCT VFR BLD CALC: 26.2 % — LOW (ref 39–50)
HDLC SERPL-MCNC: 20 MG/DL — LOW (ref 40–125)
HGB BLD-MCNC: 8.6 G/DL — LOW (ref 13–17)
LIPID PNL WITH DIRECT LDL SERPL: 72 MG/DL — SIGNIFICANT CHANGE UP
MAGNESIUM SERPL-MCNC: 2.6 MG/DL — SIGNIFICANT CHANGE UP (ref 1.6–2.6)
MCHC RBC-ENTMCNC: 32.7 PG — SIGNIFICANT CHANGE UP (ref 27–34)
MCHC RBC-ENTMCNC: 32.8 GM/DL — SIGNIFICANT CHANGE UP (ref 32–36)
MCV RBC AUTO: 99.8 FL — SIGNIFICANT CHANGE UP (ref 80–100)
PHOSPHATE SERPL-MCNC: 3.2 MG/DL — SIGNIFICANT CHANGE UP (ref 2.5–4.5)
PLATELET # BLD AUTO: 290 K/UL — SIGNIFICANT CHANGE UP (ref 150–400)
POTASSIUM SERPL-MCNC: 3 MMOL/L — LOW (ref 3.5–5.3)
POTASSIUM SERPL-SCNC: 3 MMOL/L — LOW (ref 3.5–5.3)
RBC # BLD: 2.62 M/UL — LOW (ref 4.2–5.8)
RBC # FLD: 13 % — SIGNIFICANT CHANGE UP (ref 10.3–14.5)
SODIUM SERPL-SCNC: 147 MMOL/L — HIGH (ref 135–145)
TOTAL CHOLESTEROL/HDL RATIO MEASUREMENT: 7 RATIO — SIGNIFICANT CHANGE UP (ref 3.4–9.6)
TRIGL SERPL-MCNC: 240 MG/DL — HIGH (ref 10–149)
WBC # BLD: 9.2 K/UL — SIGNIFICANT CHANGE UP (ref 3.8–10.5)
WBC # FLD AUTO: 9.2 K/UL — SIGNIFICANT CHANGE UP (ref 3.8–10.5)

## 2017-12-16 PROCEDURE — 99233 SBSQ HOSP IP/OBS HIGH 50: CPT

## 2017-12-16 PROCEDURE — 71010: CPT | Mod: 26

## 2017-12-16 PROCEDURE — 74000: CPT | Mod: 26

## 2017-12-16 RX ORDER — ACETAMINOPHEN 500 MG
1000 TABLET ORAL ONCE
Qty: 0 | Refills: 0 | Status: COMPLETED | OUTPATIENT
Start: 2017-12-17 | End: 2017-12-17

## 2017-12-16 RX ORDER — ACETAMINOPHEN 500 MG
1000 TABLET ORAL ONCE
Qty: 0 | Refills: 0 | Status: COMPLETED | OUTPATIENT
Start: 2017-12-16 | End: 2017-12-16

## 2017-12-16 RX ORDER — INSULIN LISPRO 100/ML
VIAL (ML) SUBCUTANEOUS EVERY 4 HOURS
Qty: 0 | Refills: 0 | Status: DISCONTINUED | OUTPATIENT
Start: 2017-12-16 | End: 2017-12-16

## 2017-12-16 RX ORDER — INSULIN GLARGINE 100 [IU]/ML
12 INJECTION, SOLUTION SUBCUTANEOUS AT BEDTIME
Qty: 0 | Refills: 0 | Status: DISCONTINUED | OUTPATIENT
Start: 2017-12-16 | End: 2017-12-22

## 2017-12-16 RX ORDER — POTASSIUM CHLORIDE 20 MEQ
10 PACKET (EA) ORAL
Qty: 0 | Refills: 0 | Status: COMPLETED | OUTPATIENT
Start: 2017-12-16 | End: 2017-12-16

## 2017-12-16 RX ORDER — CARVEDILOL PHOSPHATE 80 MG/1
25 CAPSULE, EXTENDED RELEASE ORAL EVERY 12 HOURS
Qty: 0 | Refills: 0 | Status: DISCONTINUED | OUTPATIENT
Start: 2017-12-16 | End: 2017-12-22

## 2017-12-16 RX ORDER — ONDANSETRON 8 MG/1
4 TABLET, FILM COATED ORAL EVERY 6 HOURS
Qty: 0 | Refills: 0 | Status: DISCONTINUED | OUTPATIENT
Start: 2017-12-16 | End: 2017-12-22

## 2017-12-16 RX ORDER — INSULIN LISPRO 100/ML
VIAL (ML) SUBCUTANEOUS AT BEDTIME
Qty: 0 | Refills: 0 | Status: DISCONTINUED | OUTPATIENT
Start: 2017-12-16 | End: 2017-12-16

## 2017-12-16 RX ORDER — POTASSIUM CHLORIDE 20 MEQ
20 PACKET (EA) ORAL ONCE
Qty: 0 | Refills: 0 | Status: COMPLETED | OUTPATIENT
Start: 2017-12-16 | End: 2017-12-16

## 2017-12-16 RX ORDER — SALIVA SUBSTITUTE COMB NO.11 351 MG
1 POWDER IN PACKET (EA) MUCOUS MEMBRANE THREE TIMES A DAY
Qty: 0 | Refills: 0 | Status: DISCONTINUED | OUTPATIENT
Start: 2017-12-16 | End: 2017-12-22

## 2017-12-16 RX ORDER — INSULIN LISPRO 100/ML
VIAL (ML) SUBCUTANEOUS
Qty: 0 | Refills: 0 | Status: DISCONTINUED | OUTPATIENT
Start: 2017-12-16 | End: 2017-12-16

## 2017-12-16 RX ORDER — INSULIN LISPRO 100/ML
VIAL (ML) SUBCUTANEOUS EVERY 6 HOURS
Qty: 0 | Refills: 0 | Status: DISCONTINUED | OUTPATIENT
Start: 2017-12-16 | End: 2017-12-19

## 2017-12-16 RX ORDER — SALIVA SUBSTITUTE COMB NO.11 351 MG
5 POWDER IN PACKET (EA) MUCOUS MEMBRANE THREE TIMES A DAY
Qty: 0 | Refills: 0 | Status: DISCONTINUED | OUTPATIENT
Start: 2017-12-16 | End: 2017-12-16

## 2017-12-16 RX ORDER — DEXTROSE MONOHYDRATE, SODIUM CHLORIDE, AND POTASSIUM CHLORIDE 50; .745; 4.5 G/1000ML; G/1000ML; G/1000ML
1000 INJECTION, SOLUTION INTRAVENOUS
Qty: 0 | Refills: 0 | Status: DISCONTINUED | OUTPATIENT
Start: 2017-12-16 | End: 2017-12-18

## 2017-12-16 RX ORDER — LIDOCAINE 4 G/100G
1 CREAM TOPICAL ONCE
Qty: 0 | Refills: 0 | Status: DISCONTINUED | OUTPATIENT
Start: 2017-12-16 | End: 2017-12-22

## 2017-12-16 RX ORDER — INSULIN LISPRO 100/ML
VIAL (ML) SUBCUTANEOUS EVERY 6 HOURS
Qty: 0 | Refills: 0 | Status: DISCONTINUED | OUTPATIENT
Start: 2017-12-16 | End: 2017-12-16

## 2017-12-16 RX ORDER — ISOSORBIDE MONONITRATE 60 MG/1
30 TABLET, EXTENDED RELEASE ORAL DAILY
Qty: 0 | Refills: 0 | Status: DISCONTINUED | OUTPATIENT
Start: 2017-12-16 | End: 2017-12-22

## 2017-12-16 RX ADMIN — Medication 1000 MILLIGRAM(S): at 09:15

## 2017-12-16 RX ADMIN — AMLODIPINE BESYLATE 10 MILLIGRAM(S): 2.5 TABLET ORAL at 05:34

## 2017-12-16 RX ADMIN — Medication 100 MILLIEQUIVALENT(S): at 06:00

## 2017-12-16 RX ADMIN — Medication 5 MILLIGRAM(S): at 11:35

## 2017-12-16 RX ADMIN — Medication 3 MILLILITER(S): at 00:16

## 2017-12-16 RX ADMIN — Medication 2: at 01:02

## 2017-12-16 RX ADMIN — Medication 0.5 MILLIGRAM(S): at 05:22

## 2017-12-16 RX ADMIN — Medication 400 MILLIGRAM(S): at 09:00

## 2017-12-16 RX ADMIN — BENZOCAINE AND MENTHOL 1 LOZENGE: 5; 1 LIQUID ORAL at 11:36

## 2017-12-16 RX ADMIN — Medication 5 MILLIGRAM(S): at 18:39

## 2017-12-16 RX ADMIN — Medication 3 MILLILITER(S): at 05:22

## 2017-12-16 RX ADMIN — Medication 3 MILLILITER(S): at 21:28

## 2017-12-16 RX ADMIN — Medication 3 MILLILITER(S): at 12:04

## 2017-12-16 RX ADMIN — Medication 5 MILLIGRAM(S): at 01:10

## 2017-12-16 RX ADMIN — Medication 1000 MILLIGRAM(S): at 22:00

## 2017-12-16 RX ADMIN — INSULIN GLARGINE 12 UNIT(S): 100 INJECTION, SOLUTION SUBCUTANEOUS at 21:28

## 2017-12-16 RX ADMIN — Medication 100 MILLIEQUIVALENT(S): at 10:37

## 2017-12-16 RX ADMIN — ATORVASTATIN CALCIUM 40 MILLIGRAM(S): 80 TABLET, FILM COATED ORAL at 21:28

## 2017-12-16 RX ADMIN — ENOXAPARIN SODIUM 40 MILLIGRAM(S): 100 INJECTION SUBCUTANEOUS at 11:46

## 2017-12-16 RX ADMIN — Medication 100 MILLIEQUIVALENT(S): at 21:30

## 2017-12-16 RX ADMIN — Medication 400 MILLIGRAM(S): at 21:29

## 2017-12-16 RX ADMIN — Medication 100 MILLIEQUIVALENT(S): at 09:35

## 2017-12-16 RX ADMIN — Medication 5 MILLIGRAM(S): at 05:34

## 2017-12-16 RX ADMIN — Medication 400 MILLIGRAM(S): at 18:06

## 2017-12-16 NOTE — PROGRESS NOTE ADULT - ASSESSMENT
Will start PN today  In view of triglycerides >200 will withhold lipids for now and go with a carb based formula  Will monitor FSBG and add insulin as indicated

## 2017-12-16 NOTE — PROGRESS NOTE ADULT - ASSESSMENT
67M with PMH of Rectal CA s/p transanal excision/biopsy (7/2017), s/p neoadjuvant chemoradiation now s/p open APR, admitted to SICU for hypoxia and hypercarbia requiring BiPAP. Awaiting return of bowel function w/ NGT for decompression.     PLAN:  Neuro: post-operative pain  -Tylenol and Toradol standing for pain  - Tramadol prn for pain    Resp: GLENYS, acute hypoxic/ hypercarbic respiratory failure; hypercarbia likely 2/2 compensation for contraction alkalosis  - Continue Diamox for diuresis as needed  - home BiPAP overnight for GLENYS, nasal cannula during the day  - Continue Duonebs    CV: HTN, HLD  -Continue amlodipine and metoprolol for HTN with hold parameters    GI: Rectal CA s/p open APR; ileus  -NPO with NGT to suction  -OOB to chair  -ambulation     /Renal: CKD; contraction alkalosis  - holding home Lasix, consider further diuresis with Diamox in setting of contraction alkalosis  - Monitor I&Os    Heme: DVT ppx  - Lovenox for VTE prophylaxis    ID: no acute issues  -Culture for fever     Endo: DM, Hgb A1C 7.6% (11/24/17)  -lispro ISS    Dispo: Full code. Continue SICU care.      Please contact SICU resident [phone] or [phone] with questions/concerns. 67M with PMH of Rectal CA s/p transanal excision/biopsy (7/2017), s/p neoadjuvant chemoradiation now s/p open APR, admitted to SICU for hypoxia and hypercarbia requiring BiPAP. Awaiting return of bowel function w/ NGT for decompression.     PLAN:  Neuro: post-operative pain  -Tylenol IV standing for pain    Resp: GLENYS, acute hypoxic/ hypercarbic respiratory failure; hypercarbia likely 2/2 compensation for contraction alkalosis  - Continue Diamox for diuresis as needed  - home BiPAP overnight for GLENYS, nasal cannula during the day  - Continue Duonebs    CV: HTN, HLD  -Continue amlodipine and metoprolol for HTN with hold parameters  -con't atorvastatin    GI: Rectal CA s/p open APR; ileus  -NPO with NGT to suction  -clamp trial this am; d/c if low residual after 4hr  -OOB to chair  -ambulation     /Renal: CKD; contraction alkalosis  - holding home Lasix, consider further diuresis with Diamox in setting of contraction alkalosis  - Monitor I&Os    Heme: DVT ppx  - Lovenox for VTE prophylaxis    ID: no acute issues  -Culture for fevers    Endo: DM, Hgb A1C 7.6% (11/24/17)  -lispro ISS  -TPN to start today.    Dispo: Full code. Continue SICU care.      Please contact SICU resident [phone] or [phone] with questions/concerns.

## 2017-12-16 NOTE — PROGRESS NOTE ADULT - ASSESSMENT
67M s/p robotic APR    - Appreciate SICU care  - NGT clamp trial this morning.   - OOB, Ambulate patient   - F/u am chest x-ray  - Monitor respiratory status, F/u Pulm recs  - Diet is currently NPO  - Continue to monitor ostomy viability and function

## 2017-12-16 NOTE — PROGRESS NOTE ADULT - ATTENDING COMMENTS
hemodynamically stable  NGT output 100 cc, off now, clears as per surgery  TPN tonight, DC IVF once on TPN  Start Lantus tonight

## 2017-12-16 NOTE — PROGRESS NOTE ADULT - ASSESSMENT
67M s/p robotic APR    - Continue with TPN through the PICC line with q6h insulin regardless of CLD status  - OOB, Ambulate patient   - Chest X-ray looks clear  - Monitor respiratory status, F/u Pulm recs  - Diet is currently CLD  - Continue to monitor ostomy viability and function  - F/u am labs

## 2017-12-16 NOTE — PROGRESS NOTE ADULT - SUBJECTIVE AND OBJECTIVE BOX
Surgery Floor Accept Not    SUBJECTIVE: Pt seen and examined at bedside. Patient resting comfortably in NAD. Patient on nasal cannula. No cp/sob/n/v.  Patient's NG tube was discontinued before arriving from SICU.     Vital Signs Last 24 Hrs  T(C): 37.2 (16 Dec 2017 12:00), Max: 37.2 (16 Dec 2017 12:00)  T(F): 99 (16 Dec 2017 12:00), Max: 99 (16 Dec 2017 12:00)  HR: 78 (16 Dec 2017 12:02) (70 - 101)  BP: 138/65 (16 Dec 2017 12:00) (116/56 - 148/63)  BP(mean): 94 (16 Dec 2017 12:00) (77 - 94)  RR: 21 (16 Dec 2017 12:00) (2 - 25)  SpO2: 98% (16 Dec 2017 12:02) (92% - 100%)    Physical Exam:  General Appearance: Appears well, NAD  Chest: Able to speak in full sentences, no labored breathing  CV: Pulse regular presently  Abdomen: Soft, nontense, distended, nontender. Ostomy functioning.   Extremities: Grossly symmetric    LABS:                        8.6    9.2   )-----------( 290      ( 16 Dec 2017 03:12 )             26.2     12-16    147<H>  |  100  |  24<H>  ----------------------------<  136<H>  3.0<L>   |  35<H>  |  1.27    Ca    8.6      16 Dec 2017 03:10  Phos  3.2     12-16  Mg     2.6     12-16            INs and OUTs:    12-15-17 @ 07:01  -  12-16-17 @ 07:00  --------------------------------------------------------  IN: 1380 mL / OUT: 4395 mL / NET: -3015 mL    12-16-17 @ 07:01  -  12-16-17 @ 13:15  --------------------------------------------------------  IN: 200 mL / OUT: 635 mL / NET: -435 mL

## 2017-12-16 NOTE — PROGRESS NOTE ADULT - SUBJECTIVE AND OBJECTIVE BOX
SICU Progress Note    History:  67M PMHx CKD 2, HLD, CAD s/p PCI (2009), GLENYS (home CPAP), DM, HTN, s/p cholecystectomy, Rectal CA s/p transanal excision/biopsy (7/2017), s/p neoadjuvant chemoradiation who presented to [facility] for planned robotic LAR resection of Rectal CA. Patient went to OR and underwent robotic converted to laparoscopic converted to open APR on 12/6/17.  In PACU patient was started on CPAP, and became hypotensive, started on phenylephrine gtt. CPAP removed & BP recovered. Received 250mL 5% Albumin & pt transported to SICU, quickly weaned off pressors, and subsequently transferred to floor.    SICU reconsulted on POD#7 for hypoxia and lethargy on the floor. Pt was noted to desat to ~70s%, was placed on CPAP with improvement to 90s%. Pt then noted to desat to 70s again, and was noted to be retaining CO2 (pCO2 56) on ABG, so switched from CPAP to BiPAP and an RRT was called. Pt also reportedly has been more lethargic and confused today than he is at baseline. CTA chest ordered to r/o PE, but IV access was lost in CT scanner so test was aborted and pt transported straight to SICU.  In SICU, repeat labs sent and additional IV access obtained via ultrasound guidance.     24hr Events:  -continued high NGT output  -TPN consult placed  -PICC placed for TPN SICU Progress Note    History:  67M PMHx CKD 2, HLD, CAD s/p PCI (2009), GLENYS (home CPAP), DM, HTN, s/p cholecystectomy, Rectal CA s/p transanal excision/biopsy (7/2017), s/p neoadjuvant chemoradiation who presented to [facility] for planned robotic LAR resection of Rectal CA. Patient went to OR and underwent robotic converted to laparoscopic converted to open APR on 12/6/17.  In PACU patient was started on CPAP, and became hypotensive, started on phenylephrine gtt. CPAP removed & BP recovered. Received 250mL 5% Albumin & pt transported to SICU, quickly weaned off pressors, and subsequently transferred to floor.    SICU reconsulted on POD#7 for hypoxia and lethargy on the floor. Pt was noted to desat to ~70s%, was placed on CPAP with improvement to 90s%. Pt then noted to desat to 70s again, and was noted to be retaining CO2 (pCO2 56) on ABG, so switched from CPAP to BiPAP and an RRT was called. Pt also reportedly has been more lethargic and confused today than he is at baseline. CTA chest ordered to r/o PE, but IV access was lost in CT scanner so test was aborted and pt transported straight to SICU.  In SICU, repeat labs sent and additional IV access obtained via ultrasound guidance.     24hr Events:  -continued high NGT output  -TPN consult placed  -PICC placed for TPN      SUBJECTIVE/ROS:  [x ] A ten-point review of systems was otherwise negative except as noted.  [ ] Due to altered mental status/intubation, subjective information were not able to be obtained from the patient. History was obtained, to the extent possible, from review of the chart and collateral sources of information.      NEURO  RASS: 0  Exam: A&Ox3  Meds: acetaminophen  IVPB. 1000 milliGRAM(s) IV Intermittent once  acetaminophen  IVPB. 1000 milliGRAM(s) IV Intermittent once  acetaminophen  IVPB. 1000 milliGRAM(s) IV Intermittent once  aspirin 325 milliGRAM(s) Oral daily    [x] Adequacy of sedation and pain control has been assessed and adjusted      RESPIRATORY  RR: 16 (12-16-17 @ 10:00) (2 - 26)  SpO2: 95% (12-16-17 @ 10:00) (92% - 100%)  Wt(kg): --  Exam: unlabored, on NC  Mechanical Ventilation: x    [n/a ] Extubation Readiness Assessed  Meds: ALBUTerol/ipratropium for Nebulization 3 milliLiter(s) Nebulizer every 6 hours  buDESOnide   0.5 milliGRAM(s) Respule 0.5 milliGRAM(s) Inhalation every 12 hours        CARDIOVASCULAR  HR: 94 (12-16-17 @ 10:00) (70 - 108)  BP: 128/64 (12-16-17 @ 10:00) (116/56 - 148/63)  BP(mean): 88 (12-16-17 @ 10:00) (77 - 95)  ABP: --  ABP(mean): --  Wt(kg): --  CVP(cm H2O): --      Exam: regular  Cardiac Rhythm: sinus  Perfusion     [ x]Adequate   [ ]Inadequate  Mentation   [x ]Normal       [ ]Reduced  Extremities  [ x]Warm         [ ]Cool  Volume Status [ ]Hypervolemic [ x]Euvolemic [ ]Hypovolemic  Meds: amLODIPine   Tablet 10 milliGRAM(s) Oral daily  metoprolol    tartrate Injectable 5 milliGRAM(s) IV Push every 6 hours        GI/NUTRITION  Exam: soft, distended, NT; ostomy pink center, dusky edge; liquid stool in appliance.   Diet: NPO, NGT  Meds: x    GENITOURINARY  I&O's Detail    12-15 @ 07:01  -  12-16 @ 07:00  --------------------------------------------------------  IN:    lactated ringers.: 30 mL    lactated ringers.: 1050 mL    Solution: 300 mL  Total IN: 1380 mL    OUT:    Colostomy: 30 mL    Indwelling Catheter - Urethral: 2065 mL    Nasoenteral Tube: 2300 mL  Total OUT: 4395 mL    Total NET: -3015 mL      12-16 @ 07:01  -  12-16 @ 10:12  --------------------------------------------------------  IN:    lactated ringers.: 100 mL  Total IN: 100 mL    OUT:    Colostomy: 100 mL    Indwelling Catheter - Urethral: 130 mL  Total OUT: 230 mL    Total NET: -130 mL          12-16    147<H>  |  100  |  24<H>  ----------------------------<  136<H>  3.0<L>   |  35<H>  |  1.27    Ca    8.6      16 Dec 2017 03:10  Phos  3.2     12-16  Mg     2.6     12-16      [x ] Daley catheter, indication: critically ill  Meds: lactated ringers. 1000 milliLiter(s) IV Continuous <Continuous>  potassium chloride  10 mEq/100 mL IVPB 10 milliEquivalent(s) IV Intermittent every 1 hour        HEMATOLOGIC  Meds: enoxaparin Injectable 40 milliGRAM(s) SubCutaneous daily    [x] VTE Prophylaxis                        8.6    9.2   )-----------( 290      ( 16 Dec 2017 03:12 )             26.2       Transfusion: none past 24h     [ ] PRBC   [ ] Platelets   [ ] FFP   [ ] Cryoprecipitate      INFECTIOUS DISEASES  T(C): 36.7 (12-16-17 @ 08:00), Max: 37 (12-15-17 @ 12:00)  Wt(kg): --  WBC Count: 9.2 K/uL (12-16 @ 03:12)    Recent Cultures:x    Meds: x      ENDOCRINE  Capillary Blood Glucose    Meds: atorvastatin 40 milliGRAM(s) Oral at bedtime  insulin lispro (HumaLOG) corrective regimen sliding scale   SubCutaneous every 6 hours        ACCESS DEVICES:  [x ] Peripheral IV  [ ] Central Venous Line	[ ] R	[ ] L	[ ] IJ	[ ] Fem	[ ] SC	Placed:   [ ] Arterial Line		[ ] R	[ ] L	[ ] Fem	[ ] Rad	[ ] Ax	Placed:   [x ] PICC: 12/15/17					[ ] Mediport  [x ] Urinary Catheter, Date Placed: 12/13/17  [x ] Necessity of urinary, arterial, and venous catheters discussed    OTHER MEDICATIONS:  benzocaine 15 mG/menthol 3.6 mG Lozenge 1 Lozenge Oral four times a day  nystatin Powder 1 Application(s) Topical two times a day PRN  saliva substitute (BIOTENE MOISTURIZING MOUTH SPRAY) 1 Crowder(s) Topical three times a day PRN      CODE STATUS: FULL    IMAGING:  Xray Chest 1 View AP/PA (12.15.17 @ 16:39) >  INTERPRETATION:  right picc line with the distal tip in  SVC.      Xray Abdomen 1 View PORTABLE -Urgent (12.14.17 @ 10:08) >  FINDINGS:   Unchanged mildly dilated loops of small bowel.    Surgical staples visualized in the mid abdomen and right upper quadrant.    Degenerative changes of the spine.    IMPRESSION: Unchanged mildly dilated loops of small bowel.

## 2017-12-16 NOTE — CHART NOTE - NSCHARTNOTEFT_GEN_A_CORE
Patient has 3 episodes of emesis throughout the evening, abdominal xray demonstrated distended stomach and loops of bowel. Nasogastric tube placed with 800cc of dark bilious output returned immediately. NGT to low continuous wall suction. Will continue to monitor. F/U Chest X-ray to confirm NGT placement.

## 2017-12-16 NOTE — PROVIDER CONTACT NOTE (OTHER) - ASSESSMENT
pt vomiting dark brown fluid, pt states he" is not nauseas, but vomit is uncontrollably coming up". Pt distended. All VSS.

## 2017-12-16 NOTE — PROGRESS NOTE ADULT - SUBJECTIVE AND OBJECTIVE BOX
PICC placed to start PN today  12-15 @ 07:01  -  12-16 @ 07:00  --------------------------------------------------------  IN:    lactated ringers.: 30 mL    lactated ringers.: 1050 mL    Solution: 300 mL  Total IN: 1380 mL    OUT:    Colostomy: 30 mL    Indwelling Catheter - Urethral: 2065 mL    Nasoenteral Tube: 2300 mL  Total OUT: 4395 mL    Total NET: -3015 mL        T(C): 36.7 (12-16-17 @ 08:00), Max: 37 (12-15-17 @ 12:00)  HR: 94 (12-16-17 @ 10:00) (70 - 108)  BP: 128/64 (12-16-17 @ 10:00) (116/56 - 148/63)  RR: 16 (12-16-17 @ 10:00) (2 - 26)  SpO2: 95% (12-16-17 @ 10:00) (92% - 100%)  Wt(kg): --    Labs   12-16    147<H>  |  100  |  24<H>  ----------------------------<  136<H>  3.0<L>   |  35<H>  |  1.27    Ca    8.6      16 Dec 2017 03:10  Phos  3.2     12-16  Mg     2.6     12-16    Hemoglobin A1C, Whole Blood (11.24.17 @ 15:44)    Hemoglobin A1C, Whole Blood: 7.6: Method: Immunoassay       Reference Range                4.0-5.6%       High risk (prediabetic)        5.7-6.4%       Diabetic, diagnostic             >=6.5%       ADA diabetic treatment goal       <7.0%  The Hemoglobin A1c testing is NGSP-certified.Reference ranges are based  upon the 2010 recommendations of  the American Diabetes Association.  Interpretation may vary for children  and adolescents. %    Lipid Profile in AM (12.16.17 @ 07:31)    Triglycerides, Serum: 240 mg/dL          CAPILLARY BLOOD GLUCOSE  POCT Blood Glucose.: 140 mg/dL (16 Dec 2017 06:49)  POCT Blood Glucose.: 156 mg/dL (16 Dec 2017 00:48)  POCT Blood Glucose.: 124 mg/dL (15 Dec 2017 17:49)  POCT Blood Glucose.: 144 mg/dL (15 Dec 2017 12:52)    I&O's Detail    15 Dec 2017 07:01  -  16 Dec 2017 07:00  --------------------------------------------------------  IN:    lactated ringers.: 30 mL    lactated ringers.: 1050 mL    Solution: 300 mL  Total IN: 1380 mL    OUT:    Colostomy: 30 mL    Indwelling Catheter - Urethral: 2065 mL    Nasoenteral Tube: 2300 mL  Total OUT: 4395 mL    Total NET: -3015 mL      16 Dec 2017 07:01  -  16 Dec 2017 10:19  --------------------------------------------------------  IN:    lactated ringers.: 100 mL  Total IN: 100 mL    OUT:    Colostomy: 100 mL    Indwelling Catheter - Urethral: 130 mL  Total OUT: 230 mL    Total NET: -130 mL

## 2017-12-16 NOTE — PROGRESS NOTE ADULT - SUBJECTIVE AND OBJECTIVE BOX
Red Team Surgery Progress Note    SUBJECTIVE: Pt seen and examined at bedside. No overnight events. Patient comfortable and in no-apparent distress. No nausea, vomiting, or diarrhea - NGT in place. Patient received PICC yesterday for TPN.       Vital Signs Last 24 Hrs  T(C): 36.8 (16 Dec 2017 03:00), Max: 37 (15 Dec 2017 12:00)  T(F): 98.2 (16 Dec 2017 03:00), Max: 98.6 (15 Dec 2017 12:00)  HR: 93 (16 Dec 2017 07:00) (70 - 108)  BP: 137/65 (16 Dec 2017 06:00) (116/56 - 149/69)  BP(mean): 94 (16 Dec 2017 06:00) (77 - 99)  RR: 21 (16 Dec 2017 07:00) (2 - 26)  SpO2: 92% (16 Dec 2017 07:00) (92% - 100%)    Physical Exam:  General Appearance: Appears well, NAD  Chest: Able to speak in full sentences, no labored breathing  CV: Pulse regular presently  Abdomen: Soft, nontense, Distention decreased.   Extremities: Grossly symmetric    LABS:                        8.6    9.2   )-----------( 290      ( 16 Dec 2017 03:12 )             26.2     12-16    147<H>  |  100  |  24<H>  ----------------------------<  136<H>  3.0<L>   |  35<H>  |  1.27    Ca    8.6      16 Dec 2017 03:10  Phos  3.2     12-16  Mg     2.6     12-16            INs and OUTs:    12-15-17 @ 07:01  -  12-16-17 @ 07:00  --------------------------------------------------------  IN: 1380 mL / OUT: 4335 mL / NET: -2955 mL Red Team Surgery Progress Note    SUBJECTIVE: Pt seen and examined at bedside. No overnight events. Patient comfortable and in no-apparent distress. No nausea, vomiting, or diarrhea - NGT in place. Patient received PICC yesterday for TPN.     Vital Signs Last 24 Hrs  T(C): 36.8 (16 Dec 2017 03:00), Max: 37 (15 Dec 2017 12:00)  T(F): 98.2 (16 Dec 2017 03:00), Max: 98.6 (15 Dec 2017 12:00)  HR: 93 (16 Dec 2017 07:00) (70 - 108)  BP: 137/65 (16 Dec 2017 06:00) (116/56 - 149/69)  BP(mean): 94 (16 Dec 2017 06:00) (77 - 99)  RR: 21 (16 Dec 2017 07:00) (2 - 26)  SpO2: 92% (16 Dec 2017 07:00) (92% - 100%)    Physical Exam:  General Appearance: Appears well, NAD  Chest: Able to speak in full sentences, no labored breathing  CV: Pulse regular presently  Abdomen: Soft, nontense, Distention decreased. NGT in place, ostomy functional.   Extremities: Grossly symmetric    LABS:                        8.6    9.2   )-----------( 290      ( 16 Dec 2017 03:12 )             26.2     12-16    147<H>  |  100  |  24<H>  ----------------------------<  136<H>  3.0<L>   |  35<H>  |  1.27    Ca    8.6      16 Dec 2017 03:10  Phos  3.2     12-16  Mg     2.6     12-16            INs and OUTs:    12-15-17 @ 07:01  -  12-16-17 @ 07:00  --------------------------------------------------------  IN: 1380 mL / OUT: 4335 mL / NET: -2955 mL

## 2017-12-17 LAB
ANION GAP SERPL CALC-SCNC: 10 MMOL/L — SIGNIFICANT CHANGE UP (ref 5–17)
ANION GAP SERPL CALC-SCNC: 13 MMOL/L — SIGNIFICANT CHANGE UP (ref 5–17)
ANION GAP SERPL CALC-SCNC: 8 MMOL/L — SIGNIFICANT CHANGE UP (ref 5–17)
APPEARANCE UR: CLEAR — SIGNIFICANT CHANGE UP
BACTERIA # UR AUTO: NEGATIVE /HPF — SIGNIFICANT CHANGE UP
BILIRUB UR-MCNC: NEGATIVE — SIGNIFICANT CHANGE UP
BUN SERPL-MCNC: 30 MG/DL — HIGH (ref 7–23)
BUN SERPL-MCNC: 34 MG/DL — HIGH (ref 7–23)
BUN SERPL-MCNC: 36 MG/DL — HIGH (ref 7–23)
CALCIUM SERPL-MCNC: 9 MG/DL — SIGNIFICANT CHANGE UP (ref 8.4–10.5)
CALCIUM SERPL-MCNC: 9.1 MG/DL — SIGNIFICANT CHANGE UP (ref 8.4–10.5)
CALCIUM SERPL-MCNC: 9.5 MG/DL — SIGNIFICANT CHANGE UP (ref 8.4–10.5)
CHLORIDE SERPL-SCNC: 97 MMOL/L — SIGNIFICANT CHANGE UP (ref 96–108)
CHLORIDE SERPL-SCNC: 97 MMOL/L — SIGNIFICANT CHANGE UP (ref 96–108)
CHLORIDE SERPL-SCNC: 98 MMOL/L — SIGNIFICANT CHANGE UP (ref 96–108)
CK SERPL-CCNC: 34 U/L — SIGNIFICANT CHANGE UP (ref 30–200)
CK SERPL-CCNC: 34 U/L — SIGNIFICANT CHANGE UP (ref 30–200)
CO2 SERPL-SCNC: 36 MMOL/L — HIGH (ref 22–31)
CO2 SERPL-SCNC: 42 MMOL/L — HIGH (ref 22–31)
CO2 SERPL-SCNC: 44 MMOL/L — HIGH (ref 22–31)
COLOR SPEC: YELLOW — SIGNIFICANT CHANGE UP
CREAT SERPL-MCNC: 1.09 MG/DL — SIGNIFICANT CHANGE UP (ref 0.5–1.3)
CREAT SERPL-MCNC: 1.18 MG/DL — SIGNIFICANT CHANGE UP (ref 0.5–1.3)
CREAT SERPL-MCNC: 1.26 MG/DL — SIGNIFICANT CHANGE UP (ref 0.5–1.3)
DIFF PNL FLD: NEGATIVE — SIGNIFICANT CHANGE UP
GLUCOSE BLDC GLUCOMTR-MCNC: 228 MG/DL — HIGH (ref 70–99)
GLUCOSE BLDC GLUCOMTR-MCNC: 239 MG/DL — HIGH (ref 70–99)
GLUCOSE BLDC GLUCOMTR-MCNC: 254 MG/DL — HIGH (ref 70–99)
GLUCOSE BLDC GLUCOMTR-MCNC: 272 MG/DL — HIGH (ref 70–99)
GLUCOSE SERPL-MCNC: 208 MG/DL — HIGH (ref 70–99)
GLUCOSE SERPL-MCNC: 233 MG/DL — HIGH (ref 70–99)
GLUCOSE SERPL-MCNC: 266 MG/DL — HIGH (ref 70–99)
GLUCOSE UR QL: 50
HCT VFR BLD CALC: 27.7 % — LOW (ref 39–50)
HCT VFR BLD CALC: 28.2 % — LOW (ref 39–50)
HCT VFR BLD CALC: 29.5 % — LOW (ref 39–50)
HGB BLD-MCNC: 8.9 G/DL — LOW (ref 13–17)
HGB BLD-MCNC: 9 G/DL — LOW (ref 13–17)
HGB BLD-MCNC: 9.1 G/DL — LOW (ref 13–17)
KETONES UR-MCNC: NEGATIVE — SIGNIFICANT CHANGE UP
LEUKOCYTE ESTERASE UR-ACNC: NEGATIVE — SIGNIFICANT CHANGE UP
MAGNESIUM SERPL-MCNC: 2.6 MG/DL — SIGNIFICANT CHANGE UP (ref 1.6–2.6)
MCHC RBC-ENTMCNC: 30 PG — SIGNIFICANT CHANGE UP (ref 27–34)
MCHC RBC-ENTMCNC: 30.5 GM/DL — LOW (ref 32–36)
MCHC RBC-ENTMCNC: 32 GM/DL — SIGNIFICANT CHANGE UP (ref 32–36)
MCHC RBC-ENTMCNC: 32.2 GM/DL — SIGNIFICANT CHANGE UP (ref 32–36)
MCHC RBC-ENTMCNC: 32.2 PG — SIGNIFICANT CHANGE UP (ref 27–34)
MCHC RBC-ENTMCNC: 32.3 PG — SIGNIFICANT CHANGE UP (ref 27–34)
MCV RBC AUTO: 100 FL — SIGNIFICANT CHANGE UP (ref 80–100)
MCV RBC AUTO: 101 FL — HIGH (ref 80–100)
MCV RBC AUTO: 98.3 FL — SIGNIFICANT CHANGE UP (ref 80–100)
NITRITE UR-MCNC: NEGATIVE — SIGNIFICANT CHANGE UP
NT-PROBNP SERPL-SCNC: 681 PG/ML — HIGH (ref 0–300)
NT-PROBNP SERPL-SCNC: 792 PG/ML — HIGH (ref 0–300)
PH UR: 8.5 — HIGH (ref 5–8)
PHOSPHATE SERPL-MCNC: 1.8 MG/DL — LOW (ref 2.5–4.5)
PLATELET # BLD AUTO: 388 K/UL — SIGNIFICANT CHANGE UP (ref 150–400)
PLATELET # BLD AUTO: 394 K/UL — SIGNIFICANT CHANGE UP (ref 150–400)
PLATELET # BLD AUTO: 420 K/UL — HIGH (ref 150–400)
POTASSIUM SERPL-MCNC: 2.9 MMOL/L — CRITICAL LOW (ref 3.5–5.3)
POTASSIUM SERPL-MCNC: 3 MMOL/L — LOW (ref 3.5–5.3)
POTASSIUM SERPL-MCNC: 3.1 MMOL/L — LOW (ref 3.5–5.3)
POTASSIUM SERPL-SCNC: 2.9 MMOL/L — CRITICAL LOW (ref 3.5–5.3)
POTASSIUM SERPL-SCNC: 3 MMOL/L — LOW (ref 3.5–5.3)
POTASSIUM SERPL-SCNC: 3.1 MMOL/L — LOW (ref 3.5–5.3)
PROT UR-MCNC: 300 MG/DL
RBC # BLD: 2.76 M/UL — LOW (ref 4.2–5.8)
RBC # BLD: 2.81 M/UL — LOW (ref 4.2–5.8)
RBC # BLD: 3 M/UL — LOW (ref 4.2–5.8)
RBC # FLD: 12.9 % — SIGNIFICANT CHANGE UP (ref 10.3–14.5)
RBC # FLD: 13.1 % — SIGNIFICANT CHANGE UP (ref 10.3–14.5)
RBC # FLD: 14.2 % — SIGNIFICANT CHANGE UP (ref 10.3–14.5)
RBC CASTS # UR COMP ASSIST: ABNORMAL /HPF (ref 0–2)
SODIUM SERPL-SCNC: 146 MMOL/L — HIGH (ref 135–145)
SODIUM SERPL-SCNC: 148 MMOL/L — HIGH (ref 135–145)
SODIUM SERPL-SCNC: 152 MMOL/L — HIGH (ref 135–145)
SP GR SPEC: 1.02 — SIGNIFICANT CHANGE UP (ref 1.01–1.02)
TROPONIN T SERPL-MCNC: 0.06 NG/ML — SIGNIFICANT CHANGE UP (ref 0–0.06)
TROPONIN T SERPL-MCNC: 0.06 NG/ML — SIGNIFICANT CHANGE UP (ref 0–0.06)
UROBILINOGEN FLD QL: NEGATIVE — SIGNIFICANT CHANGE UP
WBC # BLD: 12.8 K/UL — HIGH (ref 3.8–10.5)
WBC # BLD: 13.05 K/UL — HIGH (ref 3.8–10.5)
WBC # BLD: 13.7 K/UL — HIGH (ref 3.8–10.5)
WBC # FLD AUTO: 12.8 K/UL — HIGH (ref 3.8–10.5)
WBC # FLD AUTO: 13.05 K/UL — HIGH (ref 3.8–10.5)
WBC # FLD AUTO: 13.7 K/UL — HIGH (ref 3.8–10.5)
WBC UR QL: SIGNIFICANT CHANGE UP /HPF (ref 0–5)

## 2017-12-17 PROCEDURE — 71010: CPT | Mod: 26,77

## 2017-12-17 PROCEDURE — 71010: CPT | Mod: 26

## 2017-12-17 PROCEDURE — 93010 ELECTROCARDIOGRAM REPORT: CPT

## 2017-12-17 RX ORDER — POTASSIUM PHOSPHATE, MONOBASIC POTASSIUM PHOSPHATE, DIBASIC 236; 224 MG/ML; MG/ML
30 INJECTION, SOLUTION INTRAVENOUS ONCE
Qty: 0 | Refills: 0 | Status: COMPLETED | OUTPATIENT
Start: 2017-12-17 | End: 2017-12-17

## 2017-12-17 RX ORDER — POTASSIUM CHLORIDE 20 MEQ
10 PACKET (EA) ORAL
Qty: 0 | Refills: 0 | Status: COMPLETED | OUTPATIENT
Start: 2017-12-17 | End: 2017-12-18

## 2017-12-17 RX ORDER — POTASSIUM CHLORIDE 20 MEQ
10 PACKET (EA) ORAL
Qty: 0 | Refills: 0 | Status: COMPLETED | OUTPATIENT
Start: 2017-12-17 | End: 2017-12-17

## 2017-12-17 RX ORDER — SODIUM CHLORIDE 9 MG/ML
500 INJECTION, SOLUTION INTRAVENOUS ONCE
Qty: 0 | Refills: 0 | Status: COMPLETED | OUTPATIENT
Start: 2017-12-17 | End: 2017-12-17

## 2017-12-17 RX ADMIN — Medication 100 MILLIEQUIVALENT(S): at 13:30

## 2017-12-17 RX ADMIN — Medication 4: at 00:59

## 2017-12-17 RX ADMIN — Medication 1000 MILLIGRAM(S): at 04:00

## 2017-12-17 RX ADMIN — Medication 3 MILLILITER(S): at 00:59

## 2017-12-17 RX ADMIN — CARVEDILOL PHOSPHATE 25 MILLIGRAM(S): 80 CAPSULE, EXTENDED RELEASE ORAL at 17:43

## 2017-12-17 RX ADMIN — Medication 100 MILLIEQUIVALENT(S): at 10:55

## 2017-12-17 RX ADMIN — BENZOCAINE AND MENTHOL 1 LOZENGE: 5; 1 LIQUID ORAL at 23:43

## 2017-12-17 RX ADMIN — Medication 100 MILLIEQUIVALENT(S): at 12:22

## 2017-12-17 RX ADMIN — Medication 6: at 18:34

## 2017-12-17 RX ADMIN — INSULIN GLARGINE 12 UNIT(S): 100 INJECTION, SOLUTION SUBCUTANEOUS at 21:51

## 2017-12-17 RX ADMIN — ATORVASTATIN CALCIUM 40 MILLIGRAM(S): 80 TABLET, FILM COATED ORAL at 21:51

## 2017-12-17 RX ADMIN — Medication 100 MILLIEQUIVALENT(S): at 23:44

## 2017-12-17 RX ADMIN — ISOSORBIDE MONONITRATE 30 MILLIGRAM(S): 60 TABLET, EXTENDED RELEASE ORAL at 10:47

## 2017-12-17 RX ADMIN — SODIUM CHLORIDE 1000 MILLILITER(S): 9 INJECTION, SOLUTION INTRAVENOUS at 15:24

## 2017-12-17 RX ADMIN — Medication 0.5 MILLIGRAM(S): at 05:39

## 2017-12-17 RX ADMIN — Medication 3 MILLILITER(S): at 10:47

## 2017-12-17 RX ADMIN — Medication 3 MILLILITER(S): at 17:43

## 2017-12-17 RX ADMIN — AMLODIPINE BESYLATE 10 MILLIGRAM(S): 2.5 TABLET ORAL at 05:38

## 2017-12-17 RX ADMIN — Medication 3 MILLILITER(S): at 05:38

## 2017-12-17 RX ADMIN — POTASSIUM PHOSPHATE, MONOBASIC POTASSIUM PHOSPHATE, DIBASIC 83.33 MILLIMOLE(S): 236; 224 INJECTION, SOLUTION INTRAVENOUS at 15:24

## 2017-12-17 RX ADMIN — Medication 6: at 12:23

## 2017-12-17 RX ADMIN — Medication 3 MILLILITER(S): at 23:43

## 2017-12-17 RX ADMIN — Medication 400 MILLIGRAM(S): at 03:25

## 2017-12-17 RX ADMIN — Medication 4: at 05:38

## 2017-12-17 RX ADMIN — ENOXAPARIN SODIUM 40 MILLIGRAM(S): 100 INJECTION SUBCUTANEOUS at 10:47

## 2017-12-17 RX ADMIN — Medication 325 MILLIGRAM(S): at 10:47

## 2017-12-17 RX ADMIN — BENZOCAINE AND MENTHOL 1 LOZENGE: 5; 1 LIQUID ORAL at 10:48

## 2017-12-17 RX ADMIN — BENZOCAINE AND MENTHOL 1 LOZENGE: 5; 1 LIQUID ORAL at 17:43

## 2017-12-17 RX ADMIN — Medication 0.5 MILLIGRAM(S): at 17:43

## 2017-12-17 RX ADMIN — CARVEDILOL PHOSPHATE 25 MILLIGRAM(S): 80 CAPSULE, EXTENDED RELEASE ORAL at 05:38

## 2017-12-17 NOTE — PROGRESS NOTE ADULT - ASSESSMENT
Mr. Ballard is a 67 year old patient with PMHx Rectal CA s/p transanal excision/biopsy (7/2017), s/p neoadjuvant chemoradiation who presented to [facility] on 12/6 and is now s/p open APR POD12. He is hemodynamically stable. AM labs are significant for hypophosphatasia hypokalemia and leukocytosis.    - DVT ppx: Lovenox  - C/W  mg  - Diet: NPO with NGT  - on IVF, decreased the rate to 50 cc/hr because of edema  - Monitor ostomy function  - repleted potassium and phosphate  - Monitor respiratory status, F/u Pulm recs  - Dispo: [facility]

## 2017-12-17 NOTE — PROVIDER CONTACT NOTE (OTHER) - BACKGROUND
12/6 lap to open LAR, cystoscopy with ucath, abdominal to perineal resection-- tx to SICU for hypoxia  12/16 Tx to 2[facility]i, NG tube replaced due to vomiting

## 2017-12-17 NOTE — PROGRESS NOTE ADULT - SUBJECTIVE AND OBJECTIVE BOX
Day # 1 of PN  Pt did not tolerate removal of NGT (emesis)  12-16 @ 07:01  -  12-17 @ 07:00  --------------------------------------------------------  IN:    lactated ringers.: 200 mL    Oral Fluid: 240 mL    Solution: 200 mL    TPN (Total Parenteral Nutrition): 1094 mL  Total IN: 1734 mL    OUT:    Colostomy: 200 mL    Indwelling Catheter - Urethral: 1235 mL    Nasoenteral Tube: 2600 mL  Total OUT: 4035 mL    Total NET: -2301 mL        T(C): 37.4 (12-17-17 @ 09:35), Max: 37.4 (12-17-17 @ 09:35)  HR: 106 (12-17-17 @ 09:35) (78 - 106)  BP: 144/86 (12-17-17 @ 09:35) (123/81 - 144/86)  RR: 18 (12-17-17 @ 09:35) (18 - 21)  SpO2: 100% (12-17-17 @ 09:35) (94% - 100%)  Wt(kg): --    Labs   12-17    146<H>  |  97  |  30<H>  ----------------------------<  233<H>  3.1<L>   |  36<H>  |  1.09    Ca    9.5      17 Dec 2017 08:23  Phos  1.8     12-17  Mg     2.6     12-17          CAPILLARY BLOOD GLUCOSE      POCT Blood Glucose.: 228 mg/dL (17 Dec 2017 05:25)  POCT Blood Glucose.: 239 mg/dL (16 Dec 2017 23:57)  POCT Blood Glucose.: 208 mg/dL (16 Dec 2017 18:46)  POCT Blood Glucose.: 134 mg/dL (16 Dec 2017 11:40)    I&O's Detail    16 Dec 2017 07:01  -  17 Dec 2017 07:00  --------------------------------------------------------  IN:    lactated ringers.: 200 mL    Oral Fluid: 240 mL    Solution: 200 mL    TPN (Total Parenteral Nutrition): 1094 mL  Total IN: 1734 mL    OUT:    Colostomy: 200 mL    Indwelling Catheter - Urethral: 1235 mL    Nasoenteral Tube: 2600 mL  Total OUT: 4035 mL    Total NET: -2301 mL      17 Dec 2017 07:01  -  17 Dec 2017 10:08  --------------------------------------------------------  IN:    TPN (Total Parenteral Nutrition): 234 mL  Total IN: 234 mL    OUT:    Colostomy: 25 mL    Indwelling Catheter - Urethral: 200 mL    Nasoenteral Tube: 950 mL  Total OUT: 1175 mL    Total NET: -941 mL

## 2017-12-17 NOTE — PROVIDER CONTACT NOTE (OTHER) - SITUATION
Since ambulating patient OOB to chair, heart rate has been ranging 110-120 bpm. Patient denies chest pain or SOB.

## 2017-12-17 NOTE — PROGRESS NOTE ADULT - SUBJECTIVE AND OBJECTIVE BOX
Follow-up Pulm Progress Note    No new respiratory events overnight. Wants to eat. NGT replaced.     Medications:  MEDICATIONS  (STANDING):  ALBUTerol/ipratropium for Nebulization 3 milliLiter(s) Nebulizer every 6 hours  amLODIPine   Tablet 10 milliGRAM(s) Oral daily  aspirin 325 milliGRAM(s) Oral daily  atorvastatin 40 milliGRAM(s) Oral at bedtime  benzocaine 15 mG/menthol 3.6 mG Lozenge 1 Lozenge Oral four times a day  buDESOnide   0.5 milliGRAM(s) Respule 0.5 milliGRAM(s) Inhalation every 12 hours  carvedilol 25 milliGRAM(s) Oral every 12 hours  enoxaparin Injectable 40 milliGRAM(s) SubCutaneous daily  insulin glargine Injectable (LANTUS) 12 Unit(s) SubCutaneous at bedtime  insulin lispro (HumaLOG) corrective regimen sliding scale   SubCutaneous every 6 hours  isosorbide   mononitrate ER Tablet (IMDUR) 30 milliGRAM(s) Oral daily  lidocaine 2% Gel 1 Application(s) Topical once  potassium chloride  10 mEq/100 mL IVPB 10 milliEquivalent(s) IV Intermittent every 1 hour  potassium phosphate IVPB 30 milliMole(s) IV Intermittent once  sodium chloride 0.45% with potassium chloride 20 mEq/L 1000 milliLiter(s) (50 mL/Hr) IV Continuous <Continuous>    MEDICATIONS  (PRN):  nystatin Powder 1 Application(s) Topical two times a day PRN Groin moisture  ondansetron Injectable 4 milliGRAM(s) IV Push every 6 hours PRN Nausea and/or Vomiting  saliva substitute (BIOTENE MOISTURIZING MOUTH SPRAY) 1 [place](s) Topical three times a day PRN Mouth Care    Vital Signs Last 24 Hrs  T(C): 37.4 (17 Dec 2017 09:35), Max: 37.4 (17 Dec 2017 09:35)  T(F): 99.3 (17 Dec 2017 09:35), Max: 99.3 (17 Dec 2017 09:35)  HR: 120 (17 Dec 2017 11:04) (89 - 120)  BP: 120/78 (17 Dec 2017 11:04) (120/78 - 144/86)  BP(mean): --  RR: 18 (17 Dec 2017 11:04) (18 - 21)  SpO2: 96% (17 Dec 2017 11:04) (94% - 100%)    12-16 @ 07:01  -  12-17 @ 07:00  --------------------------------------------------------  IN: 1734 mL / OUT: 4035 mL / NET: -2301 mL    LABS:                        9.0    13.05 )-----------( 388      ( 17 Dec 2017 08:26 )             29.5     12-17    146<H>  |  97  |  30<H>  ----------------------------<  233<H>  3.1<L>   |  36<H>  |  1.09    Ca    9.5      17 Dec 2017 08:23  Phos  1.8     12-17  Mg     2.6     12-17    CAPILLARY BLOOD GLUCOSE    POCT Blood Glucose.: 228 mg/dL (17 Dec 2017 05:25)    CULTURES: (if applicable)    Physical Examination:  PULM: Decreased at bases  CVS: S1, S2 heard    RADIOLOGY REVIEWED  CXR:     CT chest:    TTE:

## 2017-12-17 NOTE — PROGRESS NOTE ADULT - SUBJECTIVE AND OBJECTIVE BOX
Surgery Red Team Progress Note    STATUS POST:  LAr converted to open APR    POST OPERATIVE DAY # 12    SUBJECTIVE:   Patient was seen and examined this morning. he was transfered from SICU yesterday. He had three episodes of emesis yesterday. Abdominal Xray showed dilated loops of bowelll controlled. He does not report pain, fever, n/v, chest pain, or shortness of breath.     OBJECTIVE:   T(C): 37.4 (12-17-17 @ 09:35), Max: 37.4 (12-17-17 @ 09:35)  HR: 106 (12-17-17 @ 09:35) (78 - 106)  BP: 144/86 (12-17-17 @ 09:35) (123/81 - 144/86)  RR: 18 (12-17-17 @ 09:35) (18 - 21)  SpO2: 100% (12-17-17 @ 09:35) (94% - 100%)  Wt(kg): --  CAPILLARY BLOOD GLUCOSE      POCT Blood Glucose.: 228 mg/dL (17 Dec 2017 05:25)  POCT Blood Glucose.: 239 mg/dL (16 Dec 2017 23:57)  POCT Blood Glucose.: 208 mg/dL (16 Dec 2017 18:46)  POCT Blood Glucose.: 134 mg/dL (16 Dec 2017 11:40)    I&O's Detail    16 Dec 2017 07:01  -  17 Dec 2017 07:00  --------------------------------------------------------  IN:    lactated ringers.: 200 mL    Oral Fluid: 240 mL    Solution: 200 mL    TPN (Total Parenteral Nutrition): 1094 mL  Total IN: 1734 mL    OUT:    Colostomy: 200 mL    Indwelling Catheter - Urethral: 1235 mL    Nasoenteral Tube: 2600 mL  Total OUT: 4035 mL    Total NET: -2301 mL      17 Dec 2017 07:01  -  17 Dec 2017 10:21  --------------------------------------------------------  IN:    TPN (Total Parenteral Nutrition): 234 mL  Total IN: 234 mL    OUT:    Colostomy: 25 mL    Indwelling Catheter - Urethral: 200 mL    Nasoenteral Tube: 950 mL  Total OUT: 1175 mL    Total NET: -941 mL          Physical exam:  General: Surgery Red Team Progress Note    STATUS POST:  LAr converted to open APR    POST OPERATIVE DAY # 12    SUBJECTIVE:   Patient was seen and examined this morning. he was transfered from SICU yesterday. He had three episodes of emesis yesterday. Abdominal Xray showed dilated loops of bowel. NGT was placed and drained 800 cc coffee ground output. He does not report pain, fever, n/v, chest pain, or shortness of breath.     OBJECTIVE:   T(C): 37.4 (12-17-17 @ 09:35), Max: 37.4 (12-17-17 @ 09:35)  HR: 106 (12-17-17 @ 09:35) (78 - 106)  BP: 144/86 (12-17-17 @ 09:35) (123/81 - 144/86)  RR: 18 (12-17-17 @ 09:35) (18 - 21)  SpO2: 100% (12-17-17 @ 09:35) (94% - 100%)  Wt(kg): --  CAPILLARY BLOOD GLUCOSE      POCT Blood Glucose.: 228 mg/dL (17 Dec 2017 05:25)  POCT Blood Glucose.: 239 mg/dL (16 Dec 2017 23:57)  POCT Blood Glucose.: 208 mg/dL (16 Dec 2017 18:46)  POCT Blood Glucose.: 134 mg/dL (16 Dec 2017 11:40)    I&O's Detail    16 Dec 2017 07:01  -  17 Dec 2017 07:00  --------------------------------------------------------  IN:    lactated ringers.: 200 mL    Oral Fluid: 240 mL    Solution: 200 mL    TPN (Total Parenteral Nutrition): 1094 mL  Total IN: 1734 mL    OUT:    Colostomy: 200 mL    Indwelling Catheter - Urethral: 1235 mL    Nasoenteral Tube: 2600 mL  Total OUT: 4035 mL    Total NET: -2301 mL      17 Dec 2017 07:01  -  17 Dec 2017 10:21  --------------------------------------------------------  IN:    TPN (Total Parenteral Nutrition): 234 mL  Total IN: 234 mL    OUT:    Colostomy: 25 mL    Indwelling Catheter - Urethral: 200 mL    Nasoenteral Tube: 950 mL  Total OUT: 1175 mL    Total NET: -941 mL      PHYSICAL EXAM:  Gen: Well-nourished, well-developed, A&O x3, resting in bed in no acute distress  CV: RRR  Resp: Patent airways, unlabored   Abdomen: Soft, mildly tender at incisional site, distended, ostomy functional with dark liquid output, blister at left side of ostomy dressing.   Extremities: All 4 extremities warm and well perfused, no edema

## 2017-12-17 NOTE — PROVIDER CONTACT NOTE (OTHER) - ACTION/TREATMENT ORDERED:
MD Spence aware, standing dose of Imdur PO given, will reassess vital signs in 1hrs, will continue to monitor. MD Spence aware, standing dose of 30mg Imdur PO given, will reassess vital signs in 1hrs, will continue to monitor.

## 2017-12-17 NOTE — PROGRESS NOTE ADULT - ASSESSMENT
Hyperglycemia after starting PN  Will add 10 units of RI to PN starting today and adjust as indicated

## 2017-12-17 NOTE — PROVIDER CONTACT NOTE (OTHER) - ACTION/TREATMENT ORDERED:
MD Spence aware and at bedside, STAT EKG performed & STAT BMP & cardiac enzymes sent, will continue to monitor.

## 2017-12-17 NOTE — PROGRESS NOTE ADULT - PROBLEM SELECTOR PLAN 2
CTPA non-diagnostic for PE  -LE duplex negative for DVT  -Well's Score is 2.5 (moderate) risk of PE, but patient has alternative explanations for hypoxia.

## 2017-12-17 NOTE — PROVIDER CONTACT NOTE (OTHER) - SITUATION
Patient heart rate ranges between 110-120 bpm despite receiving 30mg Imdur PO. Patient continues to deny chest pain or SOB

## 2017-12-18 DIAGNOSIS — E87.0 HYPEROSMOLALITY AND HYPERNATREMIA: ICD-10-CM

## 2017-12-18 DIAGNOSIS — A41.9 SEPSIS, UNSPECIFIED ORGANISM: ICD-10-CM

## 2017-12-18 DIAGNOSIS — E87.3 ALKALOSIS: ICD-10-CM

## 2017-12-18 LAB
ANION GAP SERPL CALC-SCNC: 14 MMOL/L — SIGNIFICANT CHANGE UP (ref 5–17)
APPEARANCE UR: CLEAR — SIGNIFICANT CHANGE UP
BACTERIA # UR AUTO: ABNORMAL /HPF
BASE EXCESS BLDA CALC-SCNC: 20.2 MMOL/L — HIGH (ref -2–2)
BILIRUB UR-MCNC: NEGATIVE — SIGNIFICANT CHANGE UP
BUN SERPL-MCNC: 37 MG/DL — HIGH (ref 7–23)
CALCIUM SERPL-MCNC: 9.4 MG/DL — SIGNIFICANT CHANGE UP (ref 8.4–10.5)
CHLORIDE SERPL-SCNC: 94 MMOL/L — LOW (ref 96–108)
CO2 BLDA-SCNC: 48 MMOL/L — HIGH (ref 22–30)
CO2 SERPL-SCNC: 42 MMOL/L — HIGH (ref 22–31)
COLOR SPEC: YELLOW — SIGNIFICANT CHANGE UP
COMMENT - URINE: SIGNIFICANT CHANGE UP
CREAT SERPL-MCNC: 1.2 MG/DL — SIGNIFICANT CHANGE UP (ref 0.5–1.3)
DIFF PNL FLD: ABNORMAL
EPI CELLS # UR: SIGNIFICANT CHANGE UP /HPF
GAS PNL BLDA: SIGNIFICANT CHANGE UP
GAS PNL BLDA: SIGNIFICANT CHANGE UP
GLUCOSE BLDC GLUCOMTR-MCNC: 191 MG/DL — HIGH (ref 70–99)
GLUCOSE BLDC GLUCOMTR-MCNC: 230 MG/DL — HIGH (ref 70–99)
GLUCOSE BLDC GLUCOMTR-MCNC: 249 MG/DL — HIGH (ref 70–99)
GLUCOSE BLDC GLUCOMTR-MCNC: 282 MG/DL — HIGH (ref 70–99)
GLUCOSE SERPL-MCNC: 232 MG/DL — HIGH (ref 70–99)
GLUCOSE UR QL: 50
HCO3 BLDA-SCNC: 46 MMOL/L — HIGH (ref 21–29)
HCT VFR BLD CALC: 29.3 % — LOW (ref 39–50)
HGB BLD-MCNC: 8.7 G/DL — LOW (ref 13–17)
KETONES UR-MCNC: NEGATIVE — SIGNIFICANT CHANGE UP
LEUKOCYTE ESTERASE UR-ACNC: ABNORMAL
MAGNESIUM SERPL-MCNC: 2.4 MG/DL — SIGNIFICANT CHANGE UP (ref 1.6–2.6)
MCHC RBC-ENTMCNC: 29.6 PG — SIGNIFICANT CHANGE UP (ref 27–34)
MCHC RBC-ENTMCNC: 29.7 GM/DL — LOW (ref 32–36)
MCV RBC AUTO: 99.7 FL — SIGNIFICANT CHANGE UP (ref 80–100)
NITRITE UR-MCNC: NEGATIVE — SIGNIFICANT CHANGE UP
PCO2 BLDA: 54 MMHG — HIGH (ref 32–46)
PH BLDA: 7.54 — HIGH (ref 7.35–7.45)
PH UR: 7.5 — SIGNIFICANT CHANGE UP (ref 5–8)
PHOSPHATE SERPL-MCNC: 2.8 MG/DL — SIGNIFICANT CHANGE UP (ref 2.5–4.5)
PLATELET # BLD AUTO: 461 K/UL — HIGH (ref 150–400)
PO2 BLDA: 62 MMHG — LOW (ref 74–108)
POTASSIUM SERPL-MCNC: 3 MMOL/L — LOW (ref 3.5–5.3)
POTASSIUM SERPL-SCNC: 3 MMOL/L — LOW (ref 3.5–5.3)
PROCALCITONIN SERPL-MCNC: 3.79 NG/ML — HIGH (ref 0–0.04)
PROT UR-MCNC: 150 MG/DL
RBC # BLD: 2.94 M/UL — LOW (ref 4.2–5.8)
RBC # FLD: 14.5 % — SIGNIFICANT CHANGE UP (ref 10.3–14.5)
RBC CASTS # UR COMP ASSIST: ABNORMAL /HPF (ref 0–2)
SAO2 % BLDA: 93 % — SIGNIFICANT CHANGE UP (ref 92–96)
SODIUM SERPL-SCNC: 150 MMOL/L — HIGH (ref 135–145)
SP GR SPEC: 1.02 — SIGNIFICANT CHANGE UP (ref 1.01–1.02)
UROBILINOGEN FLD QL: NEGATIVE — SIGNIFICANT CHANGE UP
WBC # BLD: 13.15 K/UL — HIGH (ref 3.8–10.5)
WBC # FLD AUTO: 13.15 K/UL — HIGH (ref 3.8–10.5)
WBC UR QL: SIGNIFICANT CHANGE UP /HPF (ref 0–5)

## 2017-12-18 PROCEDURE — 74177 CT ABD & PELVIS W/CONTRAST: CPT | Mod: 26

## 2017-12-18 PROCEDURE — 71260 CT THORAX DX C+: CPT | Mod: 26

## 2017-12-18 PROCEDURE — 99233 SBSQ HOSP IP/OBS HIGH 50: CPT

## 2017-12-18 RX ORDER — PANTOPRAZOLE SODIUM 20 MG/1
40 TABLET, DELAYED RELEASE ORAL DAILY
Qty: 0 | Refills: 0 | Status: DISCONTINUED | OUTPATIENT
Start: 2017-12-18 | End: 2017-12-22

## 2017-12-18 RX ORDER — ASPIRIN/CALCIUM CARB/MAGNESIUM 324 MG
81 TABLET ORAL DAILY
Qty: 0 | Refills: 0 | Status: DISCONTINUED | OUTPATIENT
Start: 2017-12-18 | End: 2017-12-22

## 2017-12-18 RX ORDER — DEXTROSE MONOHYDRATE, SODIUM CHLORIDE, AND POTASSIUM CHLORIDE 50; .745; 4.5 G/1000ML; G/1000ML; G/1000ML
1000 INJECTION, SOLUTION INTRAVENOUS
Qty: 0 | Refills: 0 | Status: DISCONTINUED | OUTPATIENT
Start: 2017-12-18 | End: 2017-12-19

## 2017-12-18 RX ORDER — POTASSIUM CHLORIDE 20 MEQ
10 PACKET (EA) ORAL
Qty: 0 | Refills: 0 | Status: COMPLETED | OUTPATIENT
Start: 2017-12-18 | End: 2017-12-18

## 2017-12-18 RX ORDER — POTASSIUM CHLORIDE 20 MEQ
10 PACKET (EA) ORAL
Qty: 0 | Refills: 0 | Status: COMPLETED | OUTPATIENT
Start: 2017-12-18 | End: 2017-12-19

## 2017-12-18 RX ADMIN — BENZOCAINE AND MENTHOL 1 LOZENGE: 5; 1 LIQUID ORAL at 05:34

## 2017-12-18 RX ADMIN — Medication 6: at 13:38

## 2017-12-18 RX ADMIN — Medication 100 MILLIEQUIVALENT(S): at 23:51

## 2017-12-18 RX ADMIN — CARVEDILOL PHOSPHATE 25 MILLIGRAM(S): 80 CAPSULE, EXTENDED RELEASE ORAL at 05:34

## 2017-12-18 RX ADMIN — ISOSORBIDE MONONITRATE 30 MILLIGRAM(S): 60 TABLET, EXTENDED RELEASE ORAL at 12:09

## 2017-12-18 RX ADMIN — Medication 81 MILLIGRAM(S): at 12:06

## 2017-12-18 RX ADMIN — Medication 0.5 MILLIGRAM(S): at 05:34

## 2017-12-18 RX ADMIN — PANTOPRAZOLE SODIUM 40 MILLIGRAM(S): 20 TABLET, DELAYED RELEASE ORAL at 12:06

## 2017-12-18 RX ADMIN — Medication 4: at 00:59

## 2017-12-18 RX ADMIN — CARVEDILOL PHOSPHATE 25 MILLIGRAM(S): 80 CAPSULE, EXTENDED RELEASE ORAL at 17:46

## 2017-12-18 RX ADMIN — Medication 3 MILLILITER(S): at 05:33

## 2017-12-18 RX ADMIN — Medication 100 MILLIEQUIVALENT(S): at 00:59

## 2017-12-18 RX ADMIN — ENOXAPARIN SODIUM 40 MILLIGRAM(S): 100 INJECTION SUBCUTANEOUS at 12:06

## 2017-12-18 RX ADMIN — AMLODIPINE BESYLATE 10 MILLIGRAM(S): 2.5 TABLET ORAL at 05:34

## 2017-12-18 RX ADMIN — Medication 100 MILLIEQUIVALENT(S): at 17:47

## 2017-12-18 RX ADMIN — Medication 100 MILLIEQUIVALENT(S): at 15:59

## 2017-12-18 RX ADMIN — ATORVASTATIN CALCIUM 40 MILLIGRAM(S): 80 TABLET, FILM COATED ORAL at 22:00

## 2017-12-18 RX ADMIN — Medication 100 MILLIEQUIVALENT(S): at 00:32

## 2017-12-18 RX ADMIN — INSULIN GLARGINE 12 UNIT(S): 100 INJECTION, SOLUTION SUBCUTANEOUS at 22:00

## 2017-12-18 RX ADMIN — Medication 2: at 05:34

## 2017-12-18 RX ADMIN — Medication 3 MILLILITER(S): at 12:12

## 2017-12-18 RX ADMIN — Medication 3 MILLILITER(S): at 23:53

## 2017-12-18 RX ADMIN — BENZOCAINE AND MENTHOL 1 LOZENGE: 5; 1 LIQUID ORAL at 12:12

## 2017-12-18 RX ADMIN — Medication 4: at 18:40

## 2017-12-18 NOTE — PROGRESS NOTE ADULT - SUBJECTIVE AND OBJECTIVE BOX
Pt continues NPO  12-17 @ 07:01  -  12-18 @ 07:00  --------------------------------------------------------  IN:    Lactated Ringers IV Bolus: 500 mL    Solution: 800 mL    Solution: 300 mL    TPN (Total Parenteral Nutrition): 1872 mL  Total IN: 3472 mL    OUT:    Colostomy: 25 mL    Indwelling Catheter - Urethral: 1700 mL    Nasoenteral Tube: 3850 mL  Total OUT: 5575 mL    Total NET: -2103 mL        T(C): 37.2 (12-18-17 @ 05:00), Max: 37.2 (12-18-17 @ 05:00)  HR: 103 (12-18-17 @ 05:00) (98 - 120)  BP: 158/85 (12-18-17 @ 05:00) (114/70 - 158/85)  RR: 18 (12-18-17 @ 05:00) (18 - 21)  SpO2: 94% (12-18-17 @ 05:00) (94% - 98%)  Wt(kg): --    Labs   12-17    152<H>  |  98  |  36<H>  ----------------------------<  208<H>  2.9<LL>   |  44<H>  |  1.26    Ca    9.0      17 Dec 2017 22:28  Phos  1.8     12-17  Mg     2.6     12-17          CAPILLARY BLOOD GLUCOSE      POCT Blood Glucose.: 191 mg/dL (18 Dec 2017 05:17)  POCT Blood Glucose.: 230 mg/dL (18 Dec 2017 00:46)  POCT Blood Glucose.: 254 mg/dL (17 Dec 2017 18:26)  POCT Blood Glucose.: 272 mg/dL (17 Dec 2017 12:20)    I&O's Detail    17 Dec 2017 07:01  -  18 Dec 2017 07:00  --------------------------------------------------------  IN:    Lactated Ringers IV Bolus: 500 mL    Solution: 800 mL    Solution: 300 mL    TPN (Total Parenteral Nutrition): 1872 mL  Total IN: 3472 mL    OUT:    Colostomy: 25 mL    Indwelling Catheter - Urethral: 1700 mL    Nasoenteral Tube: 3850 mL  Total OUT: 5575 mL    Total NET: -2103 mL

## 2017-12-18 NOTE — PROGRESS NOTE ADULT - ATTENDING COMMENTS
more alert in PM than in AM with NP  agree with increased IVF to supplement what has been lost  hold diuresis for now  OOB if possible  IS if possible  NGT as per surgery  ? MARIANO hernandez

## 2017-12-18 NOTE — PROGRESS NOTE ADULT - PROBLEM SELECTOR PLAN 1
Low grade fever 38.3 with increased lethargy  -Check blood cultures x2, UA/urine culture   -Consider repeat CT A/P to eval for occult infection Low grade fever 38.3 with increased lethargy  -Check blood cultures x2, UA/urine culture   -Consider repeat CT A/P to eval for occult infection  -d/w surgery PA

## 2017-12-18 NOTE — CHART NOTE - NSCHARTNOTEFT_GEN_A_CORE
Pt seen for TPN/nutrition follow up, as per department protocol.     Hospital Course: Pt is a "67M with PMH of Rectal CA s/p transanal excision/biopsy (7/2017), s/p neoadjuvant chemoradiation now S/P open abdominal perineal resection on 12/6, admitted to SICU for hypoxia and hypercarbia requiring BiPAP."    Pt with prolonged post-op ileus (>7 days); lipid-free PN ordered 12/16. Hyperlipidemia noted. Pt noted with emesis 12/16, unable to tolerate removal of NGT to LCWS. Pt reports he feels very thirsty and has been drinking water as permitted, resulting in high NGT output.    Source: Patient [ X]    Family [ ]     other [X ]; medical record, RN    Diet : NPO with sips/ice chips and TPN    GI: emesis noted 12/16; NGT output x 24-hours = 3850ml; colostomy output x 24-hours = 25ml     Enteral /Parenteral Nutrition: TPN infusing at 78ml/hr (148Gm amino acids, 207Gm dextrose, 0ml 20%lipids) to provide 1296cal (17cal/Kg, 1.9Gm protein/Kg  per IBW 78.2Kg); with 10cc MVI 9+5, 3cc MTE-5. Hyperglycemia noted, 10 units of Humulin-R added to PN.    Current Weight: 124.1Kg (12/15), 130.9Kg (12/6); IBW 78.2Kg (BMI ~37Kg/m2)  Edema: none noted    Pertinent Medications: MEDICATIONS  (STANDING):  ALBUTerol/ipratropium for Nebulization 3 milliLiter(s) Nebulizer every 6 hours  amLODIPine   Tablet 10 milliGRAM(s) Oral daily  aspirin 325 milliGRAM(s) Oral daily  atorvastatin 40 milliGRAM(s) Oral at bedtime  benzocaine 15 mG/menthol 3.6 mG Lozenge 1 Lozenge Oral four times a day  buDESOnide   0.5 milliGRAM(s) Respule 0.5 milliGRAM(s) Inhalation every 12 hours  carvedilol 25 milliGRAM(s) Oral every 12 hours  enoxaparin Injectable 40 milliGRAM(s) SubCutaneous daily  insulin glargine Injectable (LANTUS) 12 Unit(s) SubCutaneous at bedtime  insulin lispro (HumaLOG) corrective regimen sliding scale   SubCutaneous every 6 hours  isosorbide   mononitrate ER Tablet (IMDUR) 30 milliGRAM(s) Oral daily  lidocaine 2% Gel 1 Application(s) Topical once  pantoprazole  Injectable 40 milliGRAM(s) IV Push daily    MEDICATIONS  (PRN):  nystatin Powder 1 Application(s) Topical two times a day PRN Groin moisture  ondansetron Injectable 4 milliGRAM(s) IV Push every 6 hours PRN Nausea and/or Vomiting  saliva substitute (BIOTENE MOISTURIZING MOUTH SPRAY) 1 [place](s) Topical three times a day PRN Mouth Care    Pertinent Labs:  12-17 Na152 mmol/L<H> Glu 208 mg/dL<H> K+ 2.9 mmol/L<LL> Cr  1.26 mg/dL BUN 36 mg/dL<H> Phos n/a     Fingersticks x 24 hours: 191-272mg/dL, HbA12c 6.7%, Triglycerides 240, HDL 20    Skin: no pressure injuries    Estimated Needs:   [X ] no change since previous assessment  [ ] recalculated:       Previous Nutrition Diagnosis:     [X ] Inadequate Protein-Energy Intake      Nutrition Diagnosis is [ X] ongoing; being addressed with parenteral nutrition    New Nutrition Diagnosis: [ X] not applicable     Interventions:     Recommend:  1) PN per MD Damian  2) Monitor weight, lab values, skin, PN provision and tolerance      Monitoring and Evaluation:   Follow up per protocol  RD to remain available for further nutritional interventions as indicated.   Mary Narvaez, MS RD [facility], #[phone].

## 2017-12-18 NOTE — PROGRESS NOTE ADULT - PROBLEM SELECTOR PLAN 2
Multifactorial 2nd shunt through compressive atelectasis from pleural effusions, abd distension, likely underlying COPD  -Keep sp02>90% on supplemental oxygen  -OOB as tolerated  -Incentive spirometry

## 2017-12-18 NOTE — PROGRESS NOTE ADULT - ASSESSMENT
Assessment:    67y Male who presents with Malignant neoplasm of rectum    Plan:  -DVT PPX- Lovenox/ ASA   -Pain control   -OOB as tolerated with assistance   - diet as tolerated  -Monitor Gi Fxn   -Dispo Planning     Jesusita Gilliam   #[id number] 12-18-17 @ 09:10

## 2017-12-18 NOTE — PROGRESS NOTE ADULT - SUBJECTIVE AND OBJECTIVE BOX
Follow-up Pulm Progress Note    Pt appears lethargic   92% on RA  Low grade temp-38.3  Abd distended, high output from NGT    Medications:  MEDICATIONS  (STANDING):  ALBUTerol/ipratropium for Nebulization 3 milliLiter(s) Nebulizer every 6 hours  amLODIPine   Tablet 10 milliGRAM(s) Oral daily  aspirin enteric coated 81 milliGRAM(s) Oral daily  atorvastatin 40 milliGRAM(s) Oral at bedtime  benzocaine 15 mG/menthol 3.6 mG Lozenge 1 Lozenge Oral four times a day  buDESOnide   0.5 milliGRAM(s) Respule 0.5 milliGRAM(s) Inhalation every 12 hours  carvedilol 25 milliGRAM(s) Oral every 12 hours  enoxaparin Injectable 40 milliGRAM(s) SubCutaneous daily  insulin glargine Injectable (LANTUS) 12 Unit(s) SubCutaneous at bedtime  insulin lispro (HumaLOG) corrective regimen sliding scale   SubCutaneous every 6 hours  isosorbide   mononitrate ER Tablet (IMDUR) 30 milliGRAM(s) Oral daily  lidocaine 2% Gel 1 Application(s) Topical once  pantoprazole  Injectable 40 milliGRAM(s) IV Push daily  potassium chloride  10 mEq/100 mL IVPB 10 milliEquivalent(s) IV Intermittent every 1 hour  sodium chloride 0.45% with potassium chloride 20 mEq/L 1000 milliLiter(s) (100 mL/Hr) IV Continuous <Continuous>    MEDICATIONS  (PRN):  nystatin Powder 1 Application(s) Topical two times a day PRN Groin moisture  ondansetron Injectable 4 milliGRAM(s) IV Push every 6 hours PRN Nausea and/or Vomiting  saliva substitute (BIOTENE MOISTURIZING MOUTH SPRAY) 1 [place](s) Topical three times a day PRN Mouth Care          Vital Signs Last 24 Hrs  T(C): 38.3 (18 Dec 2017 14:59), Max: 38.3 (18 Dec 2017 14:00)  T(F): 100.9 (18 Dec 2017 14:59), Max: 100.9 (18 Dec 2017 14:00)  HR: 98 (18 Dec 2017 13:56) (79 - 108)  BP: 134/77 (18 Dec 2017 13:56) (114/70 - 158/85)  BP(mean): --  RR: 17 (18 Dec 2017 13:56) (17 - 18)  SpO2: 96% (18 Dec 2017 13:56) (94% - 98%) on 3L NC    ABG - ( 18 Dec 2017 14:32 )  pH: 7.54  /  pCO2: 54    /  pO2: 62    / HCO3: 46    / Base Excess: 20.2  /  SaO2: 93                     @ 07:01  -   @ 07:00  --------------------------------------------------------  IN: 3472 mL / OUT: 5575 mL / NET: -2103 mL          LABS:                        8.7    13.15 )-----------( 461      ( 18 Dec 2017 08:39 )             29.3         150<H>  |  94<L>  |  37<H>  ----------------------------<  232<H>  3.0<L>   |  42<H>  |  1.20    Ca    9.4      18 Dec 2017 09:41  Phos  2.8       Mg     2.4             CARDIAC MARKERS ( 17 Dec 2017 22:28 )  x     / 0.06 ng/mL / 34 U/L / x     / x      CARDIAC MARKERS ( 17 Dec 2017 13:44 )  x     / 0.06 ng/mL / 34 U/L / x     / x          CAPILLARY BLOOD GLUCOSE      POCT Blood Glucose.: 282 mg/dL (18 Dec 2017 13:19)      Urinalysis Basic - ( 17 Dec 2017 16:00 )    Color: Yellow / Appearance: Clear / S.020 / pH: x  Gluc: x / Ketone: Negative  / Bili: Negative / Urobili: Negative   Blood: x / Protein: 300 mg/dL / Nitrite: Negative   Leuk Esterase: Negative / RBC: 2-5 /HPF / WBC 2-5 /HPF   Sq Epi: x / Non Sq Epi: x / Bacteria: Negative /HPF      Procalcitonin, Serum: 3.79 ng/mL (17 @ 07:43)    Serum Pro-Brain Natriuretic Peptide: 681 pg/mL (17 @ 22:28)  Serum Pro-Brain Natriuretic Peptide: 792 pg/mL (17 @ 13:44)                CULTURES: (if applicable)          Physical Examination:  PULM: Clear to auscultation bilaterally, no significant sputum production  CVS: S1, S2 heard    RADIOLOGY REVIEWED  CXR:     CT chest:    TTE: Follow-up Pulm Progress Note    Pt appears lethargic   92% on RA  Low grade temp-38.3  Abd distended, high output from NGT    Medications:  MEDICATIONS  (STANDING):  ALBUTerol/ipratropium for Nebulization 3 milliLiter(s) Nebulizer every 6 hours  amLODIPine   Tablet 10 milliGRAM(s) Oral daily  aspirin enteric coated 81 milliGRAM(s) Oral daily  atorvastatin 40 milliGRAM(s) Oral at bedtime  benzocaine 15 mG/menthol 3.6 mG Lozenge 1 Lozenge Oral four times a day  buDESOnide   0.5 milliGRAM(s) Respule 0.5 milliGRAM(s) Inhalation every 12 hours  carvedilol 25 milliGRAM(s) Oral every 12 hours  enoxaparin Injectable 40 milliGRAM(s) SubCutaneous daily  insulin glargine Injectable (LANTUS) 12 Unit(s) SubCutaneous at bedtime  insulin lispro (HumaLOG) corrective regimen sliding scale   SubCutaneous every 6 hours  isosorbide   mononitrate ER Tablet (IMDUR) 30 milliGRAM(s) Oral daily  lidocaine 2% Gel 1 Application(s) Topical once  pantoprazole  Injectable 40 milliGRAM(s) IV Push daily  potassium chloride  10 mEq/100 mL IVPB 10 milliEquivalent(s) IV Intermittent every 1 hour  sodium chloride 0.45% with potassium chloride 20 mEq/L 1000 milliLiter(s) (100 mL/Hr) IV Continuous <Continuous>    MEDICATIONS  (PRN):  nystatin Powder 1 Application(s) Topical two times a day PRN Groin moisture  ondansetron Injectable 4 milliGRAM(s) IV Push every 6 hours PRN Nausea and/or Vomiting  saliva substitute (BIOTENE MOISTURIZING MOUTH SPRAY) 1 [place](s) Topical three times a day PRN Mouth Care          Vital Signs Last 24 Hrs  T(C): 38.3 (18 Dec 2017 14:59), Max: 38.3 (18 Dec 2017 14:00)  T(F): 100.9 (18 Dec 2017 14:59), Max: 100.9 (18 Dec 2017 14:00)  HR: 98 (18 Dec 2017 13:56) (79 - 108)  BP: 134/77 (18 Dec 2017 13:56) (114/70 - 158/85)  BP(mean): --  RR: 17 (18 Dec 2017 13:56) (17 - 18)  SpO2: 96% (18 Dec 2017 13:56) (94% - 98%) on 3L NC    ABG - ( 18 Dec 2017 14:32 )  pH: 7.54  /  pCO2: 54    /  pO2: 62    / HCO3: 46    / Base Excess: 20.2  /  SaO2: 93                     @ 07:01  -   @ 07:00  --------------------------------------------------------  IN: 3472 mL / OUT: 5575 mL / NET: -2103 mL          LABS:                        8.7    13.15 )-----------( 461      ( 18 Dec 2017 08:39 )             29.3         150<H>  |  94<L>  |  37<H>  ----------------------------<  232<H>  3.0<L>   |  42<H>  |  1.20    Ca    9.4      18 Dec 2017 09:41  Phos  2.8       Mg     2.4             CARDIAC MARKERS ( 17 Dec 2017 22:28 )  x     / 0.06 ng/mL / 34 U/L / x     / x      CARDIAC MARKERS ( 17 Dec 2017 13:44 )  x     / 0.06 ng/mL / 34 U/L / x     / x          CAPILLARY BLOOD GLUCOSE      POCT Blood Glucose.: 282 mg/dL (18 Dec 2017 13:19)      Urinalysis Basic - ( 17 Dec 2017 16:00 )    Color: Yellow / Appearance: Clear / S.020 / pH: x  Gluc: x / Ketone: Negative  / Bili: Negative / Urobili: Negative   Blood: x / Protein: 300 mg/dL / Nitrite: Negative   Leuk Esterase: Negative / RBC: 2-5 /HPF / WBC 2-5 /HPF   Sq Epi: x / Non Sq Epi: x / Bacteria: Negative /HPF      Procalcitonin, Serum: 3.79 ng/mL (17 @ 07:43)    Serum Pro-Brain Natriuretic Peptide: 681 pg/mL (17 @ 22:28)  Serum Pro-Brain Natriuretic Peptide: 792 pg/mL (17 @ 13:44)                CULTURES: (if applicable)          Physical Examination:  PULM: Decreased BS at bases  CVS: S1, S2 RRR    RADIOLOGY REVIEWED  CXR: Grossly clear Follow-up Pulm Progress Note    Pt appears lethargic   92% on RA  Low grade temp-38.3  Abd distended, high output from NGT    Medications:  MEDICATIONS  (STANDING):  ALBUTerol/ipratropium for Nebulization 3 milliLiter(s) Nebulizer every 6 hours  amLODIPine   Tablet 10 milliGRAM(s) Oral daily  aspirin enteric coated 81 milliGRAM(s) Oral daily  atorvastatin 40 milliGRAM(s) Oral at bedtime  benzocaine 15 mG/menthol 3.6 mG Lozenge 1 Lozenge Oral four times a day  buDESOnide   0.5 milliGRAM(s) Respule 0.5 milliGRAM(s) Inhalation every 12 hours  carvedilol 25 milliGRAM(s) Oral every 12 hours  enoxaparin Injectable 40 milliGRAM(s) SubCutaneous daily  insulin glargine Injectable (LANTUS) 12 Unit(s) SubCutaneous at bedtime  insulin lispro (HumaLOG) corrective regimen sliding scale   SubCutaneous every 6 hours  isosorbide   mononitrate ER Tablet (IMDUR) 30 milliGRAM(s) Oral daily  lidocaine 2% Gel 1 Application(s) Topical once  pantoprazole  Injectable 40 milliGRAM(s) IV Push daily  potassium chloride  10 mEq/100 mL IVPB 10 milliEquivalent(s) IV Intermittent every 1 hour  sodium chloride 0.45% with potassium chloride 20 mEq/L 1000 milliLiter(s) (100 mL/Hr) IV Continuous <Continuous>    MEDICATIONS  (PRN):  nystatin Powder 1 Application(s) Topical two times a day PRN Groin moisture  ondansetron Injectable 4 milliGRAM(s) IV Push every 6 hours PRN Nausea and/or Vomiting  saliva substitute (BIOTENE MOISTURIZING MOUTH SPRAY) 1 [place](s) Topical three times a day PRN Mouth Care    Vital Signs Last 24 Hrs  T(C): 38.3 (18 Dec 2017 14:59), Max: 38.3 (18 Dec 2017 14:00)  T(F): 100.9 (18 Dec 2017 14:59), Max: 100.9 (18 Dec 2017 14:00)  HR: 98 (18 Dec 2017 13:56) (79 - 108)  BP: 134/77 (18 Dec 2017 13:56) (114/70 - 158/85)  BP(mean): --  RR: 17 (18 Dec 2017 13:56) (17 - 18)  SpO2: 96% (18 Dec 2017 13:56) (94% - 98%) on 3L NC    ABG - ( 18 Dec 2017 14:32 )  pH: 7.54  /  pCO2: 54    /  pO2: 62    / HCO3: 46    / Base Excess: 20.2  /  SaO2: 93         @ 07:01  -   @ 07:00  --------------------------------------------------------  IN: 3472 mL / OUT: 5575 mL / NET: -2103 mL    LABS:                        8.7    13.15 )-----------( 461      ( 18 Dec 2017 08:39 )             29.3         150<H>  |  94<L>  |  37<H>  ----------------------------<  232<H>  3.0<L>   |  42<H>  |  1.20    Ca    9.4      18 Dec 2017 09:41  Phos  2.8       Mg     2.4             CARDIAC MARKERS ( 17 Dec 2017 22:28 )  x     / 0.06 ng/mL / 34 U/L / x     / x      CARDIAC MARKERS ( 17 Dec 2017 13:44 )  x     / 0.06 ng/mL / 34 U/L / x     / x          CAPILLARY BLOOD GLUCOSE      POCT Blood Glucose.: 282 mg/dL (18 Dec 2017 13:19)      Urinalysis Basic - ( 17 Dec 2017 16:00 )    Color: Yellow / Appearance: Clear / S.020 / pH: x  Gluc: x / Ketone: Negative  / Bili: Negative / Urobili: Negative   Blood: x / Protein: 300 mg/dL / Nitrite: Negative   Leuk Esterase: Negative / RBC: 2-5 /HPF / WBC 2-5 /HPF   Sq Epi: x / Non Sq Epi: x / Bacteria: Negative /HPF      Procalcitonin, Serum: 3.79 ng/mL (17 @ 07:43)    Serum Pro-Brain Natriuretic Peptide: 681 pg/mL (17 @ 22:28)  Serum Pro-Brain Natriuretic Peptide: 792 pg/mL (17 @ 13:44)                CULTURES: (if applicable)          Physical Examination:  PULM: Decreased BS at bases  CVS: S1, S2 RRR    RADIOLOGY REVIEWED  CXR: Grossly clear

## 2017-12-18 NOTE — PROGRESS NOTE ADULT - ATTENDING COMMENTS
Pt reports feeling hungry and thirsty, abd pain improved  Pt refusing to walk, and refusing to stay out of bed in the chair for more than a few minutes    AAOx3  abd soft, less distended, NT, VAC in place, colostomy with necrotic mucosa superficially but pink viable mucosa centrally, + air and small amount of liquid stool in the bag.     NGT >3L in 24 hours    A/P S/P robotic - open APR  cont NGT, monitor output  trial of void, if patient incontinent and unable to track I/O will replace bartholomew  recheck electrolytes, may need TPN adjusted if values are correct  OOB ambulate  PT

## 2017-12-18 NOTE — PROGRESS NOTE ADULT - ASSESSMENT
66 y/o M with PMH of HTN, DMT2, PVD, HLD, MI/CAD with stent x 1 2009, GLENYS, 50 pack yr smoker (quit Jan 2017), morbid obesity, rectal cancer (diagnosed 7/2017 s/p chemo) admitted 12/6 for LAR. Hospital course c/b post-operative hypotension requiring SICU admission. Downgraded to floors on 12/7. On 12/12 he was noted to be hypoxic to 70s. Placed on bipap and readmitted to SICU. Downgraded to floors on 12/16. 12/18 noted to have low grade fever

## 2017-12-18 NOTE — PROGRESS NOTE ADULT - SUBJECTIVE AND OBJECTIVE BOX
Cardiology Attending Progress Note      CHIEF COMPLAINT/REASON FOR CONSULT: Elevated Cardiac Enzymes    HISTORY OF PRESENT ILLNESS:    67y.o. Male with HTN, CAD s/p ANIKET to mLAD , GLENYS on CPAP, rectal mass s/p transanal biopsy/excision 2017 s/p chemo/XRT, admitted 2017 for robotic low anterior resection ( converted to open abdominal perineal resection) + cystoscopy insertion of uretral catheters, post-op course c/b hypoxia on 2017, and found to have elevated cardiac enzymes (Trop: 0.22 -> 0.19 -> 0.15, CKMB 1.0 -> 1.0 ->1.0) for which cardiology was consulted.     INTERVAL EVENTS:   Patient seen and examined. He states that he continues to have hiccups and abdominal pain. No Chest Pain. No SOB. No HA/Dizziness. No Palpitations. No N/V/D/F/C.    Allergies    No Known Allergies    Intolerances    	    MEDICATIONS:  amLODIPine   Tablet 10 milliGRAM(s) Oral daily  carvedilol 25 milliGRAM(s) Oral every 12 hours  enoxaparin Injectable 40 milliGRAM(s) SubCutaneous daily  isosorbide   mononitrate ER Tablet (IMDUR) 30 milliGRAM(s) Oral daily      ALBUTerol/ipratropium for Nebulization 3 milliLiter(s) Nebulizer every 6 hours  buDESOnide   0.5 milliGRAM(s) Respule 0.5 milliGRAM(s) Inhalation every 12 hours    aspirin 325 milliGRAM(s) Oral daily  ondansetron Injectable 4 milliGRAM(s) IV Push every 6 hours PRN    pantoprazole  Injectable 40 milliGRAM(s) IV Push daily    atorvastatin 40 milliGRAM(s) Oral at bedtime  insulin glargine Injectable (LANTUS) 12 Unit(s) SubCutaneous at bedtime  insulin lispro (HumaLOG) corrective regimen sliding scale   SubCutaneous every 6 hours    benzocaine 15 mG/menthol 3.6 mG Lozenge 1 Lozenge Oral four times a day  lidocaine 2% Gel 1 Application(s) Topical once  nystatin Powder 1 Application(s) Topical two times a day PRN  saliva substitute (BIOTENE MOISTURIZING MOUTH SPRAY) 1 [place](s) Topical three times a day PRN      PAST MEDICAL & SURGICAL HISTORY:  Rectal cancer: s/p chemo radiation  Vitiligo  Stented coronary artery  Obesity (BMI 30-39.9)  Cataracts, bilateral  Hyperlipidemia, unspecified hyperlipidemia type  Tobacco use: quit 2017  PVD (peripheral vascular disease)  Diabetes mellitus, type 2  Essential (primary) hypertension  Abnormal rectal biopsy: 2017 rectal tumor excision  History of cholecystectomy  S/P tonsillectomy and adenoidectomy  H/O heart artery stent: stent x 1 and balloon angioplasty -     FAMILY HISTORY:  Family history of essential hypertension  Diabetes mellitus, type 2      REVIEW OF SYSTEMS:    CONSTITUTIONAL: No weakness, fevers or chills  EYES/ENT: No visual changes;  No vertigo or throat pain   NECK: No pain or stiffness  RESPIRATORY: No cough, wheezing, hemoptysis; No shortness of breath  CARDIOVASCULAR: No chest pain or palpitations  GASTROINTESTINAL: +abdominal or epigastric pain. No nausea, vomiting, or hematemesis; No diarrhea or constipation. No melena or hematochezia.  GENITOURINARY: No dysuria, frequency or hematuria  NEUROLOGICAL: No numbness or weakness  SKIN: No itching, burning, rashes, or lesions   All other review of systems is negative unless indicated above.    PHYSICAL EXAM:  T(C): 37.2 (17 @ 05:00), Max: 37.2 (17 @ 05:00)  HR: 103 (17 @ 05:00) (98 - 120)  BP: 158/85 (17 @ 05:00) (114/70 - 158/85)  RR: 18 (17 @ 05:00) (18 - 21)  SpO2: 94% (17 @ 05:00) (94% - 98%)  Wt(kg): --  I&O's Summary    17 Dec 2017 07:01  -  18 Dec 2017 07:00  --------------------------------------------------------  IN: 3472 mL / OUT: 5575 mL / NET: -2103 mL        Appearance: Normal	  HEENT:   Normal oral mucosa, PERRL, EOMI, +NGT in place	  Lymphatic: No lymphadenopathy  Cardiovascular: Normal S1 S2, No JVD, 1/6 CIARRA  Respiratory: Lungs clear to auscultation	  Psychiatry: A & O x 3, Mood & affect appropriate  Gastrointestinal:  Soft, Non-tender, + BS	  Skin: No rashes, No ecchymoses, No cyanosis	  Neurologic: Non-focal  Extremities: Normal range of motion, No clubbing, 2+ pitting edema diffusely (anasarca)  Vascular: Peripheral pulses palpable 2+ bilaterally    LABS:	 	    CBC Full  -  ( 18 Dec 2017 08:39 )  WBC Count : 13.15 K/uL  Hemoglobin : 8.7 g/dL  Hematocrit : 29.3 %  Platelet Count - Automated : 461 K/uL  Mean Cell Volume : 99.7 fl  Mean Cell Hemoglobin : 29.6 pg  Mean Cell Hemoglobin Concentration : 29.7 gm/dL  Auto Neutrophil # : x  Auto Lymphocyte # : x  Auto Monocyte # : x  Auto Eosinophil # : x  Auto Basophil # : x  Auto Neutrophil % : x  Auto Lymphocyte % : x  Auto Monocyte % : x  Auto Eosinophil % : x  Auto Basophil % : x        152<H>  |  98  |  36<H>  ----------------------------<  208<H>  2.9<LL>   |  44<H>  |  1.26      148<H>  |  97  |  34<H>  ----------------------------<  266<H>  3.0<L>   |  42<H>  |  1.18    Ca    9.0      17 Dec 2017 22:28  Ca    9.1      17 Dec 2017 13:44  Phos  1.8       Mg     2.6           proBNP: Serum Pro-Brain Natriuretic Peptide: 1322 pg/mL ( @ 21:51)    CARDIAC MARKERS:    Trop: 0.22 -> 0.19 -> 0.15 -> -> 0.07, CKMB 1.0 -> 1.0 ->1.0)     EC2017: NSR, +Qwaves II, III, AVF, low septal forces  Overall unchanged when compared with prior 2016    TELEMETRY: none  	  CT-Angio:   MPRESSION:     Technically limited study. No central pulmonary embolism. Nondiagnostic   evaluation of the segmental and subsegmental pulmonary arteries due to   poor contrast opacification.    Small bilateral pleural effusions with bibasilar atelectasis.    Status post Gomez procedure. Soft tissue thickening and fullness with   areas of hyperdensity and inflammatory change of the rectal stump with   hemorrhagic components may represent postsurgical changes given the   recent surgery. Follow-up is recommended.    Tiny left pulmonary nodules are unchanged. Six-month follow-up CT is   recommended to ensure stability.      	    TTE: 2017:  EF (Visual Estimate): 75 %  ------------------------------------------------------------------------  Observations:  Mitral Valve: Normal mitral valve. Minimal mitral  regurgitation.  Aortic Valve/Aorta: Aortic valve not well visualized;  appears to be a calcified trileaflet valve with normal  opening. No aortic valve regurgitation seen.  Aortic Root: 4.1 cm.  Left Atrium: Left atrium not well visualized.  Left Ventricle: Endocardium not well visualized; grossly  hyperdynamic left ventricular systolic function. Normal  left ventricular internal dimensions and wall thicknesses.  Right Heart: Right atrium not well visualized, probably  normal. The right ventricle is not well visualized; grossly  normal right ventricular systolic function. Tricuspid valve  not well visualized, probably normal. Minimal tricuspid  regurgitation. Normal pulmonic valve.  Pericardium/Pleura: Trace pericardial effusion.  Hemodynamic: Inadequate tricuspid regurgitation Doppler  envelope precludes estimation of RVSP.  ------------------------------------------------------------------------  Conclusions:  1. Normal mitral valve. Minimal mitral regurgitation.  2. Aortic valve not well visualized; appears to be a  calcified trileaflet valve with normal opening. No aortic  valve regurgitation seen.  3. Endocardium not well visualized; grossly hyperdynamic  left ventricular systolic function.  4. The right ventricle is not well visualized; grossly  normal right ventricular systolic function.  *** No previous Echo exam.    A/P: 67y.o. Male with HTN, CAD s/p ANIKET to mLAD , GLENYS on CPAP, rectal mass s/p transanal biopsy/excision 2017 s/p chemo/XRT, admitted 2017 for robotic low anterior resection ( converted to open abdominal perineal resection) + cystoscopy insertion of uretral catheters, post-op course c/b hypoxia on 2017, and found to have elevated cardiac enzymes for which cardiology was consulted.     1. NSTEMI Type 2 - Now resolved. Suspect demand ischemia / Type 2 NSTEMI in the setting of recent surgery, hypoxia, anemia, CKD in this patient with known CAD s/p ANIKET to mLAD. Less likely represents an acute atherothrombotic event given normal CKMB, no significant EKG changes. Suspect troponin elevated in the setting of recent CKD (Cr recently 1.86 on , now improved).  Cardiac enzymes now downtrended. CT-angio done but could not rule out segmental/subsegmental PE.  -Doubt significant diastolic dysfunction as TTE with underfilled hyperdynamic LV despite significant anasarca/edema.  -Would switch from  to ASA 81 mg po QD  -Cont Atorvastatin 40 gm po QHS  -Potassium 2.9 this AM in the setting of NGT to suction from Ileus overnight. Please maintain K>3.5  -Further, now hypernatremic to 152 with rising creatine and decreasing GFR suggestive of volume depletion.   - (down from 1322), suggesting that he is relatively dry compared to last week.  -No indication for diuretics at this time. Would calculate fluid gap with goal for slow correction of hypernatremia (2-3 meq/day)  -Suspect elevated HR/BP due to stress/pain in the setting of Ileus, 800 cc coffee grounds drained from NGT yesterday.   Cardiac enzymes drawn yesterday were negativex2 (0.07->0.07).    2. HTN  - /85  -Pain management, as this will likely improve HR and blood pressure.  -Cont Norvasc 10 mg po QD  -Cont Imdur 30 mg po QD  -Cont Carvedilol 25 mg po BID    Cardiology will sign off at this time. Please re-consult as needed.      Tyrell East MD  Cardiology Attending  [facility] / [facility]   Cell: [phone]  (Cardiology Nocturnist cell number available 7 pm - 7 am every night; available daytime week days for follow-up only; daytime weekends covered by general cardiology consult service)

## 2017-12-18 NOTE — PROGRESS NOTE ADULT - SUBJECTIVE AND OBJECTIVE BOX
GENERAL SURGERY DAILY PROGRESS NOTE:     Subjective: Patient seen and examined. Patient stable with no overnight events. Patient denies CP/ SOB/ Palpatations. Patient denies N/V. Ostomy function noted.     MEDICATIONS  (STANDING):  ALBUTerol/ipratropium for Nebulization 3 milliLiter(s) Nebulizer every 6 hours  amLODIPine   Tablet 10 milliGRAM(s) Oral daily  aspirin 325 milliGRAM(s) Oral daily  atorvastatin 40 milliGRAM(s) Oral at bedtime  benzocaine 15 mG/menthol 3.6 mG Lozenge 1 Lozenge Oral four times a day  buDESOnide   0.5 milliGRAM(s) Respule 0.5 milliGRAM(s) Inhalation every 12 hours  carvedilol 25 milliGRAM(s) Oral every 12 hours  enoxaparin Injectable 40 milliGRAM(s) SubCutaneous daily  insulin glargine Injectable (LANTUS) 12 Unit(s) SubCutaneous at bedtime  insulin lispro (HumaLOG) corrective regimen sliding scale   SubCutaneous every 6 hours  isosorbide   mononitrate ER Tablet (IMDUR) 30 milliGRAM(s) Oral daily  lidocaine 2% Gel 1 Application(s) Topical once  sodium chloride 0.45% with potassium chloride 20 mEq/L 1000 milliLiter(s) (50 mL/Hr) IV Continuous <Continuous>    MEDICATIONS  (PRN):  nystatin Powder 1 Application(s) Topical two times a day PRN Groin moisture  ondansetron Injectable 4 milliGRAM(s) IV Push every 6 hours PRN Nausea and/or Vomiting  saliva substitute (BIOTENE MOISTURIZING MOUTH SPRAY) 1 [place](s) Topical three times a day PRN Mouth Care    Vital Signs Last 24 Hrs:   T(C): 37.2 (18 Dec 2017 05:00), Max: 37.4 (17 Dec 2017 09:35)  T(F): 98.9 (18 Dec 2017 05:00), Max: 99.3 (17 Dec 2017 09:35)  HR: 103 (18 Dec 2017 05:00) (98 - 120)  BP: 158/85 (18 Dec 2017 05:00) (114/70 - 158/85)  RR: 18 (18 Dec 2017 05:00) (18 - 21)  SpO2: 94% (18 Dec 2017 05:00) (94% - 100%)    I&O's Detail:   17 Dec 2017 07:01  -  18 Dec 2017 07:00  --------------------------------------------------------  IN:    Lactated Ringers IV Bolus: 500 mL    Solution: 800 mL    Solution: 300 mL    TPN (Total Parenteral Nutrition): 1872 mL  Total IN: 3472 mL  OUT:    Colostomy: 25 mL    Indwelling Catheter - Urethral: 1700 mL    Nasoenteral Tube: 3850 mL  Total OUT: 5575 mL  Total NET: -2103 mL  Daily Weight in k.2 (18 Dec 2017 06:00)    LABS:                     8.9    12.8  )-----------( 420      ( 17 Dec 2017 22:28 )             27.7   1217  152<H>  |  98  |  36<H>  ----------------------------<  208<H>  2.9<LL>   |  44<H>  |  1.26  Ca    9.0      17 Dec 2017 22:28  Phos  1.8       Mg     2.6         Urinalysis Basic - ( 17 Dec 2017 16:00 )  Color: Yellow / Appearance: Clear / S.020 / pH: x  Gluc: x / Ketone: Negative  / Bili: Negative / Urobili: Negative   Blood: x / Protein: 300 mg/dL / Nitrite: Negative   Leuk Esterase: Negative / RBC: 2-5 /HPF / WBC 2-5 /HPF   Sq Epi: x / Non Sq Epi: x / Bacteria: Negative /HPF    Physical Exam:  Gen: NAD, AAOx3  Abd: soft, NT, ND  clean /dry/ intact

## 2017-12-19 LAB
ANION GAP SERPL CALC-SCNC: 12 MMOL/L — SIGNIFICANT CHANGE UP (ref 5–17)
BUN SERPL-MCNC: 38 MG/DL — HIGH (ref 7–23)
CALCIUM SERPL-MCNC: 9 MG/DL — SIGNIFICANT CHANGE UP (ref 8.4–10.5)
CHLORIDE SERPL-SCNC: 97 MMOL/L — SIGNIFICANT CHANGE UP (ref 96–108)
CO2 SERPL-SCNC: 41 MMOL/L — HIGH (ref 22–31)
CREAT SERPL-MCNC: 1.4 MG/DL — HIGH (ref 0.5–1.3)
CULTURE RESULTS: SIGNIFICANT CHANGE UP
GLUCOSE BLDC GLUCOMTR-MCNC: 211 MG/DL — HIGH (ref 70–99)
GLUCOSE BLDC GLUCOMTR-MCNC: 234 MG/DL — HIGH (ref 70–99)
GLUCOSE BLDC GLUCOMTR-MCNC: 276 MG/DL — HIGH (ref 70–99)
GLUCOSE BLDC GLUCOMTR-MCNC: 289 MG/DL — HIGH (ref 70–99)
GLUCOSE BLDC GLUCOMTR-MCNC: 304 MG/DL — HIGH (ref 70–99)
GLUCOSE SERPL-MCNC: 204 MG/DL — HIGH (ref 70–99)
HCT VFR BLD CALC: 27 % — LOW (ref 39–50)
HCT VFR BLD CALC: 28.8 % — LOW (ref 39–50)
HGB BLD-MCNC: 8.3 G/DL — LOW (ref 13–17)
HGB BLD-MCNC: 8.6 G/DL — LOW (ref 13–17)
MAGNESIUM SERPL-MCNC: 2.4 MG/DL — SIGNIFICANT CHANGE UP (ref 1.6–2.6)
MCHC RBC-ENTMCNC: 28.8 GM/DL — LOW (ref 32–36)
MCHC RBC-ENTMCNC: 29.3 PG — SIGNIFICANT CHANGE UP (ref 27–34)
MCHC RBC-ENTMCNC: 31.8 GM/DL — LOW (ref 32–36)
MCHC RBC-ENTMCNC: 32.2 PG — SIGNIFICANT CHANGE UP (ref 27–34)
MCV RBC AUTO: 101 FL — HIGH (ref 80–100)
MCV RBC AUTO: 101.8 FL — HIGH (ref 80–100)
PHOSPHATE SERPL-MCNC: 2.6 MG/DL — SIGNIFICANT CHANGE UP (ref 2.5–4.5)
PLATELET # BLD AUTO: 457 K/UL — HIGH (ref 150–400)
PLATELET # BLD AUTO: 480 K/UL — HIGH (ref 150–400)
POTASSIUM SERPL-MCNC: 3.1 MMOL/L — LOW (ref 3.5–5.3)
POTASSIUM SERPL-SCNC: 3.1 MMOL/L — LOW (ref 3.5–5.3)
RBC # BLD: 2.66 M/UL — LOW (ref 4.2–5.8)
RBC # BLD: 2.83 M/UL — LOW (ref 4.2–5.8)
RBC # FLD: 13.1 % — SIGNIFICANT CHANGE UP (ref 10.3–14.5)
RBC # FLD: 14.6 % — HIGH (ref 10.3–14.5)
SODIUM SERPL-SCNC: 150 MMOL/L — HIGH (ref 135–145)
SPECIMEN SOURCE: SIGNIFICANT CHANGE UP
WBC # BLD: 10.7 K/UL — HIGH (ref 3.8–10.5)
WBC # BLD: 13.37 K/UL — HIGH (ref 3.8–10.5)
WBC # FLD AUTO: 10.7 K/UL — HIGH (ref 3.8–10.5)
WBC # FLD AUTO: 13.37 K/UL — HIGH (ref 3.8–10.5)

## 2017-12-19 PROCEDURE — 93970 EXTREMITY STUDY: CPT | Mod: 26

## 2017-12-19 RX ORDER — POTASSIUM CHLORIDE 20 MEQ
10 PACKET (EA) ORAL
Qty: 0 | Refills: 0 | Status: COMPLETED | OUTPATIENT
Start: 2017-12-19 | End: 2017-12-19

## 2017-12-19 RX ORDER — INSULIN LISPRO 100/ML
VIAL (ML) SUBCUTANEOUS AT BEDTIME
Qty: 0 | Refills: 0 | Status: DISCONTINUED | OUTPATIENT
Start: 2017-12-19 | End: 2017-12-22

## 2017-12-19 RX ORDER — INSULIN LISPRO 100/ML
VIAL (ML) SUBCUTANEOUS
Qty: 0 | Refills: 0 | Status: DISCONTINUED | OUTPATIENT
Start: 2017-12-19 | End: 2017-12-20

## 2017-12-19 RX ORDER — SODIUM CHLORIDE 9 MG/ML
500 INJECTION INTRAMUSCULAR; INTRAVENOUS; SUBCUTANEOUS ONCE
Qty: 0 | Refills: 0 | Status: COMPLETED | OUTPATIENT
Start: 2017-12-19 | End: 2017-12-19

## 2017-12-19 RX ORDER — DEXTROSE MONOHYDRATE, SODIUM CHLORIDE, AND POTASSIUM CHLORIDE 50; .745; 4.5 G/1000ML; G/1000ML; G/1000ML
1000 INJECTION, SOLUTION INTRAVENOUS
Qty: 0 | Refills: 0 | Status: DISCONTINUED | OUTPATIENT
Start: 2017-12-19 | End: 2017-12-20

## 2017-12-19 RX ORDER — ACETAMINOPHEN 500 MG
1000 TABLET ORAL ONCE
Qty: 0 | Refills: 0 | Status: COMPLETED | OUTPATIENT
Start: 2017-12-19 | End: 2017-12-20

## 2017-12-19 RX ADMIN — Medication 100 MILLIEQUIVALENT(S): at 01:26

## 2017-12-19 RX ADMIN — AMLODIPINE BESYLATE 10 MILLIGRAM(S): 2.5 TABLET ORAL at 05:37

## 2017-12-19 RX ADMIN — PANTOPRAZOLE SODIUM 40 MILLIGRAM(S): 20 TABLET, DELAYED RELEASE ORAL at 12:24

## 2017-12-19 RX ADMIN — BENZOCAINE AND MENTHOL 1 LOZENGE: 5; 1 LIQUID ORAL at 05:37

## 2017-12-19 RX ADMIN — Medication 4: at 01:26

## 2017-12-19 RX ADMIN — Medication 81 MILLIGRAM(S): at 12:26

## 2017-12-19 RX ADMIN — ATORVASTATIN CALCIUM 40 MILLIGRAM(S): 80 TABLET, FILM COATED ORAL at 22:22

## 2017-12-19 RX ADMIN — CARVEDILOL PHOSPHATE 25 MILLIGRAM(S): 80 CAPSULE, EXTENDED RELEASE ORAL at 05:37

## 2017-12-19 RX ADMIN — ENOXAPARIN SODIUM 40 MILLIGRAM(S): 100 INJECTION SUBCUTANEOUS at 12:23

## 2017-12-19 RX ADMIN — Medication 6: at 18:07

## 2017-12-19 RX ADMIN — Medication 3 MILLILITER(S): at 05:37

## 2017-12-19 RX ADMIN — Medication 3 MILLILITER(S): at 17:57

## 2017-12-19 RX ADMIN — ISOSORBIDE MONONITRATE 30 MILLIGRAM(S): 60 TABLET, EXTENDED RELEASE ORAL at 12:23

## 2017-12-19 RX ADMIN — Medication 100 MILLIEQUIVALENT(S): at 05:37

## 2017-12-19 RX ADMIN — BENZOCAINE AND MENTHOL 1 LOZENGE: 5; 1 LIQUID ORAL at 12:24

## 2017-12-19 RX ADMIN — Medication 100 MILLIEQUIVALENT(S): at 17:08

## 2017-12-19 RX ADMIN — Medication 0.5 MILLIGRAM(S): at 05:37

## 2017-12-19 RX ADMIN — Medication 6: at 13:20

## 2017-12-19 RX ADMIN — SODIUM CHLORIDE 1000 MILLILITER(S): 9 INJECTION INTRAMUSCULAR; INTRAVENOUS; SUBCUTANEOUS at 17:50

## 2017-12-19 RX ADMIN — Medication 100 MILLIEQUIVALENT(S): at 17:51

## 2017-12-19 RX ADMIN — INSULIN GLARGINE 12 UNIT(S): 100 INJECTION, SOLUTION SUBCUTANEOUS at 22:22

## 2017-12-19 RX ADMIN — CARVEDILOL PHOSPHATE 25 MILLIGRAM(S): 80 CAPSULE, EXTENDED RELEASE ORAL at 17:57

## 2017-12-19 RX ADMIN — Medication 3 MILLILITER(S): at 12:27

## 2017-12-19 RX ADMIN — Medication 4: at 05:37

## 2017-12-19 RX ADMIN — Medication 3 MILLILITER(S): at 23:29

## 2017-12-19 RX ADMIN — Medication 100 MILLIEQUIVALENT(S): at 18:48

## 2017-12-19 NOTE — PROGRESS NOTE ADULT - ATTENDING COMMENTS
Pt reports no abd pain. RNs not recording PO intake however patient is drinking several cups of water per shift    AAOx3  Abd softly distended, NT, VAC in place, colostomy black with pink viable mucosa just inside    A/P S/P APR  fever possibly from PICC line, no source on CT C/A/P  OOB ambulate  NGT clamp trial Pt reports no abd pain. RNs not recording PO intake however patient is drinking several cups of water per shift    AAOx3  Abd softly distended, NT, VAC in place, colostomy black with pink viable mucosa just inside    A/P S/P APR  fever possibly from PICC line, no source on CT C/A/P  OOB ambulate  D/C NGT, start clears

## 2017-12-19 NOTE — CONSULT NOTE ADULT - CONSULT REQUESTED DATE/TIME
06-Dec-2017 19:48
12-Dec-2017 21:47
13-Dec-2017 10:30
19-Dec-2017 20:46
19-Dec-2017 16:31
15-Dec-2017 10:24

## 2017-12-19 NOTE — CONSULT NOTE ADULT - SUBJECTIVE AND OBJECTIVE BOX
Patient is a 67 year old obese male with PMH of HTN, MI/CAD with stent x 1 2009, GLENYS CPAP use with rectal mass s/p transanal excision. Biopsy 7/2017 s/p neoadjuvant chemoradiation planned for laparoscopic robotic assisted low anterior resection, cystoscopy with insertion of ureteral catheters. Patient was taken for a Robotic LAR converted to open APR. Course complicated by respiratory issues currently followed by Pulmonology. Patient was spiking low grade temps blood cultures were sent, along with a urine culture, and CXR was performed. CT scan and duplex was performed results noted below.     ICU Vital Signs Last 24 Hrs:   T(C): 37.2 (19 Dec 2017 13:53), Max: 37.6 (18 Dec 2017 17:32)  T(F): 99 (19 Dec 2017 13:53), Max: 99.7 (18 Dec 2017 17:32)  HR: 90 (19 Dec 2017 13:53) (83 - 118)  BP: 127/78 (19 Dec 2017 13:53) (124/69 - 185/83)  RR: 18 (19 Dec 2017 13:53) (18 - 18)  SpO2: 94% (19 Dec 2017 13:53) (92% - 98%)    Physical Exam:   Gen: NAD, AAOx3   Abd: Soft, obese, Non-distended   clean dry intact, vac in place     Imaging:   < from: CT Chest w/ IV Cont (12.18.17 @ 21:27) >  IMPRESSION: Persistent soft tissue thickening and fullness in the   surgical bed adjacent to the rectal stump with droplets of gas that may   represent infection. No definite abscess seen at this time. Follow-up   recommended. No evidence of pneumonia< end of copied text >  < from: CT Abdomen and Pelvis w/ Oral Cont and w/ IV Cont (12.18.17 @ 21:27) >  IMPRESSION: Persistent soft tissue thickening and fullness in the   surgical bed adjacent to the rectal stump with droplets of gas that may   represent infection. No definite abscess seen at this time. Follow-up   recommended. No evidence of pneumonia < end of copied text >    Duplex:   < from: [facility] Duplex Lower Ext Vein Scan, Bilat (12.19.17 @ 15:26) >  IMPRESSION: There is persistent right peroneal vein DVT without proximal   propagation. < end of copied text >    < from: [facility] Duplex Lower Ext Vein Scan, Bilat (12.13.17 @ 13:46) >  IMPRESSION: No evidence of deep vein thrombosis of either lower extremity.  ALINE Hill notified.  < end of copied text >

## 2017-12-19 NOTE — PROGRESS NOTE ADULT - SUBJECTIVE AND OBJECTIVE BOX
Follow-up Pulm Progress Note    No new respiratory events overnight.  Denies SOB/CP.     Medications:  MEDICATIONS  (STANDING):  acetaminophen  IVPB. 1000 milliGRAM(s) IV Intermittent once  ALBUTerol/ipratropium for Nebulization 3 milliLiter(s) Nebulizer every 6 hours  amLODIPine   Tablet 10 milliGRAM(s) Oral daily  aspirin enteric coated 81 milliGRAM(s) Oral daily  atorvastatin 40 milliGRAM(s) Oral at bedtime  benzocaine 15 mG/menthol 3.6 mG Lozenge 1 Lozenge Oral four times a day  buDESOnide   0.5 milliGRAM(s) Respule 0.5 milliGRAM(s) Inhalation every 12 hours  carvedilol 25 milliGRAM(s) Oral every 12 hours  enoxaparin Injectable 40 milliGRAM(s) SubCutaneous daily  insulin glargine Injectable (LANTUS) 12 Unit(s) SubCutaneous at bedtime  insulin lispro (HumaLOG) corrective regimen sliding scale   SubCutaneous every 6 hours  isosorbide   mononitrate ER Tablet (IMDUR) 30 milliGRAM(s) Oral daily  lidocaine 2% Gel 1 Application(s) Topical once  pantoprazole  Injectable 40 milliGRAM(s) IV Push daily  potassium chloride  10 mEq/100 mL IVPB 10 milliEquivalent(s) IV Intermittent every 1 hour  sodium chloride 0.45% with potassium chloride 20 mEq/L 1000 milliLiter(s) (100 mL/Hr) IV Continuous <Continuous>    MEDICATIONS  (PRN):  nystatin Powder 1 Application(s) Topical two times a day PRN Groin moisture  ondansetron Injectable 4 milliGRAM(s) IV Push every 6 hours PRN Nausea and/or Vomiting  saliva substitute (BIOTENE MOISTURIZING MOUTH SPRAY) 1 [place](s) Topical three times a day PRN Mouth Care          Vital Signs Last 24 Hrs  T(C): 37.8 (19 Dec 2017 17:00), Max: 37.8 (19 Dec 2017 17:00)  T(F): 100 (19 Dec 2017 17:00), Max: 100 (19 Dec 2017 17:00)  HR: 107 (19 Dec 2017 17:00) (83 - 118)  BP: 120/73 (19 Dec 2017 17:00) (120/73 - 185/83)  BP(mean): --  RR: 18 (19 Dec 2017 17:00) (18 - 18)  SpO2: 94% (19 Dec 2017 17:00) (92% - 98%)    ABG - ( 18 Dec 2017 18:53 )  pH: 7.53  /  pCO2: 55    /  pO2: 60    / HCO3: 46    / Base Excess: 20.2  /  SaO2: 91                     @ 07:01  -   @ 07:00  --------------------------------------------------------  IN: 4672 mL / OUT: 3150 mL / NET: 1522 mL          LABS:                        8.7    13.15 )-----------( 461      ( 18 Dec 2017 08:39 )             29.3     12    150<H>  |  97  |  38<H>  ----------------------------<  204<H>  3.1<L>   |  41<H>  |  1.40<H>    Ca    9.0      19 Dec 2017 08:54  Phos  2.6       Mg     2.4             CARDIAC MARKERS ( 17 Dec 2017 22:28 )  x     / 0.06 ng/mL / 34 U/L / x     / x          CAPILLARY BLOOD GLUCOSE      POCT Blood Glucose.: 276 mg/dL (19 Dec 2017 13:12)      Urinalysis Basic - ( 18 Dec 2017 19:14 )    Color: Yellow / Appearance: Clear / S.021 / pH: x  Gluc: x / Ketone: Negative  / Bili: Negative / Urobili: Negative   Blood: x / Protein: 150 mg/dL / Nitrite: Negative   Leuk Esterase: Moderate / RBC: 10-25 /HPF / WBC 26-50 /HPF   Sq Epi: x / Non Sq Epi: OCC /HPF / Bacteria: Moderate /HPF      Procalcitonin, Serum: 3.79 ng/mL (17 @ 07:43)    Serum Pro-Brain Natriuretic Peptide: 681 pg/mL (17 @ 22:28)  Serum Pro-Brain Natriuretic Peptide: 792 pg/mL (17 @ 13:44)                CULTURES: (if applicable)          Physical Examination:  PULM: Clear to auscultation bilaterally, no significant sputum production  CVS: S1, S2 heard    RADIOLOGY REVIEWED  CXR:     CT chest:    TTE: Follow-up Pulm Progress Note    No new respiratory events overnight.  Denies SOB/CP.   93% on 3L NC    Medications:  MEDICATIONS  (STANDING):  acetaminophen  IVPB. 1000 milliGRAM(s) IV Intermittent once  ALBUTerol/ipratropium for Nebulization 3 milliLiter(s) Nebulizer every 6 hours  amLODIPine   Tablet 10 milliGRAM(s) Oral daily  aspirin enteric coated 81 milliGRAM(s) Oral daily  atorvastatin 40 milliGRAM(s) Oral at bedtime  benzocaine 15 mG/menthol 3.6 mG Lozenge 1 Lozenge Oral four times a day  buDESOnide   0.5 milliGRAM(s) Respule 0.5 milliGRAM(s) Inhalation every 12 hours  carvedilol 25 milliGRAM(s) Oral every 12 hours  enoxaparin Injectable 40 milliGRAM(s) SubCutaneous daily  insulin glargine Injectable (LANTUS) 12 Unit(s) SubCutaneous at bedtime  insulin lispro (HumaLOG) corrective regimen sliding scale   SubCutaneous every 6 hours  isosorbide   mononitrate ER Tablet (IMDUR) 30 milliGRAM(s) Oral daily  lidocaine 2% Gel 1 Application(s) Topical once  pantoprazole  Injectable 40 milliGRAM(s) IV Push daily  potassium chloride  10 mEq/100 mL IVPB 10 milliEquivalent(s) IV Intermittent every 1 hour  sodium chloride 0.45% with potassium chloride 20 mEq/L 1000 milliLiter(s) (100 mL/Hr) IV Continuous <Continuous>    MEDICATIONS  (PRN):  nystatin Powder 1 Application(s) Topical two times a day PRN Groin moisture  ondansetron Injectable 4 milliGRAM(s) IV Push every 6 hours PRN Nausea and/or Vomiting  saliva substitute (BIOTENE MOISTURIZING MOUTH SPRAY) 1 [place](s) Topical three times a day PRN Mouth Care          Vital Signs Last 24 Hrs  T(C): 37.8 (19 Dec 2017 17:00), Max: 37.8 (19 Dec 2017 17:00)  T(F): 100 (19 Dec 2017 17:00), Max: 100 (19 Dec 2017 17:00)  HR: 107 (19 Dec 2017 17:00) (83 - 118)  BP: 120/73 (19 Dec 2017 17:00) (120/73 - 185/83)  BP(mean): --  RR: 18 (19 Dec 2017 17:00) (18 - 18)  SpO2: 94% (19 Dec 2017 17:00) (92% - 98%) on 3L NC    ABG - ( 18 Dec 2017 18:53 )  pH: 7.53  /  pCO2: 55    /  pO2: 60    / HCO3: 46    / Base Excess: 20.2  /  SaO2: 91                     @ 07:01  -   @ 07:00  --------------------------------------------------------  IN: 4672 mL / OUT: 3150 mL / NET: 1522 mL          LABS:                        8.7    13.15 )-----------( 461      ( 18 Dec 2017 08:39 )             29.3     12-    150<H>  |  97  |  38<H>  ----------------------------<  204<H>  3.1<L>   |  41<H>  |  1.40<H>    Ca    9.0      19 Dec 2017 08:54  Phos  2.6       Mg     2.4             CARDIAC MARKERS ( 17 Dec 2017 22:28 )  x     / 0.06 ng/mL / 34 U/L / x     / x          CAPILLARY BLOOD GLUCOSE      POCT Blood Glucose.: 276 mg/dL (19 Dec 2017 13:12)      Urinalysis Basic - ( 18 Dec 2017 19:14 )    Color: Yellow / Appearance: Clear / S.021 / pH: x  Gluc: x / Ketone: Negative  / Bili: Negative / Urobili: Negative   Blood: x / Protein: 150 mg/dL / Nitrite: Negative   Leuk Esterase: Moderate / RBC: 10-25 /HPF / WBC 26-50 /HPF   Sq Epi: x / Non Sq Epi: OCC /HPF / Bacteria: Moderate /HPF      Procalcitonin, Serum: 3.79 ng/mL (17 @ 07:43)    Serum Pro-Brain Natriuretic Peptide: 681 pg/mL (17 @ 22:28)  Serum Pro-Brain Natriuretic Peptide: 792 pg/mL (12-17-17 @ 13:44)                CULTURES: (if applicable)          Physical Examination:  PULM: Decreased BS bases  CVS: S1, S2 RRR    RADIOLOGY REVIEWED  CT Chest: < from: CT Chest w/ IV Cont (17 @ 21:27) >  CHEST:     LUNGSAND LARGE AIRWAYS: Patent central airways. Pulmonary nodules seen   on prior study not well seen on the current examination. Basilar   atelectasis.   PLEURA: Small bilateral effusions, improved.  VESSELS: Within normal limits.  HEART: Heart size is normal.Trace pericardial effusion.  MEDIASTINUM AND JOSE: Scattered small mediastinal lymph nodes.  CHEST WALL AND LOWER NECK: Right central venous catheter in the SVC.      < end of copied text >    VA Duplex: < from: [facility] Duplex Lower Ext Vein Scan, Bilat (17 @ 15:26) >  IMPRESSION: There is persistent right peroneal vein DVT without proximal   propagation.    < end of copied text > Follow-up Pulm Progress Note    No new respiratory events overnight.  Denies SOB/CP.   93% on 3L NC    Medications:  MEDICATIONS  (STANDING):  acetaminophen  IVPB. 1000 milliGRAM(s) IV Intermittent once  ALBUTerol/ipratropium for Nebulization 3 milliLiter(s) Nebulizer every 6 hours  amLODIPine   Tablet 10 milliGRAM(s) Oral daily  aspirin enteric coated 81 milliGRAM(s) Oral daily  atorvastatin 40 milliGRAM(s) Oral at bedtime  benzocaine 15 mG/menthol 3.6 mG Lozenge 1 Lozenge Oral four times a day  buDESOnide   0.5 milliGRAM(s) Respule 0.5 milliGRAM(s) Inhalation every 12 hours  carvedilol 25 milliGRAM(s) Oral every 12 hours  enoxaparin Injectable 40 milliGRAM(s) SubCutaneous daily  insulin glargine Injectable (LANTUS) 12 Unit(s) SubCutaneous at bedtime  insulin lispro (HumaLOG) corrective regimen sliding scale   SubCutaneous every 6 hours  isosorbide   mononitrate ER Tablet (IMDUR) 30 milliGRAM(s) Oral daily  lidocaine 2% Gel 1 Application(s) Topical once  pantoprazole  Injectable 40 milliGRAM(s) IV Push daily  potassium chloride  10 mEq/100 mL IVPB 10 milliEquivalent(s) IV Intermittent every 1 hour  sodium chloride 0.45% with potassium chloride 20 mEq/L 1000 milliLiter(s) (100 mL/Hr) IV Continuous <Continuous>    MEDICATIONS  (PRN):  nystatin Powder 1 Application(s) Topical two times a day PRN Groin moisture  ondansetron Injectable 4 milliGRAM(s) IV Push every 6 hours PRN Nausea and/or Vomiting  saliva substitute (BIOTENE MOISTURIZING MOUTH SPRAY) 1 [place](s) Topical three times a day PRN Mouth Care    Vital Signs Last 24 Hrs  T(C): 37.8 (19 Dec 2017 17:00), Max: 37.8 (19 Dec 2017 17:00)  T(F): 100 (19 Dec 2017 17:00), Max: 100 (19 Dec 2017 17:00)  HR: 107 (19 Dec 2017 17:00) (83 - 118)  BP: 120/73 (19 Dec 2017 17:00) (120/73 - 185/83)  BP(mean): --  RR: 18 (19 Dec 2017 17:00) (18 - 18)  SpO2: 94% (19 Dec 2017 17:00) (92% - 98%) on 3L NC    ABG - ( 18 Dec 2017 18:53 )  pH: 7.53  /  pCO2: 55    /  pO2: 60    / HCO3: 46    / Base Excess: 20.2  /  SaO2: 91         @ 07:01  -   @ 07:00  --------------------------------------------------------  IN: 4672 mL / OUT: 3150 mL / NET: 1522 mL    LABS:                        8.7    13.15 )-----------( 461      ( 18 Dec 2017 08:39 )             29.3     12-    150<H>  |  97  |  38<H>  ----------------------------<  204<H>  3.1<L>   |  41<H>  |  1.40<H>    Ca    9.0      19 Dec 2017 08:54  Phos  2.6       Mg     2.4         CARDIAC MARKERS ( 17 Dec 2017 22:28 )  x     / 0.06 ng/mL / 34 U/L / x     / x        CAPILLARY BLOOD GLUCOSE    POCT Blood Glucose.: 276 mg/dL (19 Dec 2017 13:12)      Urinalysis Basic - ( 18 Dec 2017 19:14 )    Color: Yellow / Appearance: Clear / S.021 / pH: x  Gluc: x / Ketone: Negative  / Bili: Negative / Urobili: Negative   Blood: x / Protein: 150 mg/dL / Nitrite: Negative   Leuk Esterase: Moderate / RBC: 10-25 /HPF / WBC 26-50 /HPF   Sq Epi: x / Non Sq Epi: OCC /HPF / Bacteria: Moderate /HPF      Procalcitonin, Serum: 3.79 ng/mL (17 @ 07:43)    Serum Pro-Brain Natriuretic Peptide: 681 pg/mL (17 @ 22:28)  Serum Pro-Brain Natriuretic Peptide: 792 pg/mL (12-17-17 @ 13:44)    CULTURES: (if applicable)    Physical Examination:  PULM: Decreased BS bases  CVS: S1, S2 RRR    RADIOLOGY REVIEWED  CT Chest: < from: CT Chest w/ IV Cont (17 @ 21:27) >  CHEST:     LUNGSAND LARGE AIRWAYS: Patent central airways. Pulmonary nodules seen   on prior study not well seen on the current examination. Basilar   atelectasis.   PLEURA: Small bilateral effusions, improved.  VESSELS: Within normal limits.  HEART: Heart size is normal.Trace pericardial effusion.  MEDIASTINUM AND JOSE: Scattered small mediastinal lymph nodes.  CHEST WALL AND LOWER NECK: Right central venous catheter in the SVC.      < end of copied text >    VA Duplex: < from: [facility] Duplex Lower Ext Vein Scan, Bilat (17 @ 15:26) >  IMPRESSION: There is persistent right peroneal vein DVT without proximal   propagation.    < end of copied text >

## 2017-12-19 NOTE — CONSULT NOTE ADULT - ASSESSMENT
67 year old obese male with PMH of HTN, MI/CAD with stent x 1 2009, GLENYS cpap use with rectal mass s/p transanal excision/ biopsy 7/2017 s/p neoadjuvant chemoradiation admitted 12/6 for LAR. Hospital course c/b post-operative hypotension requiring SICU admission. Downgraded to floors on 12/7. On 12/12 he was noted to be hypoxic to 70s.  noticed with cr rise to 1.4     1- BILL   2- hypokalemia  3- alkalosis     rise in creatinine likely mutifactorial including iv contrast used for ct scan  monitor creatinine and replete kcl   ivf hydration to cont  coreg and norvasc to cont

## 2017-12-19 NOTE — PROGRESS NOTE ADULT - SUBJECTIVE AND OBJECTIVE BOX
GENERAL SURGERY DAILY PROGRESS NOTE:     Subjective: Patient seen and examined. Patient stable with no overnight events. Patient denies CP/ SOB/ Palpatations. Patient denies N/V. Reports No flatus and No BM.     MEDICATIONS  (STANDING):  ALBUTerol/ipratropium for Nebulization 3 milliLiter(s) Nebulizer every 6 hours  amLODIPine   Tablet 10 milliGRAM(s) Oral daily  aspirin enteric coated 81 milliGRAM(s) Oral daily  atorvastatin 40 milliGRAM(s) Oral at bedtime  benzocaine 15 mG/menthol 3.6 mG Lozenge 1 Lozenge Oral four times a day  buDESOnide   0.5 milliGRAM(s) Respule 0.5 milliGRAM(s) Inhalation every 12 hours  carvedilol 25 milliGRAM(s) Oral every 12 hours  enoxaparin Injectable 40 milliGRAM(s) SubCutaneous daily  insulin glargine Injectable (LANTUS) 12 Unit(s) SubCutaneous at bedtime  insulin lispro (HumaLOG) corrective regimen sliding scale   SubCutaneous every 6 hours  isosorbide   mononitrate ER Tablet (IMDUR) 30 milliGRAM(s) Oral daily  lidocaine 2% Gel 1 Application(s) Topical once  pantoprazole  Injectable 40 milliGRAM(s) IV Push daily  sodium chloride 0.45% with potassium chloride 20 mEq/L 1000 milliLiter(s) (100 mL/Hr) IV Continuous <Continuous>    MEDICATIONS  (PRN):  nystatin Powder 1 Application(s) Topical two times a day PRN Groin moisture  ondansetron Injectable 4 milliGRAM(s) IV Push every 6 hours PRN Nausea and/or Vomiting  saliva substitute (BIOTENE MOISTURIZING MOUTH SPRAY) 1 [place](s) Topical three times a day PRN Mouth Care      Vital Signs Last 24 Hrs  T(C): 37.2 (19 Dec 2017 05:00), Max: 38.3 (18 Dec 2017 14:00)  T(F): 99 (19 Dec 2017 05:00), Max: 100.9 (18 Dec 2017 14:00)  HR: 99 (19 Dec 2017 05:00) (79 - 118)  BP: 152/83 (19 Dec 2017 05:00) (124/69 - 152/83)  BP(mean): --  RR: 18 (19 Dec 2017 05:00) (17 - 18)  SpO2: 92% (19 Dec 2017 05:00) (92% - 98%)    I&O's Detail    18 Dec 2017 07:01  -  19 Dec 2017 07:00  --------------------------------------------------------  IN:    sodium chloride 0.45% with potassium chloride 20 mEq/L: 2400 mL    Solution: 400 mL    TPN (Total Parenteral Nutrition): 1872 mL  Total IN: 4672 mL  OUT:    Colostomy: 100 mL    Indwelling Catheter - Urethral: 550 mL    Nasoenteral Tube: 1500 mL    Voided: 1000 mL  Total OUT: 3150 mL  Total NET: 1522 mL    Daily     Daily Weight in k.6 (19 Dec 2017 05:00)    LABS:                        8.7    13.15 )-----------( 461      ( 18 Dec 2017 08:39 )             29.3     12-18    150<H>  |  94<L>  |  37<H>  ----------------------------<  232<H>  3.0<L>   |  42<H>  |  1.20    Ca    9.4      18 Dec 2017 09:41  Phos  2.8     12-18  Mg     2.4     12-18        Urinalysis Basic - ( 18 Dec 2017 19:14 )    Color: Yellow / Appearance: Clear / S.021 / pH: x  Gluc: x / Ketone: Negative  / Bili: Negative / Urobili: Negative   Blood: x / Protein: 150 mg/dL / Nitrite: Negative   Leuk Esterase: Moderate / RBC: 10-25 /HPF / WBC 26-50 /HPF   Sq Epi: x / Non Sq Epi: OCC /HPF / Bacteria: Moderate /HPF    Physical Exam:   Gen: NAD, AAOx3  Abd: soft, NT, mild distention   clean/ dry / intact

## 2017-12-19 NOTE — PROGRESS NOTE ADULT - ATTENDING COMMENTS
would adjust TPN for sodium and glucose  OOB chair  continues to have atelactasis  not diuresing at present

## 2017-12-19 NOTE — CONSULT NOTE ADULT - CONSULT REASON
Elizabeth
Hypoxic respiratory failure
Troponin elevation
monitoring while in PACU on phenylephrine
Evaluation of persistent Right peroneal vein DVT
Prolonged PO ileus after colectomy

## 2017-12-19 NOTE — PROGRESS NOTE ADULT - SUBJECTIVE AND OBJECTIVE BOX
Day # of PN  PN infusion at   12-18 @ 07:01  -  12-19 @ 07:00  --------------------------------------------------------  IN:    sodium chloride 0.45% with potassium chloride 20 mEq/L: 2400 mL    Solution: 400 mL    TPN (Total Parenteral Nutrition): 1872 mL  Total IN: 4672 mL    OUT:    Colostomy: 100 mL    Indwelling Catheter - Urethral: 550 mL    Nasoenteral Tube: 1500 mL    Voided: 1000 mL  Total OUT: 3150 mL    Total NET: 1522 mL        T(C): 37.2 (12-19-17 @ 05:00), Max: 38.3 (12-18-17 @ 14:00)  HR: 99 (12-19-17 @ 05:00) (79 - 118)  BP: 152/83 (12-19-17 @ 05:00) (124/69 - 152/83)  RR: 18 (12-19-17 @ 05:00) (17 - 18)  SpO2: 92% (12-19-17 @ 05:00) (92% - 98%)  Wt(kg): --    Labs   12-18    150<H>  |  94<L>  |  37<H>  ----------------------------<  232<H>  3.0<L>   |  42<H>  |  1.20    Ca    9.4      18 Dec 2017 09:41  Phos  2.8     12-18  Mg     2.4     12-18          CAPILLARY BLOOD GLUCOSE      POCT Blood Glucose.: 211 mg/dL (19 Dec 2017 05:30)  POCT Blood Glucose.: 234 mg/dL (19 Dec 2017 00:52)  POCT Blood Glucose.: 249 mg/dL (18 Dec 2017 18:39)  POCT Blood Glucose.: 282 mg/dL (18 Dec 2017 13:19)    I&O's Detail    18 Dec 2017 07:01  -  19 Dec 2017 07:00  --------------------------------------------------------  IN:    sodium chloride 0.45% with potassium chloride 20 mEq/L: 2400 mL    Solution: 400 mL    TPN (Total Parenteral Nutrition): 1872 mL  Total IN: 4672 mL    OUT:    Colostomy: 100 mL    Indwelling Catheter - Urethral: 550 mL    Nasoenteral Tube: 1500 mL    Voided: 1000 mL  Total OUT: 3150 mL    Total NET: 1522 mL

## 2017-12-19 NOTE — CONSULT NOTE ADULT - ASSESSMENT
67 year old male s/p Robotic LAR converted to open LAR evaluation for possible evaluation of persistent Right peroneal vein DVT  -Vascular surgery for evaluation of need for Anticoagulation       Jesusita Gilliam PA-C  #[id number] 67 year old male s/p Robotic LAR converted to open LAR evaluation for possible evaluation of persistent Right peroneal vein DVT    - Would rec. anticoagulation for treatment of RLE DVT, if no contraindication from primary surgical team, due to patient's hypercoagulability secondary to underlying malignancy  - If primary surgical team has strong contraindication to therapeutic anticoagulation, would obtain serial RLE duplex scans for monitoring of DVT to assess for proximal propagation  - Discussed with vascular fellow, Dr. Partida.      Elena Young MD PGY3  Vascular Surgery, #[id number]

## 2017-12-19 NOTE — PROGRESS NOTE ADULT - ASSESSMENT
Assessment:    67y Male who presents with Malignant neoplasm of rectum s/p Robotic LAR converted to APR    Plan:  -DVT PPX- Lovenox/ ASA   -Pain control   -OOB as tolerated with assistance   -diet as tolerated  -monitor Gi Fxn   -Dispo Planning   -F/U offical CT scan read  -F/U blood cultures     Jesusita Gilliam   #[id number] 12-19-17 @ 07:11

## 2017-12-19 NOTE — CONSULT NOTE ADULT - SUBJECTIVE AND OBJECTIVE BOX
[facility]  [phone]  NEPHROLOGY      INITIAL CONSULT NOTE  --------------------------------------------------------------------------------    HPI:  67 year old obese male with PMH of HTN, MI/CAD with stent x 1 2009, GLENYS cpap use with rectal mass s/p transanal excision/ biopsy 7/2017 s/p neoadjuvant chemoradiation admitted 12/6 for LAR. Hospital course c/b post-operative hypotension requiring SICU admission. Downgraded to floors on 12/7. On 12/12 he was noted to be hypoxic to 70s. Placed on bipap. pt required iv contrast for the source of his infection. noticed with cr rise to 1.4 renal consult called.       PAST HISTORY  --------------------------------------------------------------------------------  PAST MEDICAL & SURGICAL HISTORY:  Rectal cancer: s/p chemo radiation  Vitiligo  Stented coronary artery  Obesity (BMI 30-39.9)  Cataracts, bilateral  Hyperlipidemia, unspecified hyperlipidemia type  Tobacco use: quit Jan 2017  PVD (peripheral vascular disease)  Diabetes mellitus, type 2  Essential (primary) hypertension  Abnormal rectal biopsy: 7/2017 rectal tumor excision  History of cholecystectomy  S/P tonsillectomy and adenoidectomy  H/O heart artery stent: stent x 1 and balloon angioplasty - 2009    FAMILY HISTORY:  Family history of essential hypertension  Diabetes mellitus, type 2    PAST SOCIAL HISTORY: past tobacco     ALLERGIES & MEDICATIONS  --------------------------------------------------------------------------------  Allergies    No Known Allergies    Intolerances      Standing Inpatient Medications  acetaminophen  IVPB. 1000 milliGRAM(s) IV Intermittent once  ALBUTerol/ipratropium for Nebulization 3 milliLiter(s) Nebulizer every 6 hours  amLODIPine   Tablet 10 milliGRAM(s) Oral daily  aspirin enteric coated 81 milliGRAM(s) Oral daily  atorvastatin 40 milliGRAM(s) Oral at bedtime  benzocaine 15 mG/menthol 3.6 mG Lozenge 1 Lozenge Oral four times a day  buDESOnide   0.5 milliGRAM(s) Respule 0.5 milliGRAM(s) Inhalation every 12 hours  carvedilol 25 milliGRAM(s) Oral every 12 hours  enoxaparin Injectable 40 milliGRAM(s) SubCutaneous daily  insulin glargine Injectable (LANTUS) 12 Unit(s) SubCutaneous at bedtime  insulin lispro (HumaLOG) corrective regimen sliding scale   SubCutaneous every 6 hours  isosorbide   mononitrate ER Tablet (IMDUR) 30 milliGRAM(s) Oral daily  lidocaine 2% Gel 1 Application(s) Topical once  pantoprazole  Injectable 40 milliGRAM(s) IV Push daily  sodium chloride 0.45% with potassium chloride 20 mEq/L 1000 milliLiter(s) IV Continuous <Continuous>    PRN Inpatient Medications  nystatin Powder 1 Application(s) Topical two times a day PRN  ondansetron Injectable 4 milliGRAM(s) IV Push every 6 hours PRN  saliva substitute (BIOTENE MOISTURIZING MOUTH SPRAY) 1 [place](s) Topical three times a day PRN      REVIEW OF SYSTEMS  --------------------------------------------------------------------------------  Gen: No, fevers/chills, weakness+  Skin: No rashes  Head/Eyes/Ears/Mouth: No headache; Normal hearing;  No sinus pain/discomfort, sore throat  Respiratory: No dyspnea, + cough,-  wheezing, - hemoptysis  CV: No chest pain, or palp   GI: No abdominal pain, - diarrhea, c- nausea, -vomiting,   : No increased frequency, dysuria, hematuria,   Neuro: No dizziness/lightheadedness,  + c/o hiccups   All other systems were reviewed and are negative, except as noted.    VITALS/PHYSICAL EXAM  --------------------------------------------------------------------------------  T(C): 37.8 (12-19-17 @ 17:00), Max: 37.8 (12-19-17 @ 17:00)  HR: 107 (12-19-17 @ 17:00) (90 - 107)  BP: 120/73 (12-19-17 @ 17:00) (120/73 - 185/83)  RR: 18 (12-19-17 @ 17:00) (18 - 18)  SpO2: 94% (12-19-17 @ 17:00) (92% - 96%)  Wt(kg): --        12-18-17 @ 07:01  -  12-19-17 @ 07:00  --------------------------------------------------------  IN: 4672 mL / OUT: 3150 mL / NET: 1522 mL    12-19-17 @ 07:01  -  12-19-17 @ 20:46  --------------------------------------------------------  IN: 1376 mL / OUT: 800 mL / NET: 576 mL      Physical Exam:  	Gen: alert and oriented. speech coherent   	no jvd supple neck,   	Pulm: decrease bs no rales or ronchi or wheezing   	CV: RRR, S1S2; no rub  	Abd: + BS, distended + ostomy + abthera vac   	: No suprapubic tenderness  	ext: Warm, FROM, no clubbing, intact strength; no edema  	  LABS/STUDIES  --------------------------------------------------------------------------------              8.6    10.7  >-----------<  457      [12-19-17 @ 18:01]              27.0     150  |  97  |  38  ----------------------------<  204      [12-19-17 @ 08:54]  3.1   |  41  |  1.40        Ca     9.0     [12-19-17 @ 08:54]      Mg     2.4     [12-19-17 @ 08:54]      Phos  2.6     [12-19-17 @ 08:54]          Troponin 0.06      [12-17-17 @ 22:28]  CK 34      [12-17-17 @ 22:28]    Creatinine Trend:  SCr 1.40 [12-19 @ 08:54]  SCr 1.20 [12-18 @ 09:41]  SCr 1.26 [12-17 @ 22:28]  SCr 1.18 [12-17 @ 13:44]  SCr 1.09 [12-17 @ 08:23]    Urinalysis - [12-18-17 @ 19:14]      Color Yellow / Appearance Clear / SG 1.021 / pH 7.5      Gluc 50 / Ketone Negative  / Bili Negative / Urobili Negative       Blood Moderate / Protein 150 / Leuk Est Moderate / Nitrite Negative      RBC 10-25 / WBC 26-50 / Hyaline  / Gran  / Sq Epi  / Non Sq Epi OCC / Bacteria Moderate      HbA1c 6.7      [12-16-17 @ 07:31]  Lipid: chol 140, , HDL 20, LDL 72      [12-16-17 @ 07:31]      < from: CT Chest w/ IV Cont (12.18.17 @ 21:27) >    EXAM:  CT CHEST IC                            PROCEDURE DATE:  12/18/2017            INTERPRETATION:  CLINICAL INFORMATION: 67 year-old status post   laparoscopic LAR with fever after surgery for a few days. Assess for   pneumonia or abscess.    COMPARISON: 12/12/2017    PROCEDURE: Serial 2 mm sections were obtained through the chest, abdomen   and pelvis. 90  mls of Omnipaque 350 was administered intravenously   without complication and 10 mls were discarded.      FINDINGS:    CHEST:     LUNGSAND LARGE AIRWAYS: Patent central airways. Pulmonary nodules seen   on prior study not well seen on the current examination. Basilar   atelectasis.   PLEURA: Small bilateral effusions, improved.  VESSELS: Within normal limits.  HEART: Heart size is normal.Trace pericardial effusion.  MEDIASTINUM AND JOSE: Scattered small mediastinal lymph nodes.  CHEST WALL AND LOWER NECK: Right central venous catheter in the SVC.    LIVER: Within normal limits.  BILE DUCTS: Normal caliber.  GALLBLADDER: Cholecystectomy.  SPLEEN: Within normal limits.  PANCREAS: Fatty atrophy.  ADRENALS: 1.6 cm left adrenal nodule.  KIDNEYS/URETERS: Right renal cysts   BLADDER: Small amount of air, likely related to recent instrumentation.  REPRODUCTIVE ORGANS: Prostate is mildly enlarged, measuring 5.4 x 6.3 cm.    BOWEL: NG tube in the stomach. Status post Gomez procedure. Persistent   soft tissue thickening and fullness surrounding the rectal stump. Small   droplets of gas are seen in the surgical bed and in fluid posterior to   the prostate gland. Findings may represent infection. No evidence of   bowel obstruction   PERITONEUM: Mild ascites, not significantly changed compared to the prior   study. No new collection seen.  VESSELS:  Mild atherosclerotic changes.  RETROPERITONEUM: No lymphadenopathy.    ABDOMINAL WALL: Status post ventral abdominal incision with clips and   postsurgical changes.  BONES: Degenerative changes again seen.    IMPRESSION: Persistent soft tissue thickening and fullness in the   surgical bed adjacent to the rectal stump with droplets of gas that may   represent infection. No definite abscess seen at this time. Follow-up   recommended.    No evidence of pneumonia                            MAURICIO MIJARES M.D., ATTENDING RADIOLOGIST  This document has been electronically signed. Dec 19 2017  9:16AM

## 2017-12-20 DIAGNOSIS — I82.409 ACUTE EMBOLISM AND THROMBOSIS OF UNSPECIFIED DEEP VEINS OF UNSPECIFIED LOWER EXTREMITY: ICD-10-CM

## 2017-12-20 LAB
ANION GAP SERPL CALC-SCNC: 10 MMOL/L — SIGNIFICANT CHANGE UP (ref 5–17)
BUN SERPL-MCNC: 32 MG/DL — HIGH (ref 7–23)
CALCIUM SERPL-MCNC: 8.3 MG/DL — LOW (ref 8.4–10.5)
CHLORIDE SERPL-SCNC: 95 MMOL/L — LOW (ref 96–108)
CO2 SERPL-SCNC: 36 MMOL/L — HIGH (ref 22–31)
CREAT SERPL-MCNC: 0.98 MG/DL — SIGNIFICANT CHANGE UP (ref 0.5–1.3)
GLUCOSE BLDC GLUCOMTR-MCNC: 241 MG/DL — HIGH (ref 70–99)
GLUCOSE BLDC GLUCOMTR-MCNC: 267 MG/DL — HIGH (ref 70–99)
GLUCOSE BLDC GLUCOMTR-MCNC: 281 MG/DL — HIGH (ref 70–99)
GLUCOSE BLDC GLUCOMTR-MCNC: 299 MG/DL — HIGH (ref 70–99)
GLUCOSE SERPL-MCNC: 237 MG/DL — HIGH (ref 70–99)
HCT VFR BLD CALC: 25.6 % — LOW (ref 39–50)
HGB BLD-MCNC: 7.5 G/DL — LOW (ref 13–17)
MAGNESIUM SERPL-MCNC: 2.2 MG/DL — SIGNIFICANT CHANGE UP (ref 1.6–2.6)
MCHC RBC-ENTMCNC: 29.1 PG — SIGNIFICANT CHANGE UP (ref 27–34)
MCHC RBC-ENTMCNC: 29.3 GM/DL — LOW (ref 32–36)
MCV RBC AUTO: 99.2 FL — SIGNIFICANT CHANGE UP (ref 80–100)
PHOSPHATE SERPL-MCNC: 2.3 MG/DL — LOW (ref 2.5–4.5)
PLATELET # BLD AUTO: 463 K/UL — HIGH (ref 150–400)
POTASSIUM SERPL-MCNC: 3.2 MMOL/L — LOW (ref 3.5–5.3)
POTASSIUM SERPL-SCNC: 3.2 MMOL/L — LOW (ref 3.5–5.3)
RBC # BLD: 2.58 M/UL — LOW (ref 4.2–5.8)
RBC # FLD: 14.7 % — HIGH (ref 10.3–14.5)
SODIUM SERPL-SCNC: 141 MMOL/L — SIGNIFICANT CHANGE UP (ref 135–145)
WBC # BLD: 8.98 K/UL — SIGNIFICANT CHANGE UP (ref 3.8–10.5)
WBC # FLD AUTO: 8.98 K/UL — SIGNIFICANT CHANGE UP (ref 3.8–10.5)

## 2017-12-20 RX ORDER — POTASSIUM CHLORIDE 20 MEQ
10 PACKET (EA) ORAL
Qty: 0 | Refills: 0 | Status: COMPLETED | OUTPATIENT
Start: 2017-12-20 | End: 2017-12-20

## 2017-12-20 RX ORDER — INSULIN LISPRO 100/ML
VIAL (ML) SUBCUTANEOUS
Qty: 0 | Refills: 0 | Status: DISCONTINUED | OUTPATIENT
Start: 2017-12-20 | End: 2017-12-22

## 2017-12-20 RX ORDER — ENOXAPARIN SODIUM 100 MG/ML
120 INJECTION SUBCUTANEOUS
Qty: 0 | Refills: 0 | Status: DISCONTINUED | OUTPATIENT
Start: 2017-12-20 | End: 2017-12-22

## 2017-12-20 RX ORDER — POTASSIUM PHOSPHATE, MONOBASIC POTASSIUM PHOSPHATE, DIBASIC 236; 224 MG/ML; MG/ML
15 INJECTION, SOLUTION INTRAVENOUS ONCE
Qty: 0 | Refills: 0 | Status: COMPLETED | OUTPATIENT
Start: 2017-12-20 | End: 2017-12-20

## 2017-12-20 RX ADMIN — Medication 4: at 08:14

## 2017-12-20 RX ADMIN — Medication 3 MILLILITER(S): at 17:47

## 2017-12-20 RX ADMIN — AMLODIPINE BESYLATE 10 MILLIGRAM(S): 2.5 TABLET ORAL at 05:10

## 2017-12-20 RX ADMIN — ISOSORBIDE MONONITRATE 30 MILLIGRAM(S): 60 TABLET, EXTENDED RELEASE ORAL at 12:03

## 2017-12-20 RX ADMIN — Medication 50 MILLIEQUIVALENT(S): at 19:53

## 2017-12-20 RX ADMIN — Medication 0.5 MILLIGRAM(S): at 17:49

## 2017-12-20 RX ADMIN — ENOXAPARIN SODIUM 120 MILLIGRAM(S): 100 INJECTION SUBCUTANEOUS at 18:02

## 2017-12-20 RX ADMIN — Medication 3 MILLILITER(S): at 05:09

## 2017-12-20 RX ADMIN — Medication 3 MILLILITER(S): at 12:02

## 2017-12-20 RX ADMIN — Medication 50 MILLIEQUIVALENT(S): at 22:56

## 2017-12-20 RX ADMIN — Medication 50 MILLIEQUIVALENT(S): at 21:05

## 2017-12-20 RX ADMIN — PANTOPRAZOLE SODIUM 40 MILLIGRAM(S): 20 TABLET, DELAYED RELEASE ORAL at 12:06

## 2017-12-20 RX ADMIN — Medication 0.5 MILLIGRAM(S): at 05:19

## 2017-12-20 RX ADMIN — Medication 1000 MILLIGRAM(S): at 10:09

## 2017-12-20 RX ADMIN — Medication 1: at 22:56

## 2017-12-20 RX ADMIN — POTASSIUM PHOSPHATE, MONOBASIC POTASSIUM PHOSPHATE, DIBASIC 62.5 MILLIMOLE(S): 236; 224 INJECTION, SOLUTION INTRAVENOUS at 14:05

## 2017-12-20 RX ADMIN — BENZOCAINE AND MENTHOL 1 LOZENGE: 5; 1 LIQUID ORAL at 12:03

## 2017-12-20 RX ADMIN — Medication 3 MILLILITER(S): at 23:02

## 2017-12-20 RX ADMIN — CARVEDILOL PHOSPHATE 25 MILLIGRAM(S): 80 CAPSULE, EXTENDED RELEASE ORAL at 17:48

## 2017-12-20 RX ADMIN — ATORVASTATIN CALCIUM 40 MILLIGRAM(S): 80 TABLET, FILM COATED ORAL at 21:05

## 2017-12-20 RX ADMIN — Medication 6: at 14:06

## 2017-12-20 RX ADMIN — INSULIN GLARGINE 12 UNIT(S): 100 INJECTION, SOLUTION SUBCUTANEOUS at 22:56

## 2017-12-20 RX ADMIN — Medication 10: at 19:31

## 2017-12-20 RX ADMIN — Medication 81 MILLIGRAM(S): at 12:02

## 2017-12-20 RX ADMIN — BENZOCAINE AND MENTHOL 1 LOZENGE: 5; 1 LIQUID ORAL at 17:48

## 2017-12-20 RX ADMIN — CARVEDILOL PHOSPHATE 25 MILLIGRAM(S): 80 CAPSULE, EXTENDED RELEASE ORAL at 05:10

## 2017-12-20 RX ADMIN — Medication 400 MILLIGRAM(S): at 10:06

## 2017-12-20 RX ADMIN — BENZOCAINE AND MENTHOL 1 LOZENGE: 5; 1 LIQUID ORAL at 23:02

## 2017-12-20 NOTE — PROGRESS NOTE ADULT - ATTENDING COMMENTS
s/p robotic - open APR    Tmax 100, likely from DVT. Start anticoag per vascular surg  OOB ambulate  advance diet as kelsey  Will D/C TPN once kelsey more PO intake. Colostomy functioning, awaiting superficial necrotic layer to slough.   D/C planning to rehab once off TPN, kelsey PO.

## 2017-12-20 NOTE — PROGRESS NOTE ADULT - ASSESSMENT
PO intake being advanced  In view of oral Carb intake which may be variable will not increase insulin in PN

## 2017-12-20 NOTE — PROVIDER CONTACT NOTE (OTHER) - ACTION/TREATMENT ORDERED:
IVF held, TPN continues to run. Will alert AM nurse to f/u with primary team regarding order. Will continue to monitor patient.

## 2017-12-20 NOTE — PROVIDER CONTACT NOTE (OTHER) - ACTION/TREATMENT ORDERED:
No further action taken at this time, continue with NC supplementation. MD made aware, will continue to monitor.

## 2017-12-20 NOTE — PROGRESS NOTE ADULT - SUBJECTIVE AND OBJECTIVE BOX
Pt on PN and started on LRD  12-19 @ 07:01  -  12-20 @ 07:00  --------------------------------------------------------  IN:    Oral Fluid: 360 mL    sodium chloride 0.45% with potassium chloride 20 mEq/L: 600 mL    Solution: 200 mL    TPN (Total Parenteral Nutrition): 1872 mL  Total IN: 3032 mL    OUT:    Colostomy: 475 mL    Voided: 1650 mL  Total OUT: 2125 mL    Total NET: 907 mL        T(C): 37.6 (12-20-17 @ 08:27), Max: 37.8 (12-19-17 @ 17:00)  HR: 81 (12-20-17 @ 09:40) (78 - 107)  BP: 135/73 (12-20-17 @ 08:27) (120/73 - 145/73)  RR: 19 (12-20-17 @ 08:27) (18 - 19)  SpO2: 95% (12-20-17 @ 09:40) (93% - 95%)  Wt(kg): --    Labs   12-20    141  |  95<L>  |  32<H>  ----------------------------<  237<H>  3.2<L>   |  36<H>  |  0.98    Ca    8.3<L>      20 Dec 2017 08:45  Phos  2.3     12-20  Mg     2.2     12-20          CAPILLARY BLOOD GLUCOSE      POCT Blood Glucose.: 241 mg/dL (20 Dec 2017 07:57)  POCT Blood Glucose.: 304 mg/dL (19 Dec 2017 21:14)  POCT Blood Glucose.: 289 mg/dL (19 Dec 2017 17:29)  POCT Blood Glucose.: 276 mg/dL (19 Dec 2017 13:12)    I&O's Detail    19 Dec 2017 07:01  -  20 Dec 2017 07:00  --------------------------------------------------------  IN:    Oral Fluid: 360 mL    sodium chloride 0.45% with potassium chloride 20 mEq/L: 600 mL    Solution: 200 mL    TPN (Total Parenteral Nutrition): 1872 mL  Total IN: 3032 mL    OUT:    Colostomy: 475 mL    Voided: 1650 mL  Total OUT: 2125 mL    Total NET: 907 mL      20 Dec 2017 07:01  -  20 Dec 2017 09:49  --------------------------------------------------------  IN:    Oral Fluid: 340 mL  Total IN: 340 mL    OUT:  Total OUT: 0 mL    Total NET: 340 mL

## 2017-12-20 NOTE — CHART NOTE - NSCHARTNOTESELECT_GEN_ALL_CORE
Pain Management/Event Note
Event Note
Event Note
Event Note/Post op check
Event Note/Surgery
Nutrition Services
Nutrition Services

## 2017-12-20 NOTE — PROGRESS NOTE ADULT - ATTENDING COMMENTS
hypoxemia combination of pleural effusion/atelactasis/abd distension  diuresis caused a BILL  cont IS/must get out of bed  hopefully once belly becomes less distended, lung expansion will improve  must wear his Bilevel at night  would adjust TPN and/or insulin scale for hyperglycemia

## 2017-12-20 NOTE — CHART NOTE - NSCHARTNOTEFT_GEN_A_CORE
Spoke with Dr. Conde as per Duplex findings noted in yesterdays Duplex Vascular surgery was called. As per recommendations from vascular surgery patient should receive anticoagulation. Discussed findings with Dr. Conde who states it is ok from a surgical perspective to be placed on 1mg/kg of T. Lovenox. The patient had weight measured today showing 122.6 kg and 120 BID of Lovenox was ordered. Discussed with Dr. Conde. Will continue to monitor patient.       Jesusita Gilliam PA-C  #[id number]

## 2017-12-20 NOTE — PROGRESS NOTE ADULT - SUBJECTIVE AND OBJECTIVE BOX
Follow-up Pulm Progress Note    No new respiratory events overnight.  Denies SOB/CP.   97% on 4L NC  Didn't wear bipap overnight     Medications:  MEDICATIONS  (STANDING):  ALBUTerol/ipratropium for Nebulization 3 milliLiter(s) Nebulizer every 6 hours  amLODIPine   Tablet 10 milliGRAM(s) Oral daily  aspirin enteric coated 81 milliGRAM(s) Oral daily  atorvastatin 40 milliGRAM(s) Oral at bedtime  benzocaine 15 mG/menthol 3.6 mG Lozenge 1 Lozenge Oral four times a day  buDESOnide   0.5 milliGRAM(s) Respule 0.5 milliGRAM(s) Inhalation every 12 hours  carvedilol 25 milliGRAM(s) Oral every 12 hours  enoxaparin Injectable 120 milliGRAM(s) SubCutaneous two times a day  insulin glargine Injectable (LANTUS) 12 Unit(s) SubCutaneous at bedtime  insulin lispro (HumaLOG) corrective regimen sliding scale   SubCutaneous at bedtime  insulin lispro (HumaLOG) corrective regimen sliding scale   SubCutaneous three times a day before meals  isosorbide   mononitrate ER Tablet (IMDUR) 30 milliGRAM(s) Oral daily  lidocaine 2% Gel 1 Application(s) Topical once  pantoprazole  Injectable 40 milliGRAM(s) IV Push daily    MEDICATIONS  (PRN):  nystatin Powder 1 Application(s) Topical two times a day PRN Groin moisture  ondansetron Injectable 4 milliGRAM(s) IV Push every 6 hours PRN Nausea and/or Vomiting  saliva substitute (BIOTENE MOISTURIZING MOUTH SPRAY) 1 [place](s) Topical three times a day PRN Mouth Care          Vital Signs Last 24 Hrs  T(C): 37.2 (20 Dec 2017 14:42), Max: 37.8 (19 Dec 2017 17:00)  T(F): 99 (20 Dec 2017 14:42), Max: 100 (19 Dec 2017 17:00)  HR: 98 (20 Dec 2017 14:42) (78 - 107)  BP: 128/78 (20 Dec 2017 14:42) (120/73 - 145/73)  BP(mean): --  RR: 18 (20 Dec 2017 14:42) (18 - 19)  SpO2: 95% (20 Dec 2017 14:42) (93% - 95%) on 4L NC    ABG - ( 18 Dec 2017 18:53 )  pH: 7.53  /  pCO2: 55    /  pO2: 60    / HCO3: 46    / Base Excess: 20.2  /  SaO2: 91                     @ 07:01  -   @ 07:00  --------------------------------------------------------  IN: 3032 mL / OUT: 2125 mL / NET: 907 mL          LABS:                        7.5    8.98  )-----------( 463      ( 20 Dec 2017 08:50 )             25.6     12-20    141  |  95<L>  |  32<H>  ----------------------------<  237<H>  3.2<L>   |  36<H>  |  0.98    Ca    8.3<L>      20 Dec 2017 08:45  Phos  2.3       Mg     2.2                 CAPILLARY BLOOD GLUCOSE      POCT Blood Glucose.: 281 mg/dL (20 Dec 2017 13:19)      Urinalysis Basic - ( 18 Dec 2017 19:14 )    Color: Yellow / Appearance: Clear / S.021 / pH: x  Gluc: x / Ketone: Negative  / Bili: Negative / Urobili: Negative   Blood: x / Protein: 150 mg/dL / Nitrite: Negative   Leuk Esterase: Moderate / RBC: 10-25 /HPF / WBC 26-50 /HPF   Sq Epi: x / Non Sq Epi: OCC /HPF / Bacteria: Moderate /HPF      Procalcitonin, Serum: 3.79 ng/mL (17 @ 07:43)    Serum Pro-Brain Natriuretic Peptide: 681 pg/mL (17 @ 22:28)                CULTURES: (if applicable)  Culture Results:   <10,000 CFU/ml Normal Urogenital faina present ( @ 21:26)  Culture Results:   No growth to date. ( @ 19:37)  Culture Results:   No growth to date. ( @ 19:37)    Most recent blood culture --  @ 21:26   -- -- .Urine Clean Catch (Midstream)  @ 21:26  Most recent blood culture --  @ 19:37   -- -- .Blood Blood  @ 19:37        Physical Examination:  PULM: Decreased BS at bases  CVS: S1, S2 RRR    RADIOLOGY REVIEWED  CT chest: < from: CT Chest w/ IV Cont (17 @ 21:27) >    CHEST:     LUNGSAND LARGE AIRWAYS: Patent central airways. Pulmonary nodules seen   on prior study not well seen on the current examination. Basilar   atelectasis.   PLEURA: Small bilateral effusions, improved.  VESSELS: Within normal limits.  HEART: Heart size is normal.Trace pericardial effusion.  MEDIASTINUM AND JOSE: Scattered small mediastinal lymph nodes.  CHEST WALL AND LOWER NECK: Right central venous catheter in the SVC.    < end of copied text > Follow-up Pulm Progress Note    No new respiratory events overnight.  Denies SOB/CP.   97% on 4L NC  Didn't wear bipap overnight     Medications:  MEDICATIONS  (STANDING):  ALBUTerol/ipratropium for Nebulization 3 milliLiter(s) Nebulizer every 6 hours  amLODIPine   Tablet 10 milliGRAM(s) Oral daily  aspirin enteric coated 81 milliGRAM(s) Oral daily  atorvastatin 40 milliGRAM(s) Oral at bedtime  benzocaine 15 mG/menthol 3.6 mG Lozenge 1 Lozenge Oral four times a day  buDESOnide   0.5 milliGRAM(s) Respule 0.5 milliGRAM(s) Inhalation every 12 hours  carvedilol 25 milliGRAM(s) Oral every 12 hours  enoxaparin Injectable 120 milliGRAM(s) SubCutaneous two times a day  insulin glargine Injectable (LANTUS) 12 Unit(s) SubCutaneous at bedtime  insulin lispro (HumaLOG) corrective regimen sliding scale   SubCutaneous at bedtime  insulin lispro (HumaLOG) corrective regimen sliding scale   SubCutaneous three times a day before meals  isosorbide   mononitrate ER Tablet (IMDUR) 30 milliGRAM(s) Oral daily  lidocaine 2% Gel 1 Application(s) Topical once  pantoprazole  Injectable 40 milliGRAM(s) IV Push daily    MEDICATIONS  (PRN):  nystatin Powder 1 Application(s) Topical two times a day PRN Groin moisture  ondansetron Injectable 4 milliGRAM(s) IV Push every 6 hours PRN Nausea and/or Vomiting  saliva substitute (BIOTENE MOISTURIZING MOUTH SPRAY) 1 [place](s) Topical three times a day PRN Mouth Care    Vital Signs Last 24 Hrs  T(C): 37.2 (20 Dec 2017 14:42), Max: 37.8 (19 Dec 2017 17:00)  T(F): 99 (20 Dec 2017 14:42), Max: 100 (19 Dec 2017 17:00)  HR: 98 (20 Dec 2017 14:42) (78 - 107)  BP: 128/78 (20 Dec 2017 14:42) (120/73 - 145/73)  BP(mean): --  RR: 18 (20 Dec 2017 14:42) (18 - 19)  SpO2: 95% (20 Dec 2017 14:42) (93% - 95%) on 4L NC    ABG - ( 18 Dec 2017 18:53 )  pH: 7.53  /  pCO2: 55    /  pO2: 60    / HCO3: 46    / Base Excess: 20.2  /  SaO2: 91         @ 07:01  -   @ 07:00  --------------------------------------------------------  IN: 3032 mL / OUT: 2125 mL / NET: 907 mL    LABS:                        7.5    8.98  )-----------( 463      ( 20 Dec 2017 08:50 )             25.6     12-20    141  |  95<L>  |  32<H>  ----------------------------<  237<H>  3.2<L>   |  36<H>  |  0.98    Ca    8.3<L>      20 Dec 2017 08:45  Phos  2.3       Mg     2.2         CAPILLARY BLOOD GLUCOSE      POCT Blood Glucose.: 281 mg/dL (20 Dec 2017 13:19)      Urinalysis Basic - ( 18 Dec 2017 19:14 )    Color: Yellow / Appearance: Clear / S.021 / pH: x  Gluc: x / Ketone: Negative  / Bili: Negative / Urobili: Negative   Blood: x / Protein: 150 mg/dL / Nitrite: Negative   Leuk Esterase: Moderate / RBC: 10-25 /HPF / WBC 26-50 /HPF   Sq Epi: x / Non Sq Epi: OCC /HPF / Bacteria: Moderate /HPF      Procalcitonin, Serum: 3.79 ng/mL (17 @ 07:43)    Serum Pro-Brain Natriuretic Peptide: 681 pg/mL (17 @ 22:28)    CULTURES: (if applicable)  Culture Results:   <10,000 CFU/ml Normal Urogenital faina present ( @ 21:26)  Culture Results:   No growth to date. ( @ 19:37)  Culture Results:   No growth to date. ( @ 19:37)    Most recent blood culture --  @ 21:26   -- -- .Urine Clean Catch (Midstream)  @ 21:26  Most recent blood culture --  @ 19:37   -- -- .Blood Blood  @ 19:37        Physical Examination:  PULM: Decreased BS at bases  CVS: S1, S2 RRR    RADIOLOGY REVIEWED  CT chest: < from: CT Chest w/ IV Cont (17 @ 21:27) >    CHEST:     LUNGSAND LARGE AIRWAYS: Patent central airways. Pulmonary nodules seen   on prior study not well seen on the current examination. Basilar   atelectasis.   PLEURA: Small bilateral effusions, improved.  VESSELS: Within normal limits.  HEART: Heart size is normal.Trace pericardial effusion.  MEDIASTINUM AND JOSE: Scattered small mediastinal lymph nodes.  CHEST WALL AND LOWER NECK: Right central venous catheter in the SVC.    < end of copied text >

## 2017-12-20 NOTE — PROVIDER CONTACT NOTE (OTHER) - SITUATION
Pt has provider to RN to hold IVF when TPN is running from the 17th. Pt has new order for IVF on the 19.

## 2017-12-21 ENCOUNTER — TRANSCRIPTION ENCOUNTER (OUTPATIENT)
Age: 67
End: 2017-12-21

## 2017-12-21 DIAGNOSIS — R50.9 FEVER, UNSPECIFIED: ICD-10-CM

## 2017-12-21 LAB
ANION GAP SERPL CALC-SCNC: 6 MMOL/L — SIGNIFICANT CHANGE UP (ref 5–17)
APPEARANCE UR: ABNORMAL
BACTERIA # UR AUTO: ABNORMAL /HPF
BASE EXCESS BLDV CALC-SCNC: 8.1 MMOL/L — HIGH (ref -2–2)
BILIRUB UR-MCNC: NEGATIVE — SIGNIFICANT CHANGE UP
BUN SERPL-MCNC: 30 MG/DL — HIGH (ref 7–23)
CALCIUM SERPL-MCNC: 8.4 MG/DL — SIGNIFICANT CHANGE UP (ref 8.4–10.5)
CHLORIDE SERPL-SCNC: 94 MMOL/L — LOW (ref 96–108)
CO2 BLDV-SCNC: 34 MMOL/L — HIGH (ref 22–30)
CO2 SERPL-SCNC: 34 MMOL/L — HIGH (ref 22–31)
COLOR SPEC: YELLOW — SIGNIFICANT CHANGE UP
CREAT SERPL-MCNC: 1.18 MG/DL — SIGNIFICANT CHANGE UP (ref 0.5–1.3)
DIFF PNL FLD: ABNORMAL
EPI CELLS # UR: SIGNIFICANT CHANGE UP /HPF
GAS PNL BLDV: SIGNIFICANT CHANGE UP
GLUCOSE BLDC GLUCOMTR-MCNC: 152 MG/DL — HIGH (ref 70–99)
GLUCOSE BLDC GLUCOMTR-MCNC: 185 MG/DL — HIGH (ref 70–99)
GLUCOSE BLDC GLUCOMTR-MCNC: 193 MG/DL — HIGH (ref 70–99)
GLUCOSE BLDC GLUCOMTR-MCNC: 209 MG/DL — HIGH (ref 70–99)
GLUCOSE BLDC GLUCOMTR-MCNC: 238 MG/DL — HIGH (ref 70–99)
GLUCOSE BLDC GLUCOMTR-MCNC: 249 MG/DL — HIGH (ref 70–99)
GLUCOSE SERPL-MCNC: 246 MG/DL — HIGH (ref 70–99)
GLUCOSE UR QL: 100
HCO3 BLDV-SCNC: 33 MMOL/L — HIGH (ref 21–29)
HCT VFR BLD CALC: 26.1 % — LOW (ref 39–50)
HGB BLD-MCNC: 8.4 G/DL — LOW (ref 13–17)
HOROWITZ INDEX BLDV+IHG-RTO: SIGNIFICANT CHANGE UP
KETONES UR-MCNC: NEGATIVE — SIGNIFICANT CHANGE UP
LEUKOCYTE ESTERASE UR-ACNC: NEGATIVE — SIGNIFICANT CHANGE UP
MCHC RBC-ENTMCNC: 31.6 PG — SIGNIFICANT CHANGE UP (ref 27–34)
MCHC RBC-ENTMCNC: 32.1 GM/DL — SIGNIFICANT CHANGE UP (ref 32–36)
MCV RBC AUTO: 98.7 FL — SIGNIFICANT CHANGE UP (ref 80–100)
NITRITE UR-MCNC: NEGATIVE — SIGNIFICANT CHANGE UP
PCO2 BLDV: 49 MMHG — SIGNIFICANT CHANGE UP (ref 35–50)
PH BLDV: 7.44 — SIGNIFICANT CHANGE UP (ref 7.35–7.45)
PH UR: 6 — SIGNIFICANT CHANGE UP (ref 5–8)
PLATELET # BLD AUTO: 450 K/UL — HIGH (ref 150–400)
PO2 BLDV: 158 MMHG — HIGH (ref 25–45)
POTASSIUM SERPL-MCNC: 3.5 MMOL/L — SIGNIFICANT CHANGE UP (ref 3.5–5.3)
POTASSIUM SERPL-SCNC: 3.5 MMOL/L — SIGNIFICANT CHANGE UP (ref 3.5–5.3)
PROT UR-MCNC: 100 MG/DL
RBC # BLD: 2.65 M/UL — LOW (ref 4.2–5.8)
RBC # FLD: 13.1 % — SIGNIFICANT CHANGE UP (ref 10.3–14.5)
RBC CASTS # UR COMP ASSIST: ABNORMAL /HPF (ref 0–2)
SAO2 % BLDV: 100 % — HIGH (ref 67–88)
SODIUM SERPL-SCNC: 134 MMOL/L — LOW (ref 135–145)
SP GR SPEC: 1.02 — SIGNIFICANT CHANGE UP (ref 1.01–1.02)
UROBILINOGEN FLD QL: NEGATIVE — SIGNIFICANT CHANGE UP
WBC # BLD: 9.4 K/UL — SIGNIFICANT CHANGE UP (ref 3.8–10.5)
WBC # FLD AUTO: 9.4 K/UL — SIGNIFICANT CHANGE UP (ref 3.8–10.5)
WBC UR QL: ABNORMAL /HPF (ref 0–5)

## 2017-12-21 PROCEDURE — 71010: CPT | Mod: 26,77

## 2017-12-21 PROCEDURE — 71010: CPT | Mod: 26

## 2017-12-21 RX ORDER — ACETAMINOPHEN 500 MG
650 TABLET ORAL EVERY 6 HOURS
Qty: 0 | Refills: 0 | Status: DISCONTINUED | OUTPATIENT
Start: 2017-12-21 | End: 2017-12-22

## 2017-12-21 RX ORDER — POTASSIUM CHLORIDE 20 MEQ
20 PACKET (EA) ORAL ONCE
Qty: 0 | Refills: 0 | Status: DISCONTINUED | OUTPATIENT
Start: 2017-12-21 | End: 2017-12-21

## 2017-12-21 RX ORDER — ACETAMINOPHEN 500 MG
1000 TABLET ORAL ONCE
Qty: 0 | Refills: 0 | Status: DISCONTINUED | OUTPATIENT
Start: 2017-12-21 | End: 2017-12-22

## 2017-12-21 RX ORDER — POTASSIUM CHLORIDE 20 MEQ
40 PACKET (EA) ORAL ONCE
Qty: 0 | Refills: 0 | Status: COMPLETED | OUTPATIENT
Start: 2017-12-21 | End: 2017-12-21

## 2017-12-21 RX ADMIN — ENOXAPARIN SODIUM 120 MILLIGRAM(S): 100 INJECTION SUBCUTANEOUS at 17:07

## 2017-12-21 RX ADMIN — PANTOPRAZOLE SODIUM 40 MILLIGRAM(S): 20 TABLET, DELAYED RELEASE ORAL at 12:50

## 2017-12-21 RX ADMIN — AMLODIPINE BESYLATE 10 MILLIGRAM(S): 2.5 TABLET ORAL at 05:43

## 2017-12-21 RX ADMIN — Medication 0.5 MILLIGRAM(S): at 05:45

## 2017-12-21 RX ADMIN — Medication 8: at 12:49

## 2017-12-21 RX ADMIN — ATORVASTATIN CALCIUM 40 MILLIGRAM(S): 80 TABLET, FILM COATED ORAL at 22:12

## 2017-12-21 RX ADMIN — Medication 40 MILLIEQUIVALENT(S): at 15:26

## 2017-12-21 RX ADMIN — BENZOCAINE AND MENTHOL 1 LOZENGE: 5; 1 LIQUID ORAL at 17:07

## 2017-12-21 RX ADMIN — Medication 81 MILLIGRAM(S): at 12:49

## 2017-12-21 RX ADMIN — Medication 8: at 08:57

## 2017-12-21 RX ADMIN — ONDANSETRON 4 MILLIGRAM(S): 8 TABLET, FILM COATED ORAL at 05:01

## 2017-12-21 RX ADMIN — BENZOCAINE AND MENTHOL 1 LOZENGE: 5; 1 LIQUID ORAL at 05:46

## 2017-12-21 RX ADMIN — BENZOCAINE AND MENTHOL 1 LOZENGE: 5; 1 LIQUID ORAL at 12:50

## 2017-12-21 RX ADMIN — ENOXAPARIN SODIUM 120 MILLIGRAM(S): 100 INJECTION SUBCUTANEOUS at 05:50

## 2017-12-21 RX ADMIN — Medication 0.5 MILLIGRAM(S): at 17:08

## 2017-12-21 RX ADMIN — ISOSORBIDE MONONITRATE 30 MILLIGRAM(S): 60 TABLET, EXTENDED RELEASE ORAL at 12:50

## 2017-12-21 RX ADMIN — CARVEDILOL PHOSPHATE 25 MILLIGRAM(S): 80 CAPSULE, EXTENDED RELEASE ORAL at 17:07

## 2017-12-21 RX ADMIN — Medication 3 MILLILITER(S): at 12:49

## 2017-12-21 RX ADMIN — INSULIN GLARGINE 12 UNIT(S): 100 INJECTION, SOLUTION SUBCUTANEOUS at 22:12

## 2017-12-21 RX ADMIN — Medication 650 MILLIGRAM(S): at 15:26

## 2017-12-21 RX ADMIN — Medication 3 MILLILITER(S): at 05:45

## 2017-12-21 RX ADMIN — Medication 8: at 17:07

## 2017-12-21 RX ADMIN — Medication 3 MILLILITER(S): at 17:08

## 2017-12-21 RX ADMIN — CARVEDILOL PHOSPHATE 25 MILLIGRAM(S): 80 CAPSULE, EXTENDED RELEASE ORAL at 05:43

## 2017-12-21 NOTE — PROGRESS NOTE ADULT - SUBJECTIVE AND OBJECTIVE BOX
Follow-up Pulm Progress Note    No new respiratory events overnight.  Denies SOB/CP.   91-95% on 4L NC  Wore some bipap overnight       Medications:  MEDICATIONS  (STANDING):  acetaminophen  IVPB. 1000 milliGRAM(s) IV Intermittent once  ALBUTerol/ipratropium for Nebulization 3 milliLiter(s) Nebulizer every 6 hours  amLODIPine   Tablet 10 milliGRAM(s) Oral daily  aspirin enteric coated 81 milliGRAM(s) Oral daily  atorvastatin 40 milliGRAM(s) Oral at bedtime  benzocaine 15 mG/menthol 3.6 mG Lozenge 1 Lozenge Oral four times a day  buDESOnide   0.5 milliGRAM(s) Respule 0.5 milliGRAM(s) Inhalation every 12 hours  carvedilol 25 milliGRAM(s) Oral every 12 hours  enoxaparin Injectable 120 milliGRAM(s) SubCutaneous two times a day  insulin glargine Injectable (LANTUS) 12 Unit(s) SubCutaneous at bedtime  insulin lispro (HumaLOG) corrective regimen sliding scale   SubCutaneous at bedtime  insulin lispro (HumaLOG) corrective regimen sliding scale   SubCutaneous three times a day before meals  isosorbide   mononitrate ER Tablet (IMDUR) 30 milliGRAM(s) Oral daily  lidocaine 2% Gel 1 Application(s) Topical once  pantoprazole  Injectable 40 milliGRAM(s) IV Push daily    MEDICATIONS  (PRN):  acetaminophen   Tablet 650 milliGRAM(s) Oral every 6 hours PRN For Temp greater than 38 C (100.4 F)  nystatin Powder 1 Application(s) Topical two times a day PRN Groin moisture  ondansetron Injectable 4 milliGRAM(s) IV Push every 6 hours PRN Nausea and/or Vomiting  saliva substitute (BIOTENE MOISTURIZING MOUTH SPRAY) 1 [place](s) Topical three times a day PRN Mouth Care          Vital Signs Last 24 Hrs  T(C): 38.4 (21 Dec 2017 13:54), Max: 38.4 (21 Dec 2017 13:54)  T(F): 101.1 (21 Dec 2017 13:54), Max: 101.1 (21 Dec 2017 13:54)  HR: 86 (21 Dec 2017 13:54) (77 - 89)  BP: 144/71 (21 Dec 2017 13:54) (128/64 - 160/72)  BP(mean): --  RR: 20 (21 Dec 2017 13:54) (18 - 20)  SpO2: 96% (21 Dec 2017 13:54) (91% - 96%) on RA      VBG pH 7.44 12-21 @ 12:34    VBG pCO2 49 12-21 @ 12:34    VBG O2 sat 100 12-21 @ 12:34    VBG lactate -- 12-21 @ 12:34      12-20 @ 07:01  -  12-21 @ 07:00  --------------------------------------------------------  IN: 3092 mL / OUT: 1150 mL / NET: 1942 mL          LABS:                        8.4    9.4   )-----------( 450      ( 21 Dec 2017 13:57 )             26.1     12-21    134<L>  |  94<L>  |  30<H>  ----------------------------<  246<H>  3.5   |  34<H>  |  1.18    Ca    8.4      21 Dec 2017 13:57  Phos  2.3     12-20  Mg     2.2     12-20            CAPILLARY BLOOD GLUCOSE      POCT Blood Glucose.: 249 mg/dL (21 Dec 2017 12:48)                        CULTURES: (if applicable)  Culture Results:   <10,000 CFU/ml Normal Urogenital faina present (12-18 @ 21:26)  Culture Results:   No growth to date. (12-18 @ 19:37)  Culture Results:   No growth to date. (12-18 @ 19:37)    Most recent blood culture -- 12-18 @ 21:26   -- -- .Urine Clean Catch (Midstream) 12-18 @ 21:26  Most recent blood culture -- 12-18 @ 19:37   -- -- .Blood Blood 12-18 @ 19:37        Physical Examination:  PULM: Decreased BS at bases  CVS: S1, S2 RRR    RADIOLOGY REVIEWED  CXR: Low lung volumes  Mild congestion Follow-up Pulm Progress Note    No new respiratory events overnight.  Denies SOB/CP.   91-95% on 4L NC  Wore some bipap overnight       Medications:  MEDICATIONS  (STANDING):  acetaminophen  IVPB. 1000 milliGRAM(s) IV Intermittent once  ALBUTerol/ipratropium for Nebulization 3 milliLiter(s) Nebulizer every 6 hours  amLODIPine   Tablet 10 milliGRAM(s) Oral daily  aspirin enteric coated 81 milliGRAM(s) Oral daily  atorvastatin 40 milliGRAM(s) Oral at bedtime  benzocaine 15 mG/menthol 3.6 mG Lozenge 1 Lozenge Oral four times a day  buDESOnide   0.5 milliGRAM(s) Respule 0.5 milliGRAM(s) Inhalation every 12 hours  carvedilol 25 milliGRAM(s) Oral every 12 hours  enoxaparin Injectable 120 milliGRAM(s) SubCutaneous two times a day  insulin glargine Injectable (LANTUS) 12 Unit(s) SubCutaneous at bedtime  insulin lispro (HumaLOG) corrective regimen sliding scale   SubCutaneous at bedtime  insulin lispro (HumaLOG) corrective regimen sliding scale   SubCutaneous three times a day before meals  isosorbide   mononitrate ER Tablet (IMDUR) 30 milliGRAM(s) Oral daily  lidocaine 2% Gel 1 Application(s) Topical once  pantoprazole  Injectable 40 milliGRAM(s) IV Push daily    MEDICATIONS  (PRN):  acetaminophen   Tablet 650 milliGRAM(s) Oral every 6 hours PRN For Temp greater than 38 C (100.4 F)  nystatin Powder 1 Application(s) Topical two times a day PRN Groin moisture  ondansetron Injectable 4 milliGRAM(s) IV Push every 6 hours PRN Nausea and/or Vomiting  saliva substitute (BIOTENE MOISTURIZING MOUTH SPRAY) 1 [place](s) Topical three times a day PRN Mouth Care    Vital Signs Last 24 Hrs  T(C): 38.4 (21 Dec 2017 13:54), Max: 38.4 (21 Dec 2017 13:54)  T(F): 101.1 (21 Dec 2017 13:54), Max: 101.1 (21 Dec 2017 13:54)  HR: 86 (21 Dec 2017 13:54) (77 - 89)  BP: 144/71 (21 Dec 2017 13:54) (128/64 - 160/72)  BP(mean): --  RR: 20 (21 Dec 2017 13:54) (18 - 20)  SpO2: 96% (21 Dec 2017 13:54) (91% - 96%) on RA      VBG pH 7.44 12-21 @ 12:34    VBG pCO2 49 12-21 @ 12:34    VBG O2 sat 100 12-21 @ 12:34    VBG lactate -- 12-21 @ 12:34      12-20 @ 07:01  -  12-21 @ 07:00  --------------------------------------------------------  IN: 3092 mL / OUT: 1150 mL / NET: 1942 mL    LABS:                        8.4    9.4   )-----------( 450      ( 21 Dec 2017 13:57 )             26.1     12-21    134<L>  |  94<L>  |  30<H>  ----------------------------<  246<H>  3.5   |  34<H>  |  1.18    Ca    8.4      21 Dec 2017 13:57  Phos  2.3     12-20  Mg     2.2     12-20            CAPILLARY BLOOD GLUCOSE      POCT Blood Glucose.: 249 mg/dL (21 Dec 2017 12:48)      CULTURES: (if applicable)  Culture Results:   <10,000 CFU/ml Normal Urogenital faina present (12-18 @ 21:26)  Culture Results:   No growth to date. (12-18 @ 19:37)  Culture Results:   No growth to date. (12-18 @ 19:37)    Most recent blood culture -- 12-18 @ 21:26   -- -- .Urine Clean Catch (Midstream) 12-18 @ 21:26  Most recent blood culture -- 12-18 @ 19:37   -- -- .Blood Blood 12-18 @ 19:37        Physical Examination:  PULM: Decreased BS at bases  CVS: S1, S2 RRR    RADIOLOGY REVIEWED  CXR: Low lung volumes  Mild congestion

## 2017-12-21 NOTE — DISCHARGE NOTE ADULT - PLAN OF CARE
s/p low anterior resection converted to open abdominal perineal resection pain control and ostomy function maintain oxygen saturation mid-upper 90s Supplemental O2 and/or BiPAP as necessary

## 2017-12-21 NOTE — DISCHARGE NOTE ADULT - CARE PLAN
Principal Discharge DX:	Rectal cancer  Goal:	s/p low anterior resection converted to open abdominal perineal resection  Instructions for follow-up, activity and diet:	pain control and ostomy function  Secondary Diagnosis:	Respiratory failure with hypoxia  Goal:	maintain oxygen saturation mid-upper 90s  Instructions for follow-up, activity and diet:	Supplemental O2 and/or BiPAP as necessary

## 2017-12-21 NOTE — DISCHARGE NOTE ADULT - PATIENT PORTAL LINK FT
“You can access the [facility] Patient Portal, offered by [facility], by registering with the following website: [email or web address]”

## 2017-12-21 NOTE — PROGRESS NOTE ADULT - ASSESSMENT
Assessment:    67y Male who presents with Malignant neoplasm of rectum s/p Robotic LAR converted to APR    Plan:  -DVT PPX- Lovenox/ ASA   -Pain control   -OOB as tolerated with assistance   -diet as tolerated  -f/u Dr. Damian about TPN plan, may stop soon  -monitor Gi Fxn   -Dispo: Planning to rehab tomorrow      Nusrat Clark, PGY1  [phone]

## 2017-12-21 NOTE — PROVIDER CONTACT NOTE (OTHER) - RECOMMENDATIONS
EKG
MD Almeida made aware
5 lopressor,
Assess the stoma for any changes.
BC? Tylenol
Continue to monitor patient
Hold IVF until AM team evaluates.
No further action taken, continue to monitor patient
EKG

## 2017-12-21 NOTE — PROGRESS NOTE ADULT - SUBJECTIVE AND OBJECTIVE BOX
Red Team progress note    Subjective: patient seen and examined.  No events overnight.  He is tolerating a LRD without N/V.  He was having discomfort with his CPAP overnight and did well on 3L NC instead.     Vital Signs Last 24 Hrs  T(C): 37.3 (12-21-17 @ 06:38), Max: 37.6 (12-20-17 @ 08:27)  HR: 83 (12-21-17 @ 06:38) (78 - 98)  BP: 130/73 (12-21-17 @ 06:38) (128/78 - 160/72)  RR: 20 (12-21-17 @ 06:38) (18 - 20)  SpO2: 96% (12-21-17 @ 06:38) (93% - 96%)  Wt(kg): --    12-20 @ 07:01  -  12-21 @ 07:00  --------------------------------------------------------  IN:    Oral Fluid: 820 mL    Solution: 400 mL    TPN (Total Parenteral Nutrition): 1872 mL  Total IN: 3092 mL    OUT:    Colostomy: 400 mL    Voided: 750 mL  Total OUT: 1150 mL    Total NET: 1942 mL          PHYSICAL EXAM:  General: NAD, AOx3  Respiratory: non labored breathing  Gastrointestinal: abdomen soft, obese, VAC in place, colostomy black with pink mucosa visible, + air and liquid stool in bag                          7.5    8.98  )-----------( 463      ( 20 Dec 2017 08:50 )             25.6     12-20    141  |  95<L>  |  32<H>  ----------------------------<  237<H>  3.2<L>   |  36<H>  |  0.98    Ca    8.3<L>      20 Dec 2017 08:45  Phos  2.3     12-20  Mg     2.2     12-20

## 2017-12-21 NOTE — PROGRESS NOTE ADULT - PROBLEM SELECTOR PLAN 2
Blood cultures x2  -PICC removed   -Urine culture   -No evidence of PNA on CXR  -Low grade fever ?2nd atelectasis, VTE, malignancy   -Clinically appears non-toxic

## 2017-12-21 NOTE — PROGRESS NOTE ADULT - ASSESSMENT
Discussed with PA  Pt to be discharged to rehab tomorrow off PN  Will stop PN tomorrow  Will not increase RI in PN since part of the hyperglycemia is due to the oral intake

## 2017-12-21 NOTE — PROGRESS NOTE ADULT - SUBJECTIVE AND OBJECTIVE BOX
Pt on diet and PN  12-20 @ 07:01  -  12-21 @ 07:00  --------------------------------------------------------  IN:    Oral Fluid: 820 mL    Solution: 400 mL    TPN (Total Parenteral Nutrition): 1872 mL  Total IN: 3092 mL    OUT:    Colostomy: 400 mL    Voided: 750 mL  Total OUT: 1150 mL    Total NET: 1942 mL        T(C): 37.3 (12-21-17 @ 06:38), Max: 37.6 (12-20-17 @ 22:40)  HR: 77 (12-21-17 @ 08:37) (77 - 98)  BP: 130/73 (12-21-17 @ 06:38) (128/78 - 160/72)  RR: 20 (12-21-17 @ 06:38) (18 - 20)  SpO2: 91% (12-21-17 @ 08:37) (91% - 96%)  Wt(kg): --    Labs   12-20    141  |  95<L>  |  32<H>  ----------------------------<  237<H>  3.2<L>   |  36<H>  |  0.98    Ca    8.3<L>      20 Dec 2017 08:45  Phos  2.3     12-20  Mg     2.2     12-20          CAPILLARY BLOOD GLUCOSE      POCT Blood Glucose.: 238 mg/dL (21 Dec 2017 08:39)  POCT Blood Glucose.: 267 mg/dL (20 Dec 2017 22:39)  POCT Blood Glucose.: 299 mg/dL (20 Dec 2017 18:23)  POCT Blood Glucose.: 281 mg/dL (20 Dec 2017 13:19)    I&O's Detail    20 Dec 2017 07:01  -  21 Dec 2017 07:00  --------------------------------------------------------  IN:    Oral Fluid: 820 mL    Solution: 400 mL    TPN (Total Parenteral Nutrition): 1872 mL  Total IN: 3092 mL    OUT:    Colostomy: 400 mL    Voided: 750 mL  Total OUT: 1150 mL    Total NET: 1942 mL

## 2017-12-21 NOTE — DISCHARGE NOTE ADULT - CARE PROVIDER_API CALL
Tyrell East), Cardiovascular Disease; Internal Medicine  [address] [place] [address]  Phone: [phone]  Fax: [phone]    Santiago Conde (MD), ColonRectal Surgery; Surgery  [address]  [place] [address]  Phone: [phone]  Fax: [phone]    Juan Hyman (DO), Critical Care Medicine; Internal Medicine; Pulmonary Disease  [address]  [place] [address]  Phone: [phone]  Fax: [phone]

## 2017-12-21 NOTE — PROGRESS NOTE ADULT - ATTENDING COMMENTS
s/p robotic - open APR    Tmax 100, likely from DVT. Start anticoag per vascular surg  OOB ambulate  advance diet as kelsey  Will D/C TPN once kelsey more PO intake. Colostomy functioning, awaiting superficial necrotic layer to slough.   D/C planning to rehab once off TPN, kelsey PO. pt without c/o, kelsey smal amounts of PO intake, says he's not eating due to fear of getting sick.     AAOx3  abd soft, VAC in place, colostomy black superficially, pink inside, + air and liquid stool in bag    A/P s/p robotic - open APR  encourage PO intake today, possibly d/c TPN today if kelsey LRD  plan for D/C to rehab tomorrow.

## 2017-12-21 NOTE — DISCHARGE NOTE ADULT - HOSPITAL COURSE
67 year old male with significant past medical history of CKD, HLD, CAD s/p PCI (2009), GLENYS (home bipap), DM, HTN, s/p choleystectomy, Rectal CA s/p transanal excision/biopsy (7/2017), s/p neoadjuvant chemoradiation presented to [facility] on 12/06/17 for an elective/scheduled operation; robotic low anterior resection converted to open abdominal perineal resection was performed 12/06/17.  In the PACU, the patient was started on CPAP, and became hypotensive to SBP 80's. He was started on phenylephrine gtt (drip), w/ increasing doses and rising lactate on Arterial Blood Gases, but he maintained his mental status throughout. CPAP was removed & BP recovered. He received 250mL 5% Albumen & SICU (Surgical ICU) was consulted as patient continued to required phenylephrine to maintain MAP>65mmHg.  The patient was transferred from PACU to the SICU... 67 year old male with significant past medical history of CKD, HLD, CAD s/p PCI (2009), GLENYS (home bipap), DM, HTN, s/p choleystectomy, Rectal CA s/p transanal excision/biopsy (7/2017), s/p neoadjuvant chemoradiation presented to [facility] on 12/06/17 for an elective/scheduled operation; robotic low anterior resection converted to open abdominal perineal resection was performed 12/06/17, with a wound vac placed at the midline incision.  In the PACU, the patient was started on CPAP, and became hypotensive to SBP 80's. He was started on phenylephrine gtt (drip), w/ increasing doses and rising lactate on Arterial Blood Gases, but he maintained his mental status throughout. CPAP was removed & BP recovered. He received 250mL 5% Albumen & SICU (Surgical ICU) was consulted as patient continued to required phenylephrine to maintain MAP>65mmHg.  The patient was transferred from PACU to the SICU on 12/06/17.    Patient was weaned off pressors on 12/07/17, and was deemed stable enough to transfer to the surgical curtis.  He was transferred with nasal canula at 4 L (saturating at low 90s) and PCA for pain.  On 12/10/17, the stoma appeared dusky w/ viable mucosa at the internal orifice. 12/11/17, the PCA was discontinued and the patient's pain was controlled w/ oral oxycodone and tylenol.     12/12/17, the patient's oxygen saturation began to desaturate to 70s%; he became hypoxic and lethargic, and was transferred back to the SICU and placed on BiPAP and a RRT was called. CT-angiogram was eventually performed which ruled out pulmonary embolism. on 12/13/17 as the cause of the hypoxia but cardiac enzymes were elevated. 67 year old male with significant past medical history of CKD, HLD, CAD s/p PCI (2009), GLENYS (home bipap), DM, HTN, s/p choleystectomy, Rectal CA s/p transanal excision/biopsy (7/2017), s/p neoadjuvant chemoradiation presented to [facility] on 12/06/17 for an elective/scheduled operation; robotic low anterior resection converted to open abdominal perineal resection was performed 12/06/17, with a wound vac placed at the midline incision.  In the PACU, the patient was started on CPAP, and became hypotensive to SBP 80's. He was started on phenylephrine gtt (drip), w/ increasing doses and rising lactate on Arterial Blood Gases, but he maintained his mental status throughout. CPAP was removed & BP recovered. He received 250mL 5% Albumen & SICU (Surgical ICU) was consulted as patient continued to required phenylephrine to maintain MAP>65mmHg.  The patient was transferred from PACU to the SICU on 12/06/17.    Patient was weaned off pressors on 12/07/17, and was deemed stable enough to transfer to the surgical curtis.  He was transferred with nasal canula at 4 L (saturating at low 90s) and PCA for pain.  On 12/10/17, the stoma appeared dusky w/ viable mucosa at the internal orifice. 12/11/17, the PCA was discontinued and the patient's pain was controlled w/ oral oxycodone and tylenol.     12/12/17, the patient's oxygen saturation began to desaturate to 70s%; he became hypoxic and lethargic, and was transferred back to the SICU and placed on BiPAP and a RRT was called. CT-angiogram was eventually performed which ruled out pulmonary embolism. on 12/13/17 as the cause of the hypoxia but cardiac enzymes were elevated.  Cardiology diagnosed the patient w/ NSTEMI and recommended that the patient continue 81 mg Aspirin daily.  The patient had multiple episodes of vomiting and abdominal distension and had to have an Nasogastric Tube placed and set onto low-continuous suction to decompress his GI tract multiple times.  Due to prolonged ileus, patient was seen by the nutritionist and a PICC line was placed and TPN (Total Parenteral Nutrition) was started on 12/16/17.    Vascular surgery was consulted on 12/19/17 for evaluation of persistent right peroneal vein DVT; recommended therapeutic anticoagulation, and was started on therapeutic lovenox (120mg twice a day).    12/21/17, the patient spiked a fever (101.1 F/38.4C); PICC line was removed as it was suspected to be the source of possible infection; tip of PICC was sent to the lab as well as blood cultures.  Patient's fevers resolved (afebrile for >24 hours) and he will be discharged today, 12/22/17 to rehab for ostomy management, wound vac care and oxygen supplementation.  Patient will continue all of his home medications and is additionally going to be discharged on therapeutic lovenox (120mg BID) which was started in the hospital.  The wound vac will be replaced at the rehab facility, therefore the patient will have his current hospital vac removed prior to discharge, and the wound will be packed with wet-to-dry dressing until he reaches the rehab facility.  The BiPap settings that the patient was using in the hospital are FiO2 30, Inspiratory Pressure (IPAP) 14, Expiratory Pressure (CPAP) 10 qHS for GLENYS.  When on nasal canula, the patient was on 5 L.    The patient will be instructed to follow-up with Santiago Conde MD (Colorectal Surgeon) within 1-2 weeks of being discharged.  He should follow-up with Cardiology (Tyrell East MD) within 1-2 weeks of being discharged, and Pulmonology (Juan Hyman DO) within 1-2 weeks of being discharged.

## 2017-12-21 NOTE — PROGRESS NOTE ADULT - ASSESSMENT
68 y/o M with PMH of HTN, DMT2, PVD, HLD, MI/CAD with stent x 1 2009, GLENYS, 50 pack yr smoker (quit Jan 2017), morbid obesity, rectal cancer (diagnosed 7/2017 s/p chemo) admitted 12/6 for LAR. Hospital course c/b post-operative hypotension requiring SICU admission. Downgraded to floors on 12/7. On 12/12 he was noted to be hypoxic to 70s. Placed on bipap and readmitted to SICU. Downgraded to floors on 12/16. 12/18 noted to have low grade fever

## 2017-12-21 NOTE — PROGRESS NOTE ADULT - PROBLEM SELECTOR PLAN 1
Multifactorial 2nd shunt through compressive atelectasis from pleural effusions, abd distension, likely underlying COPD  -Keep sp02>90% on supplemental oxygen  -Patient is not really moving, won't tolerate OOB 2nd pain  -OOB as tolerated  -Incentive spirometry  -Will need supplemental oxygen for rehab

## 2017-12-22 VITALS — OXYGEN SATURATION: 92 % | HEART RATE: 90 BPM

## 2017-12-22 LAB
ANION GAP SERPL CALC-SCNC: 11 MMOL/L — SIGNIFICANT CHANGE UP (ref 5–17)
BUN SERPL-MCNC: 35 MG/DL — HIGH (ref 7–23)
CALCIUM SERPL-MCNC: 8.3 MG/DL — LOW (ref 8.4–10.5)
CHLORIDE SERPL-SCNC: 93 MMOL/L — LOW (ref 96–108)
CO2 SERPL-SCNC: 29 MMOL/L — SIGNIFICANT CHANGE UP (ref 22–31)
CREAT SERPL-MCNC: 1.52 MG/DL — HIGH (ref 0.5–1.3)
CULTURE RESULTS: SIGNIFICANT CHANGE UP
GLUCOSE BLDC GLUCOMTR-MCNC: 125 MG/DL — HIGH (ref 70–99)
GLUCOSE BLDC GLUCOMTR-MCNC: 132 MG/DL — HIGH (ref 70–99)
GLUCOSE SERPL-MCNC: 115 MG/DL — HIGH (ref 70–99)
HCT VFR BLD CALC: 24 % — LOW (ref 39–50)
HGB BLD-MCNC: 7.8 G/DL — LOW (ref 13–17)
MAGNESIUM SERPL-MCNC: 2.4 MG/DL — SIGNIFICANT CHANGE UP (ref 1.6–2.6)
MCHC RBC-ENTMCNC: 32 PG — SIGNIFICANT CHANGE UP (ref 27–34)
MCHC RBC-ENTMCNC: 32.3 GM/DL — SIGNIFICANT CHANGE UP (ref 32–36)
MCV RBC AUTO: 99.2 FL — SIGNIFICANT CHANGE UP (ref 80–100)
PHOSPHATE SERPL-MCNC: 3.1 MG/DL — SIGNIFICANT CHANGE UP (ref 2.5–4.5)
PLATELET # BLD AUTO: 409 K/UL — HIGH (ref 150–400)
POTASSIUM SERPL-MCNC: 4.4 MMOL/L — SIGNIFICANT CHANGE UP (ref 3.5–5.3)
POTASSIUM SERPL-SCNC: 4.4 MMOL/L — SIGNIFICANT CHANGE UP (ref 3.5–5.3)
PROCALCITONIN SERPL-MCNC: 1.18 NG/ML — HIGH (ref 0–0.04)
RBC # BLD: 2.42 M/UL — LOW (ref 4.2–5.8)
RBC # FLD: 13.1 % — SIGNIFICANT CHANGE UP (ref 10.3–14.5)
SODIUM SERPL-SCNC: 133 MMOL/L — LOW (ref 135–145)
SPECIMEN SOURCE: SIGNIFICANT CHANGE UP
WBC # BLD: 11.7 K/UL — HIGH (ref 3.8–10.5)
WBC # FLD AUTO: 11.7 K/UL — HIGH (ref 3.8–10.5)

## 2017-12-22 PROCEDURE — 84145 PROCALCITONIN (PCT): CPT

## 2017-12-22 PROCEDURE — 85730 THROMBOPLASTIN TIME PARTIAL: CPT

## 2017-12-22 PROCEDURE — 84100 ASSAY OF PHOSPHORUS: CPT

## 2017-12-22 PROCEDURE — 84132 ASSAY OF SERUM POTASSIUM: CPT

## 2017-12-22 PROCEDURE — 88342 IMHCHEM/IMCYTCHM 1ST ANTB: CPT

## 2017-12-22 PROCEDURE — 81001 URINALYSIS AUTO W/SCOPE: CPT

## 2017-12-22 PROCEDURE — 97161 PT EVAL LOW COMPLEX 20 MIN: CPT

## 2017-12-22 PROCEDURE — 93306 TTE W/DOPPLER COMPLETE: CPT

## 2017-12-22 PROCEDURE — 82803 BLOOD GASES ANY COMBINATION: CPT

## 2017-12-22 PROCEDURE — 84300 ASSAY OF URINE SODIUM: CPT

## 2017-12-22 PROCEDURE — 36600 WITHDRAWAL OF ARTERIAL BLOOD: CPT

## 2017-12-22 PROCEDURE — 86850 RBC ANTIBODY SCREEN: CPT

## 2017-12-22 PROCEDURE — 71275 CT ANGIOGRAPHY CHEST: CPT

## 2017-12-22 PROCEDURE — 93970 EXTREMITY STUDY: CPT

## 2017-12-22 PROCEDURE — 86901 BLOOD TYPING SEROLOGIC RH(D): CPT

## 2017-12-22 PROCEDURE — 82553 CREATINE MB FRACTION: CPT

## 2017-12-22 PROCEDURE — 97110 THERAPEUTIC EXERCISES: CPT

## 2017-12-22 PROCEDURE — 82435 ASSAY OF BLOOD CHLORIDE: CPT

## 2017-12-22 PROCEDURE — 85014 HEMATOCRIT: CPT

## 2017-12-22 PROCEDURE — 82550 ASSAY OF CK (CPK): CPT

## 2017-12-22 PROCEDURE — 86900 BLOOD TYPING SEROLOGIC ABO: CPT

## 2017-12-22 PROCEDURE — 84484 ASSAY OF TROPONIN QUANT: CPT

## 2017-12-22 PROCEDURE — P9045: CPT

## 2017-12-22 PROCEDURE — C1758: CPT

## 2017-12-22 PROCEDURE — 97116 GAIT TRAINING THERAPY: CPT

## 2017-12-22 PROCEDURE — 82330 ASSAY OF CALCIUM: CPT

## 2017-12-22 PROCEDURE — 94640 AIRWAY INHALATION TREATMENT: CPT

## 2017-12-22 PROCEDURE — C1751: CPT

## 2017-12-22 PROCEDURE — P9016: CPT

## 2017-12-22 PROCEDURE — 88341 IMHCHEM/IMCYTCHM EA ADD ANTB: CPT

## 2017-12-22 PROCEDURE — 82947 ASSAY GLUCOSE BLOOD QUANT: CPT

## 2017-12-22 PROCEDURE — 97530 THERAPEUTIC ACTIVITIES: CPT

## 2017-12-22 PROCEDURE — 83735 ASSAY OF MAGNESIUM: CPT

## 2017-12-22 PROCEDURE — 88309 TISSUE EXAM BY PATHOLOGIST: CPT

## 2017-12-22 PROCEDURE — 82570 ASSAY OF URINE CREATININE: CPT

## 2017-12-22 PROCEDURE — 74177 CT ABD & PELVIS W/CONTRAST: CPT

## 2017-12-22 PROCEDURE — 36569 INSJ PICC 5 YR+ W/O IMAGING: CPT

## 2017-12-22 PROCEDURE — 83935 ASSAY OF URINE OSMOLALITY: CPT

## 2017-12-22 PROCEDURE — 86923 COMPATIBILITY TEST ELECTRIC: CPT

## 2017-12-22 PROCEDURE — 83880 ASSAY OF NATRIURETIC PEPTIDE: CPT

## 2017-12-22 PROCEDURE — 71045 X-RAY EXAM CHEST 1 VIEW: CPT

## 2017-12-22 PROCEDURE — 80061 LIPID PANEL: CPT

## 2017-12-22 PROCEDURE — 87070 CULTURE OTHR SPECIMN AEROBIC: CPT

## 2017-12-22 PROCEDURE — 74018 RADEX ABDOMEN 1 VIEW: CPT

## 2017-12-22 PROCEDURE — 93005 ELECTROCARDIOGRAM TRACING: CPT

## 2017-12-22 PROCEDURE — 94660 CPAP INITIATION&MGMT: CPT

## 2017-12-22 PROCEDURE — 85610 PROTHROMBIN TIME: CPT

## 2017-12-22 PROCEDURE — 97606 NEG PRS WND THER DME>50 SQCM: CPT

## 2017-12-22 PROCEDURE — 87040 BLOOD CULTURE FOR BACTERIA: CPT

## 2017-12-22 PROCEDURE — 80048 BASIC METABOLIC PNL TOTAL CA: CPT

## 2017-12-22 PROCEDURE — 83036 HEMOGLOBIN GLYCOSYLATED A1C: CPT

## 2017-12-22 PROCEDURE — 85027 COMPLETE CBC AUTOMATED: CPT

## 2017-12-22 PROCEDURE — 87086 URINE CULTURE/COLONY COUNT: CPT

## 2017-12-22 PROCEDURE — 82962 GLUCOSE BLOOD TEST: CPT

## 2017-12-22 PROCEDURE — C1769: CPT

## 2017-12-22 PROCEDURE — 97112 NEUROMUSCULAR REEDUCATION: CPT

## 2017-12-22 PROCEDURE — S2900: CPT

## 2017-12-22 PROCEDURE — 71260 CT THORAX DX C+: CPT

## 2017-12-22 PROCEDURE — 84295 ASSAY OF SERUM SODIUM: CPT

## 2017-12-22 PROCEDURE — 83605 ASSAY OF LACTIC ACID: CPT

## 2017-12-22 PROCEDURE — 82565 ASSAY OF CREATININE: CPT

## 2017-12-22 PROCEDURE — 36430 TRANSFUSION BLD/BLD COMPNT: CPT

## 2017-12-22 RX ORDER — ENOXAPARIN SODIUM 100 MG/ML
120 INJECTION SUBCUTANEOUS
Qty: 0 | Refills: 0 | COMMUNITY
Start: 2017-12-22

## 2017-12-22 RX ORDER — METOPROLOL TARTRATE 50 MG
5 TABLET ORAL ONCE
Qty: 0 | Refills: 0 | Status: DISCONTINUED | OUTPATIENT
Start: 2017-12-22 | End: 2017-12-22

## 2017-12-22 RX ADMIN — BENZOCAINE AND MENTHOL 1 LOZENGE: 5; 1 LIQUID ORAL at 00:20

## 2017-12-22 RX ADMIN — BENZOCAINE AND MENTHOL 1 LOZENGE: 5; 1 LIQUID ORAL at 12:08

## 2017-12-22 RX ADMIN — Medication 3 MILLILITER(S): at 06:00

## 2017-12-22 RX ADMIN — Medication 81 MILLIGRAM(S): at 12:07

## 2017-12-22 RX ADMIN — Medication 3 MILLILITER(S): at 12:07

## 2017-12-22 RX ADMIN — ISOSORBIDE MONONITRATE 30 MILLIGRAM(S): 60 TABLET, EXTENDED RELEASE ORAL at 12:08

## 2017-12-22 RX ADMIN — PANTOPRAZOLE SODIUM 40 MILLIGRAM(S): 20 TABLET, DELAYED RELEASE ORAL at 12:08

## 2017-12-22 RX ADMIN — Medication 0.5 MILLIGRAM(S): at 06:00

## 2017-12-22 RX ADMIN — ENOXAPARIN SODIUM 120 MILLIGRAM(S): 100 INJECTION SUBCUTANEOUS at 05:59

## 2017-12-22 NOTE — PROGRESS NOTE ADULT - PROBLEM SELECTOR PLAN 7
On recent outpt TTE 11/2016  -BP control.

## 2017-12-22 NOTE — PROGRESS NOTE ADULT - PROBLEM SELECTOR PROBLEM 5
GLENYS (obstructive sleep apnea)
Diastolic CHF
GLENYS (obstructive sleep apnea)
GLENYS (obstructive sleep apnea)
Prophylactic measure
GLENYS (obstructive sleep apnea)
GLENYS (obstructive sleep apnea)

## 2017-12-22 NOTE — PROGRESS NOTE ADULT - PROBLEM SELECTOR PLAN 4
Resolving, compensated on VBG today
Likely 2nd dehydration 2nd high output NGT  -Resolved today after 2 days of 1/2 NS and bolus yesterday  -Still requiring aggressive K+ repletion
Likely COPD  -Duoneb q6  -Outpt PFT's.
Resolving, compensated on VBG today
S/P robotic APR, continue with incentive spirometry, post op management. Hb/Hct is stable.
Likely 2nd dehydration 2nd high output NGT  -Now with BILL-consider renal consult   -NS bolus 500cc x1  -1/2 NS @ 100cc/hr  -Aggressive K+ repletion   -Watch resp status closely
Recommend increase IVF to 100cc/hr  -Supplement K+  -Watch resp status closely

## 2017-12-22 NOTE — PROGRESS NOTE ADULT - PROVIDER SPECIALTY LIST ADULT
Anesthesia
Cardiology
Colorectal Surgery
Critical Care
Nutrition Support
Pulmonology
SICU
Surgery
Anesthesia
SICU
Pulmonology

## 2017-12-22 NOTE — PROGRESS NOTE ADULT - SUBJECTIVE AND OBJECTIVE BOX
SUBJECTIVE: Patient comfortable in bed on NC. No acute events overnight. Only febrile once yesterday afternoon. Glucose controlled after TPN discontinued. Patient tolerating decent PO intake. Denies pain and N/V. Ostomy functioning with air and stool.     Vital Signs Last 24 Hrs  T(C): 36.9 (22 Dec 2017 05:26), Max: 38.4 (21 Dec 2017 13:54)  T(F): 98.5 (22 Dec 2017 05:26), Max: 101.1 (21 Dec 2017 13:54)  HR: 71 (22 Dec 2017 06:02) (71 - 95)  BP: 112/68 (22 Dec 2017 05:26) (100/62 - 144/71)  BP(mean): --  RR: 18 (22 Dec 2017 05:26) (18 - 20)  SpO2: 95% (22 Dec 2017 06:02) (91% - 96%)    I&O's Detail    21 Dec 2017 07:01  -  22 Dec 2017 07:00  --------------------------------------------------------  IN:  Total IN: 0 mL    OUT:    Colostomy: 125 mL    Voided: 775 mL  Total OUT: 900 mL    Total NET: -900 mL          Physical Exam:  General Appearance: Appears well, NAD  Abdomen: Soft, distended, appropriate incisional tenderness, dressings clean and dry and intact, VAC in place, ostomy dark  Extremities: Grossly symmetric, SCD's in place, PICC removed     LABS:                        8.4    9.4   )-----------( 450      ( 21 Dec 2017 13:57 )             26.1         134<L>  |  94<L>  |  30<H>  ----------------------------<  246<H>  3.5   |  34<H>  |  1.18    Ca    8.4      21 Dec 2017 13:57  Phos  2.3     12-20  Mg     2.2     12-20        Urinalysis Basic - ( 21 Dec 2017 15:48 )    Color: Yellow / Appearance: SL Turbid / S.023 / pH: x  Gluc: x / Ketone: Negative  / Bili: Negative / Urobili: Negative   Blood: x / Protein: 100 mg/dL / Nitrite: Negative   Leuk Esterase: Negative / RBC: 2-5 /HPF / WBC 5-10 /HPF   Sq Epi: x / Non Sq Epi: Occasional /HPF / Bacteria: Few /HPF            Kennedi Davila PA-C  p#[phone]

## 2017-12-22 NOTE — PROGRESS NOTE ADULT - SUBJECTIVE AND OBJECTIVE BOX
Follow-up Pulm Progress Note    92% on 4L NC  Denies dyspnea    Medications:  MEDICATIONS  (STANDING):  acetaminophen  IVPB. 1000 milliGRAM(s) IV Intermittent once  ALBUTerol/ipratropium for Nebulization 3 milliLiter(s) Nebulizer every 6 hours  amLODIPine   Tablet 10 milliGRAM(s) Oral daily  aspirin enteric coated 81 milliGRAM(s) Oral daily  atorvastatin 40 milliGRAM(s) Oral at bedtime  benzocaine 15 mG/menthol 3.6 mG Lozenge 1 Lozenge Oral four times a day  buDESOnide   0.5 milliGRAM(s) Respule 0.5 milliGRAM(s) Inhalation every 12 hours  carvedilol 25 milliGRAM(s) Oral every 12 hours  enoxaparin Injectable 120 milliGRAM(s) SubCutaneous two times a day  insulin glargine Injectable (LANTUS) 12 Unit(s) SubCutaneous at bedtime  insulin lispro (HumaLOG) corrective regimen sliding scale   SubCutaneous at bedtime  insulin lispro (HumaLOG) corrective regimen sliding scale   SubCutaneous three times a day before meals  isosorbide   mononitrate ER Tablet (IMDUR) 30 milliGRAM(s) Oral daily  lidocaine 2% Gel 1 Application(s) Topical once  pantoprazole  Injectable 40 milliGRAM(s) IV Push daily    MEDICATIONS  (PRN):  acetaminophen   Tablet 650 milliGRAM(s) Oral every 6 hours PRN For Temp greater than 38 C (100.4 F)  nystatin Powder 1 Application(s) Topical two times a day PRN Groin moisture  ondansetron Injectable 4 milliGRAM(s) IV Push every 6 hours PRN Nausea and/or Vomiting  saliva substitute (BIOTENE MOISTURIZING MOUTH SPRAY) 1 Gilbert(s) Topical three times a day PRN Mouth Care          Vital Signs Last 24 Hrs  T(C): 37.7 (22 Dec 2017 13:46), Max: 37.8 (21 Dec 2017 21:15)  T(F): 99.8 (22 Dec 2017 13:46), Max: 100.1 (21 Dec 2017 21:15)  HR: 87 (22 Dec 2017 13:46) (71 - 95)  BP: 112/71 (22 Dec 2017 13:46) (100/62 - 114/67)  BP(mean): --  RR: 18 (22 Dec 2017 13:46) (18 - 18)  SpO2: 94% (22 Dec 2017 13:46) (94% - 96%) on 4L NC      VBG pH 7.44  @ 12:34    VBG pCO2 49  @ 12:34    VBG O2 sat 100  @ 12:34    VBG lactate --  @ 12:34       @ 07:01  -   @ 07:00  --------------------------------------------------------  IN: 0 mL / OUT: 900 mL / NET: -900 mL          LABS:                        7.8    11.7  )-----------( 409      ( 22 Dec 2017 10:24 )             24.0         133<L>  |  93<L>  |  35<H>  ----------------------------<  115<H>  4.4   |  29  |  1.52<H>    Ca    8.3<L>      22 Dec 2017 10:24  Phos  3.1       Mg     2.4                 CAPILLARY BLOOD GLUCOSE      POCT Blood Glucose.: 132 mg/dL (22 Dec 2017 12:30)      Urinalysis Basic - ( 21 Dec 2017 15:48 )    Color: Yellow / Appearance: SL Turbid / S.023 / pH: x  Gluc: x / Ketone: Negative  / Bili: Negative / Urobili: Negative   Blood: x / Protein: 100 mg/dL / Nitrite: Negative   Leuk Esterase: Negative / RBC: 2-5 /HPF / WBC 5-10 /HPF   Sq Epi: x / Non Sq Epi: Occasional /HPF / Bacteria: Few /HPF      Procalcitonin, Serum: 1.18 ng/mL (17 @ 10:24)                  CULTURES: (if applicable)  Culture Results:   <10,000 CFU/ml Normal Urogenital faina present ( @ 21:26)  Culture Results:   No growth to date. ( @ 19:37)  Culture Results:   No growth to date. ( @ 19:37)    Most recent blood culture -- 12-18 @ 21:26   -- -- .Urine Clean Catch (Midstream)  @ 21:26  Most recent blood culture --  @ 19:37   -- -- .Blood Blood  @ 19:37        Physical Examination:  PULM: Decreased BS at bases  CVS: S1, S2 RRR    RADIOLOGY REVIEWED

## 2017-12-22 NOTE — PROGRESS NOTE ADULT - PROBLEM SELECTOR PLAN 5
Bipap 14/10 qHS (home settings).  -Patient has not been wearing his home machine
Bipap 14/10 qHS (home settings).  -Patient has not been wearing his home machine
Bipap 14/10 qHS (home settings).  -Patient has not been wearing his home machine  -Changed to hospital machine
On recent outpt TTE 11/2016  -Lasix as tolerated  -BP control.
VTE px: heparin 5000 units sq tid
Bipap 14/10 qHS (home settings).
Bipap 14/10 qHS (home settings).

## 2017-12-22 NOTE — PROGRESS NOTE ADULT - ASSESSMENT
66yo M POD 16 s/p robotic LAR converted to open APR; course complicated by DVT and PMH of GLENYS/COPD    -C/W BiPAP overnight as tolerated  -Supplemental oxygen maintaining >90%  -C/W LRD  -IVL  -Monitor blood glucose   -F/U PICC and blood cultures  -F/U official read on CXR  -C/W T lovenox and ASA  -Dispo planning to rehab after afebrile for 24H  -Check procalcitonin this AM as per CRS fellow  -OOB/IS

## 2017-12-22 NOTE — PROGRESS NOTE ADULT - PROBLEM SELECTOR PROBLEM 3
DVT (deep venous thrombosis)
DVT (deep venous thrombosis)
Diabetes mellitus, type 2
GLENYS (obstructive sleep apnea)
Hypertension
Metabolic alkalosis

## 2017-12-22 NOTE — PROGRESS NOTE ADULT - PROBLEM SELECTOR PROBLEM 1
Respiratory failure with hypoxia
Hyperlipidemia, unspecified hyperlipidemia type
Ileus following gastrointestinal surgery
Ileus following gastrointestinal surgery
NSTEMI (non-ST elevated myocardial infarction)
Respiratory failure with hypoxia
Sepsis
Sepsis

## 2017-12-22 NOTE — PROGRESS NOTE ADULT - PROBLEM SELECTOR PLAN 6
Likely COPD  -Duoneb q6  -Outpt PFT's.

## 2017-12-22 NOTE — PROGRESS NOTE ADULT - PROBLEM SELECTOR PROBLEM 2
Fever
CAD (coronary artery disease)
DVT (deep venous thrombosis)
Fever
Hyperlipidemia, unspecified hyperlipidemia type
Ileus following gastrointestinal surgery
R/O Pulmonary embolism
Rectal cancer
Respiratory failure with hypoxia
Respiratory failure with hypoxia

## 2017-12-22 NOTE — PROGRESS NOTE ADULT - PROBLEM SELECTOR PROBLEM 4
Metabolic alkalosis
COPD (chronic obstructive pulmonary disease)
Hypernatremia
Metabolic alkalosis
Rectal cancer
Hypernatremia
Hypernatremia

## 2017-12-22 NOTE — PROGRESS NOTE ADULT - PROBLEM SELECTOR PLAN 1
Multifactorial 2nd shunt through compressive atelectasis from pleural effusions, abd distension, likely underlying COPD  -Keep sp02>90% on supplemental oxygen  -Patient is not really moving, won't tolerate OOB 2nd pain  -OOB as tolerated  -Incentive spirometry  -Will need supplemental oxygen for rehab  -Creat uptrending, now off TPN. Must be monitored closely at rehab

## 2017-12-23 ENCOUNTER — INPATIENT (INPATIENT)
Facility: HOSPITAL | Age: 67
LOS: 10 days | Discharge: NURSING, REHAB OR LONG-TERM CARE | DRG: 175 | End: 2018-01-03
Attending: SURGERY | Admitting: SURGERY
Payer: MEDICARE

## 2017-12-23 VITALS — SYSTOLIC BLOOD PRESSURE: 140 MMHG | DIASTOLIC BLOOD PRESSURE: 80 MMHG | RESPIRATION RATE: 18 BRPM | HEART RATE: 98 BPM

## 2017-12-23 DIAGNOSIS — Z90.49 ACQUIRED ABSENCE OF OTHER SPECIFIED PARTS OF DIGESTIVE TRACT: Chronic | ICD-10-CM

## 2017-12-23 DIAGNOSIS — Z90.89 ACQUIRED ABSENCE OF OTHER ORGANS: Chronic | ICD-10-CM

## 2017-12-23 DIAGNOSIS — K94.01 COLOSTOMY HEMORRHAGE: ICD-10-CM

## 2017-12-23 DIAGNOSIS — Z95.5 PRESENCE OF CORONARY ANGIOPLASTY IMPLANT AND GRAFT: Chronic | ICD-10-CM

## 2017-12-23 DIAGNOSIS — R89.7 ABNORMAL HISTOLOGICAL FINDINGS IN SPECIMENS FROM OTHER ORGANS, SYSTEMS AND TISSUES: Chronic | ICD-10-CM

## 2017-12-23 LAB
ALBUMIN SERPL ELPH-MCNC: 2 G/DL — LOW (ref 3.3–5)
ALP SERPL-CCNC: 121 U/L — HIGH (ref 40–120)
ALT FLD-CCNC: 52 U/L RC — HIGH (ref 10–45)
ANION GAP SERPL CALC-SCNC: 9 MMOL/L — SIGNIFICANT CHANGE UP (ref 5–17)
AST SERPL-CCNC: 43 U/L — HIGH (ref 10–40)
BASE EXCESS BLDV CALC-SCNC: 9.3 MMOL/L — HIGH (ref -2–2)
BASOPHILS # BLD AUTO: 0 K/UL — SIGNIFICANT CHANGE UP (ref 0–0.2)
BASOPHILS NFR BLD AUTO: 0.2 % — SIGNIFICANT CHANGE UP (ref 0–2)
BILIRUB SERPL-MCNC: 0.5 MG/DL — SIGNIFICANT CHANGE UP (ref 0.2–1.2)
BLD GP AB SCN SERPL QL: NEGATIVE — SIGNIFICANT CHANGE UP
BUN SERPL-MCNC: 30 MG/DL — HIGH (ref 7–23)
CA-I SERPL-SCNC: 1.14 MMOL/L — SIGNIFICANT CHANGE UP (ref 1.12–1.3)
CALCIUM SERPL-MCNC: 7.9 MG/DL — LOW (ref 8.4–10.5)
CHLORIDE BLDV-SCNC: 95 MMOL/L — LOW (ref 96–108)
CHLORIDE SERPL-SCNC: 94 MMOL/L — LOW (ref 96–108)
CO2 BLDV-SCNC: 37 MMOL/L — HIGH (ref 22–30)
CO2 SERPL-SCNC: 31 MMOL/L — SIGNIFICANT CHANGE UP (ref 22–31)
CREAT SERPL-MCNC: 1.4 MG/DL — HIGH (ref 0.5–1.3)
CULTURE RESULTS: SIGNIFICANT CHANGE UP
EOSINOPHIL # BLD AUTO: 0.1 K/UL — SIGNIFICANT CHANGE UP (ref 0–0.5)
EOSINOPHIL NFR BLD AUTO: 1 % — SIGNIFICANT CHANGE UP (ref 0–6)
GAS PNL BLDV: 134 MMOL/L — LOW (ref 136–145)
GAS PNL BLDV: SIGNIFICANT CHANGE UP
GLUCOSE BLDV-MCNC: 237 MG/DL — HIGH (ref 70–99)
GLUCOSE SERPL-MCNC: 247 MG/DL — HIGH (ref 70–99)
HCO3 BLDV-SCNC: 36 MMOL/L — HIGH (ref 21–29)
HCT VFR BLD CALC: 24 % — LOW (ref 39–50)
HCT VFR BLDA CALC: 23 % — LOW (ref 39–50)
HGB BLD CALC-MCNC: 7.3 G/DL — LOW (ref 13–17)
HGB BLD-MCNC: 8.1 G/DL — LOW (ref 13–17)
LACTATE BLDV-MCNC: 1.6 MMOL/L — SIGNIFICANT CHANGE UP (ref 0.7–2)
LYMPHOCYTES # BLD AUTO: 0.4 K/UL — LOW (ref 1–3.3)
LYMPHOCYTES # BLD AUTO: 3.8 % — LOW (ref 13–44)
MCHC RBC-ENTMCNC: 32.7 PG — SIGNIFICANT CHANGE UP (ref 27–34)
MCHC RBC-ENTMCNC: 33.5 GM/DL — SIGNIFICANT CHANGE UP (ref 32–36)
MCV RBC AUTO: 97.7 FL — SIGNIFICANT CHANGE UP (ref 80–100)
MONOCYTES # BLD AUTO: 0.9 K/UL — SIGNIFICANT CHANGE UP (ref 0–0.9)
MONOCYTES NFR BLD AUTO: 8 % — SIGNIFICANT CHANGE UP (ref 2–14)
NEUTROPHILS # BLD AUTO: 10.2 K/UL — HIGH (ref 1.8–7.4)
NEUTROPHILS NFR BLD AUTO: 87 % — HIGH (ref 43–77)
PCO2 BLDV: 64 MMHG — HIGH (ref 35–50)
PH BLDV: 7.36 — SIGNIFICANT CHANGE UP (ref 7.35–7.45)
PLATELET # BLD AUTO: 380 K/UL — SIGNIFICANT CHANGE UP (ref 150–400)
PO2 BLDV: 27 MMHG — SIGNIFICANT CHANGE UP (ref 25–45)
POTASSIUM BLDV-SCNC: 3.7 MMOL/L — SIGNIFICANT CHANGE UP (ref 3.5–5)
POTASSIUM SERPL-MCNC: 4.2 MMOL/L — SIGNIFICANT CHANGE UP (ref 3.5–5.3)
POTASSIUM SERPL-SCNC: 4.2 MMOL/L — SIGNIFICANT CHANGE UP (ref 3.5–5.3)
PROT SERPL-MCNC: 6.3 G/DL — SIGNIFICANT CHANGE UP (ref 6–8.3)
RBC # BLD: 2.46 M/UL — LOW (ref 4.2–5.8)
RBC # FLD: 13.2 % — SIGNIFICANT CHANGE UP (ref 10.3–14.5)
RH IG SCN BLD-IMP: NEGATIVE — SIGNIFICANT CHANGE UP
SAO2 % BLDV: 41 % — LOW (ref 67–88)
SODIUM SERPL-SCNC: 134 MMOL/L — LOW (ref 135–145)
SPECIMEN SOURCE: SIGNIFICANT CHANGE UP
WBC # BLD: 11.7 K/UL — HIGH (ref 3.8–10.5)
WBC # FLD AUTO: 11.7 K/UL — HIGH (ref 3.8–10.5)

## 2017-12-23 PROCEDURE — 71010: CPT | Mod: 26

## 2017-12-23 PROCEDURE — 99285 EMERGENCY DEPT VISIT HI MDM: CPT | Mod: 25

## 2017-12-23 PROCEDURE — 71275 CT ANGIOGRAPHY CHEST: CPT | Mod: 26

## 2017-12-23 RX ORDER — PIPERACILLIN AND TAZOBACTAM 4; .5 G/20ML; G/20ML
3.38 INJECTION, POWDER, LYOPHILIZED, FOR SOLUTION INTRAVENOUS ONCE
Qty: 0 | Refills: 0 | Status: COMPLETED | OUTPATIENT
Start: 2017-12-23 | End: 2017-12-23

## 2017-12-23 RX ORDER — CARVEDILOL PHOSPHATE 80 MG/1
25 CAPSULE, EXTENDED RELEASE ORAL EVERY 12 HOURS
Qty: 0 | Refills: 0 | Status: DISCONTINUED | OUTPATIENT
Start: 2017-12-23 | End: 2018-01-03

## 2017-12-23 RX ORDER — ENOXAPARIN SODIUM 100 MG/ML
120 INJECTION SUBCUTANEOUS ONCE
Qty: 0 | Refills: 0 | Status: COMPLETED | OUTPATIENT
Start: 2017-12-23 | End: 2017-12-23

## 2017-12-23 RX ORDER — LISINOPRIL 2.5 MG/1
40 TABLET ORAL DAILY
Qty: 0 | Refills: 0 | Status: DISCONTINUED | OUTPATIENT
Start: 2017-12-23 | End: 2017-12-23

## 2017-12-23 RX ORDER — GLUCAGON INJECTION, SOLUTION 0.5 MG/.1ML
1 INJECTION, SOLUTION SUBCUTANEOUS ONCE
Qty: 0 | Refills: 0 | Status: DISCONTINUED | OUTPATIENT
Start: 2017-12-23 | End: 2018-01-03

## 2017-12-23 RX ORDER — AMLODIPINE BESYLATE 2.5 MG/1
10 TABLET ORAL DAILY
Qty: 0 | Refills: 0 | Status: DISCONTINUED | OUTPATIENT
Start: 2017-12-23 | End: 2017-12-23

## 2017-12-23 RX ORDER — LISINOPRIL 2.5 MG/1
40 TABLET ORAL DAILY
Qty: 0 | Refills: 0 | Status: DISCONTINUED | OUTPATIENT
Start: 2017-12-23 | End: 2018-01-03

## 2017-12-23 RX ORDER — SODIUM CHLORIDE 9 MG/ML
1000 INJECTION, SOLUTION INTRAVENOUS
Qty: 0 | Refills: 0 | Status: DISCONTINUED | OUTPATIENT
Start: 2017-12-23 | End: 2018-01-03

## 2017-12-23 RX ORDER — FUROSEMIDE 40 MG
20 TABLET ORAL DAILY
Qty: 0 | Refills: 0 | Status: DISCONTINUED | OUTPATIENT
Start: 2017-12-23 | End: 2017-12-27

## 2017-12-23 RX ORDER — ENOXAPARIN SODIUM 100 MG/ML
130 INJECTION SUBCUTANEOUS
Qty: 0 | Refills: 0 | Status: DISCONTINUED | OUTPATIENT
Start: 2017-12-23 | End: 2017-12-24

## 2017-12-23 RX ORDER — INSULIN LISPRO 100/ML
VIAL (ML) SUBCUTANEOUS AT BEDTIME
Qty: 0 | Refills: 0 | Status: DISCONTINUED | OUTPATIENT
Start: 2017-12-23 | End: 2018-01-03

## 2017-12-23 RX ORDER — AMLODIPINE BESYLATE 2.5 MG/1
10 TABLET ORAL DAILY
Qty: 0 | Refills: 0 | Status: DISCONTINUED | OUTPATIENT
Start: 2017-12-23 | End: 2018-01-03

## 2017-12-23 RX ORDER — DEXTROSE 50 % IN WATER 50 %
25 SYRINGE (ML) INTRAVENOUS ONCE
Qty: 0 | Refills: 0 | Status: DISCONTINUED | OUTPATIENT
Start: 2017-12-23 | End: 2018-01-03

## 2017-12-23 RX ORDER — DEXTROSE 50 % IN WATER 50 %
1 SYRINGE (ML) INTRAVENOUS ONCE
Qty: 0 | Refills: 0 | Status: DISCONTINUED | OUTPATIENT
Start: 2017-12-23 | End: 2018-01-03

## 2017-12-23 RX ORDER — AZITHROMYCIN 500 MG/1
500 TABLET, FILM COATED ORAL ONCE
Qty: 0 | Refills: 0 | Status: COMPLETED | OUTPATIENT
Start: 2017-12-23 | End: 2017-12-23

## 2017-12-23 RX ORDER — BUDESONIDE, MICRONIZED 100 %
0.5 POWDER (GRAM) MISCELLANEOUS EVERY 12 HOURS
Qty: 0 | Refills: 0 | Status: DISCONTINUED | OUTPATIENT
Start: 2017-12-23 | End: 2018-01-03

## 2017-12-23 RX ORDER — FUROSEMIDE 40 MG
20 TABLET ORAL DAILY
Qty: 0 | Refills: 0 | Status: DISCONTINUED | OUTPATIENT
Start: 2017-12-23 | End: 2017-12-23

## 2017-12-23 RX ORDER — DEXTROSE 50 % IN WATER 50 %
12.5 SYRINGE (ML) INTRAVENOUS ONCE
Qty: 0 | Refills: 0 | Status: DISCONTINUED | OUTPATIENT
Start: 2017-12-23 | End: 2018-01-03

## 2017-12-23 RX ORDER — IPRATROPIUM/ALBUTEROL SULFATE 18-103MCG
3 AEROSOL WITH ADAPTER (GRAM) INHALATION EVERY 6 HOURS
Qty: 0 | Refills: 0 | Status: DISCONTINUED | OUTPATIENT
Start: 2017-12-23 | End: 2018-01-03

## 2017-12-23 RX ORDER — ATORVASTATIN CALCIUM 80 MG/1
40 TABLET, FILM COATED ORAL AT BEDTIME
Qty: 0 | Refills: 0 | Status: DISCONTINUED | OUTPATIENT
Start: 2017-12-23 | End: 2018-01-03

## 2017-12-23 RX ORDER — ASPIRIN/CALCIUM CARB/MAGNESIUM 324 MG
81 TABLET ORAL DAILY
Qty: 0 | Refills: 0 | Status: DISCONTINUED | OUTPATIENT
Start: 2017-12-23 | End: 2018-01-03

## 2017-12-23 RX ORDER — INSULIN GLARGINE 100 [IU]/ML
12 INJECTION, SOLUTION SUBCUTANEOUS AT BEDTIME
Qty: 0 | Refills: 0 | Status: DISCONTINUED | OUTPATIENT
Start: 2017-12-23 | End: 2018-01-03

## 2017-12-23 RX ORDER — INSULIN LISPRO 100/ML
VIAL (ML) SUBCUTANEOUS
Qty: 0 | Refills: 0 | Status: DISCONTINUED | OUTPATIENT
Start: 2017-12-23 | End: 2018-01-03

## 2017-12-23 RX ORDER — VANCOMYCIN HCL 1 G
1000 VIAL (EA) INTRAVENOUS ONCE
Qty: 0 | Refills: 0 | Status: COMPLETED | OUTPATIENT
Start: 2017-12-23 | End: 2017-12-23

## 2017-12-23 RX ADMIN — ATORVASTATIN CALCIUM 40 MILLIGRAM(S): 80 TABLET, FILM COATED ORAL at 23:01

## 2017-12-23 RX ADMIN — ENOXAPARIN SODIUM 120 MILLIGRAM(S): 100 INJECTION SUBCUTANEOUS at 17:49

## 2017-12-23 RX ADMIN — INSULIN GLARGINE 12 UNIT(S): 100 INJECTION, SOLUTION SUBCUTANEOUS at 23:01

## 2017-12-23 RX ADMIN — Medication 3 MILLILITER(S): at 23:00

## 2017-12-23 RX ADMIN — Medication 0.5 MILLIGRAM(S): at 23:00

## 2017-12-23 RX ADMIN — AZITHROMYCIN 250 MILLIGRAM(S): 500 TABLET, FILM COATED ORAL at 14:48

## 2017-12-23 RX ADMIN — PIPERACILLIN AND TAZOBACTAM 200 GRAM(S): 4; .5 INJECTION, POWDER, LYOPHILIZED, FOR SOLUTION INTRAVENOUS at 14:30

## 2017-12-23 RX ADMIN — Medication 250 MILLIGRAM(S): at 16:48

## 2017-12-23 NOTE — ED PROVIDER NOTE - MEDICAL DECISION MAKING DETAILS
laquita - sp surg with colostomy for rectal ca - now with daqrk output and discoloration of stoma - no pain no n/v, no cp no lightheaadedness - stoma dusky - concern for ischemia chk lactate- surg consult - and consider imaging

## 2017-12-23 NOTE — ED ADULT NURSE REASSESSMENT NOTE - NS ED NURSE REASSESS COMMENT FT1
pt tba for HCAP, started on IV abx. zosyn administered, zithromax 500 mg currently infusing over 60 minutes. pending vanco administration when zithromax is completed. ct scan + for PE. pt pending admission. denies any sob, cp, dizziness, numbness, and tingling. sleeping and resting comfortably in stretcher. safety and fall precautions maintained. call bell at the bedside and within reach.

## 2017-12-23 NOTE — ED PROVIDER NOTE - PROGRESS NOTE DETAILS
dw surg - pt had been dc with necrotic stoma viable internal mucosa pt remains aymptomatic - hover hypoxic off o2 - cxr w ? infiltrate vs atelevctasis, tx for hcaop cta r/o pe

## 2017-12-23 NOTE — ED PROVIDER NOTE - PMH
Cataracts, bilateral    Diabetes mellitus, type 2    Essential (primary) hypertension    Hyperlipidemia, unspecified hyperlipidemia type    Obesity (BMI 30-39.9)    PVD (peripheral vascular disease)    Rectal cancer  s/p chemo radiation  Stented coronary artery    Tobacco use  quit Jan 2017  Vitiligo

## 2017-12-23 NOTE — ED ADULT NURSE NOTE - OBJECTIVE STATEMENT
67 y.o male pmh DM, HTN, PVD, rectal cancer, s/p abdominal and colon surgery with colostomy bag the beginning of december d/c yesterday from hospital presenting to ED from rehab c/o colostomy complications as per RN that was doing morning rounds on the pt. as per EMS pt was brought to ED for surgery consult d/t necrotic stoma at colostomy site. upon arrival pt denies any complaints of pain or discomfort. no weakness, chills, body aches, abdominal pain, nausea, vomiting. denies having any output from the colostomy. stoma necrotic and as per surgery team when contacted pt had necrotic stoma at time of d/c yesterday, pt afebrile. labs and line obtained via ultrasound guided iv placement by ED Resident. abdominal incision dressing clean, dry and intact. wound with no redness, swelling, pain, warmth or drainage noted. no redness, swelling or s/s of infection to the colostomy site. pt pending xray. VS stable. safety and fall precautions maintained. call bell at the bedside.

## 2017-12-23 NOTE — ED PROVIDER NOTE - PSH
Abnormal rectal biopsy  7/2017 rectal tumor excision  H/O heart artery stent  stent x 1 and balloon angioplasty - 2009  History of cholecystectomy    S/P tonsillectomy and adenoidectomy

## 2017-12-23 NOTE — ED PROVIDER NOTE - CARE PLAN
Principal Discharge DX:	Colostomy hemorrhage Principal Discharge DX:	Colostomy hemorrhage  Secondary Diagnosis:	Pulmonary embolism

## 2017-12-23 NOTE — ED PROVIDER NOTE - SKIN, MLM
Skin normal color for race, warm, dry and intact. No evidence of rash. Vitiligo present. Chronic venous stasis changes in legs b/l.

## 2017-12-23 NOTE — ED PROVIDER NOTE - OBJECTIVE STATEMENT
67M h/o Obesity, DM, Rectal CA s/p resection and colostomy 12/17 sent in from Nursing Facility for "necrotic colostomy." NP at facility noted a "blackened" ostomy this morning and sent him here. Patient has no complaints, no abdominal pain or swelling, no N/V. Denies fever, chills, SOB, headache.

## 2017-12-23 NOTE — ED PROCEDURE NOTE - PROCEDURE ADDITIONAL DETAILS
US guided rt peripheral line placed - good venous return flushed succesfully
Emergency Department Focused Ultrasound performed at patient's bedside for educational purposes. Peripheral IV placed.

## 2017-12-24 DIAGNOSIS — I26.99 OTHER PULMONARY EMBOLISM WITHOUT ACUTE COR PULMONALE: ICD-10-CM

## 2017-12-24 DIAGNOSIS — C20 MALIGNANT NEOPLASM OF RECTUM: ICD-10-CM

## 2017-12-24 LAB
ANION GAP SERPL CALC-SCNC: 11 MMOL/L — SIGNIFICANT CHANGE UP (ref 5–17)
BUN SERPL-MCNC: 23 MG/DL — SIGNIFICANT CHANGE UP (ref 7–23)
CALCIUM SERPL-MCNC: 8.3 MG/DL — LOW (ref 8.4–10.5)
CHLORIDE SERPL-SCNC: 96 MMOL/L — SIGNIFICANT CHANGE UP (ref 96–108)
CO2 SERPL-SCNC: 30 MMOL/L — SIGNIFICANT CHANGE UP (ref 22–31)
CREAT SERPL-MCNC: 1.38 MG/DL — HIGH (ref 0.5–1.3)
GLUCOSE BLDC GLUCOMTR-MCNC: 148 MG/DL — HIGH (ref 70–99)
GLUCOSE BLDC GLUCOMTR-MCNC: 169 MG/DL — HIGH (ref 70–99)
GLUCOSE BLDC GLUCOMTR-MCNC: 196 MG/DL — HIGH (ref 70–99)
GLUCOSE BLDC GLUCOMTR-MCNC: 213 MG/DL — HIGH (ref 70–99)
GLUCOSE SERPL-MCNC: 175 MG/DL — HIGH (ref 70–99)
HCT VFR BLD CALC: 23.9 % — LOW (ref 39–50)
HGB BLD-MCNC: 7.4 G/DL — LOW (ref 13–17)
MAGNESIUM SERPL-MCNC: 2.4 MG/DL — SIGNIFICANT CHANGE UP (ref 1.6–2.6)
MCHC RBC-ENTMCNC: 29.5 PG — SIGNIFICANT CHANGE UP (ref 27–34)
MCHC RBC-ENTMCNC: 31 GM/DL — LOW (ref 32–36)
MCV RBC AUTO: 95.2 FL — SIGNIFICANT CHANGE UP (ref 80–100)
NT-PROBNP SERPL-SCNC: 480 PG/ML — HIGH (ref 0–300)
PHOSPHATE SERPL-MCNC: 3.2 MG/DL — SIGNIFICANT CHANGE UP (ref 2.5–4.5)
PLATELET # BLD AUTO: 512 K/UL — HIGH (ref 150–400)
POTASSIUM SERPL-MCNC: 4.5 MMOL/L — SIGNIFICANT CHANGE UP (ref 3.5–5.3)
POTASSIUM SERPL-SCNC: 4.5 MMOL/L — SIGNIFICANT CHANGE UP (ref 3.5–5.3)
PROCALCITONIN SERPL-MCNC: 0.92 NG/ML — HIGH (ref 0–0.04)
RBC # BLD: 2.51 M/UL — LOW (ref 4.2–5.8)
RBC # FLD: 14.2 % — SIGNIFICANT CHANGE UP (ref 10.3–14.5)
SODIUM SERPL-SCNC: 137 MMOL/L — SIGNIFICANT CHANGE UP (ref 135–145)
WBC # BLD: 10.32 K/UL — SIGNIFICANT CHANGE UP (ref 3.8–10.5)
WBC # FLD AUTO: 10.32 K/UL — SIGNIFICANT CHANGE UP (ref 3.8–10.5)

## 2017-12-24 PROCEDURE — 99223 1ST HOSP IP/OBS HIGH 75: CPT | Mod: GC

## 2017-12-24 RX ORDER — VANCOMYCIN HCL 1 G
1000 VIAL (EA) INTRAVENOUS EVERY 12 HOURS
Qty: 0 | Refills: 0 | Status: DISCONTINUED | OUTPATIENT
Start: 2017-12-24 | End: 2017-12-24

## 2017-12-24 RX ORDER — AZITHROMYCIN 500 MG/1
500 TABLET, FILM COATED ORAL EVERY 24 HOURS
Qty: 0 | Refills: 0 | Status: DISCONTINUED | OUTPATIENT
Start: 2017-12-24 | End: 2017-12-24

## 2017-12-24 RX ADMIN — CARVEDILOL PHOSPHATE 25 MILLIGRAM(S): 80 CAPSULE, EXTENDED RELEASE ORAL at 06:41

## 2017-12-24 RX ADMIN — Medication 250 MILLIGRAM(S): at 08:15

## 2017-12-24 RX ADMIN — Medication 20 MILLIGRAM(S): at 06:41

## 2017-12-24 RX ADMIN — CARVEDILOL PHOSPHATE 25 MILLIGRAM(S): 80 CAPSULE, EXTENDED RELEASE ORAL at 17:03

## 2017-12-24 RX ADMIN — Medication 3 MILLILITER(S): at 11:59

## 2017-12-24 RX ADMIN — AZITHROMYCIN 255 MILLIGRAM(S): 500 TABLET, FILM COATED ORAL at 06:40

## 2017-12-24 RX ADMIN — Medication 1: at 08:15

## 2017-12-24 RX ADMIN — ENOXAPARIN SODIUM 130 MILLIGRAM(S): 100 INJECTION SUBCUTANEOUS at 06:40

## 2017-12-24 RX ADMIN — Medication 3 MILLILITER(S): at 06:40

## 2017-12-24 RX ADMIN — Medication 81 MILLIGRAM(S): at 11:58

## 2017-12-24 RX ADMIN — AMLODIPINE BESYLATE 10 MILLIGRAM(S): 2.5 TABLET ORAL at 06:41

## 2017-12-24 RX ADMIN — ENOXAPARIN SODIUM 130 MILLIGRAM(S): 100 INJECTION SUBCUTANEOUS at 17:03

## 2017-12-24 RX ADMIN — Medication 1: at 11:58

## 2017-12-24 RX ADMIN — INSULIN GLARGINE 12 UNIT(S): 100 INJECTION, SOLUTION SUBCUTANEOUS at 23:30

## 2017-12-24 RX ADMIN — Medication 3 MILLILITER(S): at 23:14

## 2017-12-24 RX ADMIN — Medication 3 MILLILITER(S): at 17:03

## 2017-12-24 RX ADMIN — ATORVASTATIN CALCIUM 40 MILLIGRAM(S): 80 TABLET, FILM COATED ORAL at 21:10

## 2017-12-24 RX ADMIN — LISINOPRIL 40 MILLIGRAM(S): 2.5 TABLET ORAL at 06:41

## 2017-12-24 RX ADMIN — Medication 0.5 MILLIGRAM(S): at 17:03

## 2017-12-24 RX ADMIN — Medication 0.5 MILLIGRAM(S): at 06:30

## 2017-12-24 NOTE — CONSULT NOTE ADULT - PROBLEM SELECTOR RECOMMENDATION 9
The patient's recent CT demonstrates a possible new PE  the patient with a known DVT on Lovenox.  It is difficult to ascertain whether this is a new acute PE with failure of Lovenox or if the previous CTPA done 12/12 did not demonstrate a subsegmental PE because of the limitations to the study as indicated in the report from 12/12.  At this time the patient should be continued on Lovenox.  LE dopplers are pending.  Echo is also pending.  The patient is hemodynamically stable.  Continue with supplemental O2.  His hypoxemia may also be due to his body habitus and atelactasis post abdominal surgery.  Keep OOB as tolerate - incentive spirometry is extremely important.
acute left sided PE while on therapeutic lovenox. Review of records shows no missed doses while inpatient and reportedly did not miss any at rehab. His dosing (120 BID) is accurate for his weight (126kg on admission).   - Reviewed prior scans (CT 12/18, CTA 12/12) with radiology, not visualized on either even with non diagnostic study on 12/12. Also has a right peroneal DVT on US from 12/13 and 12/19  - please start heparin drip to anticoagulate the patient. will likely transition to DOAC.  - continues to be hypoxic since last admission. requiring 5L NC  - please check TTE

## 2017-12-24 NOTE — CONSULT NOTE ADULT - ATTENDING COMMENTS
Agree with above  Pt with acute PE while on therapeutic Lovenox in the setting of malignancy and recent surgery. I recommend d/c Lovenox and start Heparin gtt. Will likely need transition to Xarelto when deemed stable from surgical standpoint.   Check echo  Pt with hypoxia: related to  PE? other causes? Consider pulm consult if hypoxia persists  Rectal ca: will need adjuvant therapy after recovery from surgery

## 2017-12-24 NOTE — CONSULT NOTE ADULT - PROBLEM SELECTOR RECOMMENDATION 2
s/p neoadjuvant Xeloda with Dr Amaya (in conjunction with RT). now POD 18 from APR with colorectal surgery  - patient to follow up with Dr Amaya in the outpatient setting

## 2017-12-24 NOTE — CONSULT NOTE ADULT - ASSESSMENT
67y old Male with a history of rectal cancer (T3N0M0) s/p neoadjuvant chemo (xeloda) and RT and recent APR on 12/6 who was sent from rehab with concern for a nonviable ostomy.

## 2017-12-24 NOTE — H&P ADULT - ASSESSMENT
67y Male who presents with Malignant neoplasm of rectum s/p Robotic LAR converted to APR, now presenting to the ED and found to have a PE.  Patient has known DVT and was sent home on therapeutic Lovenox.  Patient has infiltrate on his CXR concerning for pulmonary vascular congestion versus PNA.   -continue antibiotics, send procalcitonin, consider discontinuing if procalcitonin is negative  -continue therapeutic lovenox  -Lower extremity duplex  -BARRY to assess for heart strain, although unlikely on imaging  -Pulmonary consult  -Bipap overnight, home setting  -Daily weights, BNP in am with labs  -Regular cc diet with sliding scale    JOSE ELIAS Nogueira MD PGYII

## 2017-12-24 NOTE — CONSULT NOTE ADULT - SUBJECTIVE AND OBJECTIVE BOX
Hematology Consult Note    HPI:  67y old Male with a history of rectal cancer (T3N0M0) s/p neoadjuvant chemo (xeloda) and RT and recent APR on 12/6 who was sent from rehab with concern for a nonviable ostomy.  On admission the patient has no complaints of nausea, vomiting or increased ostomy output.  The patient states that he has not had fevers or chills and was brought to the hospital because of his ostomy. He endorsed then denied shortness of breath. He has a history of GLENYS but denies a history of COPD or oxygen use at home. No chest pain, cough today.    He was admitted to [facility] 12/6  for elective robotic low anterior resection that was then converted to open abdominal perineal resection. His post op course was complicated by respiratory distress and hypotension requiring a SICU stay. On 12/10/17, the stoma appeared dusky but w/ viable mucosa at the internal orifice. He had more issues with hypoxia CT performed on 12/12 reported no central PE but nondiagnostic study of lower branches. Cardiology diagnosed the patient w/ NSTEMI at that time. During this admission he was noted to have a persistent peritoneal thrombosis. he was started on therapeutic lovenox on 12/19 (BID dosing). Per documentation he has not missed a dose in the hospital and reportedly in the short time frame he was in rehab. At time of discharge it was noted that he was requiring        Medications (home): Medications (inpatient): ALBUTerol/ipratropium for Nebulization 3 milliLiter(s) Nebulizer every 6 hours  amLODIPine   Tablet 10 milliGRAM(s) Oral daily  aspirin enteric coated 81 milliGRAM(s) Oral daily  atorvastatin 40 milliGRAM(s) Oral at bedtime  buDESOnide   0.5 milliGRAM(s) Respule 0.5 milliGRAM(s) Inhalation every 12 hours  carvedilol 25 milliGRAM(s) Oral every 12 hours  dextrose 5%. 1000 milliLiter(s) IV Continuous <Continuous>  dextrose 50% Injectable 12.5 Gram(s) IV Push once  dextrose 50% Injectable 25 Gram(s) IV Push once  dextrose 50% Injectable 25 Gram(s) IV Push once  enoxaparin Injectable 130 milliGRAM(s) SubCutaneous two times a day  furosemide    Tablet 20 milliGRAM(s) Oral daily  insulin glargine Injectable (LANTUS) 12 Unit(s) SubCutaneous at bedtime  insulin lispro (HumaLOG) corrective regimen sliding scale   SubCutaneous three times a day before meals  insulin lispro (HumaLOG) corrective regimen sliding scale   SubCutaneous at bedtime  lisinopril 40 milliGRAM(s) Oral daily    Medications (PRN):dextrose Gel 1 Dose(s) Oral once PRN  glucagon  Injectable 1 milliGRAM(s) IntraMuscular once PRN    Allergies    No Known Allergies    Intolerances      Social Hx: former smoker    Physical Exam  T(C): 37.1 (12-23-17 @ 19:32), Max: 37.1 (12-23-17 @ 19:32)  HR: 83 (12-23-17 @ 19:32) (80 - 98)  BP: 105/62 (12-23-17 @ 19:32) (105/62 - 140/80)  RR: 20 (12-23-17 @ 17:53) (18 - 22)  SpO2: 95% (12-23-17 @ 17:53) (92% - 95%)  Tmax: T(C): , Max: 37.1 (12-23-17 @ 19:32)    12-23-17  -  12-24-17  --------------------------------------------------------  IN:    Oral Fluid: 120 mL  Total IN: 120 mL    OUT:  Total OUT: 0 mL    Total NET: 120 mL        General: obese, well nourished, NAD  Neuro: alert and oriented, no focal deficits, moves all extremities spontaneously  HEENT: NCAT, EOMI, anicteric, mucosa moist  Respiratory: airway patent, respirations unlabored  CVS: regular rate and rhythm  Abdomen: soft, nontender, nondistended, ostomy intact with necrotic mucosa on surface of the ostomy and pink viable mucosa below the abdominal surface.  Minimal stool in the ostomy with gas.  Extremities: no edema, sensation and movement grossly intact  Skin: warm, dry, appropriate color    Labs:                        8.1    11.7  )-----------( 380      ( 23 Dec 2017 11:29 )             24.0     PT/INR - ( 23 Dec 2017 12:15 )   PT: 13.7 sec;   INR: 1.26 ratio         PTT - ( 23 Dec 2017 12:15 )  PTT:30.1 sec  12-23    134<L>  |  94<L>  |  30<H>  ----------------------------<  247<H>  4.2   |  31  |  1.40<H>    Ca    7.9<L>      23 Dec 2017 11:29  Phos  3.1     12-22  Mg     2.4     12-22    TPro  6.3  /  Alb  2.0<L>  /  TBili  0.5  /  DBili  x   /  AST  43<H>  /  ALT  52<H>  /  AlkPhos  121<H>  12-23    Imaging and other studies:  CHEST:     PULMONARY ARTERY: Filling defect is seen in the left upper lobe lobar   pulmonary artery branch. No additional filling defects noted.  LUNGS AND LARGE AIRWAYS: Patent central airways.  No pulmonary nodules.  PLEURA: Small bilateral pleural effusion.  VESSELS: Normal left-sided aortic arch and descending aorta. No aneurysm.   Atherosclerotic changes.  HEART: Heart size is normal. Trace pericardial effusion. Coronary artery   calcification.  MEDIASTINUM AND JOSE: No lymphadenopathy.  CHEST WALL AND LOWER NECK: Within normal limits.  VISUALIZED UPPER ABDOMEN: Within normal limits.  BONES: Multilevel degenerative changes.    IMPRESSION: Acute pulmonary embolism as described above. (24 Dec 2017 01:11)      PAST MEDICAL & SURGICAL HISTORY:  Rectal cancer: s/p chemo radiation  Vitiligo  Stented coronary artery  Obesity (BMI 30-39.9)  Cataracts, bilateral  Hyperlipidemia, unspecified hyperlipidemia type  Tobacco use: quit Jan 2017  PVD (peripheral vascular disease)  Diabetes mellitus, type 2  Essential (primary) hypertension  Abnormal rectal biopsy: 7/2017 rectal tumor excision  History of cholecystectomy  S/P tonsillectomy and adenoidectomy  H/O heart artery stent: stent x 1 and balloon angioplasty - 2009      FAMILY HISTORY:  Family history of essential hypertension  Diabetes mellitus, type 2      MEDICATIONS  (STANDING):  ALBUTerol/ipratropium for Nebulization 3 milliLiter(s) Nebulizer every 6 hours  amLODIPine   Tablet 10 milliGRAM(s) Oral daily  aspirin enteric coated 81 milliGRAM(s) Oral daily  atorvastatin 40 milliGRAM(s) Oral at bedtime  buDESOnide   0.5 milliGRAM(s) Respule 0.5 milliGRAM(s) Inhalation every 12 hours  carvedilol 25 milliGRAM(s) Oral every 12 hours  dextrose 5%. 1000 milliLiter(s) (50 mL/Hr) IV Continuous <Continuous>  dextrose 50% Injectable 12.5 Gram(s) IV Push once  dextrose 50% Injectable 25 Gram(s) IV Push once  dextrose 50% Injectable 25 Gram(s) IV Push once  enoxaparin Injectable 130 milliGRAM(s) SubCutaneous two times a day  furosemide    Tablet 20 milliGRAM(s) Oral daily  insulin glargine Injectable (LANTUS) 12 Unit(s) SubCutaneous at bedtime  insulin lispro (HumaLOG) corrective regimen sliding scale   SubCutaneous three times a day before meals  insulin lispro (HumaLOG) corrective regimen sliding scale   SubCutaneous at bedtime  lisinopril 40 milliGRAM(s) Oral daily    MEDICATIONS  (PRN):  dextrose Gel 1 Dose(s) Oral once PRN Blood Glucose LESS THAN 70 milliGRAM(s)/deciliter  glucagon  Injectable 1 milliGRAM(s) IntraMuscular once PRN Glucose LESS THAN 70 milligrams/deciliter      Allergies    No Known Allergies    Intolerances        SOCIAL HISTORY: No EtOH, no tobacco    REVIEW OF SYSTEMS:    CONSTITUTIONAL: No weakness, fevers or chills  EYES/ENT: No visual changes;  No vertigo or throat pain   NECK: No pain or stiffness  RESPIRATORY: No cough, wheezing, hemoptysis; No shortness of breath  CARDIOVASCULAR: No chest pain or palpitations  GASTROINTESTINAL: No abdominal or epigastric pain. No nausea, vomiting, or hematemesis; No diarrhea or constipation. No melena or hematochezia.  GENITOURINARY: No dysuria, frequency or hematuria  NEUROLOGICAL: No numbness or weakness  SKIN: No itching, burning, rashes, or lesions   All other review of systems is negative unless indicated above.    Height (cm): 185.42 (12-24 @ 08:02)  Weight (kg): 126.7 (12-24 @ 08:02)  BMI (kg/m2): 36.9 (12-24 @ 08:02)  BSA (m2): 2.48 (12-24 @ 08:02)    T(F): 97.8 (12-24-17 @ 11:53), Max: 98.8 (12-23-17 @ 19:32)  HR: 76 (12-24-17 @ 11:53)  BP: 115/71 (12-24-17 @ 11:53)  RR: 20 (12-24-17 @ 11:53)  SpO2: 90% (12-24-17 @ 11:53)  Wt(kg): --    GENERAL: NAD, well-developed  HEAD:  Atraumatic, Normocephalic  EYES: EOMI, PERRLA, conjunctiva and sclera clear  NECK: Supple, No JVD  CHEST/LUNG: Clear to auscultation bilaterally; No wheeze  HEART: Regular rate and rhythm; No murmurs, rubs, or gallops  ABDOMEN: Soft, Nontender, Nondistended; Bowel sounds present  EXTREMITIES:  2+ Peripheral Pulses, No clubbing, cyanosis, or edema  NEUROLOGY: non-focal  SKIN: No rashes or lesions                          7.4    10.32 )-----------( 512      ( 24 Dec 2017 08:49 )             23.9       12-24    137  |  96  |  23  ----------------------------<  175<H>  4.5   |  30  |  1.38<H>    Ca    8.3<L>      24 Dec 2017 08:40  Phos  3.2     12-24  Mg     2.4     12-24    TPro  6.3  /  Alb  2.0<L>  /  TBili  0.5  /  DBili  x   /  AST  43<H>  /  ALT  52<H>  /  AlkPhos  121<H>  12-23      Magnesium, Serum: 2.4 mg/dL (12-24 @ 08:40)  Phosphorus Level, Serum: 3.2 mg/dL (12-24 @ 08:40) Hematology Consult Note    HPI:  67y old Male with a history of rectal cancer (T3N0M0) s/p neoadjuvant chemo (xeloda) and RT and recent APR on 12/6 who was sent from rehab with concern for a nonviable ostomy.  On admission the patient has no complaints of nausea, vomiting or increased ostomy output.  The patient states that he has not had fevers or chills and was brought to the hospital because of his ostomy. He endorsed then denied shortness of breath. He has a history of GLENYS but denies a history of COPD or oxygen use at home. No chest pain, cough today.    He was admitted to [facility] 12/6  for elective robotic low anterior resection that was then converted to open abdominal perineal resection. His post op course was complicated by respiratory distress and hypotension requiring a SICU stay. On 12/10/17, the stoma appeared dusky but w/ viable mucosa at the internal orifice. He had more issues with hypoxia CT performed on 12/12 reported no central PE but nondiagnostic study of lower branches. Cardiology diagnosed the patient w/ NSTEMI at that time. During this admission he was noted to have a persistent peritoneal thrombosis. he was started on therapeutic lovenox on 12/19 (BID dosing). Per documentation he has not missed a dose in the hospital and reportedly in the short time frame he was in rehab. At time of discharge it was noted that he was requiring 5L NC.      PAST MEDICAL & SURGICAL HISTORY:  Rectal cancer: s/p chemo radiation  Vitiligo  Stented coronary artery  Obesity (BMI 30-39.9)  Cataracts, bilateral  Hyperlipidemia, unspecified hyperlipidemia type  Tobacco use: quit Jan 2017  PVD (peripheral vascular disease)  Diabetes mellitus, type 2  Essential (primary) hypertension  Abnormal rectal biopsy: 7/2017 rectal tumor excision  History of cholecystectomy  S/P tonsillectomy and adenoidectomy  H/O heart artery stent: stent x 1 and balloon angioplasty - 2009      FAMILY HISTORY:  Family history of essential hypertension  Diabetes mellitus, type 2      MEDICATIONS  (STANDING):  ALBUTerol/ipratropium for Nebulization 3 milliLiter(s) Nebulizer every 6 hours  amLODIPine   Tablet 10 milliGRAM(s) Oral daily  aspirin enteric coated 81 milliGRAM(s) Oral daily  atorvastatin 40 milliGRAM(s) Oral at bedtime  buDESOnide   0.5 milliGRAM(s) Respule 0.5 milliGRAM(s) Inhalation every 12 hours  carvedilol 25 milliGRAM(s) Oral every 12 hours  dextrose 5%. 1000 milliLiter(s) (50 mL/Hr) IV Continuous <Continuous>  dextrose 50% Injectable 12.5 Gram(s) IV Push once  dextrose 50% Injectable 25 Gram(s) IV Push once  dextrose 50% Injectable 25 Gram(s) IV Push once  enoxaparin Injectable 130 milliGRAM(s) SubCutaneous two times a day  furosemide    Tablet 20 milliGRAM(s) Oral daily  insulin glargine Injectable (LANTUS) 12 Unit(s) SubCutaneous at bedtime  insulin lispro (HumaLOG) corrective regimen sliding scale   SubCutaneous three times a day before meals  insulin lispro (HumaLOG) corrective regimen sliding scale   SubCutaneous at bedtime  lisinopril 40 milliGRAM(s) Oral daily    MEDICATIONS  (PRN):  dextrose Gel 1 Dose(s) Oral once PRN Blood Glucose LESS THAN 70 milliGRAM(s)/deciliter  glucagon  Injectable 1 milliGRAM(s) IntraMuscular once PRN Glucose LESS THAN 70 milligrams/deciliter      Allergies    No Known Allergies      SOCIAL HISTORY: No EtOH, no current tobacco    REVIEW OF SYSTEMS:    CONSTITUTIONAL: No weakness, fevers or chills  EYES/ENT: No visual changes;  No vertigo or throat pain   NECK: No pain or stiffness  RESPIRATORY: No cough, wheezing, hemoptysis; No shortness of breath  CARDIOVASCULAR: No chest pain or palpitations  GASTROINTESTINAL: No abdominal or epigastric pain. No nausea, vomiting, or hematemesis; No diarrhea or constipation. No melena or hematochezia.  GENITOURINARY: No dysuria, frequency or hematuria  NEUROLOGICAL: No numbness or weakness  SKIN: No itching, burning, rashes, or lesions   All other review of systems is negative unless indicated above.    Height (cm): 185.42 (12-24 @ 08:02)  Weight (kg): 126.7 (12-24 @ 08:02)  BMI (kg/m2): 36.9 (12-24 @ 08:02)  BSA (m2): 2.48 (12-24 @ 08:02)    T(F): 97.8 (12-24-17 @ 11:53), Max: 98.8 (12-23-17 @ 19:32)  HR: 76 (12-24-17 @ 11:53)  BP: 115/71 (12-24-17 @ 11:53)  RR: 20 (12-24-17 @ 11:53)  SpO2: 90% (12-24-17 @ 11:53)  Wt(kg): --    General: obese, well nourished, NAD  Neuro: alert and oriented, no focal deficits, moves all extremities spontaneously  HEENT: NCAT, EOMI, anicteric, mucosa moist  Respiratory: airway patent, respirations unlabored  CVS: regular rate and rhythm  Abdomen: soft, nontender, nondistended, ostomy intact with necrotic mucosa on surface of the ostomy and pink viable mucosa below the abdominal surface.  Extremities: no edema, sensation and movement grossly intact  Skin: warm, dry, appropriate color      Imaging and other studies:  CHEST:     PULMONARY ARTERY: Filling defect is seen in the left upper lobe lobar   pulmonary artery branch. No additional filling defects noted.  LUNGS AND LARGE AIRWAYS: Patent central airways.  No pulmonary nodules.  PLEURA: Small bilateral pleural effusion.  VESSELS: Normal left-sided aortic arch and descending aorta. No aneurysm.   Atherosclerotic changes.  HEART: Heart size is normal. Trace pericardial effusion. Coronary artery   calcification.  MEDIASTINUM AND JOSE: No lymphadenopathy.  CHEST WALL AND LOWER NECK: Within normal limits.  VISUALIZED UPPER ABDOMEN: Within normal limits.  BONES: Multilevel degenerative changes.    IMPRESSION: Acute pulmonary embolism as described above. (24 Dec 2017 01:11)                        7.4    10.32 )-----------( 512      ( 24 Dec 2017 08:49 )             23.9       12-24    137  |  96  |  23  ----------------------------<  175<H>  4.5   |  30  |  1.38<H>    Ca    8.3<L>      24 Dec 2017 08:40  Phos  3.2     12-24  Mg     2.4     12-24    TPro  6.3  /  Alb  2.0<L>  /  TBili  0.5  /  DBili  x   /  AST  43<H>  /  ALT  52<H>  /  AlkPhos  121<H>  12-23      Magnesium, Serum: 2.4 mg/dL (12-24 @ 08:40)  Phosphorus Level, Serum: 3.2 mg/dL (12-24 @ 08:40)

## 2017-12-24 NOTE — H&P ADULT - HISTORY OF PRESENT ILLNESS
COLORECTAL SURGERY H&P    HPI: This patient is a 67y old Male presenting from his rehabilitation facility because of concern for a nonviable ostomy.  The patient is s/p LAR converted into an APR for rectal ca, now POD     PMHx:                             PSHx:             Medications (home): Medications (inpatient): ALBUTerol/ipratropium for Nebulization 3 milliLiter(s) Nebulizer every 6 hours  amLODIPine   Tablet 10 milliGRAM(s) Oral daily  aspirin enteric coated 81 milliGRAM(s) Oral daily  atorvastatin 40 milliGRAM(s) Oral at bedtime  buDESOnide   0.5 milliGRAM(s) Respule 0.5 milliGRAM(s) Inhalation every 12 hours  carvedilol 25 milliGRAM(s) Oral every 12 hours  dextrose 5%. 1000 milliLiter(s) IV Continuous <Continuous>  dextrose 50% Injectable 12.5 Gram(s) IV Push once  dextrose 50% Injectable 25 Gram(s) IV Push once  dextrose 50% Injectable 25 Gram(s) IV Push once  enoxaparin Injectable 130 milliGRAM(s) SubCutaneous two times a day  furosemide    Tablet 20 milliGRAM(s) Oral daily  insulin glargine Injectable (LANTUS) 12 Unit(s) SubCutaneous at bedtime  insulin lispro (HumaLOG) corrective regimen sliding scale   SubCutaneous three times a day before meals  insulin lispro (HumaLOG) corrective regimen sliding scale   SubCutaneous at bedtime  lisinopril 40 milliGRAM(s) Oral daily    Medications (PRN):dextrose Gel 1 Dose(s) Oral once PRN  glucagon  Injectable 1 milliGRAM(s) IntraMuscular once PRN    Allergies    No Known Allergies    Intolerances      Social Hx:     Physical Exam  T(C): 37.1 (12-23-17 @ 19:32), Max: 37.1 (12-23-17 @ 19:32)  HR: 83 (12-23-17 @ 19:32) (80 - 98)  BP: 105/62 (12-23-17 @ 19:32) (105/62 - 140/80)  RR: 20 (12-23-17 @ 17:53) (18 - 22)  SpO2: 95% (12-23-17 @ 17:53) (92% - 95%)  Wt(kg): --  Tmax: T(C): , Max: 37.1 (12-23-17 @ 19:32)  Wt(kg): --    12-23-17  -  12-24-17  --------------------------------------------------------  IN:    Oral Fluid: 120 mL  Total IN: 120 mL    OUT:  Total OUT: 0 mL    Total NET: 120 mL        General: well developed, well nourished, NAD  Neuro: alert and oriented, no focal deficits, moves all extremities spontaneously  HEENT: NCAT, EOMI, anicteric, mucosa moist  Respiratory: airway patent, respirations unlabored  CVS: regular rate and rhythm  Abdomen: soft, nontender, nondistended  Extremities: no edema, sensation and movement grossly intact  Skin: warm, dry, appropriate color    Labs:                        8.1    11.7  )-----------( 380      ( 23 Dec 2017 11:29 )             24.0     PT/INR - ( 23 Dec 2017 12:15 )   PT: 13.7 sec;   INR: 1.26 ratio         PTT - ( 23 Dec 2017 12:15 )  PTT:30.1 sec  12-23    134<L>  |  94<L>  |  30<H>  ----------------------------<  247<H>  4.2   |  31  |  1.40<H>    Ca    7.9<L>      23 Dec 2017 11:29  Phos  3.1     12-22  Mg     2.4     12-22    TPro  6.3  /  Alb  2.0<L>  /  TBili  0.5  /  DBili  x   /  AST  43<H>  /  ALT  52<H>  /  AlkPhos  121<H>  12-23            Imaging and other studies: COLORECTAL SURGERY H&P    HPI: This patient is a 67y old Male presenting from his rehabilitation facility because of concern for a nonviable ostomy.  The patient is s/p LAR converted into an APR for rectal ca, now POD 16 from LAR to APR.  Patient has no complaints of nausea, vomiting or increased ostomy output.  The patient states that he has not had fevers or chills and was brought to the hospital because of his ostomy.          Medications (home): Medications (inpatient): ALBUTerol/ipratropium for Nebulization 3 milliLiter(s) Nebulizer every 6 hours  amLODIPine   Tablet 10 milliGRAM(s) Oral daily  aspirin enteric coated 81 milliGRAM(s) Oral daily  atorvastatin 40 milliGRAM(s) Oral at bedtime  buDESOnide   0.5 milliGRAM(s) Respule 0.5 milliGRAM(s) Inhalation every 12 hours  carvedilol 25 milliGRAM(s) Oral every 12 hours  dextrose 5%. 1000 milliLiter(s) IV Continuous <Continuous>  dextrose 50% Injectable 12.5 Gram(s) IV Push once  dextrose 50% Injectable 25 Gram(s) IV Push once  dextrose 50% Injectable 25 Gram(s) IV Push once  enoxaparin Injectable 130 milliGRAM(s) SubCutaneous two times a day  furosemide    Tablet 20 milliGRAM(s) Oral daily  insulin glargine Injectable (LANTUS) 12 Unit(s) SubCutaneous at bedtime  insulin lispro (HumaLOG) corrective regimen sliding scale   SubCutaneous three times a day before meals  insulin lispro (HumaLOG) corrective regimen sliding scale   SubCutaneous at bedtime  lisinopril 40 milliGRAM(s) Oral daily    Medications (PRN):dextrose Gel 1 Dose(s) Oral once PRN  glucagon  Injectable 1 milliGRAM(s) IntraMuscular once PRN    Allergies    No Known Allergies    Intolerances      Social Hx:     Physical Exam  T(C): 37.1 (12-23-17 @ 19:32), Max: 37.1 (12-23-17 @ 19:32)  HR: 83 (12-23-17 @ 19:32) (80 - 98)  BP: 105/62 (12-23-17 @ 19:32) (105/62 - 140/80)  RR: 20 (12-23-17 @ 17:53) (18 - 22)  SpO2: 95% (12-23-17 @ 17:53) (92% - 95%)  Tmax: T(C): , Max: 37.1 (12-23-17 @ 19:32)    12-23-17  -  12-24-17  --------------------------------------------------------  IN:    Oral Fluid: 120 mL  Total IN: 120 mL    OUT:  Total OUT: 0 mL    Total NET: 120 mL        General: obese, well nourished, NAD  Neuro: alert and oriented, no focal deficits, moves all extremities spontaneously  HEENT: NCAT, EOMI, anicteric, mucosa moist  Respiratory: airway patent, respirations unlabored  CVS: regular rate and rhythm  Abdomen: soft, nontender, nondistended, ostomy intact with necrotic mucosa on surface of the ostomy and pink viable mucosa below the abdominal surface.  Minimal stool in the ostomy with gas.  Extremities: no edema, sensation and movement grossly intact  Skin: warm, dry, appropriate color    Labs:                        8.1    11.7  )-----------( 380      ( 23 Dec 2017 11:29 )             24.0     PT/INR - ( 23 Dec 2017 12:15 )   PT: 13.7 sec;   INR: 1.26 ratio         PTT - ( 23 Dec 2017 12:15 )  PTT:30.1 sec  12-23    134<L>  |  94<L>  |  30<H>  ----------------------------<  247<H>  4.2   |  31  |  1.40<H>    Ca    7.9<L>      23 Dec 2017 11:29  Phos  3.1     12-22  Mg     2.4     12-22    TPro  6.3  /  Alb  2.0<L>  /  TBili  0.5  /  DBili  x   /  AST  43<H>  /  ALT  52<H>  /  AlkPhos  121<H>  12-23    Imaging and other studies:  CHEST:     PULMONARY ARTERY: Filling defect is seen in the left upper lobe lobar   pulmonary artery branch. No additional filling defects noted.  LUNGS AND LARGE AIRWAYS: Patent central airways.  No pulmonary nodules.  PLEURA: Small bilateral pleural effusion.  VESSELS: Normal left-sided aortic arch and descending aorta. No aneurysm.   Atherosclerotic changes.  HEART: Heart size is normal. Trace pericardial effusion. Coronary artery   calcification.  MEDIASTINUM AND JOSE: No lymphadenopathy.  CHEST WALL AND LOWER NECK: Within normal limits.  VISUALIZED UPPER ABDOMEN: Within normal limits.  BONES: Multilevel degenerative changes.    IMPRESSION: Acute pulmonary embolism as described above.

## 2017-12-24 NOTE — CONSULT NOTE ADULT - ASSESSMENT
The patient is s/p LAR converted into an APR for rectal ca.  Patient discharged to rehab and then readmitted for change in ostomy output.  During his evaluation in the ER the patient had a CT which demosntrated anew acute UL PE.  Patient with a known history of DVT on Lovenox.  Asymptomatic except for hypoexmic without O2

## 2017-12-24 NOTE — H&P ADULT - ATTENDING COMMENTS
pt seen and examined, agree with above    pt reports improved breathing this am, kelsey diet, no abd pain    Abd obese, soft, NT, VAC intact, colostomy black superficially but pink underneath, beginning to slough    CT with small PE    s/p APR now with PE  anticoag per [facility]EM[facility] consult  PT for ambulation  diet as kelsey

## 2017-12-24 NOTE — CONSULT NOTE ADULT - SUBJECTIVE AND OBJECTIVE BOX
PULMONARY CONSULT    Initial HPI on admission:  HPI:  COLORECTAL SURGERY H&P    HPI: This patient is a 67y old Male presenting from his rehabilitation facility because of concern for a nonviable ostomy.  The patient is s/p LAR converted into an APR for rectal ca, now POD 16 from LAR to APR.  Patient has no complaints of nausea, vomiting or increased ostomy output.  The patient states that he has not had fevers or chills and was brought to the hospital because of his ostomy.   The patient has a history of known LE DVT on lovenox.  CTPA done in the ER demosntrates new pulmonary emboli.  Patient denies any shortness of breath        Medications (home): Medications (inpatient): ALBUTerol/ipratropium for Nebulization 3 milliLiter(s) Nebulizer every 6 hours  amLODIPine   Tablet 10 milliGRAM(s) Oral daily  aspirin enteric coated 81 milliGRAM(s) Oral daily  atorvastatin 40 milliGRAM(s) Oral at bedtime  buDESOnide   0.5 milliGRAM(s) Respule 0.5 milliGRAM(s) Inhalation every 12 hours  carvedilol 25 milliGRAM(s) Oral every 12 hours  dextrose 5%. 1000 milliLiter(s) IV Continuous <Continuous>  dextrose 50% Injectable 12.5 Gram(s) IV Push once  dextrose 50% Injectable 25 Gram(s) IV Push once  dextrose 50% Injectable 25 Gram(s) IV Push once  enoxaparin Injectable 130 milliGRAM(s) SubCutaneous two times a day  furosemide    Tablet 20 milliGRAM(s) Oral daily  insulin glargine Injectable (LANTUS) 12 Unit(s) SubCutaneous at bedtime  insulin lispro (HumaLOG) corrective regimen sliding scale   SubCutaneous three times a day before meals  insulin lispro (HumaLOG) corrective regimen sliding scale   SubCutaneous at bedtime  lisinopril 40 milliGRAM(s) Oral daily    Medications (PRN):dextrose Gel 1 Dose(s) Oral once PRN  glucagon  Injectable 1 milliGRAM(s) IntraMuscular once PRN    Allergies    No Known Allergies    Intolerances      Social Hx:     Physical Exam  T(C): 37.1 (12-23-17 @ 19:32), Max: 37.1 (12-23-17 @ 19:32)  HR: 83 (12-23-17 @ 19:32) (80 - 98)  BP: 105/62 (12-23-17 @ 19:32) (105/62 - 140/80)  RR: 20 (12-23-17 @ 17:53) (18 - 22)  SpO2: 95% (12-23-17 @ 17:53) (92% - 95%)  Tmax: T(C): , Max: 37.1 (12-23-17 @ 19:32)    12-23-17  -  12-24-17  --------------------------------------------------------  IN:    Oral Fluid: 120 mL  Total IN: 120 mL    OUT:  Total OUT: 0 mL    Total NET: 120 mL        General: obese, well nourished, NAD  Neuro: alert and oriented, no focal deficits, moves all extremities spontaneously  HEENT: NCAT, EOMI, anicteric, mucosa moist  Respiratory: airway patent, respirations unlabored  CVS: regular rate and rhythm  Abdomen: soft, nontender, nondistended, ostomy intact with necrotic mucosa on surface of the ostomy and pink viable mucosa below the abdominal surface.  Minimal stool in the ostomy with gas.  Extremities: no edema, sensation and movement grossly intact  Skin: warm, dry, appropriate color    Labs:                        8.1    11.7  )-----------( 380      ( 23 Dec 2017 11:29 )             24.0     PT/INR - ( 23 Dec 2017 12:15 )   PT: 13.7 sec;   INR: 1.26 ratio         PTT - ( 23 Dec 2017 12:15 )  PTT:30.1 sec  12-23    134<L>  |  94<L>  |  30<H>  ----------------------------<  247<H>  4.2   |  31  |  1.40<H>    Ca    7.9<L>      23 Dec 2017 11:29  Phos  3.1     12-22  Mg     2.4     12-22    TPro  6.3  /  Alb  2.0<L>  /  TBili  0.5  /  DBili  x   /  AST  43<H>  /  ALT  52<H>  /  AlkPhos  121<H>  12-23    Imaging and other studies:  CHEST:     PULMONARY ARTERY: Filling defect is seen in the left upper lobe lobar   pulmonary artery branch. No additional filling defects noted.  LUNGS AND LARGE AIRWAYS: Patent central airways.  No pulmonary nodules.  PLEURA: Small bilateral pleural effusion.  VESSELS: Normal left-sided aortic arch and descending aorta. No aneurysm.   Atherosclerotic changes.  HEART: Heart size is normal. Trace pericardial effusion. Coronary artery   calcification.  MEDIASTINUM AND JOSE: No lymphadenopathy.  CHEST WALL AND LOWER NECK: Within normal limits.  VISUALIZED UPPER ABDOMEN: Within normal limits.  BONES: Multilevel degenerative changes.    IMPRESSION: Acute pulmonary embolism as described above. (24 Dec 2017 01:11)      BRIEF HOSPITAL COURSE: ***    PAST MEDICAL & SURGICAL HISTORY:  Rectal cancer: s/p chemo radiation  Vitiligo  Stented coronary artery  Obesity (BMI 30-39.9)  Cataracts, bilateral  Hyperlipidemia, unspecified hyperlipidemia type  Tobacco use: quit Jan 2017  PVD (peripheral vascular disease)  Diabetes mellitus, type 2  Essential (primary) hypertension  Abnormal rectal biopsy: 7/2017 rectal tumor excision  History of cholecystectomy  S/P tonsillectomy and adenoidectomy  H/O heart artery stent: stent x 1 and balloon angioplasty - 2009    Allergies    No Known Allergies    Intolerances      FAMILY HISTORY:  Family history of essential hypertension  Diabetes mellitus, type 2    Social history:     Review of Systems:  CONSTITUTIONAL: No fever, chills, or fatigue  EYES: No eye pain, visual disturbances, or discharge  ENMT:  No difficulty hearing, tinnitus, vertigo; No sinus or throat pain  NECK: No pain or stiffness  RESPIRATORY: Per above  CARDIOVASCULAR: No chest pain, palpitations, dizziness, or leg swelling  GASTROINTESTINAL: No abdominal or epigastric pain. No nausea, vomiting, or hematemesis; No diarrhea or constipation. No melena or hematochezia.  GENITOURINARY: No dysuria, frequency, hematuria, or incontinence  NEUROLOGICAL: No headaches, memory loss, loss of strength, numbness, or tremors  SKIN: No itching, burning, rashes, or lesions   MUSCULOSKELETAL: No joint pain or swelling; No muscle, back, or extremity pain  PSYCHIATRIC: No depression, anxiety, mood swings, or difficulty sleeping      Medications:  MEDICATIONS  (STANDING):  ALBUTerol/ipratropium for Nebulization 3 milliLiter(s) Nebulizer every 6 hours  amLODIPine   Tablet 10 milliGRAM(s) Oral daily  aspirin enteric coated 81 milliGRAM(s) Oral daily  atorvastatin 40 milliGRAM(s) Oral at bedtime  buDESOnide   0.5 milliGRAM(s) Respule 0.5 milliGRAM(s) Inhalation every 12 hours  carvedilol 25 milliGRAM(s) Oral every 12 hours  dextrose 5%. 1000 milliLiter(s) (50 mL/Hr) IV Continuous <Continuous>  dextrose 50% Injectable 12.5 Gram(s) IV Push once  dextrose 50% Injectable 25 Gram(s) IV Push once  dextrose 50% Injectable 25 Gram(s) IV Push once  enoxaparin Injectable 130 milliGRAM(s) SubCutaneous two times a day  furosemide    Tablet 20 milliGRAM(s) Oral daily  insulin glargine Injectable (LANTUS) 12 Unit(s) SubCutaneous at bedtime  insulin lispro (HumaLOG) corrective regimen sliding scale   SubCutaneous three times a day before meals  insulin lispro (HumaLOG) corrective regimen sliding scale   SubCutaneous at bedtime  lisinopril 40 milliGRAM(s) Oral daily    MEDICATIONS  (PRN):  dextrose Gel 1 Dose(s) Oral once PRN Blood Glucose LESS THAN 70 milliGRAM(s)/deciliter  glucagon  Injectable 1 milliGRAM(s) IntraMuscular once PRN Glucose LESS THAN 70 milligrams/deciliter            Vital Signs Last 24 Hrs  T(C): 36.6 (24 Dec 2017 11:53), Max: 37.1 (23 Dec 2017 19:32)  T(F): 97.8 (24 Dec 2017 11:53), Max: 98.8 (23 Dec 2017 19:32)  HR: 76 (24 Dec 2017 11:53) (72 - 86)  BP: 115/71 (24 Dec 2017 11:53) (104/61 - 134/70)  BP(mean): --  RR: 20 (24 Dec 2017 11:53) (18 - 22)  SpO2: 90% (24 Dec 2017 11:53) (90% - 98%)      VBG pH 7.36 12-23 @ 12:14  VBG pCO2 64 12-23 @ 12:14  VBG O2 sat 41 12-23 @ 12:14  VBG lactate 1.6 12-23 @ 12:14        12-23 @ 07:01  -  12-24 @ 07:00  --------------------------------------------------------  IN: 460 mL / OUT: 0 mL / NET: 460 mL          LABS:                        7.4    10.32 )-----------( 512      ( 24 Dec 2017 08:49 )             23.9     12-24    137  |  96  |  23  ----------------------------<  175<H>  4.5   |  30  |  1.38<H>    Ca    8.3<L>      24 Dec 2017 08:40  Phos  3.2     12-24  Mg     2.4     12-24    TPro  6.3  /  Alb  2.0<L>  /  TBili  0.5  /  DBili  x   /  AST  43<H>  /  ALT  52<H>  /  AlkPhos  121<H>  12-23          CAPILLARY BLOOD GLUCOSE      POCT Blood Glucose.: 196 mg/dL (24 Dec 2017 11:50)    PT/INR - ( 23 Dec 2017 12:15 )   PT: 13.7 sec;   INR: 1.26 ratio         PTT - ( 23 Dec 2017 12:15 )  PTT:30.1 sec    Procalcitonin, Serum: 0.92 ng/mL (12-24-17 @ 06:26)  Procalcitonin, Serum: 1.18 ng/mL (12-22-17 @ 10:24)    Serum Pro-Brain Natriuretic Peptide: 480 pg/mL (12-24-17 @ 06:26)              CULTURES: (if applicable)  Culture Results:   No growth to date. (12-21 @ 19:57)  Culture Results:   No growth at 48 hours (12-21 @ 17:04)  Culture Results:   <10,000 CFU/ml  Normal Urogenital faina present (12-21 @ 16:47)  Culture Results:   <10,000 CFU/ml Normal Urogenital faina present (12-18 @ 21:26)  Culture Results:   No growth at 5 days. (12-18 @ 19:37)  Culture Results:   No growth at 5 days. (12-18 @ 19:37)        Physical Examination:    General: No acute distress.      HEENT: Pupils equal, reactive to light.  Symmetric.    PULM: Clear to auscultation bilaterally, no significant sputum production    CVS: S1, S2    ABD: Soft, nondistended, nontender, normoactive bowel sounds, no masses    EXT: No edema, nontender    SKIN: Warm and well perfused, no rashes noted.    NEURO: Alert, oriented, interactive, nonfocal    RADIOLOGY REVIEWED  CXR:    CT chest:  left upper lobar pulmonary emboli    TTE:

## 2017-12-25 LAB
ANION GAP SERPL CALC-SCNC: 10 MMOL/L — SIGNIFICANT CHANGE UP (ref 5–17)
APTT BLD: 120 SEC — CRITICAL HIGH (ref 27.5–37.4)
APTT BLD: 44.4 SEC — HIGH (ref 27.5–37.4)
BASOPHILS # BLD AUTO: 0.1 K/UL — SIGNIFICANT CHANGE UP (ref 0–0.2)
BASOPHILS NFR BLD AUTO: 0.8 % — SIGNIFICANT CHANGE UP (ref 0–2)
BUN SERPL-MCNC: 15 MG/DL — SIGNIFICANT CHANGE UP (ref 7–23)
CALCIUM SERPL-MCNC: 8.4 MG/DL — SIGNIFICANT CHANGE UP (ref 8.4–10.5)
CHLORIDE SERPL-SCNC: 94 MMOL/L — LOW (ref 96–108)
CO2 SERPL-SCNC: 32 MMOL/L — HIGH (ref 22–31)
CREAT SERPL-MCNC: 1.39 MG/DL — HIGH (ref 0.5–1.3)
EOSINOPHIL # BLD AUTO: 0.2 K/UL — SIGNIFICANT CHANGE UP (ref 0–0.5)
EOSINOPHIL NFR BLD AUTO: 1.8 % — SIGNIFICANT CHANGE UP (ref 0–6)
GLUCOSE BLDC GLUCOMTR-MCNC: 159 MG/DL — HIGH (ref 70–99)
GLUCOSE BLDC GLUCOMTR-MCNC: 163 MG/DL — HIGH (ref 70–99)
GLUCOSE BLDC GLUCOMTR-MCNC: 180 MG/DL — HIGH (ref 70–99)
GLUCOSE BLDC GLUCOMTR-MCNC: 191 MG/DL — HIGH (ref 70–99)
GLUCOSE SERPL-MCNC: 154 MG/DL — HIGH (ref 70–99)
HCT VFR BLD CALC: 26.5 % — LOW (ref 39–50)
HCT VFR BLD CALC: 26.5 % — LOW (ref 39–50)
HGB BLD-MCNC: 8.4 G/DL — LOW (ref 13–17)
HGB BLD-MCNC: 8.4 G/DL — LOW (ref 13–17)
INR BLD: 1.27 RATIO — HIGH (ref 0.88–1.16)
LYMPHOCYTES # BLD AUTO: 0.5 K/UL — LOW (ref 1–3.3)
LYMPHOCYTES # BLD AUTO: 4.9 % — LOW (ref 13–44)
MAGNESIUM SERPL-MCNC: 2.2 MG/DL — SIGNIFICANT CHANGE UP (ref 1.6–2.6)
MCHC RBC-ENTMCNC: 30.8 PG — SIGNIFICANT CHANGE UP (ref 27–34)
MCHC RBC-ENTMCNC: 31.1 PG — SIGNIFICANT CHANGE UP (ref 27–34)
MCHC RBC-ENTMCNC: 31.6 GM/DL — LOW (ref 32–36)
MCHC RBC-ENTMCNC: 31.9 GM/DL — LOW (ref 32–36)
MCV RBC AUTO: 97.5 FL — SIGNIFICANT CHANGE UP (ref 80–100)
MCV RBC AUTO: 97.6 FL — SIGNIFICANT CHANGE UP (ref 80–100)
MONOCYTES # BLD AUTO: 0.8 K/UL — SIGNIFICANT CHANGE UP (ref 0–0.9)
MONOCYTES NFR BLD AUTO: 8.8 % — SIGNIFICANT CHANGE UP (ref 2–14)
NEUTROPHILS # BLD AUTO: 7.9 K/UL — HIGH (ref 1.8–7.4)
NEUTROPHILS NFR BLD AUTO: 83.6 % — HIGH (ref 43–77)
PHOSPHATE SERPL-MCNC: 3 MG/DL — SIGNIFICANT CHANGE UP (ref 2.5–4.5)
PLATELET # BLD AUTO: 532 K/UL — HIGH (ref 150–400)
PLATELET # BLD AUTO: 539 K/UL — HIGH (ref 150–400)
POTASSIUM SERPL-MCNC: 3.7 MMOL/L — SIGNIFICANT CHANGE UP (ref 3.5–5.3)
POTASSIUM SERPL-SCNC: 3.7 MMOL/L — SIGNIFICANT CHANGE UP (ref 3.5–5.3)
PROTHROM AB SERPL-ACNC: 13.8 SEC — HIGH (ref 9.8–12.7)
RBC # BLD: 2.72 M/UL — LOW (ref 4.2–5.8)
RBC # BLD: 2.72 M/UL — LOW (ref 4.2–5.8)
RBC # FLD: 13.2 % — SIGNIFICANT CHANGE UP (ref 10.3–14.5)
RBC # FLD: 13.3 % — SIGNIFICANT CHANGE UP (ref 10.3–14.5)
SODIUM SERPL-SCNC: 136 MMOL/L — SIGNIFICANT CHANGE UP (ref 135–145)
WBC # BLD: 9.2 K/UL — SIGNIFICANT CHANGE UP (ref 3.8–10.5)
WBC # BLD: 9.5 K/UL — SIGNIFICANT CHANGE UP (ref 3.8–10.5)
WBC # FLD AUTO: 9.2 K/UL — SIGNIFICANT CHANGE UP (ref 3.8–10.5)
WBC # FLD AUTO: 9.5 K/UL — SIGNIFICANT CHANGE UP (ref 3.8–10.5)

## 2017-12-25 RX ORDER — HEPARIN SODIUM 5000 [USP'U]/ML
2600 INJECTION INTRAVENOUS; SUBCUTANEOUS
Qty: 25000 | Refills: 0 | Status: DISCONTINUED | OUTPATIENT
Start: 2017-12-25 | End: 2017-12-25

## 2017-12-25 RX ORDER — OXYCODONE HYDROCHLORIDE 5 MG/1
5 TABLET ORAL ONCE
Qty: 0 | Refills: 0 | Status: DISCONTINUED | OUTPATIENT
Start: 2017-12-25 | End: 2017-12-25

## 2017-12-25 RX ORDER — HEPARIN SODIUM 5000 [USP'U]/ML
2300 INJECTION INTRAVENOUS; SUBCUTANEOUS
Qty: 25000 | Refills: 0 | Status: DISCONTINUED | OUTPATIENT
Start: 2017-12-25 | End: 2017-12-25

## 2017-12-25 RX ORDER — HEPARIN SODIUM 5000 [USP'U]/ML
2300 INJECTION INTRAVENOUS; SUBCUTANEOUS
Qty: 25000 | Refills: 0 | Status: DISCONTINUED | OUTPATIENT
Start: 2017-12-25 | End: 2017-12-27

## 2017-12-25 RX ORDER — INFLUENZA VIRUS VACCINE 15; 15; 15; 15 UG/.5ML; UG/.5ML; UG/.5ML; UG/.5ML
0.5 SUSPENSION INTRAMUSCULAR ONCE
Qty: 0 | Refills: 0 | Status: COMPLETED | OUTPATIENT
Start: 2017-12-25 | End: 2017-12-25

## 2017-12-25 RX ADMIN — HEPARIN SODIUM 23 UNIT(S)/HR: 5000 INJECTION INTRAVENOUS; SUBCUTANEOUS at 09:08

## 2017-12-25 RX ADMIN — CARVEDILOL PHOSPHATE 25 MILLIGRAM(S): 80 CAPSULE, EXTENDED RELEASE ORAL at 05:03

## 2017-12-25 RX ADMIN — LISINOPRIL 40 MILLIGRAM(S): 2.5 TABLET ORAL at 05:03

## 2017-12-25 RX ADMIN — Medication 3 MILLILITER(S): at 05:04

## 2017-12-25 RX ADMIN — Medication 1: at 12:38

## 2017-12-25 RX ADMIN — INSULIN GLARGINE 12 UNIT(S): 100 INJECTION, SOLUTION SUBCUTANEOUS at 23:20

## 2017-12-25 RX ADMIN — Medication 20 MILLIGRAM(S): at 05:04

## 2017-12-25 RX ADMIN — OXYCODONE HYDROCHLORIDE 5 MILLIGRAM(S): 5 TABLET ORAL at 14:37

## 2017-12-25 RX ADMIN — Medication 1: at 19:41

## 2017-12-25 RX ADMIN — Medication 1: at 07:34

## 2017-12-25 RX ADMIN — Medication 0.5 MILLIGRAM(S): at 05:04

## 2017-12-25 RX ADMIN — Medication 3 MILLILITER(S): at 11:50

## 2017-12-25 RX ADMIN — Medication 0.5 MILLIGRAM(S): at 18:03

## 2017-12-25 RX ADMIN — AMLODIPINE BESYLATE 10 MILLIGRAM(S): 2.5 TABLET ORAL at 05:03

## 2017-12-25 RX ADMIN — CARVEDILOL PHOSPHATE 25 MILLIGRAM(S): 80 CAPSULE, EXTENDED RELEASE ORAL at 18:03

## 2017-12-25 RX ADMIN — HEPARIN SODIUM 23 UNIT(S)/HR: 5000 INJECTION INTRAVENOUS; SUBCUTANEOUS at 21:11

## 2017-12-25 RX ADMIN — Medication 3 MILLILITER(S): at 18:03

## 2017-12-25 RX ADMIN — ATORVASTATIN CALCIUM 40 MILLIGRAM(S): 80 TABLET, FILM COATED ORAL at 23:19

## 2017-12-25 RX ADMIN — OXYCODONE HYDROCHLORIDE 5 MILLIGRAM(S): 5 TABLET ORAL at 14:07

## 2017-12-25 RX ADMIN — HEPARIN SODIUM 26 UNIT(S)/HR: 5000 INJECTION INTRAVENOUS; SUBCUTANEOUS at 13:22

## 2017-12-25 RX ADMIN — Medication 81 MILLIGRAM(S): at 11:50

## 2017-12-25 NOTE — PROGRESS NOTE ADULT - ASSESSMENT
68yo M s/p robotic LAR converted to open APR; course complicated by DVT and PMH of GLENYS/COPD, returned to the hospital with PE  -Diet: LRD  -start heparin drip this morning, adjust rate according to normogram  -duonebs/CPAP for PE  -c/w home meds  -currently wet to dry dressing on wound, PT will place a vac today  -pain control  -encourage DAMIÁN Clark, PGY1  [phone]

## 2017-12-25 NOTE — PROGRESS NOTE ADULT - SUBJECTIVE AND OBJECTIVE BOX
Surgery Progress Note    S: Patient seen and examined at the bedside.  No complaints. No SOB. Pain is well controlled.  No events overnight,     T(C): 37.4 (12-25-17 @ 04:35), Max: 37.7 (12-24-17 @ 17:52)  HR: 92 (12-25-17 @ 04:35) (72 - 92)  BP: 151/79 (12-25-17 @ 04:35) (104/61 - 151/79)  RR: 18 (12-25-17 @ 04:35) (18 - 20)  SpO2: 92% (12-25-17 @ 04:35) (90% - 98%)  Wt(kg): --    12-24 @ 07:01  -  12-25 @ 07:00  --------------------------------------------------------  IN:    Oral Fluid: 600 mL  Total IN: 600 mL    OUT:    Colostomy: 50 mL  Total OUT: 50 mL    Total NET: 550 mL                              7.4    10.32 )-----------( 512      ( 24 Dec 2017 08:49 )             23.9     CAPILLARY BLOOD GLUCOSE      POCT Blood Glucose.: 191 mg/dL (25 Dec 2017 07:24)  POCT Blood Glucose.: 213 mg/dL (24 Dec 2017 21:09)  POCT Blood Glucose.: 148 mg/dL (24 Dec 2017 16:18)  POCT Blood Glucose.: 196 mg/dL (24 Dec 2017 11:50)      PE:   Gen: AAOX 3, NAD  Resp: non labored breathing  Abd: Soft, NT, ND, Dressing intact, incision C,D,I. Incision non-erythematous.

## 2017-12-25 NOTE — PROGRESS NOTE ADULT - ATTENDING COMMENTS
pt feels well, kelsey diet, OOB to chair yesterday    AAOx3  abd soft, obese, NT, wound clean with WTD packing, colostomy black sloughing, pink underneath    A/P s/p APR now with DVT and PE  f/u heme recommendations  hep gtt for now  OOB ambulate  diet as kelsey  PT

## 2017-12-25 NOTE — PROVIDER CONTACT NOTE (OTHER) - SITUATION
Aptt for am labs 44.4.  Labs were drawn by phlebotomy. Heparin drip @23 started prior lab drawn as per MD order. Aptt patient specific. Goal 58-99

## 2017-12-25 NOTE — PHYSICAL THERAPY INITIAL EVALUATION ADULT - IMPAIRMENTS FOUND, PT EVAL
integumentary integrity gait, locomotion, and balance/integumentary integrity/aerobic capacity/endurance

## 2017-12-25 NOTE — PHYSICAL THERAPY INITIAL EVALUATION ADULT - IMPAIRMENTS CONTRIBUTING TO GAIT DEVIATIONS, PT EVAL
impaired balance/decreased flexibility/narrow base of support/impaired postural control/decreased strength

## 2017-12-25 NOTE — PHYSICAL THERAPY INITIAL EVALUATION ADULT - MODALITIES TREATMENT COMMENTS
surgical wound with interrupted closure 27x2.5x2.2cm (greatest depth), clean, moist, pink, no purulence, no malodor, no erythema

## 2017-12-26 DIAGNOSIS — J44.9 CHRONIC OBSTRUCTIVE PULMONARY DISEASE, UNSPECIFIED: ICD-10-CM

## 2017-12-26 DIAGNOSIS — J90 PLEURAL EFFUSION, NOT ELSEWHERE CLASSIFIED: ICD-10-CM

## 2017-12-26 DIAGNOSIS — R09.02 HYPOXEMIA: ICD-10-CM

## 2017-12-26 DIAGNOSIS — G47.33 OBSTRUCTIVE SLEEP APNEA (ADULT) (PEDIATRIC): ICD-10-CM

## 2017-12-26 LAB
ANION GAP SERPL CALC-SCNC: 8 MMOL/L — SIGNIFICANT CHANGE UP (ref 5–17)
APTT BLD: 85.7 SEC — HIGH (ref 27.5–37.4)
APTT BLD: 87.4 SEC — HIGH (ref 27.5–37.4)
BUN SERPL-MCNC: 16 MG/DL — SIGNIFICANT CHANGE UP (ref 7–23)
CALCIUM SERPL-MCNC: 8.3 MG/DL — LOW (ref 8.4–10.5)
CHLORIDE SERPL-SCNC: 95 MMOL/L — LOW (ref 96–108)
CK MB CFR SERPL CALC: 1.2 NG/ML — SIGNIFICANT CHANGE UP (ref 0–6.7)
CK SERPL-CCNC: 42 U/L — SIGNIFICANT CHANGE UP (ref 30–200)
CO2 SERPL-SCNC: 33 MMOL/L — HIGH (ref 22–31)
CREAT SERPL-MCNC: 1.16 MG/DL — SIGNIFICANT CHANGE UP (ref 0.5–1.3)
CULTURE RESULTS: SIGNIFICANT CHANGE UP
GLUCOSE BLDC GLUCOMTR-MCNC: 158 MG/DL — HIGH (ref 70–99)
GLUCOSE BLDC GLUCOMTR-MCNC: 170 MG/DL — HIGH (ref 70–99)
GLUCOSE BLDC GLUCOMTR-MCNC: 178 MG/DL — HIGH (ref 70–99)
GLUCOSE BLDC GLUCOMTR-MCNC: 182 MG/DL — HIGH (ref 70–99)
GLUCOSE BLDC GLUCOMTR-MCNC: 189 MG/DL — HIGH (ref 70–99)
GLUCOSE SERPL-MCNC: 219 MG/DL — HIGH (ref 70–99)
HCT VFR BLD CALC: 23.9 % — LOW (ref 39–50)
HGB BLD-MCNC: 7.7 G/DL — LOW (ref 13–17)
MAGNESIUM SERPL-MCNC: 2.1 MG/DL — SIGNIFICANT CHANGE UP (ref 1.6–2.6)
MCHC RBC-ENTMCNC: 31.1 PG — SIGNIFICANT CHANGE UP (ref 27–34)
MCHC RBC-ENTMCNC: 32.2 GM/DL — SIGNIFICANT CHANGE UP (ref 32–36)
MCV RBC AUTO: 96.7 FL — SIGNIFICANT CHANGE UP (ref 80–100)
PHOSPHATE SERPL-MCNC: 3.3 MG/DL — SIGNIFICANT CHANGE UP (ref 2.5–4.5)
PLATELET # BLD AUTO: 519 K/UL — HIGH (ref 150–400)
POTASSIUM SERPL-MCNC: 3.9 MMOL/L — SIGNIFICANT CHANGE UP (ref 3.5–5.3)
POTASSIUM SERPL-SCNC: 3.9 MMOL/L — SIGNIFICANT CHANGE UP (ref 3.5–5.3)
RBC # BLD: 2.47 M/UL — LOW (ref 4.2–5.8)
RBC # FLD: 13.2 % — SIGNIFICANT CHANGE UP (ref 10.3–14.5)
SODIUM SERPL-SCNC: 136 MMOL/L — SIGNIFICANT CHANGE UP (ref 135–145)
SPECIMEN SOURCE: SIGNIFICANT CHANGE UP
TROPONIN T SERPL-MCNC: <0.01 NG/ML — SIGNIFICANT CHANGE UP (ref 0–0.06)
WBC # BLD: 8.4 K/UL — SIGNIFICANT CHANGE UP (ref 3.8–10.5)
WBC # FLD AUTO: 8.4 K/UL — SIGNIFICANT CHANGE UP (ref 3.8–10.5)

## 2017-12-26 PROCEDURE — 99232 SBSQ HOSP IP/OBS MODERATE 35: CPT

## 2017-12-26 PROCEDURE — 93970 EXTREMITY STUDY: CPT | Mod: 26

## 2017-12-26 RX ORDER — BENZOCAINE AND MENTHOL 5; 1 G/100ML; G/100ML
1 LIQUID ORAL ONCE
Qty: 0 | Refills: 0 | Status: COMPLETED | OUTPATIENT
Start: 2017-12-26 | End: 2017-12-26

## 2017-12-26 RX ORDER — SIMETHICONE 80 MG/1
80 TABLET, CHEWABLE ORAL ONCE
Qty: 0 | Refills: 0 | Status: DISCONTINUED | OUTPATIENT
Start: 2017-12-26 | End: 2018-01-03

## 2017-12-26 RX ORDER — ACETAMINOPHEN 500 MG
650 TABLET ORAL ONCE
Qty: 0 | Refills: 0 | Status: COMPLETED | OUTPATIENT
Start: 2017-12-26 | End: 2017-12-26

## 2017-12-26 RX ADMIN — Medication 3 MILLILITER(S): at 05:17

## 2017-12-26 RX ADMIN — Medication 0.5 MILLIGRAM(S): at 05:17

## 2017-12-26 RX ADMIN — Medication 1: at 19:17

## 2017-12-26 RX ADMIN — LISINOPRIL 40 MILLIGRAM(S): 2.5 TABLET ORAL at 05:18

## 2017-12-26 RX ADMIN — Medication 1: at 13:27

## 2017-12-26 RX ADMIN — Medication 650 MILLIGRAM(S): at 23:08

## 2017-12-26 RX ADMIN — Medication 1: at 08:31

## 2017-12-26 RX ADMIN — HEPARIN SODIUM 23 UNIT(S)/HR: 5000 INJECTION INTRAVENOUS; SUBCUTANEOUS at 04:31

## 2017-12-26 RX ADMIN — HEPARIN SODIUM 23 UNIT(S)/HR: 5000 INJECTION INTRAVENOUS; SUBCUTANEOUS at 11:21

## 2017-12-26 RX ADMIN — Medication 81 MILLIGRAM(S): at 11:24

## 2017-12-26 RX ADMIN — ATORVASTATIN CALCIUM 40 MILLIGRAM(S): 80 TABLET, FILM COATED ORAL at 22:38

## 2017-12-26 RX ADMIN — INSULIN GLARGINE 12 UNIT(S): 100 INJECTION, SOLUTION SUBCUTANEOUS at 22:37

## 2017-12-26 RX ADMIN — BENZOCAINE AND MENTHOL 1 LOZENGE: 5; 1 LIQUID ORAL at 04:24

## 2017-12-26 RX ADMIN — AMLODIPINE BESYLATE 10 MILLIGRAM(S): 2.5 TABLET ORAL at 05:18

## 2017-12-26 RX ADMIN — CARVEDILOL PHOSPHATE 25 MILLIGRAM(S): 80 CAPSULE, EXTENDED RELEASE ORAL at 05:18

## 2017-12-26 RX ADMIN — CARVEDILOL PHOSPHATE 25 MILLIGRAM(S): 80 CAPSULE, EXTENDED RELEASE ORAL at 17:08

## 2017-12-26 RX ADMIN — Medication 3 MILLILITER(S): at 00:56

## 2017-12-26 RX ADMIN — Medication 650 MILLIGRAM(S): at 22:38

## 2017-12-26 RX ADMIN — Medication 20 MILLIGRAM(S): at 05:18

## 2017-12-26 NOTE — PROGRESS NOTE ADULT - PROBLEM SELECTOR PLAN 2
to f/u with Dr. Amaya regarding further adjuvant chemotherapy   Will need CT CAP in several weeks (prior to start of adjuvant therapy). This will be ordered as out pt.

## 2017-12-26 NOTE — PROGRESS NOTE ADULT - SUBJECTIVE AND OBJECTIVE BOX
Chief Complaint: rectal ca     INTERVAL HPI/OVERNIGHT EVENTS:    MEDICATIONS  (STANDING):  ALBUTerol/ipratropium for Nebulization 3 milliLiter(s) Nebulizer every 6 hours  amLODIPine   Tablet 10 milliGRAM(s) Oral daily  aspirin enteric coated 81 milliGRAM(s) Oral daily  atorvastatin 40 milliGRAM(s) Oral at bedtime  buDESOnide   0.5 milliGRAM(s) Respule 0.5 milliGRAM(s) Inhalation every 12 hours  carvedilol 25 milliGRAM(s) Oral every 12 hours  dextrose 5%. 1000 milliLiter(s) (50 mL/Hr) IV Continuous <Continuous>  dextrose 50% Injectable 12.5 Gram(s) IV Push once  dextrose 50% Injectable 25 Gram(s) IV Push once  dextrose 50% Injectable 25 Gram(s) IV Push once  furosemide    Tablet 20 milliGRAM(s) Oral daily  heparin  Infusion 2300 Unit(s)/Hr (23 mL/Hr) IV Continuous <Continuous>  influenza   Vaccine 0.5 milliLiter(s) IntraMuscular once  insulin glargine Injectable (LANTUS) 12 Unit(s) SubCutaneous at bedtime  insulin lispro (HumaLOG) corrective regimen sliding scale   SubCutaneous three times a day before meals  insulin lispro (HumaLOG) corrective regimen sliding scale   SubCutaneous at bedtime  lisinopril 40 milliGRAM(s) Oral daily  simethicone 80 milliGRAM(s) Chew once    MEDICATIONS  (PRN):  dextrose Gel 1 Dose(s) Oral once PRN Blood Glucose LESS THAN 70 milliGRAM(s)/deciliter  glucagon  Injectable 1 milliGRAM(s) IntraMuscular once PRN Glucose LESS THAN 70 milligrams/deciliter      Allergies    No Known Allergies    Intolerances        ROS: as above     Vital Signs Last 24 Hrs  T(C): 37.1 (26 Dec 2017 21:30), Max: 37.4 (26 Dec 2017 14:27)  T(F): 98.7 (26 Dec 2017 21:30), Max: 99.3 (26 Dec 2017 14:27)  HR: 83 (26 Dec 2017 22:01) (72 - 88)  BP: 128/74 (26 Dec 2017 21:30) (107/68 - 142/76)  BP(mean): --  RR: 18 (26 Dec 2017 21:30) (18 - 18)  SpO2: 92% (26 Dec 2017 22:01) (90% - 94%)    Physical Exam:   AAO x 3, NAD   RRR S1S2  CTA b/l   soft NTNDBS+  no c/c/e    LABS:                        7.7    8.4   )-----------( 519      ( 26 Dec 2017 10:20 )             23.9     12-26    136  |  95<L>  |  16  ----------------------------<  219<H>  3.9   |  33<H>  |  1.16    Ca    8.3<L>      26 Dec 2017 10:21  Phos  3.3     12-26  Mg     2.1     12-26      PT/INR - ( 25 Dec 2017 11:04 )   PT: 13.8 sec;   INR: 1.27 ratio         PTT - ( 26 Dec 2017 10:20 )  PTT:85.7 sec Chief Complaint: rectal ca     INTERVAL HPI/OVERNIGHT EVENTS: No c/o. Currently off O2. Wound vac in place.     MEDICATIONS  (STANDING):  ALBUTerol/ipratropium for Nebulization 3 milliLiter(s) Nebulizer every 6 hours  amLODIPine   Tablet 10 milliGRAM(s) Oral daily  aspirin enteric coated 81 milliGRAM(s) Oral daily  atorvastatin 40 milliGRAM(s) Oral at bedtime  buDESOnide   0.5 milliGRAM(s) Respule 0.5 milliGRAM(s) Inhalation every 12 hours  carvedilol 25 milliGRAM(s) Oral every 12 hours  dextrose 5%. 1000 milliLiter(s) (50 mL/Hr) IV Continuous <Continuous>  dextrose 50% Injectable 12.5 Gram(s) IV Push once  dextrose 50% Injectable 25 Gram(s) IV Push once  dextrose 50% Injectable 25 Gram(s) IV Push once  furosemide    Tablet 20 milliGRAM(s) Oral daily  heparin  Infusion 2300 Unit(s)/Hr (23 mL/Hr) IV Continuous <Continuous>  influenza   Vaccine 0.5 milliLiter(s) IntraMuscular once  insulin glargine Injectable (LANTUS) 12 Unit(s) SubCutaneous at bedtime  insulin lispro (HumaLOG) corrective regimen sliding scale   SubCutaneous three times a day before meals  insulin lispro (HumaLOG) corrective regimen sliding scale   SubCutaneous at bedtime  lisinopril 40 milliGRAM(s) Oral daily  simethicone 80 milliGRAM(s) Chew once    MEDICATIONS  (PRN):  dextrose Gel 1 Dose(s) Oral once PRN Blood Glucose LESS THAN 70 milliGRAM(s)/deciliter  glucagon  Injectable 1 milliGRAM(s) IntraMuscular once PRN Glucose LESS THAN 70 milligrams/deciliter      Allergies    No Known Allergies    Intolerances        ROS: as above     Vital Signs Last 24 Hrs  T(C): 37.1 (26 Dec 2017 21:30), Max: 37.4 (26 Dec 2017 14:27)  T(F): 98.7 (26 Dec 2017 21:30), Max: 99.3 (26 Dec 2017 14:27)  HR: 83 (26 Dec 2017 22:01) (72 - 88)  BP: 128/74 (26 Dec 2017 21:30) (107/68 - 142/76)  BP(mean): --  RR: 18 (26 Dec 2017 21:30) (18 - 18)  SpO2: 92% (26 Dec 2017 22:01) (90% - 94%)    Physical Exam:   AAO x 2-3, NAD   RRR S1S2  CTA b/l   soft NTNDBS+ + wound vac, + ostomy  chronic venous changes    LABS:                        7.7    8.4   )-----------( 519      ( 26 Dec 2017 10:20 )             23.9     12-26    136  |  95<L>  |  16  ----------------------------<  219<H>  3.9   |  33<H>  |  1.16    Ca    8.3<L>      26 Dec 2017 10:21  Phos  3.3     12-26  Mg     2.1     12-26      PT/INR - ( 25 Dec 2017 11:04 )   PT: 13.8 sec;   INR: 1.27 ratio         PTT - ( 26 Dec 2017 10:20 )  PTT:85.7 sec

## 2017-12-26 NOTE — PROGRESS NOTE ADULT - PROBLEM SELECTOR PLAN 1
Multifactorial-shunt through bibasilar atelectasis, GLENYS, COPD, with PE  -repeat TTE pending   -Check CE x1 (NSTEMI on last admit)  -Incentive spirometer/OOB  -c/w Bipap qHS  -Nebs  -f/u heme-onc

## 2017-12-26 NOTE — PROGRESS NOTE ADULT - SUBJECTIVE AND OBJECTIVE BOX
Follow-up Pulm Progress Note    Denies dyspnea, CP  85% on 4L NC  94% on 6L NC    Medications:  MEDICATIONS  (STANDING):  ALBUTerol/ipratropium for Nebulization 3 milliLiter(s) Nebulizer every 6 hours  amLODIPine   Tablet 10 milliGRAM(s) Oral daily  aspirin enteric coated 81 milliGRAM(s) Oral daily  atorvastatin 40 milliGRAM(s) Oral at bedtime  buDESOnide   0.5 milliGRAM(s) Respule 0.5 milliGRAM(s) Inhalation every 12 hours  carvedilol 25 milliGRAM(s) Oral every 12 hours  dextrose 5%. 1000 milliLiter(s) (50 mL/Hr) IV Continuous <Continuous>  dextrose 50% Injectable 12.5 Gram(s) IV Push once  dextrose 50% Injectable 25 Gram(s) IV Push once  dextrose 50% Injectable 25 Gram(s) IV Push once  furosemide    Tablet 20 milliGRAM(s) Oral daily  heparin  Infusion 2300 Unit(s)/Hr (23 mL/Hr) IV Continuous <Continuous>  influenza   Vaccine 0.5 milliLiter(s) IntraMuscular once  insulin glargine Injectable (LANTUS) 12 Unit(s) SubCutaneous at bedtime  insulin lispro (HumaLOG) corrective regimen sliding scale   SubCutaneous three times a day before meals  insulin lispro (HumaLOG) corrective regimen sliding scale   SubCutaneous at bedtime  lisinopril 40 milliGRAM(s) Oral daily    MEDICATIONS  (PRN):  dextrose Gel 1 Dose(s) Oral once PRN Blood Glucose LESS THAN 70 milliGRAM(s)/deciliter  glucagon  Injectable 1 milliGRAM(s) IntraMuscular once PRN Glucose LESS THAN 70 milligrams/deciliter          Vital Signs Last 24 Hrs  T(C): 36.8 (26 Dec 2017 10:15), Max: 37 (25 Dec 2017 22:03)  T(F): 98.2 (26 Dec 2017 10:15), Max: 98.6 (25 Dec 2017 22:03)  HR: 77 (26 Dec 2017 10:15) (72 - 85)  BP: 122/64 (26 Dec 2017 10:15) (107/68 - 129/75)  BP(mean): --  RR: 18 (26 Dec 2017 10:15) (18 - 18)  SpO2: 92% (26 Dec 2017 10:15) (90% - 94%) on 6L NC          12-25 @ 07:01  -  12-26 @ 07:00  --------------------------------------------------------  IN: 1294 mL / OUT: 1250 mL / NET: 44 mL          LABS:                        7.7    8.4   )-----------( 519      ( 26 Dec 2017 10:20 )             23.9     12-26    136  |  95<L>  |  16  ----------------------------<  219<H>  3.9   |  33<H>  |  1.16    Ca    8.3<L>      26 Dec 2017 10:21  Phos  3.3     12-26  Mg     2.1     12-26            CAPILLARY BLOOD GLUCOSE      POCT Blood Glucose.: 158 mg/dL (26 Dec 2017 08:17)    PT/INR - ( 25 Dec 2017 11:04 )   PT: 13.8 sec;   INR: 1.27 ratio         PTT - ( 26 Dec 2017 10:20 )  PTT:85.7 sec    Procalcitonin, Serum: 0.92 ng/mL (12-24-17 @ 06:26)    Serum Pro-Brain Natriuretic Peptide: 480 pg/mL (12-24-17 @ 06:26)                CULTURES: (if applicable)  Culture Results:   No growth to date. (12-21 @ 19:57)  Culture Results:   No growth at 48 hours (12-21 @ 17:04)  Culture Results:   <10,000 CFU/ml  Normal Urogenital faina present (12-21 @ 16:47)    Most recent blood culture -- 12-21 @ 19:57   -- -- .Blood Blood 12-21 @ 19:57  Most recent blood culture -- 12-21 @ 17:04   -- -- Skin tip of PICC 12-21 @ 17:04  Most recent blood culture -- 12-21 @ 16:47   -- -- .Urine Clean Catch (Midstream) 12-21 @ 16:47        Physical Examination:  PULM: Focal exp wheeze L base  CVS: S1, S2 heard    RADIOLOGY REVIEWED  CTA chest: < from: CT Angio Chest w/ IV Cont (12.23.17 @ 15:39) >  CHEST:     PULMONARY ARTERY: Filling defect is seen in the left upper lobe lobar   pulmonary artery branch. No additional filling defects noted.  LUNGS AND LARGE AIRWAYS: Patent central airways.  No pulmonary nodules.  PLEURA: Small bilateral pleural effusion.  VESSELS: Normal left-sided aortic arch and descending aorta. No aneurysm.   Atherosclerotic changes.  HEART: Heart size is normal. Trace pericardial effusion. Coronary artery   calcification.  MEDIASTINUM AND JOSE: No lymphadenopathy.  CHEST WALL AND LOWER NECK: Within normal limits.  VISUALIZED UPPER ABDOMEN: Within normal limits.  BONES: Multilevel degenerative changes.    IMPRESSION: Acute pulmonary embolism as described above.    < end of copied text > Follow-up Pulm Progress Note    Denies dyspnea, CP  85% on 4L NC  94% on 6L NC    Medications:  MEDICATIONS  (STANDING):  ALBUTerol/ipratropium for Nebulization 3 milliLiter(s) Nebulizer every 6 hours  amLODIPine   Tablet 10 milliGRAM(s) Oral daily  aspirin enteric coated 81 milliGRAM(s) Oral daily  atorvastatin 40 milliGRAM(s) Oral at bedtime  buDESOnide   0.5 milliGRAM(s) Respule 0.5 milliGRAM(s) Inhalation every 12 hours  carvedilol 25 milliGRAM(s) Oral every 12 hours  dextrose 5%. 1000 milliLiter(s) (50 mL/Hr) IV Continuous <Continuous>  dextrose 50% Injectable 12.5 Gram(s) IV Push once  dextrose 50% Injectable 25 Gram(s) IV Push once  dextrose 50% Injectable 25 Gram(s) IV Push once  furosemide    Tablet 20 milliGRAM(s) Oral daily  heparin  Infusion 2300 Unit(s)/Hr (23 mL/Hr) IV Continuous <Continuous>  influenza   Vaccine 0.5 milliLiter(s) IntraMuscular once  insulin glargine Injectable (LANTUS) 12 Unit(s) SubCutaneous at bedtime  insulin lispro (HumaLOG) corrective regimen sliding scale   SubCutaneous three times a day before meals  insulin lispro (HumaLOG) corrective regimen sliding scale   SubCutaneous at bedtime  lisinopril 40 milliGRAM(s) Oral daily    MEDICATIONS  (PRN):  dextrose Gel 1 Dose(s) Oral once PRN Blood Glucose LESS THAN 70 milliGRAM(s)/deciliter  glucagon  Injectable 1 milliGRAM(s) IntraMuscular once PRN Glucose LESS THAN 70 milligrams/deciliter    Vital Signs Last 24 Hrs  T(C): 36.8 (26 Dec 2017 10:15), Max: 37 (25 Dec 2017 22:03)  T(F): 98.2 (26 Dec 2017 10:15), Max: 98.6 (25 Dec 2017 22:03)  HR: 77 (26 Dec 2017 10:15) (72 - 85)  BP: 122/64 (26 Dec 2017 10:15) (107/68 - 129/75)  BP(mean): --  RR: 18 (26 Dec 2017 10:15) (18 - 18)  SpO2: 92% (26 Dec 2017 10:15) (90% - 94%) on 6L NC    12-25 @ 07:01  -  12-26 @ 07:00  --------------------------------------------------------  IN: 1294 mL / OUT: 1250 mL / NET: 44 mL    LABS:                        7.7    8.4   )-----------( 519      ( 26 Dec 2017 10:20 )             23.9     12-26    136  |  95<L>  |  16  ----------------------------<  219<H>  3.9   |  33<H>  |  1.16    Ca    8.3<L>      26 Dec 2017 10:21  Phos  3.3     12-26  Mg     2.1     12-26    CAPILLARY BLOOD GLUCOSE      POCT Blood Glucose.: 158 mg/dL (26 Dec 2017 08:17)    PT/INR - ( 25 Dec 2017 11:04 )   PT: 13.8 sec;   INR: 1.27 ratio         PTT - ( 26 Dec 2017 10:20 )  PTT:85.7 sec    Procalcitonin, Serum: 0.92 ng/mL (12-24-17 @ 06:26)    Serum Pro-Brain Natriuretic Peptide: 480 pg/mL (12-24-17 @ 06:26)    CULTURES: (if applicable)  Culture Results:   No growth to date. (12-21 @ 19:57)  Culture Results:   No growth at 48 hours (12-21 @ 17:04)  Culture Results:   <10,000 CFU/ml  Normal Urogenital faina present (12-21 @ 16:47)    Most recent blood culture -- 12-21 @ 19:57   -- -- .Blood Blood 12-21 @ 19:57  Most recent blood culture -- 12-21 @ 17:04   -- -- Skin tip of PICC 12-21 @ 17:04  Most recent blood culture -- 12-21 @ 16:47   -- -- .Urine Clean Catch (Midstream) 12-21 @ 16:47    Physical Examination:  PULM: Focal exp wheeze L base  CVS: S1, S2 heard    RADIOLOGY REVIEWED  CTA chest: < from: CT Angio Chest w/ IV Cont (12.23.17 @ 15:39) >  CHEST:     PULMONARY ARTERY: Filling defect is seen in the left upper lobe lobar   pulmonary artery branch. No additional filling defects noted.  LUNGS AND LARGE AIRWAYS: Patent central airways.  No pulmonary nodules.  PLEURA: Small bilateral pleural effusion.  VESSELS: Normal left-sided aortic arch and descending aorta. No aneurysm.   Atherosclerotic changes.  HEART: Heart size is normal. Trace pericardial effusion. Coronary artery   calcification.  MEDIASTINUM AND JOSE: No lymphadenopathy.  CHEST WALL AND LOWER NECK: Within normal limits.  VISUALIZED UPPER ABDOMEN: Within normal limits.  BONES: Multilevel degenerative changes.    IMPRESSION: Acute pulmonary embolism as described above.    < end of copied text >

## 2017-12-26 NOTE — PROGRESS NOTE ADULT - ASSESSMENT
67 M w/ rectal ca s/p neoadjuvant chemo/RT, followed by surgery c/b abdominal wound, found to have acute PE while on Lovenox

## 2017-12-26 NOTE — PROGRESS NOTE ADULT - PROBLEM SELECTOR PLAN 2
-Likely new ALVA segmental PE on CTPA 12/23  -Reviewed with radiology, CTPA from 12/12 was non-diagnostic for PE, CT with IV contrast 12/18 was better study but not done with PE protocol. However, ALVA pulm artery appears more dilated now according to radiology.   -Patient was started on full dose Lovenox 12/18 for R DVT. There is concern for Lovenox failure, but this is hard to assess as prior studies were suboptimal.   -c/w Heparin gtt for now, will d/w heme-onc  -Check LE duplex to assess for new/increased DVT   -f/u TTE  -Patient had NSTEMI on last admit, check CE x1 now - ? new ALVA segmental PE on CTPA 12/23  -Reviewed with radiology, CTPA from 12/12 was non-diagnostic for PE, CT with IV contrast 12/18 was better study but not done with PE protocol. However, ALVA pulm artery appears more dilated now according to radiology.   -Patient was started on full dose Lovenox 12/18 for R DVT. There is concern for Lovenox failure, but this is hard to assess as prior studies were suboptimal.   -c/w Heparin gtt for now, will d/w heme-onc  -Check LE duplex to assess for new/increased DVT   -f/u TTE  -Patient had NSTEMI on last admit, check CE x1 now

## 2017-12-26 NOTE — PROGRESS NOTE ADULT - SUBJECTIVE AND OBJECTIVE BOX
Surgery Progress Note    S: Patient seen and examined at the bedside.  No complaints. No SOB. Pain is well controlled.  No events overnight,     T(C): 36.9 (12-26-17 @ 05:06), Max: 37.1 (12-25-17 @ 09:58)  HR: 72 (12-26-17 @ 05:06) (72 - 85)  BP: 118/68 (12-26-17 @ 05:06) (107/68 - 129/75)  RR: 18 (12-26-17 @ 05:06) (18 - 18)  SpO2: 93% (12-26-17 @ 05:06) (92% - 94%)  Wt(kg): --    12-24 @ 07:01  -  12-25 @ 07:00  --------------------------------------------------------  IN:    Oral Fluid: 600 mL  Total IN: 600 mL    OUT:    Colostomy: 50 mL  Total OUT: 50 mL    Total NET: 550 mL      12-25 @ 07:01  -  12-26 @ 06:50  --------------------------------------------------------  IN:    heparin Infusion: 98 mL    heparin Infusion: 246 mL    heparin Infusion: 230 mL    Oral Fluid: 720 mL  Total IN: 1294 mL    OUT:    Colostomy: 100 mL    Voided: 1150 mL  Total OUT: 1250 mL    Total NET: 44 mL        PE:   Gen: AAOX 3, NAD  Resp: non labored breathing  Abd: Soft, NT, ND, Dressing intact, incision C,D,I. Incision non-erythematous.                             8.4    9.2   )-----------( 532      ( 25 Dec 2017 20:28 )             26.5     12-25    136  |  94<L>  |  15  ----------------------------<  154<H>  3.7   |  32<H>  |  1.39<H>    Ca    8.4      25 Dec 2017 11:04  Phos  3.0     12-25  Mg     2.2     12-25        PT/INR - ( 25 Dec 2017 11:04 )   PT: 13.8 sec;   INR: 1.27 ratio         PTT - ( 26 Dec 2017 04:14 )  PTT:87.4 sec

## 2017-12-26 NOTE — PROGRESS NOTE ADULT - ASSESSMENT
66 y/o M with PMH of HTN, DMT2, PVD, HLD, MI/CAD with stent x 1 2009, GLENYS, 50 pack yr smoker (quit Jan 2017), morbid obesity, rectal cancer (diagnosed 7/2017 s/p chemo) admitted 12/6 for LAR. Hospital course c/b post-operative hypotension requiring SICU admission. Downgraded to floors on 12/7. On 12/12 he was noted to be hypoxic to 70s. Placed on bipap and readmitted to SICU. Downgraded to floors on 12/16. 12/18 noted to have low grade fever, found to have R peroneal DVT-started on full dose lovenox. Discharged 12/22. Readmitted 12/23 over concerns for non-viable ostomy. Noted to be hypoxic (as he was upon discharge). CTPA repeat revealed ALVA segmental PE 68 y/o M with PMH of HTN, DMT2, PVD, HLD, MI/CAD with stent x 1 2009, GLENYS, 50 pack yr smoker (quit Jan 2017), morbid obesity, rectal cancer (diagnosed 7/2017 s/p chemo) admitted 12/6 for LAR. Hospital course c/b post-operative hypotension requiring SICU admission. Downgraded to floors on 12/7. On 12/12 he was noted to be hypoxic to 70s. Placed on bipap and readmitted to SICU. Downgraded to floors on 12/16. 12/18 noted to have low grade fever, found to have R peroneal DVT-started on full dose lovenox. Discharged 12/22. Readmitted 12/23 over concerns for non-viable ostomy. Noted to be hypoxic (as he was upon discharge). CTPA repeat revealed ALVA segmental PE (likely new from 12/18) 66 y/o M with PMH of HTN, DMT2, PVD, HLD, MI/CAD with stent x 1 2009, GLENYS, 50 pack yr smoker (quit Jan 2017), morbid obesity, rectal cancer (diagnosed 7/2017 s/p chemo) admitted 12/6 for LAR. Hospital course c/b post-operative hypotension requiring SICU admission. Downgraded to floors on 12/7. On 12/12 he was noted to be hypoxic to 70s. Placed on bipap and readmitted to SICU, found to have NSTEMI. Downgraded to floors on 12/16. 12/18 noted to have low grade fever, found to have R peroneal DVT-started on full dose lovenox. Discharged 12/22. Readmitted 12/23 over concerns for non-viable ostomy. Noted to be hypoxic (as he was upon discharge). CTPA repeat revealed ALVA segmental PE (?new from 12/18)

## 2017-12-26 NOTE — PROGRESS NOTE ADULT - ATTENDING COMMENTS
as per above  needs repeat TTE to assess for PHTN  given that initial PE study was nondiagnostic on initial SICU admit, I think that this is not coumadin failure  diuresis as able, follow Cr

## 2017-12-26 NOTE — PROGRESS NOTE ADULT - PROBLEM SELECTOR PLAN 1
d/w radiology over the weekend and pulm today. It appears that although previous CT chest were not optimal studies for PE, the PE that was currently found does appear to be acute and potentially formed while the pt was on therapeutic doses of Lovenox.   At this time I recommend that if no further procedures, surgeries are planned, to switch to Xarelto 15 mg BID x 21 days, then 20 mg daily. d/w radiology over the weekend and pulm today. It appears that although previous CT chest were not optimal studies for PE, the PE that was currently found does appear to be acute and potentially formed while the pt was on therapeutic doses of Lovenox.   At this time I recommend that if no further procedures, surgeries are planned, to switch to Xarelto 20 mg daily. Plan to continue AC for at least 1 yr, likely indefinite.

## 2017-12-26 NOTE — PROGRESS NOTE ADULT - ASSESSMENT
68yo M s/p robotic LAR converted to open APR; course complicated by DVT and PMH of GLENYS/COPD, returned to the hospital with PE  -Diet: LRD  -c/w hep gtt  -duonebs/CPAP for COPD  -c/w home meds  -c/w VAC therapy  -PT to assist with OOB  -pain control    Nusrat Clrak, PGY1  [phone]

## 2017-12-27 LAB
ANION GAP SERPL CALC-SCNC: 13 MMOL/L — SIGNIFICANT CHANGE UP (ref 5–17)
ANION GAP SERPL CALC-SCNC: 8 MMOL/L — SIGNIFICANT CHANGE UP (ref 5–17)
APPEARANCE UR: CLEAR — SIGNIFICANT CHANGE UP
APTT BLD: 52.7 SEC — HIGH (ref 27.5–37.4)
BILIRUB UR-MCNC: NEGATIVE — SIGNIFICANT CHANGE UP
BUN SERPL-MCNC: 14 MG/DL — SIGNIFICANT CHANGE UP (ref 7–23)
BUN SERPL-MCNC: 14 MG/DL — SIGNIFICANT CHANGE UP (ref 7–23)
CALCIUM SERPL-MCNC: 8.5 MG/DL — SIGNIFICANT CHANGE UP (ref 8.4–10.5)
CALCIUM SERPL-MCNC: 8.7 MG/DL — SIGNIFICANT CHANGE UP (ref 8.4–10.5)
CHLORIDE SERPL-SCNC: 93 MMOL/L — LOW (ref 96–108)
CHLORIDE SERPL-SCNC: 95 MMOL/L — LOW (ref 96–108)
CO2 SERPL-SCNC: 24 MMOL/L — SIGNIFICANT CHANGE UP (ref 22–31)
CO2 SERPL-SCNC: 34 MMOL/L — HIGH (ref 22–31)
COLOR SPEC: YELLOW — SIGNIFICANT CHANGE UP
CREAT SERPL-MCNC: 1.06 MG/DL — SIGNIFICANT CHANGE UP (ref 0.5–1.3)
CREAT SERPL-MCNC: 1.17 MG/DL — SIGNIFICANT CHANGE UP (ref 0.5–1.3)
DIFF PNL FLD: NEGATIVE — SIGNIFICANT CHANGE UP
EPI CELLS # UR: SIGNIFICANT CHANGE UP /HPF
GLUCOSE BLDC GLUCOMTR-MCNC: 150 MG/DL — HIGH (ref 70–99)
GLUCOSE BLDC GLUCOMTR-MCNC: 164 MG/DL — HIGH (ref 70–99)
GLUCOSE BLDC GLUCOMTR-MCNC: 173 MG/DL — HIGH (ref 70–99)
GLUCOSE BLDC GLUCOMTR-MCNC: 180 MG/DL — HIGH (ref 70–99)
GLUCOSE SERPL-MCNC: 182 MG/DL — HIGH (ref 70–99)
GLUCOSE SERPL-MCNC: 206 MG/DL — HIGH (ref 70–99)
GLUCOSE UR QL: ABNORMAL
HCT VFR BLD CALC: 26.2 % — LOW (ref 39–50)
HGB BLD-MCNC: 8.4 G/DL — LOW (ref 13–17)
INR BLD: 1.3 RATIO — HIGH (ref 0.88–1.16)
KETONES UR-MCNC: NEGATIVE — SIGNIFICANT CHANGE UP
LEUKOCYTE ESTERASE UR-ACNC: NEGATIVE — SIGNIFICANT CHANGE UP
MAGNESIUM SERPL-MCNC: 2 MG/DL — SIGNIFICANT CHANGE UP (ref 1.6–2.6)
MAGNESIUM SERPL-MCNC: 2.2 MG/DL — SIGNIFICANT CHANGE UP (ref 1.6–2.6)
MCHC RBC-ENTMCNC: 30.8 PG — SIGNIFICANT CHANGE UP (ref 27–34)
MCHC RBC-ENTMCNC: 32 GM/DL — SIGNIFICANT CHANGE UP (ref 32–36)
MCV RBC AUTO: 96.5 FL — SIGNIFICANT CHANGE UP (ref 80–100)
NITRITE UR-MCNC: NEGATIVE — SIGNIFICANT CHANGE UP
PH UR: 6.5 — SIGNIFICANT CHANGE UP (ref 5–8)
PHOSPHATE SERPL-MCNC: 3.4 MG/DL — SIGNIFICANT CHANGE UP (ref 2.5–4.5)
PHOSPHATE SERPL-MCNC: 4.4 MG/DL — SIGNIFICANT CHANGE UP (ref 2.5–4.5)
PLATELET # BLD AUTO: 545 K/UL — HIGH (ref 150–400)
POTASSIUM SERPL-MCNC: 3.8 MMOL/L — SIGNIFICANT CHANGE UP (ref 3.5–5.3)
POTASSIUM SERPL-MCNC: 5.7 MMOL/L — HIGH (ref 3.5–5.3)
POTASSIUM SERPL-SCNC: 3.8 MMOL/L — SIGNIFICANT CHANGE UP (ref 3.5–5.3)
POTASSIUM SERPL-SCNC: 5.7 MMOL/L — HIGH (ref 3.5–5.3)
PROT UR-MCNC: 30 MG/DL
PROTHROM AB SERPL-ACNC: 14.1 SEC — HIGH (ref 9.8–12.7)
RBC # BLD: 2.71 M/UL — LOW (ref 4.2–5.8)
RBC # FLD: 13.3 % — SIGNIFICANT CHANGE UP (ref 10.3–14.5)
RBC CASTS # UR COMP ASSIST: SIGNIFICANT CHANGE UP /HPF (ref 0–2)
SODIUM SERPL-SCNC: 132 MMOL/L — LOW (ref 135–145)
SODIUM SERPL-SCNC: 135 MMOL/L — SIGNIFICANT CHANGE UP (ref 135–145)
SP GR SPEC: 1.02 — SIGNIFICANT CHANGE UP (ref 1.01–1.02)
UROBILINOGEN FLD QL: NEGATIVE — SIGNIFICANT CHANGE UP
WBC # BLD: 9.4 K/UL — SIGNIFICANT CHANGE UP (ref 3.8–10.5)
WBC # FLD AUTO: 9.4 K/UL — SIGNIFICANT CHANGE UP (ref 3.8–10.5)
WBC UR QL: SIGNIFICANT CHANGE UP /HPF (ref 0–5)

## 2017-12-27 PROCEDURE — 93308 TTE F-UP OR LMTD: CPT | Mod: 26

## 2017-12-27 PROCEDURE — 93321 DOPPLER ECHO F-UP/LMTD STD: CPT | Mod: 26

## 2017-12-27 RX ORDER — FUROSEMIDE 40 MG
40 TABLET ORAL DAILY
Qty: 0 | Refills: 0 | Status: COMPLETED | OUTPATIENT
Start: 2017-12-28 | End: 2017-12-28

## 2017-12-27 RX ORDER — FUROSEMIDE 40 MG
40 TABLET ORAL DAILY
Qty: 0 | Refills: 0 | Status: DISCONTINUED | OUTPATIENT
Start: 2017-12-27 | End: 2017-12-27

## 2017-12-27 RX ORDER — FUROSEMIDE 40 MG
20 TABLET ORAL DAILY
Qty: 0 | Refills: 0 | Status: COMPLETED | OUTPATIENT
Start: 2017-12-27 | End: 2017-12-27

## 2017-12-27 RX ORDER — RIVAROXABAN 15 MG-20MG
20 KIT ORAL EVERY 24 HOURS
Qty: 0 | Refills: 0 | Status: DISCONTINUED | OUTPATIENT
Start: 2017-12-27 | End: 2018-01-03

## 2017-12-27 RX ADMIN — Medication 20 MILLIGRAM(S): at 10:12

## 2017-12-27 RX ADMIN — Medication 81 MILLIGRAM(S): at 12:43

## 2017-12-27 RX ADMIN — Medication 3 MILLILITER(S): at 12:42

## 2017-12-27 RX ADMIN — CARVEDILOL PHOSPHATE 25 MILLIGRAM(S): 80 CAPSULE, EXTENDED RELEASE ORAL at 17:27

## 2017-12-27 RX ADMIN — AMLODIPINE BESYLATE 10 MILLIGRAM(S): 2.5 TABLET ORAL at 05:49

## 2017-12-27 RX ADMIN — Medication 1: at 13:23

## 2017-12-27 RX ADMIN — ATORVASTATIN CALCIUM 40 MILLIGRAM(S): 80 TABLET, FILM COATED ORAL at 22:31

## 2017-12-27 RX ADMIN — Medication 3 MILLILITER(S): at 23:20

## 2017-12-27 RX ADMIN — Medication 3 MILLILITER(S): at 00:12

## 2017-12-27 RX ADMIN — LISINOPRIL 40 MILLIGRAM(S): 2.5 TABLET ORAL at 05:49

## 2017-12-27 RX ADMIN — Medication 0.5 MILLIGRAM(S): at 17:27

## 2017-12-27 RX ADMIN — INSULIN GLARGINE 12 UNIT(S): 100 INJECTION, SOLUTION SUBCUTANEOUS at 22:31

## 2017-12-27 RX ADMIN — Medication 0.5 MILLIGRAM(S): at 05:50

## 2017-12-27 RX ADMIN — RIVAROXABAN 20 MILLIGRAM(S): KIT at 17:27

## 2017-12-27 RX ADMIN — Medication 1: at 18:44

## 2017-12-27 RX ADMIN — Medication 20 MILLIGRAM(S): at 05:49

## 2017-12-27 RX ADMIN — CARVEDILOL PHOSPHATE 25 MILLIGRAM(S): 80 CAPSULE, EXTENDED RELEASE ORAL at 05:49

## 2017-12-27 RX ADMIN — Medication 3 MILLILITER(S): at 05:49

## 2017-12-27 RX ADMIN — Medication 3 MILLILITER(S): at 17:27

## 2017-12-27 NOTE — PROGRESS NOTE ADULT - ASSESSMENT
68 y/o M with PMH of HTN, DMT2, PVD, HLD, MI/CAD with stent x 1 2009, GLENYS, 50 pack yr smoker (quit Jan 2017), morbid obesity, rectal cancer (diagnosed 7/2017 s/p chemo) admitted 12/6 for LAR. Hospital course c/b post-operative hypotension requiring SICU admission. Downgraded to floors on 12/7. On 12/12 he was noted to be hypoxic to 70s. Placed on bipap and readmitted to SICU, found to have NSTEMI. Downgraded to floors on 12/16. 12/18 noted to have low grade fever, found to have R peroneal DVT-started on full dose lovenox. Discharged 12/22. Readmitted 12/23 over concerns for non-viable ostomy. Noted to be hypoxic (as he was upon discharge). CTPA repeat revealed ALVA segmental PE (?new from 12/18)

## 2017-12-27 NOTE — PROGRESS NOTE ADULT - SUBJECTIVE AND OBJECTIVE BOX
Follow-up Pulm Progress Note    No new respiratory events overnight.  Denies SOB/CP.   88% on 2L NC  93% on 6L NC    Medications:  MEDICATIONS  (STANDING):  ALBUTerol/ipratropium for Nebulization 3 milliLiter(s) Nebulizer every 6 hours  amLODIPine   Tablet 10 milliGRAM(s) Oral daily  aspirin enteric coated 81 milliGRAM(s) Oral daily  atorvastatin 40 milliGRAM(s) Oral at bedtime  buDESOnide   0.5 milliGRAM(s) Respule 0.5 milliGRAM(s) Inhalation every 12 hours  carvedilol 25 milliGRAM(s) Oral every 12 hours  dextrose 5%. 1000 milliLiter(s) (50 mL/Hr) IV Continuous <Continuous>  dextrose 50% Injectable 12.5 Gram(s) IV Push once  dextrose 50% Injectable 25 Gram(s) IV Push once  dextrose 50% Injectable 25 Gram(s) IV Push once  influenza   Vaccine 0.5 milliLiter(s) IntraMuscular once  insulin glargine Injectable (LANTUS) 12 Unit(s) SubCutaneous at bedtime  insulin lispro (HumaLOG) corrective regimen sliding scale   SubCutaneous three times a day before meals  insulin lispro (HumaLOG) corrective regimen sliding scale   SubCutaneous at bedtime  lisinopril 40 milliGRAM(s) Oral daily  rivaroxaban 20 milliGRAM(s) Oral every 24 hours  simethicone 80 milliGRAM(s) Chew once    MEDICATIONS  (PRN):  dextrose Gel 1 Dose(s) Oral once PRN Blood Glucose LESS THAN 70 milliGRAM(s)/deciliter  glucagon  Injectable 1 milliGRAM(s) IntraMuscular once PRN Glucose LESS THAN 70 milligrams/deciliter          Vital Signs Last 24 Hrs  T(C): 37.1 (27 Dec 2017 17:01), Max: 37.1 (26 Dec 2017 21:30)  T(F): 98.8 (27 Dec 2017 17:01), Max: 98.8 (27 Dec 2017 17:01)  HR: 90 (27 Dec 2017 17:01) (66 - 90)  BP: 151/79 (27 Dec 2017 17:01) (107/65 - 151/79)  BP(mean): --  RR: 18 (27 Dec 2017 17:01) (18 - 18)  SpO2: 94% (27 Dec 2017 17:01) (90% - 96%) on 6L NC          12-26 @ 07:01  -   @ 07:00  --------------------------------------------------------  IN: 1452 mL / OUT: 750 mL / NET: 702 mL          LABS:                        8.4    9.4   )-----------( 545      ( 27 Dec 2017 12:07 )             26.2         135  |  93<L>  |  14  ----------------------------<  206<H>  3.8   |  34<H>  |  1.17    Ca    8.7      27 Dec 2017 12:07  Phos  3.4       Mg     2.0             CARDIAC MARKERS ( 26 Dec 2017 12:28 )  x     / <0.01 ng/mL / 42 U/L / x     / 1.2 ng/mL      CAPILLARY BLOOD GLUCOSE      POCT Blood Glucose.: 180 mg/dL (27 Dec 2017 13:22)    PT/INR - ( 27 Dec 2017 12:07 )   PT: 14.1 sec;   INR: 1.30 ratio         PTT - ( 27 Dec 2017 12:07 )  PTT:52.7 sec  Urinalysis Basic - ( 27 Dec 2017 15:51 )    Color: Yellow / Appearance: Clear / S.017 / pH: x  Gluc: x / Ketone: Negative  / Bili: Negative / Urobili: Negative   Blood: x / Protein: 30 mg/dL / Nitrite: Negative   Leuk Esterase: Negative / RBC: 0-2 /HPF / WBC 6-10 /HPF   Sq Epi: x / Non Sq Epi: OCC /HPF / Bacteria: x                      CULTURES: (if applicable)  Culture Results:   No growth at 5 days. ( @ 19:57)  Culture Results:   No growth at 48 hours ( @ 17:04)  Culture Results:   <10,000 CFU/ml  Normal Urogenital faina present ( @ 16:47)          Physical Examination:  PULM: Decreased BS at bases  CVS: S1, S2 heard    RADIOLOGY REVIEWED  CTA chest: < from: CT Angio Chest w/ IV Cont (17 @ 15:39) >  CHEST:     PULMONARY ARTERY: Filling defect is seen in the left upper lobe lobar   pulmonary artery branch. No additional filling defects noted.  LUNGS AND LARGE AIRWAYS: Patent central airways.  No pulmonary nodules.  PLEURA: Small bilateral pleural effusion.  VESSELS: Normal left-sided aortic arch and descending aorta. No aneurysm.   Atherosclerotic changes.  HEART: Heart size is normal. Trace pericardial effusion. Coronary artery   calcification.  MEDIASTINUM AND JOSE: No lymphadenopathy.  CHEST WALL AND LOWER NECK: Within normal limits.  VISUALIZED UPPER ABDOMEN: Within normal limits.  BONES: Multilevel degenerative changes.    IMPRESSION: Acute pulmonary embolism as described above.    < end of copied text >

## 2017-12-27 NOTE — CHART NOTE - NSCHARTNOTEFT_GEN_A_CORE
As per discussion with Heme Dr. Ev Beatty, there is no need to load the patient with anticoagulation prior to starting Xarelto as the patient is already  on a Heparin gtt. As per Dr. Beatty the patient will receive Xarelto 20 mg once daily,  no need for Xarelto 15 mg BID as patient is already loaded. The patient will discharged on this dose and will discuss future regimen during the follow up out patient setting.       Jesusita Gilliam PA-C  #[phone]

## 2017-12-27 NOTE — PROVIDER CONTACT NOTE (OTHER) - SITUATION
12/27/17 @1053 spoke to Brooke Parker from the lab. Labs cancel by lab due to quantity insufficient.  Pt on Heparin drip MD Elmer Velazquez notified & made aware.

## 2017-12-27 NOTE — CONSULT NOTE ADULT - SUBJECTIVE AND OBJECTIVE BOX
HPI:  Patient is a 67y Male who presented with DVT, PE, and colostomy hemorrhage referred for LUTS. The patient states he is sometimes unable to control his urination but he attributes this to being unable to get to the bathroom. He denies dysuria, hematuria, and states he had no voiding issues prior to admission, nor has he ever seen a urologist.        PAST MEDICAL & SURGICAL HISTORY:  Rectal cancer: s/p chemo radiation  Vitiligo  Stented coronary artery  Obesity (BMI 30-39.9)  Cataracts, bilateral  Hyperlipidemia, unspecified hyperlipidemia type  Tobacco use: quit 2017  PVD (peripheral vascular disease)  Diabetes mellitus, type 2  Essential (primary) hypertension  Abnormal rectal biopsy: 2017 rectal tumor excision  History of cholecystectomy  S/P tonsillectomy and adenoidectomy  H/O heart artery stent: stent x 1 and balloon angioplasty -     MEDICATIONS  (STANDING):  ALBUTerol/ipratropium for Nebulization 3 milliLiter(s) Nebulizer every 6 hours  amLODIPine   Tablet 10 milliGRAM(s) Oral daily  aspirin enteric coated 81 milliGRAM(s) Oral daily  atorvastatin 40 milliGRAM(s) Oral at bedtime  buDESOnide   0.5 milliGRAM(s) Respule 0.5 milliGRAM(s) Inhalation every 12 hours  carvedilol 25 milliGRAM(s) Oral every 12 hours  dextrose 5%. 1000 milliLiter(s) (50 mL/Hr) IV Continuous <Continuous>  dextrose 50% Injectable 12.5 Gram(s) IV Push once  dextrose 50% Injectable 25 Gram(s) IV Push once  dextrose 50% Injectable 25 Gram(s) IV Push once  influenza   Vaccine 0.5 milliLiter(s) IntraMuscular once  insulin glargine Injectable (LANTUS) 12 Unit(s) SubCutaneous at bedtime  insulin lispro (HumaLOG) corrective regimen sliding scale   SubCutaneous three times a day before meals  insulin lispro (HumaLOG) corrective regimen sliding scale   SubCutaneous at bedtime  lisinopril 40 milliGRAM(s) Oral daily  rivaroxaban 20 milliGRAM(s) Oral every 24 hours  simethicone 80 milliGRAM(s) Chew once    MEDICATIONS  (PRN):  dextrose Gel 1 Dose(s) Oral once PRN Blood Glucose LESS THAN 70 milliGRAM(s)/deciliter  glucagon  Injectable 1 milliGRAM(s) IntraMuscular once PRN Glucose LESS THAN 70 milligrams/deciliter    FAMILY HISTORY:  Family history of essential hypertension  Diabetes mellitus, type 2    Allergies    No Known Allergies    Intolerances      SOCIAL HISTORY:   Tobacco hx:    REVIEW OF SYSTEMS: Pertinent positives and negatives as stated in HPI, otherwise negative    Vital signs  T(C): 37.1 (17 @ 17:01), Max: 37.1 (17 @ 21:30)  HR: 90 (17 @ 17:01)  BP: 151/79 (17 @ 17:01)  SpO2: 94% (17 @ 17:01)  Wt(kg): --      Physical Exam  Gen: NAD  Pulm: No respiratory distress, no subcostal retractions  CV: RRR, no JVD  Abd: Soft, NT, ND  :   PVR= 271   MSK: No edema present    LABS:           @ 12:07    WBC 9.4   / Hct 26.2  / SCr 1.17      @ 10:36    WBC --    / Hct --    / SCr 1.06         135  |  93<L>  |  14  ----------------------------<  206<H>  3.8   |  34<H>  |  1.17    Ca    8.7      27 Dec 2017 12:07  Phos  3.4       Mg     2.0           PT/INR - ( 27 Dec 2017 12:07 )   PT: 14.1 sec;   INR: 1.30 ratio         PTT - ( 27 Dec 2017 12:07 )  PTT:52.7 sec  Urinalysis Basic - ( 27 Dec 2017 15:51 )    Color: Yellow / Appearance: Clear / S.017 / pH: x  Gluc: x / Ketone: Negative  / Bili: Negative / Urobili: Negative   Blood: x / Protein: 30 mg/dL / Nitrite: Negative   Leuk Esterase: Negative / RBC: 0-2 /HPF / WBC 6-10 /HPF   Sq Epi: x / Non Sq Epi: OCC /HPF / Bacteria: x    Urinalysis + Microscopic Examination (17 @ 15:51)    Blood, Urine: Negative    Glucose Qualitative, Urine: Trace    Leukocyte Esterase Concentration: Negative    Ketone - Urine: Negative    Nitrite: Negative    Protein, Urine: 30 mg/dL    pH Urine: 6.5    Specific Gravity: 1.017    Urobilinogen: Negative    Urine Appearance: Clear    Color: Yellow    Bilirubin: Negative    Red Blood Cell - Urine: 0-2 /HPF    White Blood Cell - Urine: 6-10 /HPF    Epithelial Cells: OCC /HPF          RADIOLOGY:    < from: CT Abdomen and Pelvis w/ Oral Cont and w/ IV Cont (17 @ 21:27) >   Prostate is mildly enlarged, measuring 5.4 x 6.3 cm.    < end of copied text > HPI:  Patient is a 67y Male who presented with DVT, PE, and colostomy hemorrhage referred for LUTS. The patient states he is sometimes unable to control his urination but he attributes this to being unable to get to the bathroom. He denies dysuria, hematuria, and states he had no voiding issues prior to admission, nor has he ever seen a urologist.        PAST MEDICAL & SURGICAL HISTORY:  Rectal cancer: s/p chemo radiation  Vitiligo  Stented coronary artery  Obesity (BMI 30-39.9)  Cataracts, bilateral  Hyperlipidemia, unspecified hyperlipidemia type  Tobacco use: quit 2017  PVD (peripheral vascular disease)  Diabetes mellitus, type 2  Essential (primary) hypertension  Abnormal rectal biopsy: 2017 rectal tumor excision  History of cholecystectomy  S/P tonsillectomy and adenoidectomy  H/O heart artery stent: stent x 1 and balloon angioplasty -     MEDICATIONS  (STANDING):  ALBUTerol/ipratropium for Nebulization 3 milliLiter(s) Nebulizer every 6 hours  amLODIPine   Tablet 10 milliGRAM(s) Oral daily  aspirin enteric coated 81 milliGRAM(s) Oral daily  atorvastatin 40 milliGRAM(s) Oral at bedtime  buDESOnide   0.5 milliGRAM(s) Respule 0.5 milliGRAM(s) Inhalation every 12 hours  carvedilol 25 milliGRAM(s) Oral every 12 hours  dextrose 5%. 1000 milliLiter(s) (50 mL/Hr) IV Continuous <Continuous>  dextrose 50% Injectable 12.5 Gram(s) IV Push once  dextrose 50% Injectable 25 Gram(s) IV Push once  dextrose 50% Injectable 25 Gram(s) IV Push once  influenza   Vaccine 0.5 milliLiter(s) IntraMuscular once  insulin glargine Injectable (LANTUS) 12 Unit(s) SubCutaneous at bedtime  insulin lispro (HumaLOG) corrective regimen sliding scale   SubCutaneous three times a day before meals  insulin lispro (HumaLOG) corrective regimen sliding scale   SubCutaneous at bedtime  lisinopril 40 milliGRAM(s) Oral daily  rivaroxaban 20 milliGRAM(s) Oral every 24 hours  simethicone 80 milliGRAM(s) Chew once    MEDICATIONS  (PRN):  dextrose Gel 1 Dose(s) Oral once PRN Blood Glucose LESS THAN 70 milliGRAM(s)/deciliter  glucagon  Injectable 1 milliGRAM(s) IntraMuscular once PRN Glucose LESS THAN 70 milligrams/deciliter    FAMILY HISTORY:  Family history of essential hypertension  Diabetes mellitus, type 2    Allergies    No Known Allergies    Intolerances      SOCIAL HISTORY:   Tobacco hx:    REVIEW OF SYSTEMS: Pertinent positives and negatives as stated in HPI, otherwise negative    Vital signs  T(C): 37.1 (17 @ 17:01), Max: 37.1 (17 @ 21:30)  HR: 90 (17 @ 17:01)  BP: 151/79 (17 @ 17:01)  SpO2: 94% (17 @ 17:01)  Wt(kg): --      Physical Exam  Gen: NAD  Pulm: No respiratory distress, no subcostal retractions  CV: RRR, no JVD  Abd: Soft, NT, ND   exam: patient deferred  PVR= 271   MSK: No edema present    LABS:           @ 12:07    WBC 9.4   / Hct 26.2  / SCr 1.17      @ 10:36    WBC --    / Hct --    / SCr 1.06         135  |  93<L>  |  14  ----------------------------<  206<H>  3.8   |  34<H>  |  1.17    Ca    8.7      27 Dec 2017 12:07  Phos  3.4       Mg     2.0           PT/INR - ( 27 Dec 2017 12:07 )   PT: 14.1 sec;   INR: 1.30 ratio         PTT - ( 27 Dec 2017 12:07 )  PTT:52.7 sec  Urinalysis Basic - ( 27 Dec 2017 15:51 )    Color: Yellow / Appearance: Clear / S.017 / pH: x  Gluc: x / Ketone: Negative  / Bili: Negative / Urobili: Negative   Blood: x / Protein: 30 mg/dL / Nitrite: Negative   Leuk Esterase: Negative / RBC: 0-2 /HPF / WBC 6-10 /HPF   Sq Epi: x / Non Sq Epi: OCC /HPF / Bacteria: x    Urinalysis + Microscopic Examination (17 @ 15:51)    Blood, Urine: Negative    Glucose Qualitative, Urine: Trace    Leukocyte Esterase Concentration: Negative    Ketone - Urine: Negative    Nitrite: Negative    Protein, Urine: 30 mg/dL    pH Urine: 6.5    Specific Gravity: 1.017    Urobilinogen: Negative    Urine Appearance: Clear    Color: Yellow    Bilirubin: Negative    Red Blood Cell - Urine: 0-2 /HPF    White Blood Cell - Urine: 6-10 /HPF    Epithelial Cells: OCC /HPF          RADIOLOGY:    < from: CT Abdomen and Pelvis w/ Oral Cont and w/ IV Cont (17 @ 21:27) >   Prostate is mildly enlarged, measuring 5.4 x 6.3 cm.    < end of copied text >

## 2017-12-27 NOTE — PROGRESS NOTE ADULT - ASSESSMENT
Assessment:    67y Male who presents with Colostomy hemorrhage    Plan:  -DVT PPX- Hep Gtt/ASA  -Pain control   -OOB as tolerated with assistance   -Advance diet as tolerated  -Await Gi Fxn   -Dispo Planning     Jesusita Gilliam   #[id number] 12-27-17 @ 07:17

## 2017-12-27 NOTE — PROGRESS NOTE ADULT - PROBLEM SELECTOR PLAN 1
Multifactorial-shunt through bibasilar atelectasis, GLENYS, COPD, with PE  -repeat TTE pending   -Incentive spirometer/OOB  -c/w Bipap qHS  -Lasix 40mg qd  -VBG in AM

## 2017-12-27 NOTE — PROGRESS NOTE ADULT - SUBJECTIVE AND OBJECTIVE BOX
GENERAL SURGERY DAILY PROGRESS NOTE:     Subjective: Patient seen and examined. Patient stable with no overnight events. Patient denies CP/ SOB/ Palpatations. Patient denies N/V. Reports flatus and  BM. Pain adequately controlled.     MEDICATIONS  (STANDING):  ALBUTerol/ipratropium for Nebulization 3 milliLiter(s) Nebulizer every 6 hours  amLODIPine   Tablet 10 milliGRAM(s) Oral daily  aspirin enteric coated 81 milliGRAM(s) Oral daily  atorvastatin 40 milliGRAM(s) Oral at bedtime  buDESOnide   0.5 milliGRAM(s) Respule 0.5 milliGRAM(s) Inhalation every 12 hours  carvedilol 25 milliGRAM(s) Oral every 12 hours  dextrose 5%. 1000 milliLiter(s) (50 mL/Hr) IV Continuous <Continuous>  dextrose 50% Injectable 12.5 Gram(s) IV Push once  dextrose 50% Injectable 25 Gram(s) IV Push once  dextrose 50% Injectable 25 Gram(s) IV Push once  furosemide    Tablet 20 milliGRAM(s) Oral daily  heparin  Infusion 2300 Unit(s)/Hr (23 mL/Hr) IV Continuous <Continuous>  influenza   Vaccine 0.5 milliLiter(s) IntraMuscular once  insulin glargine Injectable (LANTUS) 12 Unit(s) SubCutaneous at bedtime  insulin lispro (HumaLOG) corrective regimen sliding scale   SubCutaneous three times a day before meals  insulin lispro (HumaLOG) corrective regimen sliding scale   SubCutaneous at bedtime  lisinopril 40 milliGRAM(s) Oral daily  simethicone 80 milliGRAM(s) Chew once    MEDICATIONS  (PRN):  dextrose Gel 1 Dose(s) Oral once PRN Blood Glucose LESS THAN 70 milliGRAM(s)/deciliter  glucagon  Injectable 1 milliGRAM(s) IntraMuscular once PRN Glucose LESS THAN 70 milligrams/deciliter    Vital Signs Last 24 Hrs  T(C): 36.6 (27 Dec 2017 05:46), Max: 37.4 (26 Dec 2017 14:27)  T(F): 97.8 (27 Dec 2017 05:46), Max: 99.3 (26 Dec 2017 14:27)  HR: 66 (27 Dec 2017 05:46) (66 - 88)  BP: 124/75 (27 Dec 2017 05:46) (107/65 - 142/76)  RR: 18 (27 Dec 2017 05:46) (18 - 18)  SpO2: 96% (27 Dec 2017 05:46) (90% - 96%)    I&O's Detail  26 Dec 2017 07:01  -  27 Dec 2017 07:00  --------------------------------------------------------  IN:    heparin Infusion: 652 mL    Oral Fluid: 800 mL  Total IN: 1452 mL    OUT:    Colostomy: 700 mL    VAC (Vacuum Assisted Closure) System: 50 mL  Total OUT: 750 mL  Total NET: 702 mL     LABS:                      7.7    8.4   )-----------( 519      ( 26 Dec 2017 10:20 )             23.9   12-26  136  |  95<L>  |  16  ----------------------------<  219<H>  3.9   |  33<H>  |  1.16  Ca    8.3<L>      26 Dec 2017 10:21  Phos  3.3     12-26  Mg     2.1     12-26    PT/INR - ( 25 Dec 2017 11:04 )   PT: 13.8 sec;   INR: 1.27 ratio    PTT - ( 26 Dec 2017 10:20 )  PTT:85.7 sec    Physical Exam:   Gen: NAD, AAOx3  Abd: soft, NT, obese  clean/dry/ intact

## 2017-12-27 NOTE — PROGRESS NOTE ADULT - ATTENDING COMMENTS
Pt feels well, kelsey LRD, + colostomy function, pain controlled    AAOx3  abd soft, NT/ND, obese, VAC in place, colostomy black superficially and starting to slough, pink underneath    A/P s/p APR now with DVT and PE  appreciate Heme eval  start PO anticoag and d/c heparin gtt  d/c back to rehab when bed available

## 2017-12-27 NOTE — PROGRESS NOTE ADULT - PROBLEM SELECTOR PLAN 2
- new ALVA segmental PE on CTPA 12/23  -No evidence of propagation of RLE DVT  -Reviewed with radiology, CTPA from 12/12 was non-diagnostic for PE, CT with IV contrast 12/18 was better study but not done with PE protocol. However, ALVA pulm artery appears more dilated now according to radiology.   -Patient was started on full dose Lovenox 12/18 for R DVT. There is concern for Lovenox failure  -Agree with Xarelto per heme-onc recs   -f/u TTE  -CE were negative, pro-BNP mildly elevated but patient has diastolic HF. Unable to use this as an assessment of possible RV strain

## 2017-12-27 NOTE — CONSULT NOTE ADULT - ASSESSMENT
LUTs, BPH  - start Flomax LUTs, BPH  - start Flomax  - ambulation  - follow up as outpatient with Dr. Lyle Pt has LUTs, BPH  - start Flomax  - ambulation  - follow up as outpatient with Dr. Lyle

## 2017-12-28 ENCOUNTER — TRANSCRIPTION ENCOUNTER (OUTPATIENT)
Age: 67
End: 2017-12-28

## 2017-12-28 LAB
ANION GAP SERPL CALC-SCNC: 9 MMOL/L — SIGNIFICANT CHANGE UP (ref 5–17)
BASE EXCESS BLDV CALC-SCNC: 7.4 MMOL/L — HIGH (ref -2–2)
BUN SERPL-MCNC: 13 MG/DL — SIGNIFICANT CHANGE UP (ref 7–23)
CALCIUM SERPL-MCNC: 8.5 MG/DL — SIGNIFICANT CHANGE UP (ref 8.4–10.5)
CHLORIDE SERPL-SCNC: 95 MMOL/L — LOW (ref 96–108)
CO2 BLDV-SCNC: 36 MMOL/L — HIGH (ref 22–30)
CO2 SERPL-SCNC: 33 MMOL/L — HIGH (ref 22–31)
CREAT SERPL-MCNC: 1.15 MG/DL — SIGNIFICANT CHANGE UP (ref 0.5–1.3)
GAS PNL BLDV: SIGNIFICANT CHANGE UP
GLUCOSE BLDC GLUCOMTR-MCNC: 139 MG/DL — HIGH (ref 70–99)
GLUCOSE BLDC GLUCOMTR-MCNC: 155 MG/DL — HIGH (ref 70–99)
GLUCOSE BLDC GLUCOMTR-MCNC: 157 MG/DL — HIGH (ref 70–99)
GLUCOSE BLDC GLUCOMTR-MCNC: 158 MG/DL — HIGH (ref 70–99)
GLUCOSE SERPL-MCNC: 134 MG/DL — HIGH (ref 70–99)
HCO3 BLDV-SCNC: 34 MMOL/L — HIGH (ref 21–29)
HCT VFR BLD CALC: 26 % — LOW (ref 39–50)
HGB BLD-MCNC: 7.9 G/DL — LOW (ref 13–17)
MAGNESIUM SERPL-MCNC: 2 MG/DL — SIGNIFICANT CHANGE UP (ref 1.6–2.6)
MCHC RBC-ENTMCNC: 28.8 PG — SIGNIFICANT CHANGE UP (ref 27–34)
MCHC RBC-ENTMCNC: 30.4 GM/DL — LOW (ref 32–36)
MCV RBC AUTO: 94.9 FL — SIGNIFICANT CHANGE UP (ref 80–100)
PCO2 BLDV: 64 MMHG — HIGH (ref 35–50)
PH BLDV: 7.34 — LOW (ref 7.35–7.45)
PHOSPHATE SERPL-MCNC: 3.5 MG/DL — SIGNIFICANT CHANGE UP (ref 2.5–4.5)
PLATELET # BLD AUTO: 574 K/UL — HIGH (ref 150–400)
PO2 BLDV: 45 MMHG — SIGNIFICANT CHANGE UP (ref 25–45)
POTASSIUM SERPL-MCNC: 3.9 MMOL/L — SIGNIFICANT CHANGE UP (ref 3.5–5.3)
POTASSIUM SERPL-SCNC: 3.9 MMOL/L — SIGNIFICANT CHANGE UP (ref 3.5–5.3)
RBC # BLD: 2.74 M/UL — LOW (ref 4.2–5.8)
RBC # FLD: 14.4 % — SIGNIFICANT CHANGE UP (ref 10.3–14.5)
SAO2 % BLDV: 72 % — SIGNIFICANT CHANGE UP (ref 67–88)
SODIUM SERPL-SCNC: 137 MMOL/L — SIGNIFICANT CHANGE UP (ref 135–145)
WBC # BLD: 7.29 K/UL — SIGNIFICANT CHANGE UP (ref 3.8–10.5)
WBC # FLD AUTO: 7.29 K/UL — SIGNIFICANT CHANGE UP (ref 3.8–10.5)

## 2017-12-28 PROCEDURE — 99221 1ST HOSP IP/OBS SF/LOW 40: CPT | Mod: GC

## 2017-12-28 RX ADMIN — Medication 3 MILLILITER(S): at 05:07

## 2017-12-28 RX ADMIN — AMLODIPINE BESYLATE 10 MILLIGRAM(S): 2.5 TABLET ORAL at 05:01

## 2017-12-28 RX ADMIN — INSULIN GLARGINE 12 UNIT(S): 100 INJECTION, SOLUTION SUBCUTANEOUS at 22:08

## 2017-12-28 RX ADMIN — Medication 40 MILLIGRAM(S): at 05:06

## 2017-12-28 RX ADMIN — LISINOPRIL 40 MILLIGRAM(S): 2.5 TABLET ORAL at 05:00

## 2017-12-28 RX ADMIN — CARVEDILOL PHOSPHATE 25 MILLIGRAM(S): 80 CAPSULE, EXTENDED RELEASE ORAL at 05:01

## 2017-12-28 RX ADMIN — Medication 1: at 13:37

## 2017-12-28 RX ADMIN — Medication 3 MILLILITER(S): at 23:23

## 2017-12-28 RX ADMIN — RIVAROXABAN 20 MILLIGRAM(S): KIT at 18:14

## 2017-12-28 RX ADMIN — CARVEDILOL PHOSPHATE 25 MILLIGRAM(S): 80 CAPSULE, EXTENDED RELEASE ORAL at 17:16

## 2017-12-28 RX ADMIN — Medication 81 MILLIGRAM(S): at 13:19

## 2017-12-28 RX ADMIN — ATORVASTATIN CALCIUM 40 MILLIGRAM(S): 80 TABLET, FILM COATED ORAL at 21:26

## 2017-12-28 RX ADMIN — Medication 0.5 MILLIGRAM(S): at 05:07

## 2017-12-28 RX ADMIN — Medication 1: at 17:28

## 2017-12-28 NOTE — CHART NOTE - NSCHARTNOTEFT_GEN_A_CORE
As per Discussion with Dr. Duarte due to patients history of stents with CAD patient should be on ASA while on Xarelto. Due to patients cardiac history. Will discuss and notify Dr. Conde.     Jesusita Gilliam PA-C  #[id number]

## 2017-12-28 NOTE — PROGRESS NOTE ADULT - SUBJECTIVE AND OBJECTIVE BOX
GENERAL SURGERY DAILY PROGRESS NOTE:     Subjective: Patient seen and examined. Patient stable with no overnight events. Patient denies CP/ SOB/ Palpatations. Patient denies N/V. Reports No flatus and No BM.     MEDICATIONS  (STANDING):  ALBUTerol/ipratropium for Nebulization 3 milliLiter(s) Nebulizer every 6 hours  amLODIPine   Tablet 10 milliGRAM(s) Oral daily  aspirin enteric coated 81 milliGRAM(s) Oral daily  atorvastatin 40 milliGRAM(s) Oral at bedtime  buDESOnide   0.5 milliGRAM(s) Respule 0.5 milliGRAM(s) Inhalation every 12 hours  carvedilol 25 milliGRAM(s) Oral every 12 hours  dextrose 5%. 1000 milliLiter(s) (50 mL/Hr) IV Continuous <Continuous>  dextrose 50% Injectable 12.5 Gram(s) IV Push once  dextrose 50% Injectable 25 Gram(s) IV Push once  dextrose 50% Injectable 25 Gram(s) IV Push once  influenza   Vaccine 0.5 milliLiter(s) IntraMuscular once  insulin glargine Injectable (LANTUS) 12 Unit(s) SubCutaneous at bedtime  insulin lispro (HumaLOG) corrective regimen sliding scale   SubCutaneous three times a day before meals  insulin lispro (HumaLOG) corrective regimen sliding scale   SubCutaneous at bedtime  lisinopril 40 milliGRAM(s) Oral daily  rivaroxaban 20 milliGRAM(s) Oral every 24 hours  simethicone 80 milliGRAM(s) Chew once    MEDICATIONS  (PRN):  dextrose Gel 1 Dose(s) Oral once PRN Blood Glucose LESS THAN 70 milliGRAM(s)/deciliter  glucagon  Injectable 1 milliGRAM(s) IntraMuscular once PRN Glucose LESS THAN 70 milligrams/deciliter    Vital Signs Last 24 Hrs  T(C): 36.9 (28 Dec 2017 04:54), Max: 37.1 (27 Dec 2017 09:35)  T(F): 98.5 (28 Dec 2017 04:54), Max: 98.8 (27 Dec 2017 17:01)  HR: 73 (28 Dec 2017 04:54) (68 - 90)  BP: 132/72 (28 Dec 2017 04:54) (116/73 - 151/79)  RR: 20 (28 Dec 2017 04:54) (18 - 20)  SpO2: 92% (28 Dec 2017 04:54) (92% - 96%)    I&O's Detail  26 Dec 2017 07:01  -  27 Dec 2017 07:00  --------------------------------------------------------  IN:    heparin Infusion: 652 mL    Oral Fluid: 800 mL  Total IN: 1452 mL  OUT:    Colostomy: 700 mL    VAC (Vacuum Assisted Closure) System: 50 mL  Total OUT: 750 mL  Total NET: 702 mL      27 Dec 2017 07:01  -  28 Dec 2017 06:21  --------------------------------------------------------  IN:    heparin Infusion: 96 mL    Oral Fluid: 840 mL  Total IN: 936 mL  OUT:    Colostomy: 150 mL    Voided: 950 mL  Total OUT: 1100 mL  Total NET: -164 mL     Daily Weight in k.8 (27 Dec 2017 09:35)    LABS:                    8.4    9.4   )-----------( 545      ( 27 Dec 2017 12:07 )             26.2   12  135  |  93<L>  |  14  ----------------------------<  206<H>  3.8   |  34<H>  |  1.17  Ca    8.7      27 Dec 2017 12:07  Phos  3.4       Mg     2.0       PT/INR - ( 27 Dec 2017 12:07 )   PT: 14.1 sec;   INR: 1.30 ratio         PTT - ( 27 Dec 2017 12:07 )  PTT:52.7 sec  Urinalysis Basic - ( 27 Dec 2017 15:51 )  Color: Yellow / Appearance: Clear / S.017 / pH: x  Gluc: x / Ketone: Negative  / Bili: Negative / Urobili: Negative   Blood: x / Protein: 30 mg/dL / Nitrite: Negative   Leuk Esterase: Negative / RBC: 0-2 /HPF / WBC 6-10 /HPF   Sq Epi: x / Non Sq Epi: OCC /HPF / Bacteria: x    Physical Exam:   Gen:   Abd:   Wound:  Colostomy: GENERAL SURGERY DAILY PROGRESS NOTE:     Subjective: Patient seen and examined. Patient stable with no overnight events. Patient denies CP/ SOB/ Palpatations. Patient denies N/V. Reports flatus and BM.     MEDICATIONS  (STANDING):  ALBUTerol/ipratropium for Nebulization 3 milliLiter(s) Nebulizer every 6 hours  amLODIPine   Tablet 10 milliGRAM(s) Oral daily  aspirin enteric coated 81 milliGRAM(s) Oral daily  atorvastatin 40 milliGRAM(s) Oral at bedtime  buDESOnide   0.5 milliGRAM(s) Respule 0.5 milliGRAM(s) Inhalation every 12 hours  carvedilol 25 milliGRAM(s) Oral every 12 hours  dextrose 5%. 1000 milliLiter(s) (50 mL/Hr) IV Continuous <Continuous>  dextrose 50% Injectable 12.5 Gram(s) IV Push once  dextrose 50% Injectable 25 Gram(s) IV Push once  dextrose 50% Injectable 25 Gram(s) IV Push once  influenza   Vaccine 0.5 milliLiter(s) IntraMuscular once  insulin glargine Injectable (LANTUS) 12 Unit(s) SubCutaneous at bedtime  insulin lispro (HumaLOG) corrective regimen sliding scale   SubCutaneous three times a day before meals  insulin lispro (HumaLOG) corrective regimen sliding scale   SubCutaneous at bedtime  lisinopril 40 milliGRAM(s) Oral daily  rivaroxaban 20 milliGRAM(s) Oral every 24 hours  simethicone 80 milliGRAM(s) Chew once    MEDICATIONS  (PRN):  dextrose Gel 1 Dose(s) Oral once PRN Blood Glucose LESS THAN 70 milliGRAM(s)/deciliter  glucagon  Injectable 1 milliGRAM(s) IntraMuscular once PRN Glucose LESS THAN 70 milligrams/deciliter    Vital Signs Last 24 Hrs  T(C): 36.9 (28 Dec 2017 04:54), Max: 37.1 (27 Dec 2017 09:35)  T(F): 98.5 (28 Dec 2017 04:54), Max: 98.8 (27 Dec 2017 17:01)  HR: 73 (28 Dec 2017 04:54) (68 - 90)  BP: 132/72 (28 Dec 2017 04:54) (116/73 - 151/79)  RR: 20 (28 Dec 2017 04:54) (18 - 20)  SpO2: 92% (28 Dec 2017 04:54) (92% - 96%)    I&O's Detail  26 Dec 2017 07:01  -  27 Dec 2017 07:00  --------------------------------------------------------  IN:    heparin Infusion: 652 mL    Oral Fluid: 800 mL  Total IN: 1452 mL  OUT:    Colostomy: 700 mL    VAC (Vacuum Assisted Closure) System: 50 mL  Total OUT: 750 mL  Total NET: 702 mL    27 Dec 2017 07:01  -  28 Dec 2017 06:21  --------------------------------------------------------  IN:    heparin Infusion: 96 mL    Oral Fluid: 840 mL  Total IN: 936 mL  OUT:    Colostomy: 150 mL    Voided: 950 mL  Total OUT: 1100 mL  Total NET: -164 mL   Daily Weight in k.8 (27 Dec 2017 09:35)    LABS:                    8.4    9.4   )-----------( 545      ( 27 Dec 2017 12:07 )             26.2   12  135  |  93<L>  |  14  ----------------------------<  206<H>  3.8   |  34<H>  |  1.17  Ca    8.7      27 Dec 2017 12:07  Phos  3.4       Mg     2.0       PT/INR - ( 27 Dec 2017 12:07 )   PT: 14.1 sec;   INR: 1.30 ratio         PTT - ( 27 Dec 2017 12:07 )  PTT:52.7 sec  Urinalysis Basic - ( 27 Dec 2017 15:51 )  Color: Yellow / Appearance: Clear / S.017 / pH: x  Gluc: x / Ketone: Negative  / Bili: Negative / Urobili: Negative   Blood: x / Protein: 30 mg/dL / Nitrite: Negative   Leuk Esterase: Negative / RBC: 0-2 /HPF / WBC 6-10 /HPF   Sq Epi: x / Non Sq Epi: OCC /HPF / Bacteria: x    Physical Exam  Gen: NAD, AAOx3  Abd: soft, NT, ND   Wound: clean/ dry/ intact   Colostomy: slightly dark and dusky in color unchanged

## 2017-12-28 NOTE — DISCHARGE NOTE ADULT - CONDITION (STATED IN TERMS THAT PERMIT A SPECIFIC MEASURABLE COMPARISON WITH CONDITION ON ADMISSION):
Stable. He will not need chemotherapy or radiation at this time.  He will have his woundvac in place at time of discharge and it will be changed at the rehab facility on Mondays/Wednesdays/Fridays.

## 2017-12-28 NOTE — PROGRESS NOTE ADULT - ASSESSMENT
66 yo male with episodes of incontinence and elevated PVR. Started on flomax and pt with PVR of 0 after voiding 200cc.     -C/W Flomax  -No  intervention at this time  -OOB/Ambulate  -Outpatient follow up with Dr. Valdo MENCHACA to sign off

## 2017-12-28 NOTE — DISCHARGE NOTE ADULT - ADDITIONAL INSTRUCTIONS
Please follow up with Dr. Conde in 1-2 weeks after being discharged.  You will have to call her office to schedule an appointment.

## 2017-12-28 NOTE — PROGRESS NOTE ADULT - PROBLEM SELECTOR PLAN 2
- new ALVA segmental PE on CTPA 12/23  -No evidence of propagation of RLE DVT  -Reviewed with radiology, CTPA from 12/12 was non-diagnostic for PE, CT with IV contrast 12/18 was better study but not done with PE protocol. However, ALVA pulm artery appears more dilated now according to radiology.   -Patient was started on full dose Lovenox 12/18 for R DVT. There is concern for Lovenox failure  -Agree with Xarelto per heme-onc recs   -No evidence of RV systolic dysfunction on TTE  -CE were negative, pro-BNP mildly elevated but patient has diastolic HF. Unable to use this as an assessment of possible RV strain

## 2017-12-28 NOTE — PROGRESS NOTE ADULT - PROBLEM SELECTOR PLAN 1
Multifactorial-shunt through bibasilar atelectasis, GLENYS, COPD, with PE  -TTE with RV enlargement but normal systolic function   -Incentive spirometer/OOB  -c/w Bipap qHS  -Would c/w Lasix 20mg qd

## 2017-12-28 NOTE — PROGRESS NOTE ADULT - SUBJECTIVE AND OBJECTIVE BOX
Follow-up Pulm Progress Note    Walked today with PT  Denies dyspnea  94% on 4L NC    Medications:  MEDICATIONS  (STANDING):  ALBUTerol/ipratropium for Nebulization 3 milliLiter(s) Nebulizer every 6 hours  amLODIPine   Tablet 10 milliGRAM(s) Oral daily  aspirin enteric coated 81 milliGRAM(s) Oral daily  atorvastatin 40 milliGRAM(s) Oral at bedtime  buDESOnide   0.5 milliGRAM(s) Respule 0.5 milliGRAM(s) Inhalation every 12 hours  carvedilol 25 milliGRAM(s) Oral every 12 hours  dextrose 5%. 1000 milliLiter(s) (50 mL/Hr) IV Continuous <Continuous>  dextrose 50% Injectable 12.5 Gram(s) IV Push once  dextrose 50% Injectable 25 Gram(s) IV Push once  dextrose 50% Injectable 25 Gram(s) IV Push once  influenza   Vaccine 0.5 milliLiter(s) IntraMuscular once  insulin glargine Injectable (LANTUS) 12 Unit(s) SubCutaneous at bedtime  insulin lispro (HumaLOG) corrective regimen sliding scale   SubCutaneous three times a day before meals  insulin lispro (HumaLOG) corrective regimen sliding scale   SubCutaneous at bedtime  lisinopril 40 milliGRAM(s) Oral daily  rivaroxaban 20 milliGRAM(s) Oral every 24 hours  simethicone 80 milliGRAM(s) Chew once    MEDICATIONS  (PRN):  dextrose Gel 1 Dose(s) Oral once PRN Blood Glucose LESS THAN 70 milliGRAM(s)/deciliter  glucagon  Injectable 1 milliGRAM(s) IntraMuscular once PRN Glucose LESS THAN 70 milligrams/deciliter          Vital Signs Last 24 Hrs  T(C): 36.9 (28 Dec 2017 14:15), Max: 37.1 (27 Dec 2017 17:01)  T(F): 98.5 (28 Dec 2017 14:15), Max: 98.8 (27 Dec 2017 17:01)  HR: 84 (28 Dec 2017 14:15) (73 - 90)  BP: 118/66 (28 Dec 2017 14:15) (118/66 - 151/79)  BP(mean): --  RR: 20 (28 Dec 2017 14:15) (18 - 20)  SpO2: 92% (28 Dec 2017 14:15) (92% - 96%) on 4L NC      VBG pH 7.34 12- @ 07:18    VBG pCO2 64  @ 07:18    VBG O2 sat 72  @ 07:18    VBG lactate --  @ 07:18       @ 07:01  -   @ 07:00  --------------------------------------------------------  IN: 936 mL / OUT: 1100 mL / NET: -164 mL          LABS:                        7.9    7.29  )-----------( 574      ( 28 Dec 2017 08:58 )             26.0         137  |  95<L>  |  13  ----------------------------<  134<H>  3.9   |  33<H>  |  1.15    Ca    8.5      28 Dec 2017 08:58  Phos  3.5       Mg     2.0                 CAPILLARY BLOOD GLUCOSE      POCT Blood Glucose.: 157 mg/dL (28 Dec 2017 13:34)    PT/INR - ( 27 Dec 2017 12:07 )   PT: 14.1 sec;   INR: 1.30 ratio         PTT - ( 27 Dec 2017 12:07 )  PTT:52.7 sec  Urinalysis Basic - ( 27 Dec 2017 15:51 )    Color: Yellow / Appearance: Clear / S.017 / pH: x  Gluc: x / Ketone: Negative  / Bili: Negative / Urobili: Negative   Blood: x / Protein: 30 mg/dL / Nitrite: Negative   Leuk Esterase: Negative / RBC: 0-2 /HPF / WBC 6-10 /HPF   Sq Epi: x / Non Sq Epi: OCC /HPF / Bacteria: x                      CULTURES: (if applicable)  Culture Results:   No growth at 5 days. ( @ 19:57)  Culture Results:   No growth at 48 hours ( @ 17:04)  Culture Results:   <10,000 CFU/ml  Normal Urogenital faina present ( @ 16:47)          Physical Examination:  PULM: Decreased BS at bases, grossly clear  CVS: S1, S2 heard    RADIOLOGY REVIEWED  CTA chest: < from: CT Angio Chest w/ IV Cont (17 @ 15:39) >    CHEST:     PULMONARY ARTERY: Filling defect is seen in the left upper lobe lobar   pulmonary artery branch. No additional filling defects noted.  LUNGS AND LARGE AIRWAYS: Patent central airways.  No pulmonary nodules.  PLEURA: Small bilateral pleural effusion.  VESSELS: Normal left-sided aortic arch and descending aorta. No aneurysm.   Atherosclerotic changes.  HEART: Heart size is normal. Trace pericardial effusion. Coronary artery   calcification.  MEDIASTINUM AND JOSE: No lymphadenopathy.  CHEST WALL AND LOWER NECK: Within normal limits.  VISUALIZED UPPER ABDOMEN: Within normal limits.  BONES: Multilevel degenerative changes.    IMPRESSION: Acute pulmonary embolism as described above.    < end of copied text > Follow-up Pulm Progress Note    Walked today with PT  Denies dyspnea  94% on 4L NC    Medications:  MEDICATIONS  (STANDING):  ALBUTerol/ipratropium for Nebulization 3 milliLiter(s) Nebulizer every 6 hours  amLODIPine   Tablet 10 milliGRAM(s) Oral daily  aspirin enteric coated 81 milliGRAM(s) Oral daily  atorvastatin 40 milliGRAM(s) Oral at bedtime  buDESOnide   0.5 milliGRAM(s) Respule 0.5 milliGRAM(s) Inhalation every 12 hours  carvedilol 25 milliGRAM(s) Oral every 12 hours  dextrose 5%. 1000 milliLiter(s) (50 mL/Hr) IV Continuous <Continuous>  dextrose 50% Injectable 12.5 Gram(s) IV Push once  dextrose 50% Injectable 25 Gram(s) IV Push once  dextrose 50% Injectable 25 Gram(s) IV Push once  influenza   Vaccine 0.5 milliLiter(s) IntraMuscular once  insulin glargine Injectable (LANTUS) 12 Unit(s) SubCutaneous at bedtime  insulin lispro (HumaLOG) corrective regimen sliding scale   SubCutaneous three times a day before meals  insulin lispro (HumaLOG) corrective regimen sliding scale   SubCutaneous at bedtime  lisinopril 40 milliGRAM(s) Oral daily  rivaroxaban 20 milliGRAM(s) Oral every 24 hours  simethicone 80 milliGRAM(s) Chew once    MEDICATIONS  (PRN):  dextrose Gel 1 Dose(s) Oral once PRN Blood Glucose LESS THAN 70 milliGRAM(s)/deciliter  glucagon  Injectable 1 milliGRAM(s) IntraMuscular once PRN Glucose LESS THAN 70 milligrams/deciliter    Vital Signs Last 24 Hrs  T(C): 36.9 (28 Dec 2017 14:15), Max: 37.1 (27 Dec 2017 17:01)  T(F): 98.5 (28 Dec 2017 14:15), Max: 98.8 (27 Dec 2017 17:01)  HR: 84 (28 Dec 2017 14:15) (73 - 90)  BP: 118/66 (28 Dec 2017 14:15) (118/66 - 151/79)  BP(mean): --  RR: 20 (28 Dec 2017 14:15) (18 - 20)  SpO2: 92% (28 Dec 2017 14:15) (92% - 96%) on 4L NC      VBG pH 7.34 12- @ 07:18    VBG pCO2 64  @ 07:18    VBG O2 sat 72  @ 07:18    VBG lactate --  @ 07:18       @ 07:01  -   @ 07:00  --------------------------------------------------------  IN: 936 mL / OUT: 1100 mL / NET: -164 mL    LABS:                        7.9    7.29  )-----------( 574      ( 28 Dec 2017 08:58 )             26.0         137  |  95<L>  |  13  ----------------------------<  134<H>  3.9   |  33<H>  |  1.15    Ca    8.5      28 Dec 2017 08:58  Phos  3.5       Mg     2.0         CAPILLARY BLOOD GLUCOSE      POCT Blood Glucose.: 157 mg/dL (28 Dec 2017 13:34)    PT/INR - ( 27 Dec 2017 12:07 )   PT: 14.1 sec;   INR: 1.30 ratio         PTT - ( 27 Dec 2017 12:07 )  PTT:52.7 sec  Urinalysis Basic - ( 27 Dec 2017 15:51 )    Color: Yellow / Appearance: Clear / S.017 / pH: x  Gluc: x / Ketone: Negative  / Bili: Negative / Urobili: Negative   Blood: x / Protein: 30 mg/dL / Nitrite: Negative   Leuk Esterase: Negative / RBC: 0-2 /HPF / WBC 6-10 /HPF   Sq Epi: x / Non Sq Epi: OCC /HPF / Bacteria: x    CULTURES: (if applicable)  Culture Results:   No growth at 5 days. ( @ 19:57)  Culture Results:   No growth at 48 hours ( @ 17:04)  Culture Results:   <10,000 CFU/ml  Normal Urogenital faina present ( @ 16:47)    Physical Examination:  PULM: Decreased BS at bases, grossly clear  CVS: S1, S2 heard    RADIOLOGY REVIEWED  CTA chest: < from: CT Angio Chest w/ IV Cont (17 @ 15:39) >    CHEST:     PULMONARY ARTERY: Filling defect is seen in the left upper lobe lobar   pulmonary artery branch. No additional filling defects noted.  LUNGS AND LARGE AIRWAYS: Patent central airways.  No pulmonary nodules.  PLEURA: Small bilateral pleural effusion.  VESSELS: Normal left-sided aortic arch and descending aorta. No aneurysm.   Atherosclerotic changes.  HEART: Heart size is normal. Trace pericardial effusion. Coronary artery   calcification.  MEDIASTINUM AND JOSE: No lymphadenopathy.  CHEST WALL AND LOWER NECK: Within normal limits.  VISUALIZED UPPER ABDOMEN: Within normal limits.  BONES: Multilevel degenerative changes.    IMPRESSION: Acute pulmonary embolism as described above.    < end of copied text >

## 2017-12-28 NOTE — PROGRESS NOTE ADULT - ASSESSMENT
Assessment:    67y Male who presents with Colostomy hemorrhage found to have PE     Plan:  -DVT PPX- Lovenox  -Pain control   -OOB as tolerated with assistance   -Advance diet as tolerated  -Await Gi Fxn   -Dispo Planning     Jesusita Gilliam   #[id number] 12-28-17 @ 06:21

## 2017-12-28 NOTE — DISCHARGE NOTE ADULT - CARE PLAN
Principal Discharge DX:	Other pulmonary embolism without acute cor pulmonale, unspecified chronicity  Goal:	recovery  Instructions for follow-up, activity and diet:	Take Xarelto daily in order to prevent recurrence of pulmonary embolism.

## 2017-12-28 NOTE — DISCHARGE NOTE ADULT - MEDICATION SUMMARY - MEDICATIONS TO TAKE
I will START or STAY ON the medications listed below when I get home from the hospital:    budesonide 0.5 mg/2 mL inhalation suspension  --  inhaled   -- Indication: For COPD (chronic obstructive pulmonary disease)    aspirin 81 mg oral tablet  -- 1 tab(s) by mouth once a day  -- Indication: For anti platelet    acetaminophen 325 mg oral tablet  -- 2 tab(s) by mouth every 4 hours, As needed, Mild Pain (1 - 3)  -- Indication: For Pain    ramipril 10 mg oral capsule  -- 1 cap(s) by mouth once a day  -- Indication: For Hypertension    isosorbide mononitrate 30 mg oral tablet, extended release  -- 1 tab(s) by mouth once a day (in the morning)  -- Indication: For Home med    rivaroxaban 20 mg oral tablet  -- 1 tab(s) by mouth every 24 hours  -- Indication: For Pulmonary embolism    glipiZIDE 2.5 mg oral tablet, extended release  -- 1 tab(s) by mouth 2 times a day  -- Indication: For diabetes    atorvastatin 40 mg oral tablet  -- 1 tab(s) by mouth once a day  -- Indication: For Hyperlipidemia    carvedilol 25 mg oral tablet  -- 1 tab(s) by mouth 2 times a day  -- Indication: For Hypertension    amLODIPine 10 mg oral tablet  -- 1 tab(s) by mouth once a day  -- Indication: For Hypertension    furosemide 40 mg oral tablet  -- 1 tab(s) by mouth every other day  -- Indication: For Hypertension    furosemide 20 mg oral tablet  -- 1 tab(s) by mouth every other day  -- Indication: For Hypertension    Vitamin D3 2000 intl units oral tablet  -- 1 tab(s) by mouth once a day  -- Indication: For Home med

## 2017-12-28 NOTE — DISCHARGE NOTE ADULT - CARE PROVIDER_API CALL
Santiago Conde), ColonRectal Surgery; Surgery  [address] [facility] [address]  [address]  [place] [address]  Phone: [phone]  Fax: [phone]

## 2017-12-28 NOTE — DISCHARGE NOTE ADULT - HOSPITAL COURSE
This patient is a 67y old Male who presented to the ED from his rehabilitation facility because of concern for a nonviable ostomy.  The patient is s/p LAR converted into an APR for rectal ca, and was discharged with superficial ostomy necrosis.  Patient has no complaints of nausea, vomiting or increased ostomy output.  The patient states that he has not had fevers or chills and was brought to the hospital because of his ostomy. Ostomy was still pink and viable 1cm deep from the outside.   Patient had a known DVT from prior admission and was hypoxic in the ED.  CTA was performed to evaluate for PE and was significant for < from: CT Angio Chest w/ IV Cont (12.23.17 @ 15:39) >  PULMONARY ARTERY: Filling defect is seen in the left upper lobe lobar   pulmonary artery branch. No additional filling defects noted.    < end of copied text >  He was admitted to the surgical service for management of his PE.  He was started on a heparin drip and cardiology and pulmonology were consulted.  PT was also consulted to assist with getting the patient out of bed and moving.  Duplex scan was performed and was significant for < from: [facility] Duplex Lower Ext Vein Scan, Bilat (12.26.17 @ 15:39) >  Persistent nonocclusive thrombosis of the right peroneal vein. No   evidence for clot propagation.    < end of copied text >  He also underwent a TTE to evaluate for heart strain secondary to the PE.  The results showed < from: Limited Transthoracic Echo (12.27.17 @ 14:57) >  Limited TTE  to evaluate RV  1. Hyperdynamic left ventricular systolic function.  2. Right ventricular enlargement with normal right  ventricular systolic function. Basal RV diameter 4.2  mm,TAPSE 1.9 cm  Right ventricular systolic dysfunction  with preserved apical wall motion is seen consistent with  Mccrary's sign.  3. Estimated right ventricular systolicpressure equals 49  mm Hg, assuming right atrial pressure equals 8 mm Hg,  consistent with mild pulmonary hypertension.    < end of copied text >  Once patient was stable, he was transitioned to oral anticoagulation with Xarelto per cards recs.  He was unhappy with his previous rehab and a new one was arranged for him to be discharged to.  At the time of discharge, the patient was hemodynamically stable, was tolerating PO diet, was voiding urine and passing stool, was ambulating, and was comfortable with adequate pain control. This patient is a 67y old Male who presented to the ED from his rehabilitation facility because of concern for a nonviable ostomy.  The patient is s/p LAR converted into an APR for rectal ca, and was discharged with superficial ostomy necrosis.  Patient has no complaints of nausea, vomiting or increased ostomy output.  The patient states that he has not had fevers or chills and was brought to the hospital because of his ostomy. Ostomy was still pink and viable 1cm deep from the outside.   Patient had a known DVT from prior admission and was hypoxic in the ED.  CTA was performed to evaluate for PE and was significant for < from: CT Angio Chest w/ IV Cont (12.23.17 @ 15:39) >  PULMONARY ARTERY: Filling defect is seen in the left upper lobe lobar   pulmonary artery branch. No additional filling defects noted.    < end of copied text >  He was admitted to the surgical service for management of his PE.  He was started on a heparin drip and cardiology and pulmonology were consulted.  PT was also consulted to assist with getting the patient out of bed and moving.  Duplex scan was performed and was significant for < from: [facility] Duplex Lower Ext Vein Scan, Bilat (12.26.17 @ 15:39) >  Persistent nonocclusive thrombosis of the right peroneal vein. No   evidence for clot propagation.    < end of copied text >  He also underwent a TTE to evaluate for heart strain secondary to the PE.  The results showed < from: Limited Transthoracic Echo (12.27.17 @ 14:57) >  Limited TTE  to evaluate RV  1. Hyperdynamic left ventricular systolic function.  2. Right ventricular enlargement with normal right  ventricular systolic function. Basal RV diameter 4.2  mm,TAPSE 1.9 cm  Right ventricular systolic dysfunction  with preserved apical wall motion is seen consistent with  Mccrary's sign.  3. Estimated right ventricular systolicpressure equals 49  mm Hg, assuming right atrial pressure equals 8 mm Hg,  consistent with mild pulmonary hypertension.    < end of copied text >  Once patient was stable, he was transitioned to oral anticoagulation with Xarelto per cards recs.  He was unhappy with his previous rehab and a new one was arranged for him to be discharged to.  At the time of discharge, the patient was hemodynamically stable, was tolerating PO diet, was voiding urine and passing stool, was ambulating, and was comfortable with adequate pain control.   He will not need chemotherapy or radiation at this time.  He will have his woundvac in place at time of discharge and it will be changed at the rehab facility on Mondays/Wednesdays/Fridays.

## 2017-12-28 NOTE — DISCHARGE NOTE ADULT - PATIENT PORTAL LINK FT
“You can access the [facility] Patient Portal, offered by [facility], by registering with the following website: [email or web address]”

## 2017-12-28 NOTE — PROGRESS NOTE ADULT - SUBJECTIVE AND OBJECTIVE BOX
Subjective - Patient denies urinary symptoms. States he has had daily episodes of urge incontinence. However, pt is voiding multiple volumes > 200cc. Pt voided prior to exam with PVR of 0cc.     Objective    Vital signs  T(F): , Max: 98.8 (12-27-17 @ 17:01)  HR: 75 (12-28-17 @ 10:12)  BP: 121/75 (12-28-17 @ 10:12)  SpO2: 94% (12-28-17 @ 10:12)  Wt(kg): --    Output     12-27 @ 07:01  -  12-28 @ 07:00  --------------------------------------------------------  IN: 936 mL / OUT: 1100 mL / NET: -164 mL    12-28 @ 07:01  -  12-28 @ 13:58  --------------------------------------------------------  IN: 300 mL / OUT: 650 mL / NET: -350 mL    Void: 650    Gen NAD  Abd Soft/ No SP tenderness. VAC in place.    Non-tender. Scrotum swollen.     Labs      12-28 @ 08:58    WBC 7.29  / Hct 26.0  / SCr 1.15     12-27 @ 12:07    WBC 9.4   / Hct 26.2  / SCr 1.17

## 2017-12-29 LAB
ANION GAP SERPL CALC-SCNC: 15 MMOL/L — SIGNIFICANT CHANGE UP (ref 5–17)
BUN SERPL-MCNC: 14 MG/DL — SIGNIFICANT CHANGE UP (ref 7–23)
CALCIUM SERPL-MCNC: 8.8 MG/DL — SIGNIFICANT CHANGE UP (ref 8.4–10.5)
CHLORIDE SERPL-SCNC: 94 MMOL/L — LOW (ref 96–108)
CO2 SERPL-SCNC: 29 MMOL/L — SIGNIFICANT CHANGE UP (ref 22–31)
CREAT SERPL-MCNC: 1.16 MG/DL — SIGNIFICANT CHANGE UP (ref 0.5–1.3)
GLUCOSE BLDC GLUCOMTR-MCNC: 127 MG/DL — HIGH (ref 70–99)
GLUCOSE BLDC GLUCOMTR-MCNC: 137 MG/DL — HIGH (ref 70–99)
GLUCOSE BLDC GLUCOMTR-MCNC: 142 MG/DL — HIGH (ref 70–99)
GLUCOSE BLDC GLUCOMTR-MCNC: 145 MG/DL — HIGH (ref 70–99)
GLUCOSE SERPL-MCNC: 111 MG/DL — HIGH (ref 70–99)
HCT VFR BLD CALC: 27.2 % — LOW (ref 39–50)
HGB BLD-MCNC: 8.3 G/DL — LOW (ref 13–17)
MAGNESIUM SERPL-MCNC: 2 MG/DL — SIGNIFICANT CHANGE UP (ref 1.6–2.6)
MCHC RBC-ENTMCNC: 28.4 PG — SIGNIFICANT CHANGE UP (ref 27–34)
MCHC RBC-ENTMCNC: 30.5 GM/DL — LOW (ref 32–36)
MCV RBC AUTO: 93.2 FL — SIGNIFICANT CHANGE UP (ref 80–100)
PHOSPHATE SERPL-MCNC: 3.6 MG/DL — SIGNIFICANT CHANGE UP (ref 2.5–4.5)
PLATELET # BLD AUTO: 602 K/UL — HIGH (ref 150–400)
POTASSIUM SERPL-MCNC: 4 MMOL/L — SIGNIFICANT CHANGE UP (ref 3.5–5.3)
POTASSIUM SERPL-SCNC: 4 MMOL/L — SIGNIFICANT CHANGE UP (ref 3.5–5.3)
RBC # BLD: 2.92 M/UL — LOW (ref 4.2–5.8)
RBC # FLD: 14.4 % — SIGNIFICANT CHANGE UP (ref 10.3–14.5)
SODIUM SERPL-SCNC: 138 MMOL/L — SIGNIFICANT CHANGE UP (ref 135–145)
WBC # BLD: 7.43 K/UL — SIGNIFICANT CHANGE UP (ref 3.8–10.5)
WBC # FLD AUTO: 7.43 K/UL — SIGNIFICANT CHANGE UP (ref 3.8–10.5)

## 2017-12-29 RX ORDER — FUROSEMIDE 40 MG
20 TABLET ORAL DAILY
Qty: 0 | Refills: 0 | Status: DISCONTINUED | OUTPATIENT
Start: 2017-12-29 | End: 2018-01-02

## 2017-12-29 RX ADMIN — CARVEDILOL PHOSPHATE 25 MILLIGRAM(S): 80 CAPSULE, EXTENDED RELEASE ORAL at 17:54

## 2017-12-29 RX ADMIN — RIVAROXABAN 20 MILLIGRAM(S): KIT at 17:54

## 2017-12-29 RX ADMIN — Medication 3 MILLILITER(S): at 17:54

## 2017-12-29 RX ADMIN — ATORVASTATIN CALCIUM 40 MILLIGRAM(S): 80 TABLET, FILM COATED ORAL at 22:15

## 2017-12-29 RX ADMIN — INSULIN GLARGINE 12 UNIT(S): 100 INJECTION, SOLUTION SUBCUTANEOUS at 22:15

## 2017-12-29 RX ADMIN — AMLODIPINE BESYLATE 10 MILLIGRAM(S): 2.5 TABLET ORAL at 05:55

## 2017-12-29 RX ADMIN — Medication 3 MILLILITER(S): at 12:38

## 2017-12-29 RX ADMIN — Medication 0.5 MILLIGRAM(S): at 05:55

## 2017-12-29 RX ADMIN — CARVEDILOL PHOSPHATE 25 MILLIGRAM(S): 80 CAPSULE, EXTENDED RELEASE ORAL at 05:55

## 2017-12-29 RX ADMIN — Medication 81 MILLIGRAM(S): at 12:38

## 2017-12-29 RX ADMIN — Medication 20 MILLIGRAM(S): at 05:55

## 2017-12-29 RX ADMIN — Medication 3 MILLILITER(S): at 05:55

## 2017-12-29 RX ADMIN — Medication 0.5 MILLIGRAM(S): at 17:54

## 2017-12-29 NOTE — PROGRESS NOTE ADULT - ATTENDING COMMENTS
no acute events, + air and stool in colostomy, kelsey diet, no c/o    AAOx3  abd obese soft NT, incision clean, VAC intact, colostomy black superficially, pink underneath    A/P s/p APR with DVT/PE  xarelto per heme  diet as kelsey  OOB  d/c to rehab once bed available

## 2017-12-29 NOTE — PROGRESS NOTE ADULT - ASSESSMENT
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

## 2017-12-29 NOTE — PROGRESS NOTE ADULT - PROBLEM SELECTOR PLAN 1
Multifactorial-shunt through bibasilar atelectasis, GLENYS, COPD, with PE  -TTE with RV enlargement but normal systolic function   -Incentive spirometer/OOB  -c/w Bipap qHS  -Lasix 20mg qd

## 2017-12-29 NOTE — PROGRESS NOTE ADULT - SUBJECTIVE AND OBJECTIVE BOX
GENERAL SURGERY DAILY PROGRESS NOTE:     Subjective: Patient seen and examined. No complaints or events overnight. No SOB. awaiting rehab placement.    T(C): 36.6 (17 @ 05:54), Max: 37.1 (17 @ 21:20)  HR: 74 (17 @ 05:54) (74 - 84)  BP: 127/69 (17 @ 05:54) (100/60 - 127/69)  RR: 18 (17 @ 05:54) (18 - 20)  SpO2: 92% (17 @ 05:54) (92% - 96%)  Wt(kg): --     @ 07:01  -   @ 07:00  --------------------------------------------------------  IN:    Oral Fluid: 540 mL  Total IN: 540 mL    OUT:    Voided: 850 mL  Total OUT: 850 mL    Total NET: -310 mL        Physical Exam  Gen: NAD, AAOx3  Abd: soft, NT, ND   Wound: clean/ dry/ intact   Colostomy: slightly dark and dusky in color unchanged                           7.9    7.29  )-----------( 574      ( 28 Dec 2017 08:58 )             26.0         137  |  95<L>  |  13  ----------------------------<  134<H>  3.9   |  33<H>  |  1.15    Ca    8.5      28 Dec 2017 08:58  Phos  3.5       Mg     2.0             PT/INR - ( 27 Dec 2017 12:07 )   PT: 14.1 sec;   INR: 1.30 ratio         PTT - ( 27 Dec 2017 12:07 )  PTT:52.7 sec  Urinalysis Basic - ( 27 Dec 2017 15:51 )    Color: Yellow / Appearance: Clear / S.017 / pH: x  Gluc: x / Ketone: Negative  / Bili: Negative / Urobili: Negative   Blood: x / Protein: 30 mg/dL / Nitrite: Negative   Leuk Esterase: Negative / RBC: 0-2 /HPF / WBC 6-10 /HPF   Sq Epi: x / Non Sq Epi: OCC /HPF / Bacteria: x

## 2017-12-29 NOTE — PROGRESS NOTE ADULT - SUBJECTIVE AND OBJECTIVE BOX
Follow-up Pulm Progress Note    No new respiratory events overnight.  Denies SOB/CP.   Found to be off oxygen, sp02 77%  92% on 4L NC    Medications:  MEDICATIONS  (STANDING):  ALBUTerol/ipratropium for Nebulization 3 milliLiter(s) Nebulizer every 6 hours  amLODIPine   Tablet 10 milliGRAM(s) Oral daily  aspirin enteric coated 81 milliGRAM(s) Oral daily  atorvastatin 40 milliGRAM(s) Oral at bedtime  buDESOnide   0.5 milliGRAM(s) Respule 0.5 milliGRAM(s) Inhalation every 12 hours  carvedilol 25 milliGRAM(s) Oral every 12 hours  dextrose 5%. 1000 milliLiter(s) (50 mL/Hr) IV Continuous <Continuous>  dextrose 50% Injectable 12.5 Gram(s) IV Push once  dextrose 50% Injectable 25 Gram(s) IV Push once  dextrose 50% Injectable 25 Gram(s) IV Push once  furosemide    Tablet 20 milliGRAM(s) Oral daily  influenza   Vaccine 0.5 milliLiter(s) IntraMuscular once  insulin glargine Injectable (LANTUS) 12 Unit(s) SubCutaneous at bedtime  insulin lispro (HumaLOG) corrective regimen sliding scale   SubCutaneous three times a day before meals  insulin lispro (HumaLOG) corrective regimen sliding scale   SubCutaneous at bedtime  lisinopril 40 milliGRAM(s) Oral daily  rivaroxaban 20 milliGRAM(s) Oral every 24 hours  simethicone 80 milliGRAM(s) Chew once    MEDICATIONS  (PRN):  dextrose Gel 1 Dose(s) Oral once PRN Blood Glucose LESS THAN 70 milliGRAM(s)/deciliter  glucagon  Injectable 1 milliGRAM(s) IntraMuscular once PRN Glucose LESS THAN 70 milligrams/deciliter          Vital Signs Last 24 Hrs  T(C): 36.9 (29 Dec 2017 14:15), Max: 37.1 (28 Dec 2017 21:20)  T(F): 98.4 (29 Dec 2017 14:15), Max: 98.8 (28 Dec 2017 21:20)  HR: 78 (29 Dec 2017 14:15) (74 - 84)  BP: 137/78 (29 Dec 2017 14:15) (100/60 - 137/78)  BP(mean): --  RR: 18 (29 Dec 2017 14:15) (18 - 18)  SpO2: 91% (29 Dec 2017 14:15) (91% - 96%) on 4L NC      VBG pH 7.34 12-28 @ 07:18    VBG pCO2 64 12-28 @ 07:18    VBG O2 sat 72 12-28 @ 07:18    VBG lactate -- 12-28 @ 07:18      12-28 @ 07:01  -  12-29 @ 07:00  --------------------------------------------------------  IN: 540 mL / OUT: 850 mL / NET: -310 mL          LABS:                        8.3    7.43  )-----------( 602      ( 29 Dec 2017 09:06 )             27.2     12-29    138  |  94<L>  |  14  ----------------------------<  111<H>  4.0   |  29  |  1.16    Ca    8.8      29 Dec 2017 09:06  Phos  3.6     12-29  Mg     2.0     12-29            CAPILLARY BLOOD GLUCOSE      POCT Blood Glucose.: 145 mg/dL (29 Dec 2017 13:06)                        CULTURES: (if applicable)          Physical Examination:  PULM: Decreased BS at bases  CVS: S1, S2 heard    RADIOLOGY REVIEWED  CTA chest: < from: CT Angio Chest w/ IV Cont (12.23.17 @ 15:39) >    CHEST:     PULMONARY ARTERY: Filling defect is seen in the left upper lobe lobar   pulmonary artery branch. No additional filling defects noted.  LUNGS AND LARGE AIRWAYS: Patent central airways.  No pulmonary nodules.  PLEURA: Small bilateral pleural effusion.  VESSELS: Normal left-sided aortic arch and descending aorta. No aneurysm.   Atherosclerotic changes.  HEART: Heart size is normal. Trace pericardial effusion. Coronary artery   calcification.  MEDIASTINUM AND JOSE: No lymphadenopathy.  CHEST WALL AND LOWER NECK: Within normal limits.  VISUALIZED UPPER ABDOMEN: Within normal limits.  BONES: Multilevel degenerative changes.    IMPRESSION: Acute pulmonary embolism as described above. Follow-up Pulm Progress Note    No new respiratory events overnight.  Denies SOB/CP.   Found to be off oxygen, sp02 77%  92% on 4L NC    Medications:  MEDICATIONS  (STANDING):  ALBUTerol/ipratropium for Nebulization 3 milliLiter(s) Nebulizer every 6 hours  amLODIPine   Tablet 10 milliGRAM(s) Oral daily  aspirin enteric coated 81 milliGRAM(s) Oral daily  atorvastatin 40 milliGRAM(s) Oral at bedtime  buDESOnide   0.5 milliGRAM(s) Respule 0.5 milliGRAM(s) Inhalation every 12 hours  carvedilol 25 milliGRAM(s) Oral every 12 hours  dextrose 5%. 1000 milliLiter(s) (50 mL/Hr) IV Continuous <Continuous>  dextrose 50% Injectable 12.5 Gram(s) IV Push once  dextrose 50% Injectable 25 Gram(s) IV Push once  dextrose 50% Injectable 25 Gram(s) IV Push once  furosemide    Tablet 20 milliGRAM(s) Oral daily  influenza   Vaccine 0.5 milliLiter(s) IntraMuscular once  insulin glargine Injectable (LANTUS) 12 Unit(s) SubCutaneous at bedtime  insulin lispro (HumaLOG) corrective regimen sliding scale   SubCutaneous three times a day before meals  insulin lispro (HumaLOG) corrective regimen sliding scale   SubCutaneous at bedtime  lisinopril 40 milliGRAM(s) Oral daily  rivaroxaban 20 milliGRAM(s) Oral every 24 hours  simethicone 80 milliGRAM(s) Chew once    MEDICATIONS  (PRN):  dextrose Gel 1 Dose(s) Oral once PRN Blood Glucose LESS THAN 70 milliGRAM(s)/deciliter  glucagon  Injectable 1 milliGRAM(s) IntraMuscular once PRN Glucose LESS THAN 70 milligrams/deciliter    Vital Signs Last 24 Hrs  T(C): 36.9 (29 Dec 2017 14:15), Max: 37.1 (28 Dec 2017 21:20)  T(F): 98.4 (29 Dec 2017 14:15), Max: 98.8 (28 Dec 2017 21:20)  HR: 78 (29 Dec 2017 14:15) (74 - 84)  BP: 137/78 (29 Dec 2017 14:15) (100/60 - 137/78)  BP(mean): --  RR: 18 (29 Dec 2017 14:15) (18 - 18)  SpO2: 91% (29 Dec 2017 14:15) (91% - 96%) on 4L NC      VBG pH 7.34 12-28 @ 07:18    VBG pCO2 64 12-28 @ 07:18    VBG O2 sat 72 12-28 @ 07:18    VBG lactate -- 12-28 @ 07:18      12-28 @ 07:01  -  12-29 @ 07:00  --------------------------------------------------------  IN: 540 mL / OUT: 850 mL / NET: -310 mL      LABS:                        8.3    7.43  )-----------( 602      ( 29 Dec 2017 09:06 )             27.2     12-29    138  |  94<L>  |  14  ----------------------------<  111<H>  4.0   |  29  |  1.16    Ca    8.8      29 Dec 2017 09:06  Phos  3.6     12-29  Mg     2.0     12-29    CAPILLARY BLOOD GLUCOSE      POCT Blood Glucose.: 145 mg/dL (29 Dec 2017 13:06)    CULTURES: (if applicable)    Physical Examination:  PULM: Decreased BS at bases  CVS: S1, S2 heard    RADIOLOGY REVIEWED  CTA chest: < from: CT Angio Chest w/ IV Cont (12.23.17 @ 15:39) >    CHEST:     PULMONARY ARTERY: Filling defect is seen in the left upper lobe lobar   pulmonary artery branch. No additional filling defects noted.  LUNGS AND LARGE AIRWAYS: Patent central airways.  No pulmonary nodules.  PLEURA: Small bilateral pleural effusion.  VESSELS: Normal left-sided aortic arch and descending aorta. No aneurysm.   Atherosclerotic changes.  HEART: Heart size is normal. Trace pericardial effusion. Coronary artery   calcification.  MEDIASTINUM AND JOSE: No lymphadenopathy.  CHEST WALL AND LOWER NECK: Within normal limits.  VISUALIZED UPPER ABDOMEN: Within normal limits.  BONES: Multilevel degenerative changes.    IMPRESSION: Acute pulmonary embolism as described above.

## 2017-12-30 LAB
ANION GAP SERPL CALC-SCNC: 10 MMOL/L — SIGNIFICANT CHANGE UP (ref 5–17)
BUN SERPL-MCNC: 14 MG/DL — SIGNIFICANT CHANGE UP (ref 7–23)
CALCIUM SERPL-MCNC: 8.8 MG/DL — SIGNIFICANT CHANGE UP (ref 8.4–10.5)
CHLORIDE SERPL-SCNC: 98 MMOL/L — SIGNIFICANT CHANGE UP (ref 96–108)
CO2 SERPL-SCNC: 31 MMOL/L — SIGNIFICANT CHANGE UP (ref 22–31)
CREAT SERPL-MCNC: 1.12 MG/DL — SIGNIFICANT CHANGE UP (ref 0.5–1.3)
GLUCOSE BLDC GLUCOMTR-MCNC: 129 MG/DL — HIGH (ref 70–99)
GLUCOSE BLDC GLUCOMTR-MCNC: 131 MG/DL — HIGH (ref 70–99)
GLUCOSE BLDC GLUCOMTR-MCNC: 168 MG/DL — HIGH (ref 70–99)
GLUCOSE BLDC GLUCOMTR-MCNC: 168 MG/DL — HIGH (ref 70–99)
GLUCOSE SERPL-MCNC: 121 MG/DL — HIGH (ref 70–99)
HCT VFR BLD CALC: 26.2 % — LOW (ref 39–50)
HGB BLD-MCNC: 7.9 G/DL — LOW (ref 13–17)
MAGNESIUM SERPL-MCNC: 2 MG/DL — SIGNIFICANT CHANGE UP (ref 1.6–2.6)
MCHC RBC-ENTMCNC: 28.1 PG — SIGNIFICANT CHANGE UP (ref 27–34)
MCHC RBC-ENTMCNC: 30.2 GM/DL — LOW (ref 32–36)
MCV RBC AUTO: 93.2 FL — SIGNIFICANT CHANGE UP (ref 80–100)
PHOSPHATE SERPL-MCNC: 4.6 MG/DL — HIGH (ref 2.5–4.5)
PLATELET # BLD AUTO: 571 K/UL — HIGH (ref 150–400)
POTASSIUM SERPL-MCNC: 4.2 MMOL/L — SIGNIFICANT CHANGE UP (ref 3.5–5.3)
POTASSIUM SERPL-SCNC: 4.2 MMOL/L — SIGNIFICANT CHANGE UP (ref 3.5–5.3)
RBC # BLD: 2.81 M/UL — LOW (ref 4.2–5.8)
RBC # FLD: 14.5 % — SIGNIFICANT CHANGE UP (ref 10.3–14.5)
SODIUM SERPL-SCNC: 139 MMOL/L — SIGNIFICANT CHANGE UP (ref 135–145)
WBC # BLD: 7.06 K/UL — SIGNIFICANT CHANGE UP (ref 3.8–10.5)
WBC # FLD AUTO: 7.06 K/UL — SIGNIFICANT CHANGE UP (ref 3.8–10.5)

## 2017-12-30 RX ORDER — BENZOCAINE AND MENTHOL 5; 1 G/100ML; G/100ML
1 LIQUID ORAL THREE TIMES A DAY
Qty: 0 | Refills: 0 | Status: DISCONTINUED | OUTPATIENT
Start: 2017-12-30 | End: 2018-01-03

## 2017-12-30 RX ORDER — ACETAMINOPHEN 500 MG
650 TABLET ORAL ONCE
Qty: 0 | Refills: 0 | Status: COMPLETED | OUTPATIENT
Start: 2017-12-30 | End: 2017-12-30

## 2017-12-30 RX ADMIN — Medication 1: at 16:44

## 2017-12-30 RX ADMIN — CARVEDILOL PHOSPHATE 25 MILLIGRAM(S): 80 CAPSULE, EXTENDED RELEASE ORAL at 06:05

## 2017-12-30 RX ADMIN — ATORVASTATIN CALCIUM 40 MILLIGRAM(S): 80 TABLET, FILM COATED ORAL at 23:01

## 2017-12-30 RX ADMIN — INSULIN GLARGINE 12 UNIT(S): 100 INJECTION, SOLUTION SUBCUTANEOUS at 23:03

## 2017-12-30 RX ADMIN — AMLODIPINE BESYLATE 10 MILLIGRAM(S): 2.5 TABLET ORAL at 06:04

## 2017-12-30 RX ADMIN — Medication 650 MILLIGRAM(S): at 21:08

## 2017-12-30 RX ADMIN — Medication 650 MILLIGRAM(S): at 20:38

## 2017-12-30 RX ADMIN — Medication 20 MILLIGRAM(S): at 06:04

## 2017-12-30 RX ADMIN — CARVEDILOL PHOSPHATE 25 MILLIGRAM(S): 80 CAPSULE, EXTENDED RELEASE ORAL at 16:45

## 2017-12-30 RX ADMIN — LISINOPRIL 40 MILLIGRAM(S): 2.5 TABLET ORAL at 06:04

## 2017-12-30 RX ADMIN — RIVAROXABAN 20 MILLIGRAM(S): KIT at 16:44

## 2017-12-30 RX ADMIN — Medication 81 MILLIGRAM(S): at 11:09

## 2017-12-30 NOTE — PROGRESS NOTE ADULT - SUBJECTIVE AND OBJECTIVE BOX
Red Team Surgery (Colorectal) Progress Note:    Hospital Day: 8; admitted for concern for sepsis 2/2 necrotic ostomy but found to have PE    Subjective:  No acute overnight events.  Patient was examined at bedside this AM.  He has no new complaints at this time.    Objective:  T(C): 36.6 (12-30-17 @ 06:07), Max: 36.9 (12-29-17 @ 10:00)  T(F): 97.9 (12-30-17 @ 06:07)  HR: 70 (12-30-17 @ 06:07) (70 - 84)  BP: 132/72 (12-30-17 @ 06:07) (120/68 - 137/78)  RR: 18 (12-30-17 @ 06:07) (18 - 18)  SpO2: 95% (12-30-17 @ 06:07) (91% - 95%)    Labs:                      7.9    7.06  )-----------( 571      ( 30 Dec 2017 08:07 )             26.2       12-29    138  |  94<L>  |  14  ----------------------------<  111<H>  4.0   |  29  |  1.16    Ca    8.8      29 Dec 2017 09:06  Phos  3.6     12-29  Mg     2.0     12-29        I&O's Detail    29 Dec 2017 07:01  -  30 Dec 2017 07:00  --------------------------------------------------------  IN:    Oral Fluid: 480 mL  Total IN: 480 mL    OUT:    Voided: 1075 mL  Total OUT: 1075 mL    Total NET: -595 mL      30 Dec 2017 07:01  -  30 Dec 2017 08:47  --------------------------------------------------------  IN:  Total IN: 0 mL    OUT:    Voided: 200 mL  Total OUT: 200 mL    Total NET: -200 mL      Focused Physical Exam:  General: NAD  Respiratory: Nonlabored breathing  Abdomen: obese, soft, nt, nd ostomy intact and functioning draining stool but bowel at ostomy site continues to have superficial necrotic appearance (unchanged since admission)      Medications:  ALBUTerol/ipratropium for Nebulization 3 milliLiter(s) Nebulizer every 6 hours  amLODIPine   Tablet 10 milliGRAM(s) Oral daily  aspirin enteric coated 81 milliGRAM(s) Oral daily  atorvastatin 40 milliGRAM(s) Oral at bedtime  buDESOnide   0.5 milliGRAM(s) Respule 0.5 milliGRAM(s) Inhalation every 12 hours  carvedilol 25 milliGRAM(s) Oral every 12 hours  dextrose 5%. 1000 milliLiter(s) IV Continuous <Continuous>  dextrose 50% Injectable 12.5 Gram(s) IV Push once  dextrose 50% Injectable 25 Gram(s) IV Push once  dextrose 50% Injectable 25 Gram(s) IV Push once  dextrose Gel 1 Dose(s) Oral once PRN  furosemide    Tablet 20 milliGRAM(s) Oral daily  glucagon  Injectable 1 milliGRAM(s) IntraMuscular once PRN  influenza   Vaccine 0.5 milliLiter(s) IntraMuscular once  insulin glargine Injectable (LANTUS) 12 Unit(s) SubCutaneous at bedtime  insulin lispro (HumaLOG) corrective regimen sliding scale   SubCutaneous three times a day before meals  insulin lispro (HumaLOG) corrective regimen sliding scale   SubCutaneous at bedtime  lisinopril 40 milliGRAM(s) Oral daily  rivaroxaban 20 milliGRAM(s) Oral every 24 hours  simethicone 80 milliGRAM(s) Chew once

## 2017-12-30 NOTE — PROGRESS NOTE ADULT - ASSESSMENT
67y M HD 8, admitted for concern for sepsis 2/2 necrotic ostomy but found to have PE; doing well, remains avss, wbc wnl, benign physical exam (superficial necrosis of stoma should slough off over time, does not appear to be worsening), pain tolerated, continues to tolerate low fiber diet, no complaints.    -Dispo plan: Currently awaiting discharge to rehab; patient's current insurance changes on January 1st, 2018 (in 2 days) and many of the facilities only accept his old or his new insurance but not both; the rehab facilities that accept his old/current insurance are rejecting him because his plan will change soon; the facilities that accept his new insurance won't accept him until it is valid (01/01/18); patient understands this and has no objection to staying.  -Continue Low Residue/Fiber Diet  -Continue Xarelto (Rivaroxaban) 20mg daily for PE (seen on imaging upon admission) and continue ASA 81mg daily  -OOB as tolerated w/ assistance

## 2017-12-31 LAB
GLUCOSE BLDC GLUCOMTR-MCNC: 131 MG/DL — HIGH (ref 70–99)
GLUCOSE BLDC GLUCOMTR-MCNC: 146 MG/DL — HIGH (ref 70–99)
GLUCOSE BLDC GLUCOMTR-MCNC: 158 MG/DL — HIGH (ref 70–99)
GLUCOSE BLDC GLUCOMTR-MCNC: 166 MG/DL — HIGH (ref 70–99)

## 2017-12-31 RX ADMIN — Medication 20 MILLIGRAM(S): at 06:38

## 2017-12-31 RX ADMIN — Medication 1: at 17:14

## 2017-12-31 RX ADMIN — INSULIN GLARGINE 12 UNIT(S): 100 INJECTION, SOLUTION SUBCUTANEOUS at 22:57

## 2017-12-31 RX ADMIN — CARVEDILOL PHOSPHATE 25 MILLIGRAM(S): 80 CAPSULE, EXTENDED RELEASE ORAL at 06:38

## 2017-12-31 RX ADMIN — RIVAROXABAN 20 MILLIGRAM(S): KIT at 17:15

## 2017-12-31 RX ADMIN — LISINOPRIL 40 MILLIGRAM(S): 2.5 TABLET ORAL at 06:38

## 2017-12-31 RX ADMIN — Medication 81 MILLIGRAM(S): at 11:32

## 2017-12-31 RX ADMIN — AMLODIPINE BESYLATE 10 MILLIGRAM(S): 2.5 TABLET ORAL at 06:38

## 2017-12-31 RX ADMIN — ATORVASTATIN CALCIUM 40 MILLIGRAM(S): 80 TABLET, FILM COATED ORAL at 21:27

## 2017-12-31 RX ADMIN — CARVEDILOL PHOSPHATE 25 MILLIGRAM(S): 80 CAPSULE, EXTENDED RELEASE ORAL at 17:15

## 2017-12-31 NOTE — PROGRESS NOTE ADULT - ASSESSMENT
67yM admitted for concern for sepsis 2/2 necrotic ostomy but found to have PE; doing well.  -Diet: LRD   -Continue Xarelto (Rivaroxaban) 20mg daily for PE  -home meds  -monitor ostomy output  -OOB as tolerated w/ assistance  -Dispo: awaiting rehab placement    Nusrat Clark, PGY1  [id number]

## 2017-12-31 NOTE — PROGRESS NOTE ADULT - SUBJECTIVE AND OBJECTIVE BOX
Red Team Surgery (Colorectal) Progress Note:    Subjective:  No acute overnight events.  Patient doing well, awaiting rehab placement.    Objective:  T(C): 36.8 (12-31-17 @ 06:13), Max: 37.1 (12-30-17 @ 21:00)  HR: 74 (12-31-17 @ 06:13) (74 - 84)  BP: 142/76 (12-31-17 @ 06:13) (118/74 - 145/76)  RR: 18 (12-31-17 @ 06:13) (18 - 18)  SpO2: 95% (12-31-17 @ 06:13) (92% - 98%)  Wt(kg): --    12-30 @ 07:01  -  12-31 @ 07:00  --------------------------------------------------------  IN:    Oral Fluid: 660 mL  Total IN: 660 mL    OUT:    Colostomy: 50 mL    Voided: 1100 mL  Total OUT: 1150 mL    Total NET: -490 mL      Physical Exam:  General: NAD  Respiratory: Nonlabored breathing  Abdomen: obese, soft, nt, nd ostomy intact and functioning draining stool, ostomy with superficial necrosis, viable underneath.                             7.9    7.06  )-----------( 571      ( 30 Dec 2017 08:07 )             26.2     12-30    139  |  98  |  14  ----------------------------<  121<H>  4.2   |  31  |  1.12    Ca    8.8      30 Dec 2017 09:01  Phos  4.6     12-30  Mg     2.0     12-30

## 2018-01-01 LAB
GLUCOSE BLDC GLUCOMTR-MCNC: 122 MG/DL — HIGH (ref 70–99)
GLUCOSE BLDC GLUCOMTR-MCNC: 131 MG/DL — HIGH (ref 70–99)
GLUCOSE BLDC GLUCOMTR-MCNC: 139 MG/DL — HIGH (ref 70–99)
GLUCOSE BLDC GLUCOMTR-MCNC: 214 MG/DL — HIGH (ref 70–99)

## 2018-01-01 RX ADMIN — LISINOPRIL 40 MILLIGRAM(S): 2.5 TABLET ORAL at 06:25

## 2018-01-01 RX ADMIN — CARVEDILOL PHOSPHATE 25 MILLIGRAM(S): 80 CAPSULE, EXTENDED RELEASE ORAL at 17:19

## 2018-01-01 RX ADMIN — INSULIN GLARGINE 12 UNIT(S): 100 INJECTION, SOLUTION SUBCUTANEOUS at 21:30

## 2018-01-01 RX ADMIN — ATORVASTATIN CALCIUM 40 MILLIGRAM(S): 80 TABLET, FILM COATED ORAL at 21:30

## 2018-01-01 RX ADMIN — CARVEDILOL PHOSPHATE 25 MILLIGRAM(S): 80 CAPSULE, EXTENDED RELEASE ORAL at 06:25

## 2018-01-01 RX ADMIN — Medication 20 MILLIGRAM(S): at 06:25

## 2018-01-01 RX ADMIN — RIVAROXABAN 20 MILLIGRAM(S): KIT at 17:19

## 2018-01-01 RX ADMIN — AMLODIPINE BESYLATE 10 MILLIGRAM(S): 2.5 TABLET ORAL at 06:25

## 2018-01-01 RX ADMIN — Medication 81 MILLIGRAM(S): at 11:10

## 2018-01-01 NOTE — PROGRESS NOTE ADULT - SUBJECTIVE AND OBJECTIVE BOX
Red Team Surgery (Colorectal) Progress Note:    Subjective:  His wound VAC was reinforced with Tegaderm yesterday, with good seal this morning, No other acute overnight events.  Patient doing well, awaiting rehab placement.    Vital Signs Last 24 Hrs  T(C): 36.7 (01 Jan 2018 06:22), Max: 37.2 (31 Dec 2017 17:00)  T(F): 98.1 (01 Jan 2018 06:22), Max: 99 (31 Dec 2017 18:32)  HR: 76 (01 Jan 2018 06:22) (70 - 85)  BP: 128/71 (01 Jan 2018 06:22) (117/64 - 134/77)  BP(mean): --  RR: 18 (01 Jan 2018 06:22) (18 - 18)  SpO2: 95% (01 Jan 2018 06:22) (92% - 95%)        31 Dec 2017 07:01  -  01 Jan 2018 07:00  --------------------------------------------------------  IN:    Oral Fluid: 720 mL  Total IN: 720 mL    OUT:    Colostomy: 400 mL    Voided: 650 mL  Total OUT: 1050 mL    Total NET: -330 mL      01 Jan 2018 07:01  -  01 Jan 2018 08:51  --------------------------------------------------------  IN:    Oral Fluid: 360 mL  Total IN: 360 mL    OUT:    Colostomy: 50 mL    Voided: 250 mL  Total OUT: 300 mL    Total NET: 60 mL          Physical Exam:  General: NAD  Respiratory: Nonlabored breathing  Abdomen: obese, soft, NT, ND, ostomy intact and functioning draining stool, viable. Wound vac in placed with good seal.          12-30    139  |  98  |  14  ----------------------------<  121<H>  4.2   |  31  |  1.12    Ca    8.8      30 Dec 2017 09:01  Phos  4.6     12-30  Mg     2.0     12-30    No labs ordered today since his most recent labs were WNL.                 Assessment: This is a 67 year old male admitted for pulmonary embolism, currently on anticoagulation, recent prolonged hospital stay after an open LAR. He is doing well, awaiting rehab placement.     -Diet: LRD   - Will contact physical therapy to change the VAC (changed every Monday/Wednesday/Friday)   -Continue Xarelto (Rivaroxaban) 20mg daily for PE  -continue home meds  -monitor ostomy output  -OOB as tolerated w/ assistance  -Dispo: awaiting rehab placement

## 2018-01-01 NOTE — PROGRESS NOTE ADULT - SUBJECTIVE AND OBJECTIVE BOX
patient on anticoagulation for PE(xarelto)   documented in segment only   resting comfortable   lungs clear  no new issues

## 2018-01-02 LAB
ANION GAP SERPL CALC-SCNC: 11 MMOL/L — SIGNIFICANT CHANGE UP (ref 5–17)
BUN SERPL-MCNC: 15 MG/DL — SIGNIFICANT CHANGE UP (ref 7–23)
CALCIUM SERPL-MCNC: 9 MG/DL — SIGNIFICANT CHANGE UP (ref 8.4–10.5)
CHLORIDE SERPL-SCNC: 96 MMOL/L — SIGNIFICANT CHANGE UP (ref 96–108)
CO2 SERPL-SCNC: 32 MMOL/L — HIGH (ref 22–31)
CREAT SERPL-MCNC: 1.2 MG/DL — SIGNIFICANT CHANGE UP (ref 0.5–1.3)
GLUCOSE BLDC GLUCOMTR-MCNC: 130 MG/DL — HIGH (ref 70–99)
GLUCOSE BLDC GLUCOMTR-MCNC: 140 MG/DL — HIGH (ref 70–99)
GLUCOSE BLDC GLUCOMTR-MCNC: 145 MG/DL — HIGH (ref 70–99)
GLUCOSE BLDC GLUCOMTR-MCNC: 195 MG/DL — HIGH (ref 70–99)
GLUCOSE SERPL-MCNC: 135 MG/DL — HIGH (ref 70–99)
HCT VFR BLD CALC: 28.4 % — LOW (ref 39–50)
HGB BLD-MCNC: 8.4 G/DL — LOW (ref 13–17)
MAGNESIUM SERPL-MCNC: 2.1 MG/DL — SIGNIFICANT CHANGE UP (ref 1.6–2.6)
MCHC RBC-ENTMCNC: 27.9 PG — SIGNIFICANT CHANGE UP (ref 27–34)
MCHC RBC-ENTMCNC: 29.6 GM/DL — LOW (ref 32–36)
MCV RBC AUTO: 94.4 FL — SIGNIFICANT CHANGE UP (ref 80–100)
PHOSPHATE SERPL-MCNC: 3 MG/DL — SIGNIFICANT CHANGE UP (ref 2.5–4.5)
PLATELET # BLD AUTO: 465 K/UL — HIGH (ref 150–400)
POTASSIUM SERPL-MCNC: 3.8 MMOL/L — SIGNIFICANT CHANGE UP (ref 3.5–5.3)
POTASSIUM SERPL-SCNC: 3.8 MMOL/L — SIGNIFICANT CHANGE UP (ref 3.5–5.3)
RBC # BLD: 3.01 M/UL — LOW (ref 4.2–5.8)
RBC # FLD: 14.8 % — HIGH (ref 10.3–14.5)
SODIUM SERPL-SCNC: 139 MMOL/L — SIGNIFICANT CHANGE UP (ref 135–145)
WBC # BLD: 9.06 K/UL — SIGNIFICANT CHANGE UP (ref 3.8–10.5)
WBC # FLD AUTO: 9.06 K/UL — SIGNIFICANT CHANGE UP (ref 3.8–10.5)

## 2018-01-02 RX ORDER — BUDESONIDE, MICRONIZED 100 %
0 POWDER (GRAM) MISCELLANEOUS
Qty: 0 | Refills: 0 | COMMUNITY
Start: 2018-01-02

## 2018-01-02 RX ORDER — RIVAROXABAN 15 MG-20MG
1 KIT ORAL
Qty: 30 | Refills: 0 | OUTPATIENT
Start: 2018-01-02 | End: 2018-01-31

## 2018-01-02 RX ORDER — ACETAMINOPHEN 500 MG
2 TABLET ORAL
Qty: 0 | Refills: 0 | COMMUNITY
Start: 2018-01-02

## 2018-01-02 RX ORDER — FUROSEMIDE 40 MG
40 TABLET ORAL EVERY OTHER DAY
Qty: 0 | Refills: 0 | Status: DISCONTINUED | OUTPATIENT
Start: 2018-01-03 | End: 2018-01-03

## 2018-01-02 RX ORDER — FUROSEMIDE 40 MG
20 TABLET ORAL EVERY OTHER DAY
Qty: 0 | Refills: 0 | Status: DISCONTINUED | OUTPATIENT
Start: 2018-01-02 | End: 2018-01-03

## 2018-01-02 RX ORDER — ACETAMINOPHEN 500 MG
650 TABLET ORAL EVERY 4 HOURS
Qty: 0 | Refills: 0 | Status: DISCONTINUED | OUTPATIENT
Start: 2018-01-02 | End: 2018-01-03

## 2018-01-02 RX ADMIN — Medication 81 MILLIGRAM(S): at 17:28

## 2018-01-02 RX ADMIN — LISINOPRIL 40 MILLIGRAM(S): 2.5 TABLET ORAL at 04:50

## 2018-01-02 RX ADMIN — Medication 650 MILLIGRAM(S): at 04:49

## 2018-01-02 RX ADMIN — ATORVASTATIN CALCIUM 40 MILLIGRAM(S): 80 TABLET, FILM COATED ORAL at 21:03

## 2018-01-02 RX ADMIN — Medication 650 MILLIGRAM(S): at 21:33

## 2018-01-02 RX ADMIN — Medication 650 MILLIGRAM(S): at 21:03

## 2018-01-02 RX ADMIN — INSULIN GLARGINE 12 UNIT(S): 100 INJECTION, SOLUTION SUBCUTANEOUS at 22:41

## 2018-01-02 RX ADMIN — Medication 1: at 17:28

## 2018-01-02 RX ADMIN — CARVEDILOL PHOSPHATE 25 MILLIGRAM(S): 80 CAPSULE, EXTENDED RELEASE ORAL at 17:27

## 2018-01-02 RX ADMIN — CARVEDILOL PHOSPHATE 25 MILLIGRAM(S): 80 CAPSULE, EXTENDED RELEASE ORAL at 04:50

## 2018-01-02 RX ADMIN — Medication 20 MILLIGRAM(S): at 17:27

## 2018-01-02 RX ADMIN — RIVAROXABAN 20 MILLIGRAM(S): KIT at 17:27

## 2018-01-02 RX ADMIN — Medication 20 MILLIGRAM(S): at 04:49

## 2018-01-02 RX ADMIN — AMLODIPINE BESYLATE 10 MILLIGRAM(S): 2.5 TABLET ORAL at 04:49

## 2018-01-02 NOTE — PROGRESS NOTE ADULT - ATTENDING COMMENTS
hypoxemia multifactorial  needs weight loss and PT  increase lasix to 20 alternating with 40  pt did have BILL when more aggressively diuresed

## 2018-01-02 NOTE — PROGRESS NOTE ADULT - PROBLEM SELECTOR PLAN 1
Multifactorial-shunt through bibasilar atelectasis, GLENYS, COPD, with PE  -TTE with RV enlargement but normal systolic function   -Incentive spirometer/OOB  -c/w Bipap qHS  - increase to 20 alternating with 40 daily lasix

## 2018-01-02 NOTE — PROGRESS NOTE ADULT - SUBJECTIVE AND OBJECTIVE BOX
Follow-up Pulm Progress Note    Lying down without O2 on. Pt's sat low, reapplied O2 sat.     Medications:  MEDICATIONS  (STANDING):  ALBUTerol/ipratropium for Nebulization 3 milliLiter(s) Nebulizer every 6 hours  amLODIPine   Tablet 10 milliGRAM(s) Oral daily  aspirin enteric coated 81 milliGRAM(s) Oral daily  atorvastatin 40 milliGRAM(s) Oral at bedtime  buDESOnide   0.5 milliGRAM(s) Respule 0.5 milliGRAM(s) Inhalation every 12 hours  carvedilol 25 milliGRAM(s) Oral every 12 hours  dextrose 5%. 1000 milliLiter(s) (50 mL/Hr) IV Continuous <Continuous>  dextrose 50% Injectable 12.5 Gram(s) IV Push once  dextrose 50% Injectable 25 Gram(s) IV Push once  dextrose 50% Injectable 25 Gram(s) IV Push once  furosemide    Tablet 20 milliGRAM(s) Oral daily  influenza   Vaccine 0.5 milliLiter(s) IntraMuscular once  insulin glargine Injectable (LANTUS) 12 Unit(s) SubCutaneous at bedtime  insulin lispro (HumaLOG) corrective regimen sliding scale   SubCutaneous three times a day before meals  insulin lispro (HumaLOG) corrective regimen sliding scale   SubCutaneous at bedtime  lisinopril 40 milliGRAM(s) Oral daily  rivaroxaban 20 milliGRAM(s) Oral every 24 hours  simethicone 80 milliGRAM(s) Chew once    MEDICATIONS  (PRN):  acetaminophen   Tablet. 650 milliGRAM(s) Oral every 4 hours PRN Mild Pain (1 - 3)  benzocaine 15 mG/menthol 3.6 mG Lozenge 1 Lozenge Oral three times a day PRN Sore Throat  dextrose Gel 1 Dose(s) Oral once PRN Blood Glucose LESS THAN 70 milliGRAM(s)/deciliter  glucagon  Injectable 1 milliGRAM(s) IntraMuscular once PRN Glucose LESS THAN 70 milligrams/deciliter    Vital Signs Last 24 Hrs  T(C): 36.3 (02 Jan 2018 09:42), Max: 37.3 (01 Jan 2018 21:30)  T(F): 97.4 (02 Jan 2018 09:42), Max: 99.1 (01 Jan 2018 21:30)  HR: 75 (02 Jan 2018 10:04) (71 - 85)  BP: 117/68 (02 Jan 2018 09:42) (117/68 - 135/76)  BP(mean): --  RR: 18 (02 Jan 2018 09:42) (18 - 18)  SpO2: 94% (02 Jan 2018 10:04) (92% - 96%)    01-01 @ 07:01  -  01-02 @ 07:00  --------------------------------------------------------  IN: 1000 mL / OUT: 1875 mL / NET: -875 mL    LABS:                        8.4    9.06  )-----------( 465      ( 02 Jan 2018 07:02 )             28.4     01-02    139  |  96  |  15  ----------------------------<  135<H>  3.8   |  32<H>  |  1.20    Ca    9.0      02 Jan 2018 07:06  Phos  3.0     01-02  Mg     2.1     01-02    CAPILLARY BLOOD GLUCOSE      POCT Blood Glucose.: 140 mg/dL (02 Jan 2018 08:08)    CULTURES: (if applicable)    Physical Examination:  PULM: Decreased at bases  CVS: S1, S2 heard    RADIOLOGY REVIEWED  CXR:     CT chest:    TTE:

## 2018-01-02 NOTE — PROGRESS NOTE ADULT - SUBJECTIVE AND OBJECTIVE BOX
Red Team Surgery (Colorectal) Progress Note:    Subjective:  Patient seen and examined at the bedside.  No events overnight.  Patient doing well, awaiting rehab placement.    Vital Signs Last 24 Hrs  T(C): 36.4 (01-02-18 @ 04:11), Max: 37.3 (01-01-18 @ 21:30)  HR: 81 (01-02-18 @ 04:11) (74 - 85)  BP: 135/76 (01-02-18 @ 04:11) (117/71 - 135/76)  RR: 18 (01-02-18 @ 04:11) (18 - 18)  SpO2: 93% (01-02-18 @ 04:11) (92% - 96%)  Wt(kg): --    12-31 @ 07:01  -  01-01 @ 07:00  --------------------------------------------------------  IN:    Oral Fluid: 720 mL  Total IN: 720 mL    OUT:    Colostomy: 400 mL    Voided: 650 mL  Total OUT: 1050 mL    Total NET: -330 mL      01-01 @ 07:01  -  01-02 @ 06:44  --------------------------------------------------------  IN:    Oral Fluid: 1000 mL  Total IN: 1000 mL    OUT:    Colostomy: 325 mL    Voided: 1550 mL  Total OUT: 1875 mL    Total NET: -875 mL          Physical Exam:  General: NAD  Respiratory: Nonlabored breathing  Abdomen: obese, soft, NT, ND, ostomy intact and functioning draining stool, viable. Wound vac in placed with good seal.          Assessment: This is a 67 year old male admitted for pulmonary embolism, currently on anticoagulation, recent prolonged hospital stay after an open LAR. He is doing well, awaiting rehab placement.   -Diet: LRD   - Will contact physical therapy to change the VAC (changed every Monday/Wednesday/Friday)   -Continue Xarelto (Rivaroxaban) 20mg daily for PE  -continue home meds  -monitor ostomy output  -OOB as tolerated w/ assistance  -Dispo: awaiting rehab placement    Nusrat Clark, PGY1  [phone]

## 2018-01-03 VITALS
SYSTOLIC BLOOD PRESSURE: 127 MMHG | HEART RATE: 81 BPM | RESPIRATION RATE: 16 BRPM | DIASTOLIC BLOOD PRESSURE: 72 MMHG | TEMPERATURE: 99 F | OXYGEN SATURATION: 90 %

## 2018-01-03 LAB
GLUCOSE BLDC GLUCOMTR-MCNC: 128 MG/DL — HIGH (ref 70–99)
GLUCOSE BLDC GLUCOMTR-MCNC: 175 MG/DL — HIGH (ref 70–99)
GLUCOSE BLDC GLUCOMTR-MCNC: 193 MG/DL — HIGH (ref 70–99)

## 2018-01-03 PROCEDURE — 80053 COMPREHEN METABOLIC PANEL: CPT

## 2018-01-03 PROCEDURE — 82553 CREATINE MB FRACTION: CPT

## 2018-01-03 PROCEDURE — 93970 EXTREMITY STUDY: CPT

## 2018-01-03 PROCEDURE — 84145 PROCALCITONIN (PCT): CPT

## 2018-01-03 PROCEDURE — 93308 TTE F-UP OR LMTD: CPT

## 2018-01-03 PROCEDURE — 82330 ASSAY OF CALCIUM: CPT

## 2018-01-03 PROCEDURE — 86900 BLOOD TYPING SEROLOGIC ABO: CPT

## 2018-01-03 PROCEDURE — 97116 GAIT TRAINING THERAPY: CPT

## 2018-01-03 PROCEDURE — 86901 BLOOD TYPING SEROLOGIC RH(D): CPT

## 2018-01-03 PROCEDURE — 85730 THROMBOPLASTIN TIME PARTIAL: CPT

## 2018-01-03 PROCEDURE — 93321 DOPPLER ECHO F-UP/LMTD STD: CPT

## 2018-01-03 PROCEDURE — 83735 ASSAY OF MAGNESIUM: CPT

## 2018-01-03 PROCEDURE — 96375 TX/PRO/DX INJ NEW DRUG ADDON: CPT | Mod: XU

## 2018-01-03 PROCEDURE — 82962 GLUCOSE BLOOD TEST: CPT

## 2018-01-03 PROCEDURE — 82947 ASSAY GLUCOSE BLOOD QUANT: CPT

## 2018-01-03 PROCEDURE — 85014 HEMATOCRIT: CPT

## 2018-01-03 PROCEDURE — 82803 BLOOD GASES ANY COMBINATION: CPT

## 2018-01-03 PROCEDURE — 82435 ASSAY OF BLOOD CHLORIDE: CPT

## 2018-01-03 PROCEDURE — 94640 AIRWAY INHALATION TREATMENT: CPT

## 2018-01-03 PROCEDURE — 82550 ASSAY OF CK (CPK): CPT

## 2018-01-03 PROCEDURE — 96374 THER/PROPH/DIAG INJ IV PUSH: CPT | Mod: XU

## 2018-01-03 PROCEDURE — 85027 COMPLETE CBC AUTOMATED: CPT

## 2018-01-03 PROCEDURE — 81001 URINALYSIS AUTO W/SCOPE: CPT

## 2018-01-03 PROCEDURE — 94660 CPAP INITIATION&MGMT: CPT

## 2018-01-03 PROCEDURE — 97530 THERAPEUTIC ACTIVITIES: CPT

## 2018-01-03 PROCEDURE — 86850 RBC ANTIBODY SCREEN: CPT

## 2018-01-03 PROCEDURE — 99285 EMERGENCY DEPT VISIT HI MDM: CPT | Mod: 25

## 2018-01-03 PROCEDURE — 97162 PT EVAL MOD COMPLEX 30 MIN: CPT

## 2018-01-03 PROCEDURE — 84132 ASSAY OF SERUM POTASSIUM: CPT

## 2018-01-03 PROCEDURE — 71045 X-RAY EXAM CHEST 1 VIEW: CPT

## 2018-01-03 PROCEDURE — 97605 NEG PRS WND THER DME<=50SQCM: CPT

## 2018-01-03 PROCEDURE — 93005 ELECTROCARDIOGRAM TRACING: CPT | Mod: XU

## 2018-01-03 PROCEDURE — 71275 CT ANGIOGRAPHY CHEST: CPT

## 2018-01-03 PROCEDURE — 84484 ASSAY OF TROPONIN QUANT: CPT

## 2018-01-03 PROCEDURE — 83605 ASSAY OF LACTIC ACID: CPT

## 2018-01-03 PROCEDURE — 97606 NEG PRS WND THER DME>50 SQCM: CPT

## 2018-01-03 PROCEDURE — 84295 ASSAY OF SERUM SODIUM: CPT

## 2018-01-03 PROCEDURE — 85610 PROTHROMBIN TIME: CPT

## 2018-01-03 PROCEDURE — 83880 ASSAY OF NATRIURETIC PEPTIDE: CPT

## 2018-01-03 PROCEDURE — 80048 BASIC METABOLIC PNL TOTAL CA: CPT

## 2018-01-03 PROCEDURE — 84100 ASSAY OF PHOSPHORUS: CPT

## 2018-01-03 PROCEDURE — 36000 PLACE NEEDLE IN VEIN: CPT | Mod: RT,XU

## 2018-01-03 RX ORDER — FUROSEMIDE 40 MG
1 TABLET ORAL
Qty: 0 | Refills: 0 | COMMUNITY
Start: 2018-01-03

## 2018-01-03 RX ADMIN — RIVAROXABAN 20 MILLIGRAM(S): KIT at 17:27

## 2018-01-03 RX ADMIN — LISINOPRIL 40 MILLIGRAM(S): 2.5 TABLET ORAL at 06:01

## 2018-01-03 RX ADMIN — Medication 40 MILLIGRAM(S): at 11:57

## 2018-01-03 RX ADMIN — Medication 81 MILLIGRAM(S): at 11:58

## 2018-01-03 RX ADMIN — CARVEDILOL PHOSPHATE 25 MILLIGRAM(S): 80 CAPSULE, EXTENDED RELEASE ORAL at 06:01

## 2018-01-03 RX ADMIN — AMLODIPINE BESYLATE 10 MILLIGRAM(S): 2.5 TABLET ORAL at 06:01

## 2018-01-03 RX ADMIN — CARVEDILOL PHOSPHATE 25 MILLIGRAM(S): 80 CAPSULE, EXTENDED RELEASE ORAL at 17:26

## 2018-01-03 RX ADMIN — Medication 1: at 12:37

## 2018-01-03 NOTE — PROGRESS NOTE ADULT - PROBLEM SELECTOR PLAN 3
Likely COPD  -Duoneb q6  -Pulmicort BID  -Outpt PFT's.

## 2018-01-03 NOTE — PROGRESS NOTE ADULT - PROBLEM SELECTOR PROBLEM 4
GLENYS (obstructive sleep apnea)

## 2018-01-03 NOTE — PROGRESS NOTE ADULT - PROVIDER SPECIALTY LIST ADULT
Colorectal Surgery
Heme/Onc
Pulmonology
Surgery
Urology
Pulmonology

## 2018-01-03 NOTE — PROGRESS NOTE ADULT - PROBLEM SELECTOR PLAN 4
-Bipap 14/10 qHS and PRN (home settings as based on outpt PSG)

## 2018-01-03 NOTE — PROGRESS NOTE ADULT - PROBLEM SELECTOR PROBLEM 2
Pulmonary embolism
Rectal cancer

## 2018-01-03 NOTE — PROGRESS NOTE ADULT - PROBLEM SELECTOR PLAN 2
- new ALVA segmental PE on CTPA 12/23  -No evidence of propagation of RLE DVT  -Reviewed with radiology, CTPA from 12/12 was non-diagnostic for PE, CT with IV contrast 12/18 was better study but not done with PE protocol. However, ALVA pulm artery appears more dilated now according to radiology.   -Patient was started on full dose Lovenox 12/18 for R DVT. There is concern for Lovenox failure  -Agree with Xarelto per heme-onc recs   -No evidence of RV systolic dysfunction on TTE - has ALVA segmental PE on CTPA 12/23  -No evidence of propagation of RLE DVT  -Reviewed with radiology, CTPA from 12/12 was non-diagnostic for PE, CT with IV contrast 12/18 was better study but not done with PE protocol. However, ALVA pulm artery appears more dilated now according to radiology.   -Patient was started on full dose Lovenox 12/18 for R DVT. There is concern for Lovenox failure  -Agree with Xarelto per heme-onc recs   -No evidence of RV systolic dysfunction on TTE

## 2018-01-03 NOTE — PROGRESS NOTE ADULT - PROBLEM SELECTOR PLAN 1
Multifactorial-shunt through bibasilar atelectasis, GLENYS, COPD, with PE  -TTE with RV enlargement but normal systolic function   -Incentive spirometer/OOB  -c/w Bipap qHS  - increase to 20 alternating with 40 daily lasix  -Patient unmotivated to get OOB, use incentive spirometer. Reasoning and importance reinforced  -He will require supplemental oxygen for rehab 4L NC con't Multifactorial-shunt through bibasilar atelectasis, GLENYS, COPD, with PE  -TTE with RV enlargement but normal systolic function   -Incentive spirometer/OOB  -c/w Bipap qHS  - cont 20 alternating with 40 daily lasix  -Patient unmotivated to get OOB, use incentive spirometer. Reasoning and importance reinforced  -He will require supplemental oxygen for rehab 4L NC con't

## 2018-01-03 NOTE — PROGRESS NOTE ADULT - ATTENDING COMMENTS
patient reports feeling run down today but denies pain, N/V    AAOx3  abd soft, NT, wound with pouch in place draining dark bilious drainage ~ 75cc in 12hrs. Colostomy pink    CT with PO contrast shows extravasation into midline abd wound with SB anastomosis just below the wound.     A/P s/p tatum's and SB resection for perforated diverticulitis. now with ECF - non toxic  NPO, PICC line for TPN, IV abx  monitor fistula output  rescan in several days to eval if intraabd collections become drainable by IR Pt without complaints    AAOx3  abd obese, soft, NT/ND, VAC in place, colostomy black superficially, viable underneath    A/P s/p APR with DVT/PE  anticoag per heme  awaiting rehab placement

## 2018-01-03 NOTE — PROGRESS NOTE ADULT - SUBJECTIVE AND OBJECTIVE BOX
Red Team Surgery (Colorectal) Progress Note:    Subjective:  Patient seen and examined at the bedside.  No events overnight.  Patient doing well, awaiting rehab placement.    Vital Signs Last 24 Hrs  T(C): 36.4 (01-03-18 @ 06:03), Max: 36.8 (01-02-18 @ 14:49)  HR: 74 (01-03-18 @ 06:04) (64 - 90)  BP: 116/66 (01-03-18 @ 06:03) (101/59 - 140/73)  RR: 18 (01-03-18 @ 06:03) (18 - 18)  SpO2: 95% (01-03-18 @ 06:04) (93% - 98%)  Wt(kg): --    01-02 @ 07:01  -  01-03 @ 07:00  --------------------------------------------------------  IN:  Total IN: 0 mL    OUT:    Colostomy: 250 mL    Voided: 500 mL  Total OUT: 750 mL    Total NET: -750 mL        Physical Exam:  General: NAD  Respiratory: Nonlabored breathing  Abdomen: obese, soft, NT, ND, ostomy intact and functioning draining stool, viable. Wound vac in placed with good seal.                              8.4    9.06  )-----------( 465      ( 02 Jan 2018 07:02 )             28.4     01-02    139  |  96  |  15  ----------------------------<  135<H>  3.8   |  32<H>  |  1.20    Ca    9.0      02 Jan 2018 07:06  Phos  3.0     01-02  Mg     2.1     01-02          Assessment: This is a 67 year old male admitted for pulmonary embolism, currently on anticoagulation, recent prolonged hospital stay after an open LAR. He is doing well, awaiting rehab placement.   -Diet: LRD   - Will contact physical therapy to change the VAC (changed every Monday/Wednesday/Friday)   -Continue Xarelto (Rivaroxaban) 20mg daily for PE  -continue home meds  -monitor ostomy output  -OOB as tolerated w/ assistance  -Dispo: awaiting rehab placement    Nusrat Clark, PGY1  [phone]

## 2018-01-03 NOTE — PROGRESS NOTE ADULT - ASSESSMENT
66 y/o M with PMH of HTN, DMT2, PVD, HLD, MI/CAD with stent x 1 2009, GLENYS, 50 pack yr smoker (quit Jan 2017), morbid obesity, rectal cancer (diagnosed 7/2017 s/p chemo) admitted 12/6 for LAR. Hospital course c/b post-operative hypotension requiring SICU admission. Downgraded to floors on 12/7. On 12/12 he was noted to be hypoxic to 70s. Placed on bipap and readmitted to SICU, found to have NSTEMI. Downgraded to floors on 12/16. 12/18 noted to have low grade fever, found to have R peroneal DVT-started on full dose lovenox. Discharged 12/22. Readmitted 12/23 over concerns for non-viable ostomy. Noted to be hypoxic (as he was upon discharge). CTPA repeat revealed ALVA segmental PE (?new from 12/18)

## 2018-01-03 NOTE — PROGRESS NOTE ADULT - SUBJECTIVE AND OBJECTIVE BOX
Follow-up Pulm Progress Note    No new respiratory events overnight.  Denies SOB/CP.   88% on 2L NC  94% on 4L NC    Medications:  MEDICATIONS  (STANDING):  ALBUTerol/ipratropium for Nebulization 3 milliLiter(s) Nebulizer every 6 hours  amLODIPine   Tablet 10 milliGRAM(s) Oral daily  aspirin enteric coated 81 milliGRAM(s) Oral daily  atorvastatin 40 milliGRAM(s) Oral at bedtime  buDESOnide   0.5 milliGRAM(s) Respule 0.5 milliGRAM(s) Inhalation every 12 hours  carvedilol 25 milliGRAM(s) Oral every 12 hours  dextrose 5%. 1000 milliLiter(s) (50 mL/Hr) IV Continuous <Continuous>  dextrose 50% Injectable 12.5 Gram(s) IV Push once  dextrose 50% Injectable 25 Gram(s) IV Push once  dextrose 50% Injectable 25 Gram(s) IV Push once  furosemide    Tablet 20 milliGRAM(s) Oral every other day  furosemide    Tablet 40 milliGRAM(s) Oral every other day  influenza   Vaccine 0.5 milliLiter(s) IntraMuscular once  insulin glargine Injectable (LANTUS) 12 Unit(s) SubCutaneous at bedtime  insulin lispro (HumaLOG) corrective regimen sliding scale   SubCutaneous three times a day before meals  insulin lispro (HumaLOG) corrective regimen sliding scale   SubCutaneous at bedtime  lisinopril 40 milliGRAM(s) Oral daily  rivaroxaban 20 milliGRAM(s) Oral every 24 hours  simethicone 80 milliGRAM(s) Chew once    MEDICATIONS  (PRN):  acetaminophen   Tablet. 650 milliGRAM(s) Oral every 4 hours PRN Mild Pain (1 - 3)  benzocaine 15 mG/menthol 3.6 mG Lozenge 1 Lozenge Oral three times a day PRN Sore Throat  dextrose Gel 1 Dose(s) Oral once PRN Blood Glucose LESS THAN 70 milliGRAM(s)/deciliter  glucagon  Injectable 1 milliGRAM(s) IntraMuscular once PRN Glucose LESS THAN 70 milligrams/deciliter          Vital Signs Last 24 Hrs  T(C): 36.9 (03 Jan 2018 14:23), Max: 36.9 (03 Jan 2018 14:23)  T(F): 98.4 (03 Jan 2018 14:23), Max: 98.4 (03 Jan 2018 14:23)  HR: 80 (03 Jan 2018 14:23) (64 - 90)  BP: 126/76 (03 Jan 2018 14:23) (101/59 - 140/73)  BP(mean): --  RR: 18 (03 Jan 2018 14:23) (18 - 18)  SpO2: 93% (03 Jan 2018 14:23) (93% - 98%) on 4L NC          01-02 @ 07:01  -  01-03 @ 07:00  --------------------------------------------------------  IN: 0 mL / OUT: 750 mL / NET: -750 mL          LABS:                        8.4    9.06  )-----------( 465      ( 02 Jan 2018 07:02 )             28.4     01-02    139  |  96  |  15  ----------------------------<  135<H>  3.8   |  32<H>  |  1.20    Ca    9.0      02 Jan 2018 07:06  Phos  3.0     01-02  Mg     2.1     01-02            CAPILLARY BLOOD GLUCOSE      POCT Blood Glucose.: 193 mg/dL (03 Jan 2018 12:31)                        CULTURES: (if applicable)          Physical Examination:  PULM: Decreased BS throughout   Trace exp wheeze ALVA  CVS: S1, S2 RRR    RADIOLOGY REVIEWED  CTA chest: < from: CT Angio Chest w/ IV Cont (12.23.17 @ 15:39) >  CHEST:     PULMONARY ARTERY: Filling defect is seen in the left upper lobe lobar   pulmonary artery branch. No additional filling defects noted.  LUNGS AND LARGE AIRWAYS: Patent central airways.  No pulmonary nodules.  PLEURA: Small bilateral pleural effusion.  VESSELS: Normal left-sided aortic arch and descending aorta. No aneurysm.   Atherosclerotic changes.  HEART: Heart size is normal. Trace pericardial effusion. Coronary artery   calcification.  MEDIASTINUM AND JOSE: No lymphadenopathy.  CHEST WALL AND LOWER NECK: Within normal limits.  VISUALIZED UPPER ABDOMEN: Within normal limits.  BONES: Multilevel degenerative changes.    IMPRESSION: Acute pulmonary embolism as described above.    < end of copied text > Follow-up Pulm Progress Note    No new respiratory events overnight.  Denies SOB/CP.   88% on 2L NC  94% on 4L NC    Medications:  MEDICATIONS  (STANDING):  ALBUTerol/ipratropium for Nebulization 3 milliLiter(s) Nebulizer every 6 hours  amLODIPine   Tablet 10 milliGRAM(s) Oral daily  aspirin enteric coated 81 milliGRAM(s) Oral daily  atorvastatin 40 milliGRAM(s) Oral at bedtime  buDESOnide   0.5 milliGRAM(s) Respule 0.5 milliGRAM(s) Inhalation every 12 hours  carvedilol 25 milliGRAM(s) Oral every 12 hours  dextrose 5%. 1000 milliLiter(s) (50 mL/Hr) IV Continuous <Continuous>  dextrose 50% Injectable 12.5 Gram(s) IV Push once  dextrose 50% Injectable 25 Gram(s) IV Push once  dextrose 50% Injectable 25 Gram(s) IV Push once  furosemide    Tablet 20 milliGRAM(s) Oral every other day  furosemide    Tablet 40 milliGRAM(s) Oral every other day  influenza   Vaccine 0.5 milliLiter(s) IntraMuscular once  insulin glargine Injectable (LANTUS) 12 Unit(s) SubCutaneous at bedtime  insulin lispro (HumaLOG) corrective regimen sliding scale   SubCutaneous three times a day before meals  insulin lispro (HumaLOG) corrective regimen sliding scale   SubCutaneous at bedtime  lisinopril 40 milliGRAM(s) Oral daily  rivaroxaban 20 milliGRAM(s) Oral every 24 hours  simethicone 80 milliGRAM(s) Chew once    MEDICATIONS  (PRN):  acetaminophen   Tablet. 650 milliGRAM(s) Oral every 4 hours PRN Mild Pain (1 - 3)  benzocaine 15 mG/menthol 3.6 mG Lozenge 1 Lozenge Oral three times a day PRN Sore Throat  dextrose Gel 1 Dose(s) Oral once PRN Blood Glucose LESS THAN 70 milliGRAM(s)/deciliter  glucagon  Injectable 1 milliGRAM(s) IntraMuscular once PRN Glucose LESS THAN 70 milligrams/deciliter    Vital Signs Last 24 Hrs  T(C): 36.9 (03 Jan 2018 14:23), Max: 36.9 (03 Jan 2018 14:23)  T(F): 98.4 (03 Jan 2018 14:23), Max: 98.4 (03 Jan 2018 14:23)  HR: 80 (03 Jan 2018 14:23) (64 - 90)  BP: 126/76 (03 Jan 2018 14:23) (101/59 - 140/73)  BP(mean): --  RR: 18 (03 Jan 2018 14:23) (18 - 18)  SpO2: 93% (03 Jan 2018 14:23) (93% - 98%) on 4L NC    01-02 @ 07:01  -  01-03 @ 07:00  --------------------------------------------------------  IN: 0 mL / OUT: 750 mL / NET: -750 mL    LABS:                        8.4    9.06  )-----------( 465      ( 02 Jan 2018 07:02 )             28.4     01-02    139  |  96  |  15  ----------------------------<  135<H>  3.8   |  32<H>  |  1.20    Ca    9.0      02 Jan 2018 07:06  Phos  3.0     01-02  Mg     2.1     01-02            CAPILLARY BLOOD GLUCOSE      POCT Blood Glucose.: 193 mg/dL (03 Jan 2018 12:31)    CULTURES: (if applicable)    Physical Examination:  PULM: Decreased BS throughout   Trace exp wheeze ALVA  CVS: S1, S2 RRR    RADIOLOGY REVIEWED  CTA chest: < from: CT Angio Chest w/ IV Cont (12.23.17 @ 15:39) >  CHEST:     PULMONARY ARTERY: Filling defect is seen in the left upper lobe lobar   pulmonary artery branch. No additional filling defects noted.  LUNGS AND LARGE AIRWAYS: Patent central airways.  No pulmonary nodules.  PLEURA: Small bilateral pleural effusion.  VESSELS: Normal left-sided aortic arch and descending aorta. No aneurysm.   Atherosclerotic changes.  HEART: Heart size is normal. Trace pericardial effusion. Coronary artery   calcification.  MEDIASTINUM AND JOSE: No lymphadenopathy.  CHEST WALL AND LOWER NECK: Within normal limits.  VISUALIZED UPPER ABDOMEN: Within normal limits.  BONES: Multilevel degenerative changes.    IMPRESSION: Acute pulmonary embolism as described above.    < end of copied text >

## 2018-01-17 ENCOUNTER — APPOINTMENT (OUTPATIENT)
Dept: COLORECTAL SURGERY | Facility: CLINIC | Age: 68
End: 2018-01-17
Payer: MEDICARE

## 2018-01-17 PROCEDURE — 99024 POSTOP FOLLOW-UP VISIT: CPT

## 2018-01-26 ENCOUNTER — APPOINTMENT (OUTPATIENT)
Dept: UROLOGY | Facility: CLINIC | Age: 68
End: 2018-01-26

## 2018-01-31 ENCOUNTER — APPOINTMENT (OUTPATIENT)
Dept: COLORECTAL SURGERY | Facility: CLINIC | Age: 68
End: 2018-01-31
Payer: MEDICARE

## 2018-01-31 PROCEDURE — 99213 OFFICE O/P EST LOW 20 MIN: CPT | Mod: 24

## 2018-02-02 ENCOUNTER — APPOINTMENT (OUTPATIENT)
Dept: UROLOGY | Facility: CLINIC | Age: 68
End: 2018-02-02

## 2018-02-23 ENCOUNTER — APPOINTMENT (OUTPATIENT)
Dept: UROLOGY | Facility: CLINIC | Age: 68
End: 2018-02-23
Payer: MEDICARE

## 2018-02-23 VITALS
HEIGHT: 71 IN | DIASTOLIC BLOOD PRESSURE: 76 MMHG | BODY MASS INDEX: 39.2 KG/M2 | SYSTOLIC BLOOD PRESSURE: 134 MMHG | WEIGHT: 280 LBS | TEMPERATURE: 98.2 F | RESPIRATION RATE: 17 BRPM | HEART RATE: 87 BPM

## 2018-02-23 PROCEDURE — 99214 OFFICE O/P EST MOD 30 MIN: CPT

## 2018-02-23 RX ORDER — TAMSULOSIN HYDROCHLORIDE 0.4 MG/1
0.4 CAPSULE ORAL
Qty: 30 | Refills: 5 | Status: ACTIVE | COMMUNITY
Start: 2018-02-23 | End: 1900-01-01

## 2018-02-23 RX ORDER — FINASTERIDE 5 MG/1
5 TABLET, FILM COATED ORAL DAILY
Qty: 30 | Refills: 6 | Status: ACTIVE | COMMUNITY
Start: 2018-02-23 | End: 1900-01-01

## 2018-02-27 ENCOUNTER — APPOINTMENT (OUTPATIENT)
Age: 68
End: 2018-02-27

## 2018-02-27 ENCOUNTER — OUTPATIENT (OUTPATIENT)
Dept: OUTPATIENT SERVICES | Facility: HOSPITAL | Age: 68
LOS: 1 days | Discharge: HOME | End: 2018-02-27

## 2018-02-27 DIAGNOSIS — Z90.89 ACQUIRED ABSENCE OF OTHER ORGANS: Chronic | ICD-10-CM

## 2018-02-27 DIAGNOSIS — R89.7 ABNORMAL HISTOLOGICAL FINDINGS IN SPECIMENS FROM OTHER ORGANS, SYSTEMS AND TISSUES: Chronic | ICD-10-CM

## 2018-02-27 DIAGNOSIS — Z90.49 ACQUIRED ABSENCE OF OTHER SPECIFIED PARTS OF DIGESTIVE TRACT: Chronic | ICD-10-CM

## 2018-02-27 DIAGNOSIS — C20 MALIGNANT NEOPLASM OF RECTUM: ICD-10-CM

## 2018-02-27 DIAGNOSIS — Z95.5 PRESENCE OF CORONARY ANGIOPLASTY IMPLANT AND GRAFT: Chronic | ICD-10-CM

## 2018-02-27 LAB
ANION GAP SERPL CALC-SCNC: 12 MMOL/L
BUN SERPL-MCNC: 15 MG/DL
CALCIUM SERPL-MCNC: 8.9 MG/DL
CHLORIDE SERPL-SCNC: 100 MMOL/L
CO2 SERPL-SCNC: 31 MMOL/L
CREAT SERPL-MCNC: 1.58 MG/DL
GLUCOSE SERPL-MCNC: 120 MG/DL
POTASSIUM SERPL-SCNC: 4 MMOL/L
SODIUM SERPL-SCNC: 143 MMOL/L

## 2018-03-05 ENCOUNTER — APPOINTMENT (OUTPATIENT)
Dept: HEMATOLOGY ONCOLOGY | Facility: CLINIC | Age: 68
End: 2018-03-05
Payer: MEDICARE

## 2018-03-05 ENCOUNTER — FORM ENCOUNTER (OUTPATIENT)
Age: 68
End: 2018-03-05

## 2018-03-05 VITALS
SYSTOLIC BLOOD PRESSURE: 130 MMHG | TEMPERATURE: 98.2 F | OXYGEN SATURATION: 96 % | WEIGHT: 245.82 LBS | HEART RATE: 88 BPM | RESPIRATION RATE: 18 BRPM | BODY MASS INDEX: 34.28 KG/M2 | DIASTOLIC BLOOD PRESSURE: 70 MMHG

## 2018-03-05 PROCEDURE — 99215 OFFICE O/P EST HI 40 MIN: CPT

## 2018-03-05 RX ORDER — CAPECITABINE 500 MG/1
500 TABLET ORAL
Qty: 240 | Refills: 0 | Status: DISCONTINUED | COMMUNITY
Start: 2017-08-10 | End: 2018-03-05

## 2018-03-06 ENCOUNTER — OUTPATIENT (OUTPATIENT)
Dept: OUTPATIENT SERVICES | Facility: HOSPITAL | Age: 68
LOS: 1 days | End: 2018-03-06
Payer: COMMERCIAL

## 2018-03-06 ENCOUNTER — APPOINTMENT (OUTPATIENT)
Dept: NUCLEAR MEDICINE | Facility: HOSPITAL | Age: 68
End: 2018-03-06
Payer: MEDICARE

## 2018-03-06 DIAGNOSIS — Z00.00 ENCOUNTER FOR GENERAL ADULT MEDICAL EXAMINATION WITHOUT ABNORMAL FINDINGS: ICD-10-CM

## 2018-03-06 DIAGNOSIS — N13.30 UNSPECIFIED HYDRONEPHROSIS: ICD-10-CM

## 2018-03-06 DIAGNOSIS — Z90.49 ACQUIRED ABSENCE OF OTHER SPECIFIED PARTS OF DIGESTIVE TRACT: Chronic | ICD-10-CM

## 2018-03-06 DIAGNOSIS — R89.7 ABNORMAL HISTOLOGICAL FINDINGS IN SPECIMENS FROM OTHER ORGANS, SYSTEMS AND TISSUES: Chronic | ICD-10-CM

## 2018-03-06 DIAGNOSIS — Z95.5 PRESENCE OF CORONARY ANGIOPLASTY IMPLANT AND GRAFT: Chronic | ICD-10-CM

## 2018-03-06 DIAGNOSIS — Z90.89 ACQUIRED ABSENCE OF OTHER ORGANS: Chronic | ICD-10-CM

## 2018-03-06 PROCEDURE — 51702 INSERT TEMP BLADDER CATH: CPT

## 2018-03-06 PROCEDURE — A9562: CPT

## 2018-03-06 PROCEDURE — 78708 K FLOW/FUNCT IMAGE W/DRUG: CPT

## 2018-03-06 PROCEDURE — 78708 K FLOW/FUNCT IMAGE W/DRUG: CPT | Mod: 26

## 2018-03-08 ENCOUNTER — APPOINTMENT (OUTPATIENT)
Dept: CT IMAGING | Facility: HOSPITAL | Age: 68
End: 2018-03-08

## 2018-03-10 NOTE — H&P PST ADULT - NSCAFFEINEWITH_GEN_ALL_CORE_SD
Grandparent  Still living? Unknown  Family history of alcohol abuse, Age at diagnosis: Age Unknown     Uncle  Still living? Unknown  Family history of alcohol abuse, Age at diagnosis: Age Unknown Denies

## 2018-06-07 ENCOUNTER — OUTPATIENT (OUTPATIENT)
Dept: OUTPATIENT SERVICES | Facility: HOSPITAL | Age: 68
LOS: 1 days | End: 2018-06-07

## 2018-06-07 DIAGNOSIS — Z90.49 ACQUIRED ABSENCE OF OTHER SPECIFIED PARTS OF DIGESTIVE TRACT: Chronic | ICD-10-CM

## 2018-06-07 DIAGNOSIS — Z90.89 ACQUIRED ABSENCE OF OTHER ORGANS: Chronic | ICD-10-CM

## 2018-06-07 DIAGNOSIS — R89.7 ABNORMAL HISTOLOGICAL FINDINGS IN SPECIMENS FROM OTHER ORGANS, SYSTEMS AND TISSUES: Chronic | ICD-10-CM

## 2018-06-07 DIAGNOSIS — Z95.5 PRESENCE OF CORONARY ANGIOPLASTY IMPLANT AND GRAFT: Chronic | ICD-10-CM

## 2018-06-08 ENCOUNTER — APPOINTMENT (OUTPATIENT)
Dept: UROLOGY | Facility: CLINIC | Age: 68
End: 2018-06-08
Payer: MEDICARE

## 2018-06-08 VITALS
DIASTOLIC BLOOD PRESSURE: 79 MMHG | SYSTOLIC BLOOD PRESSURE: 177 MMHG | TEMPERATURE: 98.1 F | HEART RATE: 79 BPM | RESPIRATION RATE: 17 BRPM

## 2018-06-08 DIAGNOSIS — N40.1 BENIGN PROSTATIC HYPERPLASIA WITH LOWER URINARY TRACT SYMPMS: ICD-10-CM

## 2018-06-08 DIAGNOSIS — N13.30 UNSPECIFIED HYDRONEPHROSIS: ICD-10-CM

## 2018-06-08 DIAGNOSIS — N13.8 BENIGN PROSTATIC HYPERPLASIA WITH LOWER URINARY TRACT SYMPMS: ICD-10-CM

## 2018-06-08 PROCEDURE — 99213 OFFICE O/P EST LOW 20 MIN: CPT

## 2018-06-12 ENCOUNTER — APPOINTMENT (OUTPATIENT)
Age: 68
End: 2018-06-12

## 2018-06-12 ENCOUNTER — OUTPATIENT (OUTPATIENT)
Dept: OUTPATIENT SERVICES | Facility: HOSPITAL | Age: 68
LOS: 1 days | Discharge: HOME | End: 2018-06-12

## 2018-06-12 DIAGNOSIS — Z90.89 ACQUIRED ABSENCE OF OTHER ORGANS: Chronic | ICD-10-CM

## 2018-06-12 DIAGNOSIS — C20 MALIGNANT NEOPLASM OF RECTUM: ICD-10-CM

## 2018-06-12 DIAGNOSIS — Z90.49 ACQUIRED ABSENCE OF OTHER SPECIFIED PARTS OF DIGESTIVE TRACT: Chronic | ICD-10-CM

## 2018-06-12 DIAGNOSIS — Z95.5 PRESENCE OF CORONARY ANGIOPLASTY IMPLANT AND GRAFT: Chronic | ICD-10-CM

## 2018-06-12 DIAGNOSIS — R89.7 ABNORMAL HISTOLOGICAL FINDINGS IN SPECIMENS FROM OTHER ORGANS, SYSTEMS AND TISSUES: Chronic | ICD-10-CM

## 2018-06-12 LAB
ANION GAP SERPL CALC-SCNC: 12 MMOL/L
BUN SERPL-MCNC: 15 MG/DL
CALCIUM SERPL-MCNC: 9 MG/DL
CHLORIDE SERPL-SCNC: 101 MMOL/L
CO2 SERPL-SCNC: 30 MMOL/L
CREAT SERPL-MCNC: 1.31 MG/DL
GLUCOSE SERPL-MCNC: 124 MG/DL
POTASSIUM SERPL-SCNC: 4.2 MMOL/L
SODIUM SERPL-SCNC: 143 MMOL/L

## 2018-06-13 ENCOUNTER — APPOINTMENT (OUTPATIENT)
Dept: COLORECTAL SURGERY | Facility: CLINIC | Age: 68
End: 2018-06-13
Payer: MEDICARE

## 2018-06-13 ENCOUNTER — APPOINTMENT (OUTPATIENT)
Dept: HEMATOLOGY ONCOLOGY | Facility: CLINIC | Age: 68
End: 2018-06-13
Payer: MEDICARE

## 2018-06-13 ENCOUNTER — RESULT REVIEW (OUTPATIENT)
Age: 68
End: 2018-06-13

## 2018-06-13 VITALS
RESPIRATION RATE: 16 BRPM | SYSTOLIC BLOOD PRESSURE: 157 MMHG | DIASTOLIC BLOOD PRESSURE: 94 MMHG | TEMPERATURE: 98.5 F | BODY MASS INDEX: 33.85 KG/M2 | WEIGHT: 242.71 LBS | HEART RATE: 77 BPM | OXYGEN SATURATION: 95 %

## 2018-06-13 LAB
BASOPHILS # BLD AUTO: 0 K/UL — SIGNIFICANT CHANGE UP (ref 0–0.2)
BASOPHILS NFR BLD AUTO: 0.5 % — SIGNIFICANT CHANGE UP (ref 0–2)
EOSINOPHIL # BLD AUTO: 0.2 K/UL — SIGNIFICANT CHANGE UP (ref 0–0.5)
EOSINOPHIL NFR BLD AUTO: 3 % — SIGNIFICANT CHANGE UP (ref 0–6)
HCT VFR BLD CALC: 30.9 % — LOW (ref 39–50)
HGB BLD-MCNC: 10.3 G/DL — LOW (ref 13–17)
LYMPHOCYTES # BLD AUTO: 0.6 K/UL — LOW (ref 1–3.3)
LYMPHOCYTES # BLD AUTO: 9.9 % — LOW (ref 13–44)
MCHC RBC-ENTMCNC: 28.1 PG — SIGNIFICANT CHANGE UP (ref 27–34)
MCHC RBC-ENTMCNC: 33.2 G/DL — SIGNIFICANT CHANGE UP (ref 32–36)
MCV RBC AUTO: 84.5 FL — SIGNIFICANT CHANGE UP (ref 80–100)
MONOCYTES # BLD AUTO: 0.4 K/UL — SIGNIFICANT CHANGE UP (ref 0–0.9)
MONOCYTES NFR BLD AUTO: 6.7 % — SIGNIFICANT CHANGE UP (ref 2–14)
NEUTROPHILS # BLD AUTO: 4.9 K/UL — SIGNIFICANT CHANGE UP (ref 1.8–7.4)
NEUTROPHILS NFR BLD AUTO: 79.9 % — HIGH (ref 43–77)
PLATELET # BLD AUTO: 297 K/UL — SIGNIFICANT CHANGE UP (ref 150–400)
RBC # BLD: 3.65 M/UL — LOW (ref 4.2–5.8)
RBC # FLD: 15.1 % — HIGH (ref 10.3–14.5)
WBC # BLD: 6.1 K/UL — SIGNIFICANT CHANGE UP (ref 3.8–10.5)
WBC # FLD AUTO: 6.1 K/UL — SIGNIFICANT CHANGE UP (ref 3.8–10.5)

## 2018-06-13 PROCEDURE — 99214 OFFICE O/P EST MOD 30 MIN: CPT

## 2018-06-13 PROCEDURE — 99213 OFFICE O/P EST LOW 20 MIN: CPT

## 2018-06-13 RX ORDER — OXYCODONE AND ACETAMINOPHEN 5; 325 MG/1; MG/1
5-325 TABLET ORAL
Qty: 20 | Refills: 0 | Status: DISCONTINUED | COMMUNITY
Start: 2017-07-06 | End: 2018-06-13

## 2018-06-13 RX ORDER — ASPIRIN 325 MG
81 TABLET ORAL
Refills: 0 | Status: ACTIVE | COMMUNITY

## 2018-06-13 RX ORDER — FUROSEMIDE 20 MG/1
20 TABLET ORAL DAILY
Refills: 0 | Status: DISCONTINUED | COMMUNITY
End: 2018-06-13

## 2018-06-13 RX ORDER — CARVEDILOL 25 MG/1
25 TABLET, FILM COATED ORAL TWICE DAILY
Refills: 0 | Status: DISCONTINUED | COMMUNITY
End: 2018-06-13

## 2018-06-13 RX ORDER — NEOMYCIN SULFATE 500 MG/1
500 TABLET ORAL
Qty: 6 | Refills: 0 | Status: DISCONTINUED | COMMUNITY
Start: 2017-10-27 | End: 2018-06-13

## 2018-06-13 RX ORDER — RAMIPRIL 10 MG/1
10 CAPSULE ORAL
Refills: 0 | Status: DISCONTINUED | COMMUNITY
End: 2018-06-13

## 2018-06-13 RX ORDER — METRONIDAZOLE 500 MG/1
500 TABLET ORAL DAILY
Qty: 3 | Refills: 0 | Status: DISCONTINUED | COMMUNITY
Start: 2017-10-27 | End: 2018-06-13

## 2018-06-13 RX ORDER — AMOXICILLIN AND CLAVULANATE POTASSIUM 875; 125 MG/1; MG/1
875-125 TABLET, COATED ORAL
Qty: 28 | Refills: 0 | Status: DISCONTINUED | COMMUNITY
Start: 2018-01-31 | End: 2018-06-13

## 2018-06-13 RX ORDER — AMLODIPINE BESYLATE 10 MG/1
10 TABLET ORAL DAILY
Refills: 0 | Status: DISCONTINUED | COMMUNITY
End: 2018-06-13

## 2018-06-13 RX ORDER — ONDANSETRON 8 MG/1
8 TABLET ORAL
Qty: 30 | Refills: 0 | Status: DISCONTINUED | COMMUNITY
Start: 2017-08-11 | End: 2018-06-13

## 2018-06-14 LAB
25(OH)D3 SERPL-MCNC: 66.6 NG/ML
ALBUMIN SERPL ELPH-MCNC: 3.4 G/DL
ALP BLD-CCNC: 133 U/L
ALT SERPL-CCNC: 17 U/L
ANION GAP SERPL CALC-SCNC: 15 MMOL/L
AST SERPL-CCNC: 15 U/L
BILIRUB SERPL-MCNC: 0.3 MG/DL
BUN SERPL-MCNC: 14 MG/DL
CALCIUM SERPL-MCNC: 9.1 MG/DL
CEA SERPL-MCNC: 1.8 NG/ML
CHLORIDE SERPL-SCNC: 102 MMOL/L
CO2 SERPL-SCNC: 28 MMOL/L
CREAT SERPL-MCNC: 1.28 MG/DL
GLUCOSE SERPL-MCNC: 94 MG/DL
POTASSIUM SERPL-SCNC: 4.2 MMOL/L
PROT SERPL-MCNC: 7.3 G/DL
SODIUM SERPL-SCNC: 145 MMOL/L

## 2018-06-17 ENCOUNTER — FORM ENCOUNTER (OUTPATIENT)
Age: 68
End: 2018-06-17

## 2018-06-18 ENCOUNTER — OUTPATIENT (OUTPATIENT)
Dept: OUTPATIENT SERVICES | Facility: HOSPITAL | Age: 68
LOS: 1 days | End: 2018-06-18
Payer: COMMERCIAL

## 2018-06-18 ENCOUNTER — APPOINTMENT (OUTPATIENT)
Dept: ULTRASOUND IMAGING | Facility: CLINIC | Age: 68
End: 2018-06-18
Payer: MEDICARE

## 2018-06-18 DIAGNOSIS — Z90.89 ACQUIRED ABSENCE OF OTHER ORGANS: Chronic | ICD-10-CM

## 2018-06-18 DIAGNOSIS — N13.30 UNSPECIFIED HYDRONEPHROSIS: ICD-10-CM

## 2018-06-18 DIAGNOSIS — Z00.8 ENCOUNTER FOR OTHER GENERAL EXAMINATION: ICD-10-CM

## 2018-06-18 DIAGNOSIS — R89.7 ABNORMAL HISTOLOGICAL FINDINGS IN SPECIMENS FROM OTHER ORGANS, SYSTEMS AND TISSUES: Chronic | ICD-10-CM

## 2018-06-18 DIAGNOSIS — Z90.49 ACQUIRED ABSENCE OF OTHER SPECIFIED PARTS OF DIGESTIVE TRACT: Chronic | ICD-10-CM

## 2018-06-18 DIAGNOSIS — Z95.5 PRESENCE OF CORONARY ANGIOPLASTY IMPLANT AND GRAFT: Chronic | ICD-10-CM

## 2018-06-18 PROCEDURE — 76775 US EXAM ABDO BACK WALL LIM: CPT | Mod: 26

## 2018-06-18 PROCEDURE — 76775 US EXAM ABDO BACK WALL LIM: CPT

## 2018-07-01 ENCOUNTER — FORM ENCOUNTER (OUTPATIENT)
Age: 68
End: 2018-07-01

## 2018-07-02 ENCOUNTER — APPOINTMENT (OUTPATIENT)
Dept: CT IMAGING | Facility: CLINIC | Age: 68
End: 2018-07-02
Payer: MEDICARE

## 2018-07-02 ENCOUNTER — OUTPATIENT (OUTPATIENT)
Dept: OUTPATIENT SERVICES | Facility: HOSPITAL | Age: 68
LOS: 1 days | End: 2018-07-02
Payer: COMMERCIAL

## 2018-07-02 DIAGNOSIS — C20 MALIGNANT NEOPLASM OF RECTUM: ICD-10-CM

## 2018-07-02 DIAGNOSIS — Z00.8 ENCOUNTER FOR OTHER GENERAL EXAMINATION: ICD-10-CM

## 2018-07-02 DIAGNOSIS — Z95.5 PRESENCE OF CORONARY ANGIOPLASTY IMPLANT AND GRAFT: Chronic | ICD-10-CM

## 2018-07-02 DIAGNOSIS — Z90.89 ACQUIRED ABSENCE OF OTHER ORGANS: Chronic | ICD-10-CM

## 2018-07-02 DIAGNOSIS — Z90.49 ACQUIRED ABSENCE OF OTHER SPECIFIED PARTS OF DIGESTIVE TRACT: Chronic | ICD-10-CM

## 2018-07-02 DIAGNOSIS — R89.7 ABNORMAL HISTOLOGICAL FINDINGS IN SPECIMENS FROM OTHER ORGANS, SYSTEMS AND TISSUES: Chronic | ICD-10-CM

## 2018-07-02 PROCEDURE — 71260 CT THORAX DX C+: CPT

## 2018-07-02 PROCEDURE — 71260 CT THORAX DX C+: CPT | Mod: 26

## 2018-07-02 PROCEDURE — 74177 CT ABD & PELVIS W/CONTRAST: CPT

## 2018-07-02 PROCEDURE — 74177 CT ABD & PELVIS W/CONTRAST: CPT | Mod: 26

## 2018-07-31 ENCOUNTER — APPOINTMENT (OUTPATIENT)
Dept: CARDIOLOGY | Facility: CLINIC | Age: 68
End: 2018-07-31
Payer: MEDICARE

## 2018-07-31 VITALS
WEIGHT: 240 LBS | HEIGHT: 71 IN | SYSTOLIC BLOOD PRESSURE: 118 MMHG | HEART RATE: 89 BPM | DIASTOLIC BLOOD PRESSURE: 78 MMHG | BODY MASS INDEX: 33.6 KG/M2

## 2018-07-31 DIAGNOSIS — Z00.00 ENCOUNTER FOR GENERAL ADULT MEDICAL EXAMINATION W/OUT ABNORMAL FINDINGS: ICD-10-CM

## 2018-07-31 PROCEDURE — 99205 OFFICE O/P NEW HI 60 MIN: CPT

## 2018-07-31 PROCEDURE — 93000 ELECTROCARDIOGRAM COMPLETE: CPT

## 2018-08-01 PROBLEM — Z00.00 ENCOUNTER FOR PREVENTIVE HEALTH EXAMINATION: Status: ACTIVE | Noted: 2017-02-10

## 2018-08-07 ENCOUNTER — OUTPATIENT (OUTPATIENT)
Dept: OUTPATIENT SERVICES | Facility: HOSPITAL | Age: 68
LOS: 1 days | End: 2018-08-07
Payer: COMMERCIAL

## 2018-08-07 VITALS
TEMPERATURE: 98 F | HEART RATE: 74 BPM | RESPIRATION RATE: 16 BRPM | WEIGHT: 248.02 LBS | DIASTOLIC BLOOD PRESSURE: 85 MMHG | OXYGEN SATURATION: 97 % | HEIGHT: 72 IN | SYSTOLIC BLOOD PRESSURE: 145 MMHG

## 2018-08-07 DIAGNOSIS — R89.7 ABNORMAL HISTOLOGICAL FINDINGS IN SPECIMENS FROM OTHER ORGANS, SYSTEMS AND TISSUES: Chronic | ICD-10-CM

## 2018-08-07 DIAGNOSIS — G47.33 OBSTRUCTIVE SLEEP APNEA (ADULT) (PEDIATRIC): ICD-10-CM

## 2018-08-07 DIAGNOSIS — Z01.818 ENCOUNTER FOR OTHER PREPROCEDURAL EXAMINATION: ICD-10-CM

## 2018-08-07 DIAGNOSIS — Z93.3 COLOSTOMY STATUS: Chronic | ICD-10-CM

## 2018-08-07 DIAGNOSIS — C20 MALIGNANT NEOPLASM OF RECTUM: ICD-10-CM

## 2018-08-07 DIAGNOSIS — Z93.3 COLOSTOMY STATUS: ICD-10-CM

## 2018-08-07 DIAGNOSIS — Z90.49 ACQUIRED ABSENCE OF OTHER SPECIFIED PARTS OF DIGESTIVE TRACT: Chronic | ICD-10-CM

## 2018-08-07 DIAGNOSIS — Z90.89 ACQUIRED ABSENCE OF OTHER ORGANS: Chronic | ICD-10-CM

## 2018-08-07 DIAGNOSIS — I10 ESSENTIAL (PRIMARY) HYPERTENSION: ICD-10-CM

## 2018-08-07 DIAGNOSIS — E11.9 TYPE 2 DIABETES MELLITUS WITHOUT COMPLICATIONS: ICD-10-CM

## 2018-08-07 DIAGNOSIS — Z95.5 PRESENCE OF CORONARY ANGIOPLASTY IMPLANT AND GRAFT: Chronic | ICD-10-CM

## 2018-08-07 DIAGNOSIS — Z95.5 PRESENCE OF CORONARY ANGIOPLASTY IMPLANT AND GRAFT: ICD-10-CM

## 2018-08-07 LAB
ALBUMIN SERPL ELPH-MCNC: 3.6 G/DL — SIGNIFICANT CHANGE UP (ref 3.3–5)
ALP SERPL-CCNC: 114 U/L — SIGNIFICANT CHANGE UP (ref 40–120)
ALT FLD-CCNC: 9 U/L — LOW (ref 10–45)
ANION GAP SERPL CALC-SCNC: 8 MMOL/L — SIGNIFICANT CHANGE UP (ref 5–17)
AST SERPL-CCNC: 12 U/L — SIGNIFICANT CHANGE UP (ref 10–40)
BILIRUB SERPL-MCNC: 0.4 MG/DL — SIGNIFICANT CHANGE UP (ref 0.2–1.2)
BLD GP AB SCN SERPL QL: NEGATIVE — SIGNIFICANT CHANGE UP
BUN SERPL-MCNC: 16 MG/DL — SIGNIFICANT CHANGE UP (ref 7–23)
CALCIUM SERPL-MCNC: 9.1 MG/DL — SIGNIFICANT CHANGE UP (ref 8.4–10.5)
CHLORIDE SERPL-SCNC: 100 MMOL/L — SIGNIFICANT CHANGE UP (ref 96–108)
CO2 SERPL-SCNC: 29 MMOL/L — SIGNIFICANT CHANGE UP (ref 22–31)
CREAT SERPL-MCNC: 1.33 MG/DL — HIGH (ref 0.5–1.3)
GLUCOSE SERPL-MCNC: 114 MG/DL — HIGH (ref 70–99)
HBA1C BLD-MCNC: 6.1 % — HIGH (ref 4–5.6)
HCT VFR BLD CALC: 37.1 % — LOW (ref 39–50)
HGB BLD-MCNC: 11.5 G/DL — LOW (ref 13–17)
MCHC RBC-ENTMCNC: 27.1 PG — SIGNIFICANT CHANGE UP (ref 27–34)
MCHC RBC-ENTMCNC: 31 GM/DL — LOW (ref 32–36)
MCV RBC AUTO: 87.5 FL — SIGNIFICANT CHANGE UP (ref 80–100)
PLATELET # BLD AUTO: 304 K/UL — SIGNIFICANT CHANGE UP (ref 150–400)
POTASSIUM SERPL-MCNC: 4.7 MMOL/L — SIGNIFICANT CHANGE UP (ref 3.5–5.3)
POTASSIUM SERPL-SCNC: 4.7 MMOL/L — SIGNIFICANT CHANGE UP (ref 3.5–5.3)
PROT SERPL-MCNC: 7.8 G/DL — SIGNIFICANT CHANGE UP (ref 6–8.3)
RBC # BLD: 4.24 M/UL — SIGNIFICANT CHANGE UP (ref 4.2–5.8)
RBC # FLD: 16.4 % — HIGH (ref 10.3–14.5)
RH IG SCN BLD-IMP: NEGATIVE — SIGNIFICANT CHANGE UP
SODIUM SERPL-SCNC: 137 MMOL/L — SIGNIFICANT CHANGE UP (ref 135–145)
WBC # BLD: 6 K/UL — SIGNIFICANT CHANGE UP (ref 3.8–10.5)
WBC # FLD AUTO: 6 K/UL — SIGNIFICANT CHANGE UP (ref 3.8–10.5)

## 2018-08-07 PROCEDURE — 86850 RBC ANTIBODY SCREEN: CPT

## 2018-08-07 PROCEDURE — 86900 BLOOD TYPING SEROLOGIC ABO: CPT

## 2018-08-07 PROCEDURE — 83036 HEMOGLOBIN GLYCOSYLATED A1C: CPT

## 2018-08-07 PROCEDURE — 86901 BLOOD TYPING SEROLOGIC RH(D): CPT

## 2018-08-07 PROCEDURE — 80053 COMPREHEN METABOLIC PANEL: CPT

## 2018-08-07 PROCEDURE — 85027 COMPLETE CBC AUTOMATED: CPT

## 2018-08-07 PROCEDURE — G0463: CPT

## 2018-08-07 RX ORDER — CHOLECALCIFEROL (VITAMIN D3) 125 MCG
1 CAPSULE ORAL
Qty: 0 | Refills: 0 | COMMUNITY

## 2018-08-07 RX ORDER — ISOSORBIDE MONONITRATE 60 MG/1
1 TABLET, EXTENDED RELEASE ORAL
Qty: 0 | Refills: 0 | COMMUNITY

## 2018-08-07 RX ORDER — AMLODIPINE BESYLATE 2.5 MG/1
1 TABLET ORAL
Qty: 0 | Refills: 0 | COMMUNITY

## 2018-08-07 RX ORDER — CEFOTETAN DISODIUM 1 G
2 VIAL (EA) INJECTION ONCE
Qty: 0 | Refills: 0 | Status: DISCONTINUED | OUTPATIENT
Start: 2018-08-16 | End: 2018-08-16

## 2018-08-07 RX ORDER — SODIUM CHLORIDE 9 MG/ML
3 INJECTION INTRAMUSCULAR; INTRAVENOUS; SUBCUTANEOUS EVERY 8 HOURS
Qty: 0 | Refills: 0 | Status: DISCONTINUED | OUTPATIENT
Start: 2018-08-16 | End: 2018-08-16

## 2018-08-07 NOTE — H&P PST ADULT - VENOUS THROMBOEMBOLISM CURRENT STATUS
present cancer or chemotherapy/varicose veins/major surgery, including arthroscopic and laparoscopic (greater than 1 hr)/swollen legs major surgery lasting 2-3 hrs

## 2018-08-07 NOTE — H&P PST ADULT - PROBLEM SELECTOR PLAN 2
s/p ( '09) Coronary Stent X1. Currently on ASA 81 mg. daily  PLAN: remain on ASA for Coronary Stent protection. Cardiac evaluation done by Dr. Jet Eric; * Echo scheduled @ [facility] 8-9-18

## 2018-08-07 NOTE — H&P PST ADULT - HISTORY OF PRESENT ILLNESS
67 year old obese male with PMH of HTN, MI/CAD with stent x 1 2009, GLENYS cpap use with rectal mass s/p transanal excision/ biopsy 7/2017 s/p neoadjuvant chemoradiation planned for laparoscopic robotic assisted low anterior resection, cystoscopy with insertion of ureteral catheters. This is a 67 y/o male with PMH:  HTN, HLD, ('09): + MI . s/p Coronary Stent x 1 ('2009): currently on ASA 81 mg. daily, +  GLENYS : non-compliant with CPAP, Obesity: BMI 33.6, Type 2 Diabetes.  dx: ('2017): Rectal Cancer: treated with chemo, radiation surgery. Current dx: Sticture in Colostomy. Now scheduled: Revison of Colostomy/ Possible Exploratory Laparotomy.

## 2018-08-07 NOTE — H&P PST ADULT - PROBLEM SELECTOR PLAN 1
Revision of Colostomy  Possible Exploratory laparotomy    The Caprini score indicates this patient is at risk for a VTE event (score 3-5).  Most surgical patients in this group would benefit from pharmacologic prophylaxis.  The surgical team will determine the balance between VTE risk and bleeding risk

## 2018-08-07 NOTE — H&P PST ADULT - PSH
Abnormal rectal biopsy  7/2017 rectal tumor excision  H/O heart artery stent  stent x 1 and balloon angioplasty - 2009  History of cholecystectomy  ' 85  S/P colostomy  12/ 2017  S/P tonsillectomy and adenoidectomy  childhood

## 2018-08-07 NOTE — H&P PST ADULT - PMH
Benign prostatic hypertrophy    Diabetes mellitus, type 2  dx: 90' s  DVT (deep venous thrombosis)  ' 2017  post op Colostomy . Treated; resolved  Former smoker  1ppd X 30 years. Quit ' 2016  Gallstones  ' 85  surgically treated  Heart attack  '90's  Hyperlipidemia    Hyperlipidemia, unspecified hyperlipidemia type    Hypertension    Obstructive sleep apnea syndrome in adult  dx: ' 2016   non-complaint with CPAP  Rectal cancer  dx:  ' 2017   treated with chemotherapy, radiation, surgery  Stented coronary artery  ' 2009     Coronary Stent X 1  @ [facility]  Vitiligo Benign prostatic hypertrophy    Diabetes mellitus, type 2  dx: 90' s  DVT (deep venous thrombosis)  ' 2017  post op Colostomy . Treated; resolved  Former smoker  1ppd X 30 years. Quit ' 2016  Gallstones  ' 85  surgically treated  Heart attack  ' 09  Heart murmur  mild  Hyperlipidemia    Hyperlipidemia, unspecified hyperlipidemia type    Hypertension    Obesity  BMI 33.6  Obstructive sleep apnea syndrome in adult  dx: ' 2016   non-complaint with CPAP  Rectal cancer  dx:  ' 2017   treated with chemotherapy, radiation, surgery  Stented coronary artery  ' 2009     Cor[facility] X 1  [facility]  [facility]

## 2018-08-07 NOTE — H&P PST ADULT - ASSESSMENT
CAPRINI SCORE [CLOT]    AGE RELATED RISK FACTORS                                                       MOBILITY RELATED FACTORS  [ ] Age 41-60 years                                            (1 Point)                  [ ] Bed rest                                                        (1 Point)  [X ] Age: 61-74 years                                           (2 Points)                 [ ] Plaster cast                                                   (2 Points)  [ ] Age= 75 years                                              (3 Points)                 [ ] Bed bound for more than 72 hours                 (2 Points)    DISEASE RELATED RISK FACTORS                                               GENDER SPECIFIC FACTORS  [ ] Edema in the lower extremities                       (1 Point)                  [ ] Pregnancy                                                     (1 Point)  [ ] Varicose veins                                               (1 Point)                  [ ] Post-partum < 6 weeks                                   (1 Point)             [ X] BMI > 25 Kg/m2                                            (1 Point)                  [ ] Hormonal therapy  or oral contraception          (1 Point)                 [ ] Sepsis (in the previous month)                        (1 Point)                  [ ] History of pregnancy complications                 (1 point)  [ ] Pneumonia or serious lung disease                                               [ ] Unexplained or recurrent                     (1 Point)           (in the previous month)                               (1 Point)  [ ] Abnormal pulmonary function test                     (1 Point)                 SURGERY RELATED RISK FACTORS  [ ] Acute myocardial infarction                              (1 Point)                 [ ]  Section                                             (1 Point)  [ ] Congestive heart failure (in the previous month)  (1 Point)               [ ] Minor surgery                                                  (1 Point)   [ ] Inflammatory bowel disease                             (1 Point)                 [ ] Arthroscopic surgery                                        (2 Points)  [ ] Central venous access                                      (2 Points)                [ X] General surgery lasting more than 45 minutes   (2 Points)       [ ] Stroke (in the previous month)                          (5 Points)               [ ] Elective arthroplasty                                         (5 Points)                                                                                                                                               HEMATOLOGY RELATED FACTORS                                                 TRAUMA RELATED RISK FACTORS  [ ] Prior episodes of VTE                                     (3 Points)                 [ ] Fracture of the hip, pelvis, or leg                       (5 Points)  [ ] Positive family history for VTE                         (3 Points)                 [ ] Acute spinal cord injury (in the previous month)  (5 Points)  [ ] Prothrombin 35854 A                                     (3 Points)                 [ ] Paralysis  (less than 1 month)                             (5 Points)  [ ] Factor V Leiden                                             (3 Points)                  [ ] Multiple Trauma within 1 month                        (5 Points)  [ ] Lupus anticoagulants                                     (3 Points)                                                           [ ] Anticardiolipin antibodies                               (3 Points)                                                       [ ] High homocysteine in the blood                      (3 Points)                                             [ ] Other congenital or acquired thrombophilia      (3 Points)                                                [ ] Heparin induced thrombocytopenia                  (3 Points)                                          Total Score [          ]

## 2018-08-08 PROBLEM — I21.9 ACUTE MYOCARDIAL INFARCTION, UNSPECIFIED: Chronic | Status: ACTIVE | Noted: 2018-08-07

## 2018-08-08 PROBLEM — C20 MALIGNANT NEOPLASM OF RECTUM: Chronic | Status: ACTIVE | Noted: 2017-11-24

## 2018-08-09 ENCOUNTER — APPOINTMENT (OUTPATIENT)
Dept: CARDIOLOGY | Facility: CLINIC | Age: 68
End: 2018-08-09

## 2018-08-09 ENCOUNTER — APPOINTMENT (OUTPATIENT)
Dept: CARDIOLOGY | Facility: CLINIC | Age: 68
End: 2018-08-09
Payer: MEDICARE

## 2018-08-09 PROCEDURE — 93306 TTE W/DOPPLER COMPLETE: CPT

## 2018-08-15 ENCOUNTER — TRANSCRIPTION ENCOUNTER (OUTPATIENT)
Age: 68
End: 2018-08-15

## 2018-08-16 ENCOUNTER — RESULT REVIEW (OUTPATIENT)
Age: 68
End: 2018-08-16

## 2018-08-16 ENCOUNTER — INPATIENT (INPATIENT)
Facility: HOSPITAL | Age: 68
LOS: 0 days | Discharge: HOME | DRG: 331 | End: 2018-08-17
Attending: SURGERY | Admitting: SURGERY
Payer: COMMERCIAL

## 2018-08-16 ENCOUNTER — APPOINTMENT (OUTPATIENT)
Dept: COLORECTAL SURGERY | Facility: HOSPITAL | Age: 68
End: 2018-08-16
Payer: MEDICARE

## 2018-08-16 VITALS
RESPIRATION RATE: 20 BRPM | HEART RATE: 86 BPM | DIASTOLIC BLOOD PRESSURE: 84 MMHG | SYSTOLIC BLOOD PRESSURE: 144 MMHG | WEIGHT: 248.02 LBS | TEMPERATURE: 98 F | OXYGEN SATURATION: 96 % | HEIGHT: 72 IN

## 2018-08-16 DIAGNOSIS — Z90.89 ACQUIRED ABSENCE OF OTHER ORGANS: Chronic | ICD-10-CM

## 2018-08-16 DIAGNOSIS — Z90.49 ACQUIRED ABSENCE OF OTHER SPECIFIED PARTS OF DIGESTIVE TRACT: Chronic | ICD-10-CM

## 2018-08-16 DIAGNOSIS — Z95.5 PRESENCE OF CORONARY ANGIOPLASTY IMPLANT AND GRAFT: Chronic | ICD-10-CM

## 2018-08-16 DIAGNOSIS — Z93.3 COLOSTOMY STATUS: Chronic | ICD-10-CM

## 2018-08-16 DIAGNOSIS — C20 MALIGNANT NEOPLASM OF RECTUM: ICD-10-CM

## 2018-08-16 DIAGNOSIS — R89.7 ABNORMAL HISTOLOGICAL FINDINGS IN SPECIMENS FROM OTHER ORGANS, SYSTEMS AND TISSUES: Chronic | ICD-10-CM

## 2018-08-16 LAB
GLUCOSE BLDC GLUCOMTR-MCNC: 145 MG/DL — HIGH (ref 70–99)
GLUCOSE BLDC GLUCOMTR-MCNC: 152 MG/DL — HIGH (ref 70–99)
GLUCOSE BLDC GLUCOMTR-MCNC: 191 MG/DL — HIGH (ref 70–99)
GLUCOSE BLDC GLUCOMTR-MCNC: 191 MG/DL — HIGH (ref 70–99)

## 2018-08-16 PROCEDURE — 44140 PARTIAL REMOVAL OF COLON: CPT

## 2018-08-16 PROCEDURE — 88305 TISSUE EXAM BY PATHOLOGIST: CPT | Mod: 26

## 2018-08-16 PROCEDURE — 88304 TISSUE EXAM BY PATHOLOGIST: CPT | Mod: 26

## 2018-08-16 RX ORDER — CARVEDILOL PHOSPHATE 80 MG/1
1 CAPSULE, EXTENDED RELEASE ORAL
Qty: 0 | Refills: 0 | COMMUNITY

## 2018-08-16 RX ORDER — ATORVASTATIN CALCIUM 80 MG/1
40 TABLET, FILM COATED ORAL AT BEDTIME
Qty: 0 | Refills: 0 | Status: DISCONTINUED | OUTPATIENT
Start: 2018-08-16 | End: 2018-08-17

## 2018-08-16 RX ORDER — ACETAMINOPHEN 500 MG
1000 TABLET ORAL ONCE
Qty: 0 | Refills: 0 | Status: DISCONTINUED | OUTPATIENT
Start: 2018-08-16 | End: 2018-08-17

## 2018-08-16 RX ORDER — INSULIN LISPRO 100/ML
VIAL (ML) SUBCUTANEOUS AT BEDTIME
Qty: 0 | Refills: 0 | Status: DISCONTINUED | OUTPATIENT
Start: 2018-08-16 | End: 2018-08-17

## 2018-08-16 RX ORDER — ONDANSETRON 8 MG/1
4 TABLET, FILM COATED ORAL EVERY 6 HOURS
Qty: 0 | Refills: 0 | Status: DISCONTINUED | OUTPATIENT
Start: 2018-08-16 | End: 2018-08-16

## 2018-08-16 RX ORDER — FINASTERIDE 5 MG/1
1 TABLET, FILM COATED ORAL
Qty: 0 | Refills: 0 | COMMUNITY

## 2018-08-16 RX ORDER — TAMSULOSIN HYDROCHLORIDE 0.4 MG/1
0.4 CAPSULE ORAL AT BEDTIME
Qty: 0 | Refills: 0 | Status: DISCONTINUED | OUTPATIENT
Start: 2018-08-16 | End: 2018-08-17

## 2018-08-16 RX ORDER — FINASTERIDE 5 MG/1
5 TABLET, FILM COATED ORAL DAILY
Qty: 0 | Refills: 0 | Status: DISCONTINUED | OUTPATIENT
Start: 2018-08-16 | End: 2018-08-17

## 2018-08-16 RX ORDER — SODIUM CHLORIDE 9 MG/ML
1000 INJECTION INTRAMUSCULAR; INTRAVENOUS; SUBCUTANEOUS
Qty: 0 | Refills: 0 | Status: DISCONTINUED | OUTPATIENT
Start: 2018-08-16 | End: 2018-08-17

## 2018-08-16 RX ORDER — SODIUM CHLORIDE 9 MG/ML
1000 INJECTION INTRAMUSCULAR; INTRAVENOUS; SUBCUTANEOUS
Qty: 0 | Refills: 0 | Status: DISCONTINUED | OUTPATIENT
Start: 2018-08-16 | End: 2018-08-16

## 2018-08-16 RX ORDER — KETOROLAC TROMETHAMINE 30 MG/ML
15 SYRINGE (ML) INJECTION ONCE
Qty: 0 | Refills: 0 | Status: DISCONTINUED | OUTPATIENT
Start: 2018-08-16 | End: 2018-08-17

## 2018-08-16 RX ORDER — ISOSORBIDE DINITRATE 5 MG/1
1 TABLET ORAL
Qty: 0 | Refills: 0 | COMMUNITY

## 2018-08-16 RX ORDER — ACETAMINOPHEN 500 MG
1000 TABLET ORAL ONCE
Qty: 0 | Refills: 0 | Status: DISCONTINUED | OUTPATIENT
Start: 2018-08-16 | End: 2018-08-16

## 2018-08-16 RX ORDER — CARVEDILOL PHOSPHATE 80 MG/1
6.25 CAPSULE, EXTENDED RELEASE ORAL EVERY 12 HOURS
Qty: 0 | Refills: 0 | Status: DISCONTINUED | OUTPATIENT
Start: 2018-08-16 | End: 2018-08-17

## 2018-08-16 RX ORDER — ENOXAPARIN SODIUM 100 MG/ML
40 INJECTION SUBCUTANEOUS EVERY 24 HOURS
Qty: 0 | Refills: 0 | Status: DISCONTINUED | OUTPATIENT
Start: 2018-08-16 | End: 2018-08-17

## 2018-08-16 RX ORDER — ISOSORBIDE MONONITRATE 60 MG/1
30 TABLET, EXTENDED RELEASE ORAL DAILY
Qty: 0 | Refills: 0 | Status: DISCONTINUED | OUTPATIENT
Start: 2018-08-16 | End: 2018-08-17

## 2018-08-16 RX ORDER — FENTANYL CITRATE 50 UG/ML
25 INJECTION INTRAVENOUS
Qty: 0 | Refills: 0 | Status: DISCONTINUED | OUTPATIENT
Start: 2018-08-16 | End: 2018-08-16

## 2018-08-16 RX ORDER — INSULIN LISPRO 100/ML
VIAL (ML) SUBCUTANEOUS
Qty: 0 | Refills: 0 | Status: DISCONTINUED | OUTPATIENT
Start: 2018-08-16 | End: 2018-08-17

## 2018-08-16 RX ORDER — TAMSULOSIN HYDROCHLORIDE 0.4 MG/1
1 CAPSULE ORAL
Qty: 0 | Refills: 0 | COMMUNITY

## 2018-08-16 RX ORDER — ATORVASTATIN CALCIUM 80 MG/1
1 TABLET, FILM COATED ORAL
Qty: 0 | Refills: 0 | COMMUNITY

## 2018-08-16 RX ADMIN — Medication 1: at 13:46

## 2018-08-16 RX ADMIN — FINASTERIDE 5 MILLIGRAM(S): 5 TABLET, FILM COATED ORAL at 18:09

## 2018-08-16 RX ADMIN — TAMSULOSIN HYDROCHLORIDE 0.4 MILLIGRAM(S): 0.4 CAPSULE ORAL at 21:32

## 2018-08-16 RX ADMIN — ATORVASTATIN CALCIUM 40 MILLIGRAM(S): 80 TABLET, FILM COATED ORAL at 21:32

## 2018-08-16 RX ADMIN — ISOSORBIDE MONONITRATE 30 MILLIGRAM(S): 60 TABLET, EXTENDED RELEASE ORAL at 18:14

## 2018-08-16 RX ADMIN — CARVEDILOL PHOSPHATE 6.25 MILLIGRAM(S): 80 CAPSULE, EXTENDED RELEASE ORAL at 18:09

## 2018-08-16 NOTE — PATIENT PROFILE ADULT. - PMH
Benign prostatic hypertrophy    Diabetes mellitus, type 2  dx: 90' s  DVT (deep venous thrombosis)  ' 2017  post op Colostomy . Treated; resolved  Former smoker  1ppd X 30 years. Quit ' 2016  Gallstones  ' 85  surgically treated  Heart attack  ' 09  Heart murmur  mild  Hyperlipidemia    Hyperlipidemia, unspecified hyperlipidemia type    Hypertension    Obesity  BMI 33.6  Obstructive sleep apnea syndrome in adult  dx: ' 2016   non-complaint with CPAP  Rectal cancer  dx:  ' 2017   treated with chemotherapy, radiation, surgery  Stented coronary artery  ' 2009     [facility] 1  [facility]  [facility]

## 2018-08-16 NOTE — BRIEF OPERATIVE NOTE - PROCEDURE
<<-----Click on this checkbox to enter Procedure Colostomy revision, complicated  08/16/2018  with bowel resection, and intraperitoneal lesion biopsy.  Active  EBIANCHI

## 2018-08-16 NOTE — BRIEF OPERATIVE NOTE - OPERATION/FINDINGS
- small, narrow and stenotic colostomy opening.   - Moderate adhesions and thickened tissue around colostomy.   - firm intraperitoneal implant.   - Good hemostasis at the end of the case.

## 2018-08-16 NOTE — CHART NOTE - NSCHARTNOTEFT_GEN_A_CORE
Post-operative Check    SUBJECTIVE: No acute events in the immediate post-operative period. Pain well controlled.   -denies nausea, vomitting, flatus  -denies pain      OBJECTIVE:  T(C): 36.6 (08-16-18 @ 12:00), Max: 36.6 (08-16-18 @ 12:00)  HR: 76 (08-16-18 @ 13:00) (59 - 86)  BP: 152/72 (08-16-18 @ 13:00) (136/76 - 152/72)  RR: 17 (08-16-18 @ 13:00) (16 - 20)  SpO2: 100% (08-16-18 @ 13:00) (92% - 100%)      08-16-18 @ 07:01  -  08-16-18 @ 14:13  --------------------------------------------------------  IN: 320 mL / OUT: 400 mL / NET: -80 mL        Physical Exam:   - Constitutional: NAD  - HEENT: NC/AT  - CV: RRR   - Respiratory: CTABL   - Abdomen: soft, nontender, nondistended. Pink ostomy in place on left side of abdomen with small amount of sanguineous output. No air in the ostomy bag.     - Extremities: SCDs in place, no edema, swelling. gross sensation intact. gross motor function intact.        ASSESSMENT:   MARILEE ECKERT is a 68y Male POD#0 is s/p complicated colostomy revision with bowel resection and intraperitoneal lesion biopsy (8/16).    PLAN:  - Restart home medications  - Pain management with tylenol and toradol  - Start regular/cardiac diet  - start ASA in AM  - d/c bartholomew in AM  - Appropriate for transfer to the floor

## 2018-08-17 ENCOUNTER — TRANSCRIPTION ENCOUNTER (OUTPATIENT)
Age: 68
End: 2018-08-17

## 2018-08-17 VITALS
HEART RATE: 71 BPM | DIASTOLIC BLOOD PRESSURE: 70 MMHG | OXYGEN SATURATION: 93 % | TEMPERATURE: 99 F | SYSTOLIC BLOOD PRESSURE: 165 MMHG | RESPIRATION RATE: 18 BRPM

## 2018-08-17 LAB
ANION GAP SERPL CALC-SCNC: 8 MMOL/L — SIGNIFICANT CHANGE UP (ref 5–17)
BUN SERPL-MCNC: 21 MG/DL — SIGNIFICANT CHANGE UP (ref 7–23)
CALCIUM SERPL-MCNC: 8.8 MG/DL — SIGNIFICANT CHANGE UP (ref 8.4–10.5)
CHLORIDE SERPL-SCNC: 104 MMOL/L — SIGNIFICANT CHANGE UP (ref 96–108)
CO2 SERPL-SCNC: 26 MMOL/L — SIGNIFICANT CHANGE UP (ref 22–31)
CREAT SERPL-MCNC: 1.28 MG/DL — SIGNIFICANT CHANGE UP (ref 0.5–1.3)
GLUCOSE BLDC GLUCOMTR-MCNC: 145 MG/DL — HIGH (ref 70–99)
GLUCOSE BLDC GLUCOMTR-MCNC: 169 MG/DL — HIGH (ref 70–99)
GLUCOSE SERPL-MCNC: 159 MG/DL — HIGH (ref 70–99)
POTASSIUM SERPL-MCNC: 4.1 MMOL/L — SIGNIFICANT CHANGE UP (ref 3.5–5.3)
POTASSIUM SERPL-SCNC: 4.1 MMOL/L — SIGNIFICANT CHANGE UP (ref 3.5–5.3)
SODIUM SERPL-SCNC: 138 MMOL/L — SIGNIFICANT CHANGE UP (ref 135–145)

## 2018-08-17 RX ADMIN — SODIUM CHLORIDE 100 MILLILITER(S): 9 INJECTION INTRAMUSCULAR; INTRAVENOUS; SUBCUTANEOUS at 05:18

## 2018-08-17 RX ADMIN — CARVEDILOL PHOSPHATE 6.25 MILLIGRAM(S): 80 CAPSULE, EXTENDED RELEASE ORAL at 07:27

## 2018-08-17 RX ADMIN — FINASTERIDE 5 MILLIGRAM(S): 5 TABLET, FILM COATED ORAL at 11:09

## 2018-08-17 RX ADMIN — ENOXAPARIN SODIUM 40 MILLIGRAM(S): 100 INJECTION SUBCUTANEOUS at 05:18

## 2018-08-17 RX ADMIN — Medication 1: at 12:21

## 2018-08-17 RX ADMIN — ISOSORBIDE MONONITRATE 30 MILLIGRAM(S): 60 TABLET, EXTENDED RELEASE ORAL at 12:19

## 2018-08-17 NOTE — PROGRESS NOTE ADULT - SUBJECTIVE AND OBJECTIVE BOX
[facility] RED SURGERY DAILY PROGRESS NOTE    Subjective:  Patient seen and examined on morning rounds.   -Denies N/V  -Pain well controlled  -Air is in ostomy bag, requested bag to be burped         MEDICATIONS  (STANDING):  acetaminophen  IVPB. 1000 milliGRAM(s) IV Intermittent once  atorvastatin 40 milliGRAM(s) Oral at bedtime  carvedilol 6.25 milliGRAM(s) Oral every 12 hours  enoxaparin Injectable 40 milliGRAM(s) SubCutaneous every 24 hours  finasteride 5 milliGRAM(s) Oral daily  insulin lispro (HumaLOG) corrective regimen sliding scale   SubCutaneous three times a day before meals  insulin lispro (HumaLOG) corrective regimen sliding scale   SubCutaneous at bedtime  isosorbide   mononitrate ER Tablet (IMDUR) 30 milliGRAM(s) Oral daily  sodium chloride 0.9%. 1000 milliLiter(s) (100 mL/Hr) IV Continuous <Continuous>  tamsulosin 0.4 milliGRAM(s) Oral at bedtime    MEDICATIONS  (PRN):  ketorolac   Injectable 15 milliGRAM(s) IV Push once PRN Moderate Pain    Vital Signs Last 24 Hrs  T(C): 36.7 (17 Aug 2018 04:45), Max: 37.2 (16 Aug 2018 22:20)  T(F): 98.1 (17 Aug 2018 04:45), Max: 99 (16 Aug 2018 23:46)  HR: 51 (17 Aug 2018 04:45) (51 - 81)  BP: 166/62 (17 Aug 2018 04:45) (136/76 - 172/80)  BP(mean): 99 (16 Aug 2018 12:00) (99 - 108)  RR: 18 (17 Aug 2018 04:45) (16 - 18)  SpO2: 95% (17 Aug 2018 04:45) (92% - 100%)  I&O's Detail    16 Aug 2018 07:01  -  17 Aug 2018 06:57  --------------------------------------------------------  IN:    Oral Fluid: 400 mL    sodium chloride 0.9%.: 2000 mL  Total IN: 2400 mL    OUT:    Indwelling Catheter - Urethral: 2575 mL  Total OUT: 2575 mL    Total NET: -175 mL      Physical Exam:   Gen: NAD  CVS: RRR  Respiratory: CTA B/L  Abdomen: soft, NT, ND. Air in ostomy bag with brownish, liquid output. Ostomy site is pink.  Ext: no edema, gross sensation intact, gross motor function intact

## 2018-08-17 NOTE — DISCHARGE NOTE ADULT - MEDICATION SUMMARY - MEDICATIONS TO TAKE
I will START or STAY ON the medications listed below when I get home from the hospital:    stool softner  -- 1 tab(s) by mouth once a day: in a.m.   -- Indication: For Stool softner    stool softner  -- 2 tab(s) by mouth once a day (at bedtime)  -- Indication: For Stool softner    finasteride 5 mg oral tablet  -- 1 tab(s) by mouth once a day  -- Indication: For Benign prostatic hypertrophy    aspirin 81 mg oral tablet  -- 1 tab(s) by mouth once a day. Remain on for Coronary Stent protection  -- Indication: For CAD    tamsulosin 0.4 mg oral capsule  -- 1 cap(s) by mouth once a day (at bedtime)  -- Indication: For Benign prostatic hypertrophy    isosorbide dinitrate 30 mg oral tablet  -- 1 tab(s) by mouth once a day  -- Indication: For Home med    glipiZIDE 2.5 mg oral tablet, extended release  -- 2 tab(s) by mouth once a day  -- Indication: For Diabetes    atorvastatin 40 mg oral tablet  -- 1 tab(s) by mouth once a day (at bedtime)  -- Indication: For HLD    carvedilol 6.25 mg oral tablet  -- 1 tab(s) by mouth 2 times a day  -- Indication: For HTN

## 2018-08-17 NOTE — DISCHARGE NOTE ADULT - PLAN OF CARE
s/p Colostomy revision Activity- No heavy lifting or straining over 15 lbs for the next two weeks;  Driving- Please do not drive until your pain is well controlled and you do not need to take pain medications.  You may shower-Do not submerge or scrub incision sites.  Please pat dry incisions/dressings.

## 2018-08-17 NOTE — DISCHARGE NOTE ADULT - HOSPITAL COURSE
This is a 67 y/o male with PMH: HTN, HLD, ('09): + MI . s/p Coronary Stent x 1 ('2009): currently on ASA 81 mg. daily, +  GLENYS : non-compliant with CPAP, Obesity: BMI 33.6, Type 2 Diabetes. dx: ('2017): Rectal Cancer: treated with chemo, radiation surgery. Current dx: Stricture in Colostomy.   On 8/16/18 patient underwent a Colostomy revision with bowel resection and intraperitoneal lesion biopsy. The patient tolerated the procedure well. There were no complications. The patient was extubated in the OR and transferred to the PACU in stable condition and transferred to a surgical floor. Diet was advanced as tolerated. The patient's pain was controlled by IV pain medications and then by PO pain medications. The patient was placed back on home medications.  At the time of discharge, the patient was hemodynamically stable, was tolerating PO diet, was voiding urine and having ostomy function, was ambulating, and was comfortable with adequate pain control. The patient was instructed to follow up with Dr. Conde within 1-2 weeks after discharge from the hospital. The patient felt comfortable with discharge. The patient was discharged to home. The patient had no other issues. This is a 69 y/o male with PMH: HTN, HLD, ('09): + MI . s/p Coronary Stent x 1 ('2009): currently on ASA 81 mg. daily, +  GLENYS : non-compliant with CPAP, Obesity: BMI 33.6, Type 2 Diabetes. dx: ('2017): Rectal Cancer: treated with chemo, radiation surgery. Current dx: Stricture in Colostomy.   On 8/16/18 patient underwent a Colostomy revision with bowel resection and intraperitoneal lesion biopsy. The patient tolerated the procedure well. There were no complications. The patient was extubated in the OR and transferred to the PACU in stable condition and transferred to a surgical floor. Diet was advanced as tolerated. The patient's pain was controlled by IV pain medications and then by PO pain medications. Pt was seen by Ostomy Nurse. The patient was placed back on home medications.  At the time of discharge, the patient was hemodynamically stable, was tolerating PO diet, was voiding urine and having ostomy function, was ambulating and was comfortable with adequate pain control. The patient was instructed to follow up with Dr. Conde within 1-2 weeks after discharge from the hospital. The patient felt comfortable with discharge. The patient was discharged to home. The patient had no other issues.

## 2018-08-17 NOTE — DISCHARGE NOTE ADULT - CARE PROVIDER_API CALL
Santiago Conde), ColonRectal Surgery; Surgery  [address]  [address]  [place] [address]  Phone: [phone]  Fax: [phone]

## 2018-08-17 NOTE — DISCHARGE NOTE ADULT - ADDITIONAL INSTRUCTIONS
Please discuss your surgical pathology results with your surgeon at your first outpatient follow-up appointment. Please discuss your surgical pathology results with your surgeon at your first outpatient follow-up appointment.  You may take over the counter pain medication like Tylenol.

## 2018-08-17 NOTE — DISCHARGE NOTE ADULT - PATIENT PORTAL LINK FT
You can access the [facility] Patient Portal, offered by [facility], by registering with the following website: [email or web address]

## 2018-08-17 NOTE — PROGRESS NOTE ADULT - ASSESSMENT
MARILEE ECKERT is a 68y Male with rectal cancer is POD#1 s/p complicated colostomy revision with bowel resection and intraperitoneal lesion biopsy (8/16).    PLAN:  - Continue CC diet as tolerated  - Pain management with tylenol and toradol  - d/c bartholomew in AM, fu TOV  - continue home meds, ASA in AM  - Disposition pending discission with care coordination, likely home

## 2018-08-17 NOTE — DISCHARGE NOTE ADULT - HOME CARE AGENCY
[facility] at [place] [phone]. Visiting nurse will call you 1-2 days after discharge to schedule visit to reinforce ostomy teaching.

## 2018-08-17 NOTE — DISCHARGE NOTE ADULT - CARE PLAN
Principal Discharge DX:	Rectal cancer  Goal:	s/p Colostomy revision  Assessment and plan of treatment:	Activity- No heavy lifting or straining over 15 lbs for the next two weeks;  Driving- Please do not drive until your pain is well controlled and you do not need to take pain medications.  You may shower-Do not submerge or scrub incision sites.  Please pat dry incisions/dressings.  Secondary Diagnosis:	Hyperlipidemia, unspecified hyperlipidemia type  Secondary Diagnosis:	Essential hypertension

## 2018-08-17 NOTE — PROGRESS NOTE ADULT - ATTENDING COMMENTS
kelsey diet, + gas in colostomy    AAOx3  abd soft, colostomy pink, edematous    s/p colostomy revision  d/c home  f/u next week

## 2018-08-27 PROBLEM — E66.9 OBESITY, UNSPECIFIED: Chronic | Status: ACTIVE | Noted: 2018-08-07

## 2018-08-27 PROBLEM — G47.33 OBSTRUCTIVE SLEEP APNEA (ADULT) (PEDIATRIC): Chronic | Status: ACTIVE | Noted: 2018-08-07

## 2018-08-27 PROBLEM — K80.20 CALCULUS OF GALLBLADDER WITHOUT CHOLECYSTITIS WITHOUT OBSTRUCTION: Chronic | Status: ACTIVE | Noted: 2018-08-07

## 2018-08-27 PROBLEM — Z87.891 PERSONAL HISTORY OF NICOTINE DEPENDENCE: Chronic | Status: ACTIVE | Noted: 2018-08-07

## 2018-08-27 PROBLEM — E78.5 HYPERLIPIDEMIA, UNSPECIFIED: Chronic | Status: ACTIVE | Noted: 2018-08-07

## 2018-08-27 PROBLEM — I82.409 ACUTE EMBOLISM AND THROMBOSIS OF UNSPECIFIED DEEP VEINS OF UNSPECIFIED LOWER EXTREMITY: Chronic | Status: ACTIVE | Noted: 2018-08-07

## 2018-08-27 PROBLEM — I10 ESSENTIAL (PRIMARY) HYPERTENSION: Chronic | Status: ACTIVE | Noted: 2018-08-07

## 2018-08-27 PROBLEM — N40.0 BENIGN PROSTATIC HYPERPLASIA WITHOUT LOWER URINARY TRACT SYMPTOMS: Chronic | Status: ACTIVE | Noted: 2018-08-07

## 2018-08-27 PROBLEM — R01.1 CARDIAC MURMUR, UNSPECIFIED: Chronic | Status: ACTIVE | Noted: 2018-08-07

## 2018-08-29 ENCOUNTER — APPOINTMENT (OUTPATIENT)
Dept: COLORECTAL SURGERY | Facility: CLINIC | Age: 68
End: 2018-08-29
Payer: MEDICARE

## 2018-08-29 DIAGNOSIS — L03.311 CELLULITIS OF ABDOMINAL WALL: ICD-10-CM

## 2018-08-29 PROCEDURE — 99024 POSTOP FOLLOW-UP VISIT: CPT

## 2018-09-07 ENCOUNTER — MEDICATION RENEWAL (OUTPATIENT)
Age: 68
End: 2018-09-07

## 2018-09-07 RX ORDER — CARVEDILOL 25 MG/1
25 TABLET, FILM COATED ORAL TWICE DAILY
Qty: 180 | Refills: 1 | Status: ACTIVE | COMMUNITY
Start: 1900-01-01 | End: 1900-01-01

## 2018-09-18 PROBLEM — L03.311 CELLULITIS OF LEFT ABDOMINAL WALL: Status: ACTIVE | Noted: 2018-09-18

## 2018-10-10 ENCOUNTER — APPOINTMENT (OUTPATIENT)
Dept: COLORECTAL SURGERY | Facility: CLINIC | Age: 68
End: 2018-10-10
Payer: MEDICARE

## 2018-10-10 DIAGNOSIS — K94.09 OTHER COMPLICATIONS OF COLOSTOMY: ICD-10-CM

## 2018-10-10 PROCEDURE — 99024 POSTOP FOLLOW-UP VISIT: CPT

## 2018-10-25 PROCEDURE — 80048 BASIC METABOLIC PNL TOTAL CA: CPT

## 2018-10-25 PROCEDURE — 88304 TISSUE EXAM BY PATHOLOGIST: CPT

## 2018-10-25 PROCEDURE — 82962 GLUCOSE BLOOD TEST: CPT

## 2018-10-25 PROCEDURE — 88305 TISSUE EXAM BY PATHOLOGIST: CPT

## 2018-11-09 ENCOUNTER — APPOINTMENT (OUTPATIENT)
Dept: CARDIOLOGY | Facility: CLINIC | Age: 68
End: 2018-11-09
Payer: MEDICARE

## 2018-11-09 ENCOUNTER — NON-APPOINTMENT (OUTPATIENT)
Age: 68
End: 2018-11-09

## 2018-11-09 VITALS
HEIGHT: 71 IN | HEART RATE: 64 BPM | WEIGHT: 245 LBS | SYSTOLIC BLOOD PRESSURE: 178 MMHG | BODY MASS INDEX: 34.3 KG/M2 | DIASTOLIC BLOOD PRESSURE: 100 MMHG

## 2018-11-09 PROCEDURE — 99214 OFFICE O/P EST MOD 30 MIN: CPT

## 2018-11-11 ENCOUNTER — MOBILE ON CALL (OUTPATIENT)
Age: 68
End: 2018-11-11

## 2018-11-14 ENCOUNTER — OUTPATIENT (OUTPATIENT)
Dept: OUTPATIENT SERVICES | Facility: HOSPITAL | Age: 68
LOS: 1 days | End: 2018-11-14

## 2018-11-14 ENCOUNTER — APPOINTMENT (OUTPATIENT)
Dept: COLORECTAL SURGERY | Facility: CLINIC | Age: 68
End: 2018-11-14
Payer: MEDICARE

## 2018-11-14 DIAGNOSIS — Z90.89 ACQUIRED ABSENCE OF OTHER ORGANS: Chronic | ICD-10-CM

## 2018-11-14 DIAGNOSIS — Z90.49 ACQUIRED ABSENCE OF OTHER SPECIFIED PARTS OF DIGESTIVE TRACT: Chronic | ICD-10-CM

## 2018-11-14 DIAGNOSIS — Z95.5 PRESENCE OF CORONARY ANGIOPLASTY IMPLANT AND GRAFT: Chronic | ICD-10-CM

## 2018-11-14 DIAGNOSIS — Z93.3 COLOSTOMY STATUS: Chronic | ICD-10-CM

## 2018-11-14 DIAGNOSIS — R89.7 ABNORMAL HISTOLOGICAL FINDINGS IN SPECIMENS FROM OTHER ORGANS, SYSTEMS AND TISSUES: Chronic | ICD-10-CM

## 2018-11-14 PROCEDURE — 99213 OFFICE O/P EST LOW 20 MIN: CPT | Mod: 25

## 2018-11-14 PROCEDURE — 17250 CHEM CAUT OF GRANLTJ TISSUE: CPT | Mod: 58

## 2018-11-26 ENCOUNTER — OUTPATIENT (OUTPATIENT)
Dept: OUTPATIENT SERVICES | Facility: HOSPITAL | Age: 68
LOS: 1 days | Discharge: HOME | End: 2018-11-26

## 2018-11-26 DIAGNOSIS — Z95.5 PRESENCE OF CORONARY ANGIOPLASTY IMPLANT AND GRAFT: Chronic | ICD-10-CM

## 2018-11-26 DIAGNOSIS — R89.7 ABNORMAL HISTOLOGICAL FINDINGS IN SPECIMENS FROM OTHER ORGANS, SYSTEMS AND TISSUES: Chronic | ICD-10-CM

## 2018-11-26 DIAGNOSIS — Z93.3 COLOSTOMY STATUS: Chronic | ICD-10-CM

## 2018-11-26 DIAGNOSIS — Z90.89 ACQUIRED ABSENCE OF OTHER ORGANS: Chronic | ICD-10-CM

## 2018-11-26 DIAGNOSIS — Z90.49 ACQUIRED ABSENCE OF OTHER SPECIFIED PARTS OF DIGESTIVE TRACT: Chronic | ICD-10-CM

## 2018-11-26 DIAGNOSIS — C20 MALIGNANT NEOPLASM OF RECTUM: ICD-10-CM

## 2018-11-27 ENCOUNTER — APPOINTMENT (OUTPATIENT)
Dept: CARDIOLOGY | Facility: CLINIC | Age: 68
End: 2018-11-27
Payer: MEDICARE

## 2018-11-27 VITALS
WEIGHT: 250 LBS | SYSTOLIC BLOOD PRESSURE: 134 MMHG | HEART RATE: 68 BPM | HEIGHT: 71 IN | BODY MASS INDEX: 35 KG/M2 | DIASTOLIC BLOOD PRESSURE: 86 MMHG

## 2018-11-27 DIAGNOSIS — E11.65 TYPE 2 DIABETES MELLITUS WITH HYPERGLYCEMIA: ICD-10-CM

## 2018-11-27 DIAGNOSIS — Z95.5 PRESENCE OF CORONARY ANGIOPLASTY IMPLANT AND GRAFT: ICD-10-CM

## 2018-11-27 PROCEDURE — 99214 OFFICE O/P EST MOD 30 MIN: CPT

## 2018-12-03 ENCOUNTER — APPOINTMENT (OUTPATIENT)
Dept: HEMATOLOGY ONCOLOGY | Facility: CLINIC | Age: 68
End: 2018-12-03
Payer: MEDICARE

## 2018-12-07 ENCOUNTER — RESULT REVIEW (OUTPATIENT)
Age: 68
End: 2018-12-07

## 2018-12-07 ENCOUNTER — APPOINTMENT (OUTPATIENT)
Dept: HEMATOLOGY ONCOLOGY | Facility: CLINIC | Age: 68
End: 2018-12-07
Payer: MEDICARE

## 2018-12-07 VITALS
TEMPERATURE: 98.2 F | DIASTOLIC BLOOD PRESSURE: 70 MMHG | SYSTOLIC BLOOD PRESSURE: 130 MMHG | BODY MASS INDEX: 35.98 KG/M2 | OXYGEN SATURATION: 96 % | HEART RATE: 63 BPM | RESPIRATION RATE: 16 BRPM | WEIGHT: 257.94 LBS

## 2018-12-07 DIAGNOSIS — I26.99 OTHER PULMONARY EMBOLISM W/OUT ACUTE COR PULMONALE: ICD-10-CM

## 2018-12-07 DIAGNOSIS — K62.9 DISEASE OF ANUS AND RECTUM, UNSPECIFIED: ICD-10-CM

## 2018-12-07 LAB
BASOPHILS # BLD AUTO: 0 K/UL — SIGNIFICANT CHANGE UP (ref 0–0.2)
BASOPHILS NFR BLD AUTO: 0.2 % — SIGNIFICANT CHANGE UP (ref 0–2)
EOSINOPHIL # BLD AUTO: 0.1 K/UL — SIGNIFICANT CHANGE UP (ref 0–0.5)
EOSINOPHIL NFR BLD AUTO: 2.5 % — SIGNIFICANT CHANGE UP (ref 0–6)
HCT VFR BLD CALC: 41.7 % — SIGNIFICANT CHANGE UP (ref 39–50)
HGB BLD-MCNC: 13.3 G/DL — SIGNIFICANT CHANGE UP (ref 13–17)
LYMPHOCYTES # BLD AUTO: 0.6 K/UL — LOW (ref 1–3.3)
LYMPHOCYTES # BLD AUTO: 10.7 % — LOW (ref 13–44)
MCHC RBC-ENTMCNC: 27.6 PG — SIGNIFICANT CHANGE UP (ref 27–34)
MCHC RBC-ENTMCNC: 31.9 G/DL — LOW (ref 32–36)
MCV RBC AUTO: 86.6 FL — SIGNIFICANT CHANGE UP (ref 80–100)
MONOCYTES # BLD AUTO: 0.5 K/UL — SIGNIFICANT CHANGE UP (ref 0–0.9)
MONOCYTES NFR BLD AUTO: 8.2 % — SIGNIFICANT CHANGE UP (ref 2–14)
NEUTROPHILS # BLD AUTO: 4.4 K/UL — SIGNIFICANT CHANGE UP (ref 1.8–7.4)
NEUTROPHILS NFR BLD AUTO: 78.3 % — HIGH (ref 43–77)
PLATELET # BLD AUTO: 203 K/UL — SIGNIFICANT CHANGE UP (ref 150–400)
RBC # BLD: 4.81 M/UL — SIGNIFICANT CHANGE UP (ref 4.2–5.8)
RBC # FLD: 14.5 % — SIGNIFICANT CHANGE UP (ref 10.3–14.5)
WBC # BLD: 5.7 K/UL — SIGNIFICANT CHANGE UP (ref 3.8–10.5)
WBC # FLD AUTO: 5.7 K/UL — SIGNIFICANT CHANGE UP (ref 3.8–10.5)

## 2018-12-07 PROCEDURE — 99214 OFFICE O/P EST MOD 30 MIN: CPT

## 2018-12-09 PROBLEM — I26.99 PULMONARY EMBOLISM: Status: ACTIVE | Noted: 2018-06-13

## 2018-12-09 PROBLEM — K62.9 RECTAL MASS: Status: ACTIVE | Noted: 2017-02-15

## 2018-12-09 LAB
ALBUMIN SERPL ELPH-MCNC: 3.9 G/DL
ALP BLD-CCNC: 147 U/L
ALT SERPL-CCNC: 15 U/L
ANION GAP SERPL CALC-SCNC: 12 MMOL/L
AST SERPL-CCNC: 13 U/L
BILIRUB SERPL-MCNC: 0.5 MG/DL
BUN SERPL-MCNC: 13 MG/DL
CALCIUM SERPL-MCNC: 9.1 MG/DL
CEA SERPL-MCNC: 2.7 NG/ML
CHLORIDE SERPL-SCNC: 100 MMOL/L
CO2 SERPL-SCNC: 29 MMOL/L
CREAT SERPL-MCNC: 1.29 MG/DL
GLUCOSE SERPL-MCNC: 177 MG/DL
POTASSIUM SERPL-SCNC: 4.2 MMOL/L
PROT SERPL-MCNC: 7.6 G/DL
SODIUM SERPL-SCNC: 141 MMOL/L

## 2018-12-09 NOTE — ASSESSMENT
[Designated Health Care Proxy] : Designated Health Care Proxy [Patient viewed ACP (Advance Care Planning) video in clinic] : Patient viewed ACP (Advance Care Planning) video in clinic [Name: ___] : Name: [unfilled] [Relationship: ___] : Relationship: [unfilled] [Curative] : Goals of care discussed with patient: Curative [Palliative Care Plan] : not applicable at this time

## 2018-12-09 NOTE — PHYSICAL EXAM
[Fully active, able to carry on all pre-disease performance without restriction] : Status 0 - Fully active, able to carry on all pre-disease performance without restriction [Obese] : obese [Normal] : affect appropriate [de-identified] : ostomy  [de-identified] : Vitiligo b/l hands and LE extremities; dry skin; pretibial erythema (baseline as per patient)

## 2018-12-09 NOTE — REVIEW OF SYSTEMS
[Fatigue] : fatigue [Negative] : Allergic/Immunologic [Recent Change In Weight] : ~T no recent weight change [Lower Ext Edema] : no lower extremity edema [Shortness Of Breath] : no shortness of breath [SOB on Exertion] : no shortness of breath during exertion

## 2018-12-09 NOTE — HISTORY OF PRESENT ILLNESS
[Disease: _____________________] : Disease: [unfilled] [T: ___] : T[unfilled] [N: ___] : N[unfilled] [M: ___] : M[unfilled] [AJCC Stage: ____] : AJCC Stage: [unfilled] [de-identified] : 68 y/o Male with PMH of HTN, CAD s/p angioplasty with 1 stent 2009 @ [facility], DM, Vitiligo who was found to have occult positive and referred to Dr Donavan Amador for his first colonoscopy by his PMD on 9/20/16 and found to have 5 mm transverse colon polyp (hyperplastic polyp suggestive of sessile serrated adenoma and rectal mass 5-7 cm from anal verge (high grade dysplasia with p53 point mutation).  Patient was referred to Dr Johnson (surgeon) and underwent sigmoidoscopy and then eventually transanal excision was planned but procedure aborted due to patient having respiratory distress and was admitted for observation from 11/16-11/18/16 to [facility].  He was worked up by pulmonary Dr Arevalo and found to have sleep apnea and started on CPAP.  His insurance changed and then he consulted Dr Jacobs on 2/15/17 and had a repeat colonoscopy on 4/21/2017 and found to have  10 cm rubbery rectal mass 3-4 cm from anal verge, noncircumferential and located predominately posterior bowel wall and pathology showed tubovillous adenoma/high grade dysplasia. A CT a/p done 5/23/17 showed moderate BPH but no adenopathy or pelvic mass.  Patient was taken to OR by Dr Conde 7/6/17and had rigid/flex sig with transanal biopsy and pathology showed focally invasive adenocarcinoma arising in a tubular adenoma with high grade dysplasia staged T3N0.  He presents to us 07/17/17 to discuss neoadjuvant chemotherapy.  \par \par PSx:  Cholecystectomy (1985); Tonsillectomy \par Fhx:  Breast Cancer - Mom in age 60's\par Social:  ex smoker; 1/2 PPD x 18 yrs; quit Jan 2017; no a/c\par \par He had a staging MRI 7/24/17 that shows that mass as a T2 but EUS suggests T3He consulted RT Dr Guzman and was started on Xeloda with Radiation from 8/21/17-9/28/17.  \par  [FreeTextEntry1] : RT/Xeloda 8/21-9/28/17 --> resection 12/17 (yT2N0) --> Observation  [de-identified] : Peter comes with his wife and offers no acute complaints.  He just saw Dr. Conde and continues to have wound healing issues.

## 2019-03-12 ENCOUNTER — TRANSCRIPTION ENCOUNTER (OUTPATIENT)
Age: 69
End: 2019-03-12

## 2019-03-15 ENCOUNTER — APPOINTMENT (OUTPATIENT)
Dept: COLORECTAL SURGERY | Facility: CLINIC | Age: 69
End: 2019-03-15
Payer: COMMERCIAL

## 2019-03-15 PROCEDURE — 99213 OFFICE O/P EST LOW 20 MIN: CPT

## 2019-03-15 NOTE — PHYSICAL EXAM
[Normal Breath Sounds] : Normal breath sounds [Wheezing] : no wheezing was heard [Normal Heart Sounds] : normal heart sounds [Normal Rate and Rhythm] : normal rate and rhythm [No Rash or Lesion] : No rash or lesion [Alert] : alert [Oriented to Person] : oriented to person [Oriented to Place] : oriented to place [Oriented to Time] : oriented to time [Calm] : calm [de-identified] : obese, soft NT/ND, well healed midline wound. Colostomy pink/viable, only 1 area medially of peristomal granulation tissue, tx with silver nitrate, no surrounding erythema [de-identified] : NAD [de-identified] : NC/AT, poor dentition [de-identified] : supple [de-identified] : normal ROM

## 2019-03-15 NOTE — ASSESSMENT
[FreeTextEntry1] : plan for 1 year surveillance colonoscopy\par miralax prep given\par cont local wound care with VNS\par start daily fiber supplement to bulk stool

## 2019-03-15 NOTE — HISTORY OF PRESENT ILLNESS
[FreeTextEntry1] : The patient feels well. The wounds around the stoma are healing. The ostomy functions well but the stool has been more loose lately

## 2019-05-06 ENCOUNTER — RX RENEWAL (OUTPATIENT)
Age: 69
End: 2019-05-06

## 2019-05-27 NOTE — PROGRESS NOTE ADULT - SUBJECTIVE AND OBJECTIVE BOX
Assisted patient up to bedside commode, continues to be unable to provide urine sample. Patient asking for meal tray. MD notified. INTERVAL HPI/OVERNIGHT EVENTS:  Patient is a 67yMale  no acute o/n events, still c/o hiccuping, kelsey clears, no N/V, was OOB and ambulated a small amount yesterday, +able to urinate in urinal without incontinence    Vital Signs Last 24 Hrs  T(C): 37.6 (20 Dec 2017 08:27), Max: 37.8 (19 Dec 2017 17:00)  T(F): 99.6 (20 Dec 2017 08:27), Max: 100 (19 Dec 2017 17:00)  HR: 78 (20 Dec 2017 08:) (78 - 107)  BP: 135/73 (20 Dec 2017 08:27) (120/73 - 145/73)  BP(mean): --  RR: 19 (20 Dec 2017 08:) (18 - 19)  SpO2: 94% (20 Dec 2017 08:27) (93% - 94%)   @ 07:  -   @ 07:00  --------------------------------------------------------  IN: 3032 mL / OUT: 2125 mL / NET: 907 mL     @ 07:  -   @ 09:35  --------------------------------------------------------  IN: 340 mL / OUT: 0 mL / NET: 340 mL        PHYSICAL EXAM:  Constitutional: well developed, well nourished, NAD  Eyes: anicteric  ENMT: normal facies, symmetric  Neck: supple  Respiratory: CTA bilat  Cardiovascular: RRR  Gastrointestinal: abdomen soft, obese, VAC in place, colostomy black with pink mucosa visible, + air and liquid stool in bag  Extremities: FROM, warm  Neurological: intact, non-focal  Skin: no gross lesions  Lymph Nodes: no gross adenopathy  Musculoskeletal: equal strength bilateral upper and lower extremities  Psychiatric: oriented x 3; appropriate          LABS:                        8.6    10.7  )-----------( 457      ( 19 Dec 2017 18:01 )             27.0     12-20    141  |  95<L>  |  32<H>  ----------------------------<  237<H>  3.2<L>   |  36<H>  |  0.98    Ca    8.3<L>      20 Dec 2017 08:45  Phos  2.3     12-  Mg     2.2     -        Urinalysis Basic - ( 18 Dec 2017 19:14 )    Color: Yellow / Appearance: Clear / S.021 / pH: x  Gluc: x / Ketone: Negative  / Bili: Negative / Urobili: Negative   Blood: x / Protein: 150 mg/dL / Nitrite: Negative   Leuk Esterase: Moderate / RBC: 10-25 /HPF / WBC 26-50 /HPF   Sq Epi: x / Non Sq Epi: OCC /HPF / Bacteria: Moderate /HPF      Magnesium, Serum: 2.2 mg/dL ( @ 08:45)  Phosphorus Level, Serum: 2.3 mg/dL ( @ 08:45)

## 2019-05-30 ENCOUNTER — APPOINTMENT (OUTPATIENT)
Dept: COLORECTAL SURGERY | Facility: CLINIC | Age: 69
End: 2019-05-30
Payer: MEDICARE

## 2019-05-30 PROCEDURE — 99213 OFFICE O/P EST LOW 20 MIN: CPT | Mod: 25

## 2019-05-30 PROCEDURE — 44388 COLONOSCOPY THRU STOMA SPX: CPT

## 2019-05-31 ENCOUNTER — APPOINTMENT (OUTPATIENT)
Dept: CARDIOLOGY | Facility: CLINIC | Age: 69
End: 2019-05-31

## 2019-06-05 LAB — CORE LAB BIOPSY: NORMAL

## 2020-08-12 ENCOUNTER — NON-APPOINTMENT (OUTPATIENT)
Age: 70
End: 2020-08-12

## 2020-10-15 NOTE — H&P PST ADULT - BLOOD TRANSFUSION, PREVIOUS, PROFILE
Does patient have record of home blood pressure readings no. Readings have been averaging unknown.   Last dose of blood pressure medication was taken at 2pm.  Patient is asymptomatic.   Complains of headaches yesterday. Patient state that she has a 10 out of 10 pain to her Right knee and hip area.   BP: (!) 150/90 , Pulse: 97 .  Blood pressure reading after 15 minutes was 152/92, Pulse 86.
no

## 2020-11-25 DIAGNOSIS — Z01.818 ENCOUNTER FOR OTHER PREPROCEDURAL EXAMINATION: ICD-10-CM

## 2020-11-28 ENCOUNTER — APPOINTMENT (OUTPATIENT)
Dept: DISASTER EMERGENCY | Facility: CLINIC | Age: 70
End: 2020-11-28

## 2020-11-29 LAB — SARS-COV-2 N GENE NPH QL NAA+PROBE: NOT DETECTED

## 2020-12-01 ENCOUNTER — OUTPATIENT (OUTPATIENT)
Dept: OUTPATIENT SERVICES | Facility: HOSPITAL | Age: 70
LOS: 1 days | End: 2020-12-01

## 2020-12-01 DIAGNOSIS — R89.7 ABNORMAL HISTOLOGICAL FINDINGS IN SPECIMENS FROM OTHER ORGANS, SYSTEMS AND TISSUES: Chronic | ICD-10-CM

## 2020-12-01 DIAGNOSIS — Z90.89 ACQUIRED ABSENCE OF OTHER ORGANS: Chronic | ICD-10-CM

## 2020-12-01 DIAGNOSIS — Z90.49 ACQUIRED ABSENCE OF OTHER SPECIFIED PARTS OF DIGESTIVE TRACT: Chronic | ICD-10-CM

## 2020-12-01 DIAGNOSIS — Z93.3 COLOSTOMY STATUS: Chronic | ICD-10-CM

## 2020-12-01 DIAGNOSIS — Z95.5 PRESENCE OF CORONARY ANGIOPLASTY IMPLANT AND GRAFT: Chronic | ICD-10-CM

## 2021-01-09 ENCOUNTER — APPOINTMENT (OUTPATIENT)
Dept: DISASTER EMERGENCY | Facility: CLINIC | Age: 71
End: 2021-01-09

## 2021-01-11 LAB — SARS-COV-2 N GENE NPH QL NAA+PROBE: NOT DETECTED

## 2021-01-12 ENCOUNTER — OUTPATIENT (OUTPATIENT)
Dept: OUTPATIENT SERVICES | Facility: HOSPITAL | Age: 71
LOS: 1 days | End: 2021-01-12

## 2021-01-12 DIAGNOSIS — R89.7 ABNORMAL HISTOLOGICAL FINDINGS IN SPECIMENS FROM OTHER ORGANS, SYSTEMS AND TISSUES: Chronic | ICD-10-CM

## 2021-01-12 DIAGNOSIS — Z90.89 ACQUIRED ABSENCE OF OTHER ORGANS: Chronic | ICD-10-CM

## 2021-01-12 DIAGNOSIS — Z95.5 PRESENCE OF CORONARY ANGIOPLASTY IMPLANT AND GRAFT: Chronic | ICD-10-CM

## 2021-01-12 DIAGNOSIS — Z90.49 ACQUIRED ABSENCE OF OTHER SPECIFIED PARTS OF DIGESTIVE TRACT: Chronic | ICD-10-CM

## 2021-01-12 DIAGNOSIS — Z93.3 COLOSTOMY STATUS: Chronic | ICD-10-CM

## 2021-02-24 ENCOUNTER — APPOINTMENT (OUTPATIENT)
Dept: CT IMAGING | Facility: CLINIC | Age: 71
End: 2021-02-24
Payer: MEDICARE

## 2021-02-24 PROCEDURE — 82565A: CUSTOM | Mod: QW

## 2021-02-24 PROCEDURE — 71270 CT THORAX DX C-/C+: CPT

## 2021-02-24 PROCEDURE — Q9967E: CUSTOM

## 2021-02-24 PROCEDURE — 74178 CT ABD&PLV WO CNTR FLWD CNTR: CPT

## 2021-07-09 ENCOUNTER — APPOINTMENT (OUTPATIENT)
Dept: CT IMAGING | Facility: CLINIC | Age: 71
End: 2021-07-09
Payer: MEDICARE

## 2021-07-09 PROCEDURE — 71250 CT THORAX DX C-: CPT

## 2021-07-14 NOTE — DISCHARGE NOTE ADULT - MEDICATION SUMMARY - MEDICATIONS TO TAKE
No signs or symptoms of infection noted. I will START or STAY ON the medications listed below when I get home from the hospital:    aspirin 81 mg oral tablet  -- 1 tab(s) by mouth once a day  -- Indication: For Home meds (Coronary Artery Disease w/ Stent 2009)    ramipril 10 mg oral capsule  -- 1 cap(s) by mouth once a day  -- Indication: For Home med for Hypertension    isosorbide mononitrate 30 mg oral tablet, extended release  -- 1 tab(s) by mouth once a day (in the morning)  -- Indication: For Home med for Hypertension/angina    glipiZIDE 2.5 mg oral tablet, extended release  -- 1 tab(s) by mouth 2 times a day  -- Indication: For Home medication for Diabetes    atorvastatin 40 mg oral tablet  -- 1 tab(s) by mouth once a day  -- Indication: For Home medication for hyperlipidemia    carvedilol 25 mg oral tablet  -- 1 tab(s) by mouth 2 times a day  -- Indication: For Home medication for hypertension    amLODIPine 10 mg oral tablet  -- 1 tab(s) by mouth once a day  -- Indication: For Home medication for Hypertension    furosemide 20 mg oral tablet  -- 1 tab(s) by mouth once a day  -- Indication: For Home medication diuretic    Vitamin D3 2000 intl units oral tablet  -- 1 tab(s) by mouth once a day  -- Indication: For Home medication (Vitamin tablet) I will START or STAY ON the medications listed below when I get home from the hospital:    Supplemental Oxygen  -- 1 dose(s)  every hour   -- Indication: For Supplemental O2    aspirin 81 mg oral tablet  -- 1 tab(s) by mouth once a day  -- Indication: For Home meds (Coronary Artery Disease w/ Stent 2009)    ramipril 10 mg oral capsule  -- 1 cap(s) by mouth once a day  -- Indication: For Home med for Hypertension    isosorbide mononitrate 30 mg oral tablet, extended release  -- 1 tab(s) by mouth once a day (in the morning)  -- Indication: For Home med for Hypertension/angina    enoxaparin  -- 120 milligram(s) subcutaneously 2 times a day  -- Indication: For Therapeutic Lovenox for anticoagulation (this is a blood thinner)    glipiZIDE 2.5 mg oral tablet, extended release  -- 1 tab(s) by mouth 2 times a day  -- Indication: For Home medication for Diabetes    atorvastatin 40 mg oral tablet  -- 1 tab(s) by mouth once a day  -- Indication: For Home medication for hyperlipidemia    carvedilol 25 mg oral tablet  -- 1 tab(s) by mouth 2 times a day  -- Indication: For Home medication for hypertension    amLODIPine 10 mg oral tablet  -- 1 tab(s) by mouth once a day  -- Indication: For Home medication for Hypertension    furosemide 20 mg oral tablet  -- 1 tab(s) by mouth once a day  -- Indication: For Home medication diuretic    Vitamin D3 2000 intl units oral tablet  -- 1 tab(s) by mouth once a day  -- Indication: For Home medication (Vitamin tablet)

## 2021-08-05 ENCOUNTER — APPOINTMENT (OUTPATIENT)
Dept: OPHTHALMOLOGY | Facility: CLINIC | Age: 71
End: 2021-08-05

## 2022-01-06 ENCOUNTER — OUTPATIENT (OUTPATIENT)
Dept: OUTPATIENT SERVICES | Facility: HOSPITAL | Age: 72
LOS: 1 days | End: 2022-01-06
Payer: MEDICARE

## 2022-01-06 DIAGNOSIS — Z08 ENCOUNTER FOR FOLLOW-UP EXAMINATION AFTER COMPLETED TREATMENT FOR MALIGNANT NEOPLASM: ICD-10-CM

## 2022-01-06 DIAGNOSIS — Z95.5 PRESENCE OF CORONARY ANGIOPLASTY IMPLANT AND GRAFT: Chronic | ICD-10-CM

## 2022-01-06 DIAGNOSIS — Z90.89 ACQUIRED ABSENCE OF OTHER ORGANS: Chronic | ICD-10-CM

## 2022-01-06 DIAGNOSIS — Z93.3 COLOSTOMY STATUS: Chronic | ICD-10-CM

## 2022-01-06 DIAGNOSIS — Z90.49 ACQUIRED ABSENCE OF OTHER SPECIFIED PARTS OF DIGESTIVE TRACT: Chronic | ICD-10-CM

## 2022-01-06 DIAGNOSIS — Z85.038 PERSONAL HISTORY OF OTHER MALIGNANT NEOPLASM OF LARGE INTESTINE: ICD-10-CM

## 2022-01-06 DIAGNOSIS — R89.7 ABNORMAL HISTOLOGICAL FINDINGS IN SPECIMENS FROM OTHER ORGANS, SYSTEMS AND TISSUES: Chronic | ICD-10-CM

## 2022-01-06 PROCEDURE — 74177 CT ABD & PELVIS W/CONTRAST: CPT | Mod: 26

## 2022-02-24 ENCOUNTER — OUTPATIENT (OUTPATIENT)
Dept: OUTPATIENT SERVICES | Facility: HOSPITAL | Age: 72
LOS: 1 days | End: 2022-02-24
Payer: MEDICARE

## 2022-02-24 DIAGNOSIS — Z85.048 PERSONAL HISTORY OF OTHER MALIGNANT NEOPLASM OF RECTUM, RECTOSIGMOID JUNCTION, AND ANUS: ICD-10-CM

## 2022-02-24 DIAGNOSIS — Z90.49 ACQUIRED ABSENCE OF OTHER SPECIFIED PARTS OF DIGESTIVE TRACT: Chronic | ICD-10-CM

## 2022-02-24 DIAGNOSIS — Z93.3 COLOSTOMY STATUS: Chronic | ICD-10-CM

## 2022-02-24 DIAGNOSIS — Z95.5 PRESENCE OF CORONARY ANGIOPLASTY IMPLANT AND GRAFT: Chronic | ICD-10-CM

## 2022-02-24 DIAGNOSIS — Z90.89 ACQUIRED ABSENCE OF OTHER ORGANS: Chronic | ICD-10-CM

## 2022-02-24 DIAGNOSIS — R89.7 ABNORMAL HISTOLOGICAL FINDINGS IN SPECIMENS FROM OTHER ORGANS, SYSTEMS AND TISSUES: Chronic | ICD-10-CM

## 2022-02-24 PROCEDURE — 88305 TISSUE EXAM BY PATHOLOGIST: CPT | Mod: 26

## 2023-02-01 ENCOUNTER — NON-APPOINTMENT (OUTPATIENT)
Age: 73
End: 2023-02-01

## 2023-02-15 ENCOUNTER — APPOINTMENT (OUTPATIENT)
Age: 73
End: 2023-02-15
Payer: MEDICARE

## 2023-02-15 ENCOUNTER — NON-APPOINTMENT (OUTPATIENT)
Age: 73
End: 2023-02-15

## 2023-02-15 ENCOUNTER — OUTPATIENT (OUTPATIENT)
Dept: OUTPATIENT SERVICES | Facility: HOSPITAL | Age: 73
LOS: 1 days | End: 2023-02-15

## 2023-02-15 VITALS
TEMPERATURE: 97.1 F | RESPIRATION RATE: 16 BRPM | OXYGEN SATURATION: 92 % | HEART RATE: 71 BPM | HEIGHT: 70.71 IN | SYSTOLIC BLOOD PRESSURE: 133 MMHG | WEIGHT: 288 LBS | BODY MASS INDEX: 40.32 KG/M2 | DIASTOLIC BLOOD PRESSURE: 79 MMHG

## 2023-02-15 DIAGNOSIS — R89.7 ABNORMAL HISTOLOGICAL FINDINGS IN SPECIMENS FROM OTHER ORGANS, SYSTEMS AND TISSUES: Chronic | ICD-10-CM

## 2023-02-15 DIAGNOSIS — D72.810 LYMPHOCYTOPENIA: ICD-10-CM

## 2023-02-15 DIAGNOSIS — D64.9 ANEMIA, UNSPECIFIED: ICD-10-CM

## 2023-02-15 DIAGNOSIS — C20 MALIGNANT NEOPLASM OF RECTUM: ICD-10-CM

## 2023-02-15 DIAGNOSIS — Z95.5 PRESENCE OF CORONARY ANGIOPLASTY IMPLANT AND GRAFT: Chronic | ICD-10-CM

## 2023-02-15 DIAGNOSIS — Z93.3 COLOSTOMY STATUS: Chronic | ICD-10-CM

## 2023-02-15 DIAGNOSIS — Z90.49 ACQUIRED ABSENCE OF OTHER SPECIFIED PARTS OF DIGESTIVE TRACT: Chronic | ICD-10-CM

## 2023-02-15 DIAGNOSIS — Z90.89 ACQUIRED ABSENCE OF OTHER ORGANS: Chronic | ICD-10-CM

## 2023-02-15 PROCEDURE — 99204 OFFICE O/P NEW MOD 45 MIN: CPT

## 2023-02-15 RX ORDER — DOCUSATE SODIUM 100 MG/1
TABLET ORAL
Refills: 0 | Status: DISCONTINUED | COMMUNITY
End: 2023-02-15

## 2023-02-15 NOTE — HISTORY OF PRESENT ILLNESS
[de-identified] : Mr. Ballard has a history of rectal cancer,  previously saw Dr. Amaya who gave him neoadjuvant capecitabine with concurrent RT in 2018. Resulted in an APR with ostomy formation. Pending possible ostomy reversal with Al Lindsey/Carlo. He takes oral aspirin daily for history of coronary artery disease and stenting, no other anticoagulants.  \par \par Recent CBC from January, 2023 revealed a normal WBC count of 6.86, Hgb 14g with normal MCV, and normal platelets. He has chronically decreased lymphocytes. There were no other CBC abnormalities.\par \par His last colonoscopy was in January, 2022. Dr. Lindsey reportedly told the patient that his next scoping could be in 5 years. Last CT of the A/P was also in January of 2022.

## 2023-02-15 NOTE — RESULTS/DATA
[FreeTextEntry1] : Mr. Ballard is a pleasant 72 year-old man with a history of locally advanced rectal cancer, here to establish care.

## 2023-02-15 NOTE — HISTORY OF PRESENT ILLNESS
[de-identified] : Mr. Ballard has a history of rectal cancer,  previously saw Dr. Amaya who gave him neoadjuvant capecitabine with concurrent RT in 2018. Resulted in an APR with ostomy formation. Pending possible ostomy reversal with Al Lindsey/Carlo. He takes oral aspirin daily for history of coronary artery disease and stenting, no other anticoagulants.  \par \par Recent CBC from January, 2023 revealed a normal WBC count of 6.86, Hgb 14g with normal MCV, and normal platelets. He has chronically decreased lymphocytes. There were no other CBC abnormalities.\par \par His last colonoscopy was in January, 2022. Dr. Lindsey reportedly told the patient that his next scoping could be in 5 years. Last CT of the A/P was also in January of 2022.

## 2023-02-15 NOTE — PHYSICAL EXAM
[Restricted in physically strenuous activity but ambulatory and able to carry out work of a light or sedentary nature] : Status 1- Restricted in physically strenuous activity but ambulatory and able to carry out work of a light or sedentary nature, e.g., light house work, office work [Obese] : obese [Normal] : affect appropriate [de-identified] : anicteric [de-identified] : +ostomy

## 2023-02-15 NOTE — REVIEW OF SYSTEMS
[Fatigue] : fatigue [Dysphagia] : no dysphagia [Odynophagia] : no odynophagia [Chest Pain] : no chest pain [Shortness Of Breath] : no shortness of breath [Joint Pain] : no joint pain [Joint Stiffness] : no joint stiffness [Easy Bleeding] : no tendency for easy bleeding [Easy Bruising] : no tendency for easy bruising [Swollen Glands] : no swollen glands [FreeTextEntry2] : gaining weight [FreeTextEntry7] : +hernia

## 2023-02-15 NOTE — CONSULT LETTER
[Dear  ___] : Dear  [unfilled], [Consult Letter:] : I had the pleasure of evaluating your patient, [unfilled]. [Please see my note below.] : Please see my note below. [Consult Closing:] : Thank you very much for allowing me to participate in the care of this patient.  If you have any questions, please do not hesitate to contact me. [Sincerely,] : Sincerely, [DrJerrod  ___] : Dr. BRAUN [FreeTextEntry2] : Dr. Leandro Lindsey [FreeTextEntry3] : Ruperto Delatorre MD\par

## 2023-02-15 NOTE — PHYSICAL EXAM
[Restricted in physically strenuous activity but ambulatory and able to carry out work of a light or sedentary nature] : Status 1- Restricted in physically strenuous activity but ambulatory and able to carry out work of a light or sedentary nature, e.g., light house work, office work [Obese] : obese [Normal] : affect appropriate [de-identified] : anicteric [de-identified] : +ostomy

## 2023-04-25 ENCOUNTER — APPOINTMENT (OUTPATIENT)
Dept: UROLOGY | Facility: CLINIC | Age: 73
End: 2023-04-25

## 2023-05-23 NOTE — PATIENT PROFILE ADULT. - NS PRO OT REFERRAL QUES 1 YN
D/I/A: Pt roomed on unit 2A room 13.  Arrived via litter and accompanied by CL RN. On/Off: Off monitor.  VSSA.  Rhythm upon arrival SR on monitor.  Denies pain or sob.  Reviewed activity restrictions and when to notify RN, ie-changes to breathing or increased chest pressure or chest pain.  CCL access:  RIJV primapore C/D/I and R radial with 12 ml of air. CMS intact.   P: Continue to monitor status.  Discharge to home once meeting criteria.       no

## 2023-07-21 NOTE — ED PROVIDER NOTE - CARDIAC RATE
normal Dupixent Dosing: 300 mg Ndc (300 Mg Prefilled Pen): [address]-68 Consent: The risks of pain and injection site reactions were reviewed with the patient prior to the injection. Use Enhanced Ndc?: Yes Syringe Size Used (Required For Enhanced Ndc): 300 mg/2ml prefilled syringe Treatment Number (Optional): 1 Hide Non-Enhanced Ndc Variable: No J-Code:  Ndc (300 Mg Prefilled Syringe): [address]-94 Lot # (Optional): [id number] Expiration Date (Optional): 07/31/2024 Ndc (200 Mg Prefilled Syringe): [address]-23 Administered By (Optional): Laila Lehman MD Detail Level: Detailed

## 2023-08-09 ENCOUNTER — OUTPATIENT (OUTPATIENT)
Dept: OUTPATIENT SERVICES | Facility: HOSPITAL | Age: 73
LOS: 1 days | End: 2023-08-09

## 2023-08-09 DIAGNOSIS — D64.9 ANEMIA, UNSPECIFIED: ICD-10-CM

## 2023-08-09 DIAGNOSIS — Z90.49 ACQUIRED ABSENCE OF OTHER SPECIFIED PARTS OF DIGESTIVE TRACT: Chronic | ICD-10-CM

## 2023-08-09 DIAGNOSIS — R89.7 ABNORMAL HISTOLOGICAL FINDINGS IN SPECIMENS FROM OTHER ORGANS, SYSTEMS AND TISSUES: Chronic | ICD-10-CM

## 2023-08-09 DIAGNOSIS — Z93.3 COLOSTOMY STATUS: Chronic | ICD-10-CM

## 2023-08-09 DIAGNOSIS — Z90.89 ACQUIRED ABSENCE OF OTHER ORGANS: Chronic | ICD-10-CM

## 2023-08-09 DIAGNOSIS — Z95.5 PRESENCE OF CORONARY ANGIOPLASTY IMPLANT AND GRAFT: Chronic | ICD-10-CM

## 2023-08-11 ENCOUNTER — APPOINTMENT (OUTPATIENT)
Age: 73
End: 2023-08-11

## 2023-09-28 ENCOUNTER — APPOINTMENT (OUTPATIENT)
Dept: CARDIOLOGY | Facility: CLINIC | Age: 73
End: 2023-09-28
Payer: MEDICARE

## 2023-09-28 VITALS
HEIGHT: 70 IN | WEIGHT: 280 LBS | DIASTOLIC BLOOD PRESSURE: 80 MMHG | OXYGEN SATURATION: 98 % | HEART RATE: 78 BPM | BODY MASS INDEX: 40.09 KG/M2 | SYSTOLIC BLOOD PRESSURE: 158 MMHG

## 2023-09-28 DIAGNOSIS — I10 ESSENTIAL (PRIMARY) HYPERTENSION: ICD-10-CM

## 2023-09-28 DIAGNOSIS — I71.20 THORACIC AORTIC ANEURYSM, WITHOUT RUPTURE, UNSPECIFIED: ICD-10-CM

## 2023-09-28 DIAGNOSIS — I50.32 CHRONIC DIASTOLIC (CONGESTIVE) HEART FAILURE: ICD-10-CM

## 2023-09-28 DIAGNOSIS — E78.5 HYPERLIPIDEMIA, UNSPECIFIED: ICD-10-CM

## 2023-09-28 DIAGNOSIS — I25.10 ATHEROSCLEROTIC HEART DISEASE OF NATIVE CORONARY ARTERY W/OUT ANGINA PECTORIS: ICD-10-CM

## 2023-09-28 DIAGNOSIS — Z79.899 OTHER LONG TERM (CURRENT) DRUG THERAPY: ICD-10-CM

## 2023-09-28 PROCEDURE — 99214 OFFICE O/P EST MOD 30 MIN: CPT

## 2023-09-28 RX ORDER — TORSEMIDE 20 MG/1
20 TABLET ORAL DAILY
Refills: 0 | Status: ACTIVE | COMMUNITY

## 2023-09-28 RX ORDER — REPAGLINIDE 2 MG/1
2 TABLET ORAL 3 TIMES DAILY
Refills: 0 | Status: ACTIVE | COMMUNITY

## 2023-09-28 RX ORDER — LISINOPRIL 30 MG/1
30 TABLET ORAL DAILY
Refills: 0 | Status: DISCONTINUED | COMMUNITY
End: 2023-09-28

## 2023-09-28 RX ORDER — LOSARTAN POTASSIUM 100 MG/1
100 TABLET, FILM COATED ORAL DAILY
Qty: 90 | Refills: 3 | Status: ACTIVE | COMMUNITY
Start: 2023-09-28 | End: 1900-01-01

## 2023-09-28 RX ORDER — AMLODIPINE BESYLATE AND BENAZEPRIL HYDROCHLORIDE 5; 10 MG/1; MG/1
5-10 CAPSULE ORAL DAILY
Qty: 90 | Refills: 1 | Status: DISCONTINUED | COMMUNITY
Start: 2018-11-09 | End: 2023-09-28

## 2023-09-28 RX ORDER — GLIPIZIDE 10 MG/1
10 TABLET ORAL DAILY
Refills: 0 | Status: ACTIVE | COMMUNITY

## 2023-09-28 RX ORDER — GLIPIZIDE 2.5 MG/1
2.5 TABLET, FILM COATED, EXTENDED RELEASE ORAL
Qty: 180 | Refills: 0 | Status: DISCONTINUED | COMMUNITY
Start: 2017-01-17 | End: 2023-09-28

## 2023-09-28 RX ORDER — SPIRONOLACTONE 25 MG/1
25 TABLET ORAL DAILY
Refills: 0 | Status: ACTIVE | COMMUNITY

## 2024-01-26 ENCOUNTER — NON-APPOINTMENT (OUTPATIENT)
Age: 74
End: 2024-01-26

## 2024-01-26 VITALS — HEIGHT: 70 IN | BODY MASS INDEX: 39.37 KG/M2 | WEIGHT: 275 LBS

## 2024-01-26 DIAGNOSIS — Z87.891 PERSONAL HISTORY OF NICOTINE DEPENDENCE: ICD-10-CM

## 2024-01-26 NOTE — HISTORY OF PRESENT ILLNESS
[Former] : Former [>=20 Pack-Years] : Twenty pack years or greater smoking history: Yes [TextBox_13] :  Referred by Dr. Donal Renee.  Mr. MARILEE ECKERT  is a 73 year old man with a history of colon CA dx 2016 with chemo and resection, HTN, and cardiac stents.    He  was seen in the office by Dr. Alvarez for review of eligibility for, as well as, discussion of Low-Dose CT lung cancer screening program. Over the telephone today we reviewed and confirmed that the patient meets screening eligibility criteria: -Age: 73 year   -Former smoker  -Number of pack(s) per day: 1 -Number of years smoked: 43 -Number of pack years smokin -Number of years since quitting smoking: 10 -Quit year:   Mr. ECKERT denies any signs or symptoms of lung cancer including new cough, change in cough, hemoptysis and unintentional weight loss.   Mr. ECKERT denies any personal history of lung cancer. No lung cancer in a 1st degree relative. Denies any history of lung disease. Denies any history of occupational exposures  [PacksperYear] : 43

## 2024-02-01 ENCOUNTER — APPOINTMENT (OUTPATIENT)
Dept: CT IMAGING | Facility: CLINIC | Age: 74
End: 2024-02-01
Payer: MEDICARE

## 2024-02-01 PROCEDURE — 71271 CT THORAX LUNG CANCER SCR C-: CPT

## 2024-02-16 ENCOUNTER — NON-APPOINTMENT (OUTPATIENT)
Age: 74
End: 2024-02-16

## 2024-04-04 ENCOUNTER — APPOINTMENT (OUTPATIENT)
Dept: CARDIOLOGY | Facility: CLINIC | Age: 74
End: 2024-04-04

## 2024-05-24 NOTE — PATIENT PROFILE ADULT. - PATIENT REPRESENTATIVE PHONE
Airway    Performed by: Karen Elmore  Authorized by: Andres Sweeney MD    Final Airway Type:  Supraglottic airway  Final Supraglottic Airway:  Unique  SGA Size*:  4  Attempts*:  1  Location:  OR  Urgency:  Elective  Difficult Airway: No    Indications for Airway Management:  Anesthesia  Spontaneous Ventilation: absent    Sedation Level:  Deep  Mask Difficulty Assessment:  0 - not attempted  Start Time: 5/24/2024 12:52 PM       [phone]

## 2024-05-29 NOTE — H&P PST ADULT - CARDIOVASCULAR
Admission details… Regular rate & rhythm, normal S1, S2; no murmurs, gallops or rubs; no S3, S4 detailed exam

## 2024-08-07 ENCOUNTER — NON-APPOINTMENT (OUTPATIENT)
Age: 74
End: 2024-08-07

## 2024-08-08 ENCOUNTER — APPOINTMENT (OUTPATIENT)
Dept: UROLOGY | Facility: CLINIC | Age: 74
End: 2024-08-08

## 2024-08-08 PROCEDURE — 99204 OFFICE O/P NEW MOD 45 MIN: CPT

## 2024-08-08 NOTE — HISTORY OF PRESENT ILLNESS
[FreeTextEntry1] : 73yo M hx of DM, colon CA s/p resection + radiation 2017, BPH referred for urinary frequency His HbA1c increased from 8.4 to 10.5 from April to July of this year.  He is also on torsemide and spironolactone.   Pt also used to be on CPAP for sleep apnea. Stopped since having colon resection nocturia 2-3x

## 2024-08-08 NOTE — PHYSICAL EXAM
[Normal Appearance] : normal appearance [Well Groomed] : well groomed [General Appearance - In No Acute Distress] : no acute distress [Edema] : no peripheral edema [Respiration, Rhythm And Depth] : normal respiratory rhythm and effort [Exaggerated Use Of Accessory Muscles For Inspiration] : no accessory muscle use [Normal Station and Gait] : the gait and station were normal for the patient's age [] : no rash [No Focal Deficits] : no focal deficits [Oriented To Time, Place, And Person] : oriented to person, place, and time [Affect] : the affect was normal [Mood] : the mood was normal [de-identified] : left abdominal ostomy

## 2024-08-08 NOTE — ASSESSMENT
[FreeTextEntry1] : Urinary frequency: could be related to uncontrolled diabetes along with BPH and water pills discussed importance of controlling diabetes c/w flomax and finasteride will check UA  Nocturia: could be related to sleep apnea should f/u with pulm

## 2025-02-03 NOTE — PHYSICAL THERAPY INITIAL EVALUATION ADULT - GENERAL OBSERVATIONS, REHAB EVAL
Please approve Samples given in office   Pt received semi supine in bed +bartholomew +vac +2L 02+ PCA pump, +IV

## 2025-05-12 NOTE — PATIENT PROFILE ADULT. - ALCOHOL USE HISTORY SINGLE SELECT
Last OV: 4/14/2025    Next scheduled apt: 5/30/25      Surescripts requesting refill  Medication pending      
yes...

## 2025-05-28 NOTE — PROGRESS NOTE ADULT - ASSESSMENT
68 y/o M with PMH of HTN, DMT2, PVD, HLD, MI/CAD with stent x 1 2009, GLENYS, 50 pack yr smoker (quit Jan 2017), morbid obesity, rectal cancer (diagnosed 7/2017 s/p chemo) admitted 12/6 for LAR. Hospital course c/b post-operative hypotension requiring SICU admission. Downgraded to floors on 12/7. On 12/12 he was noted to be hypoxic to 70s. Placed on bipap and readmitted to SICU. Downgraded to floors on 12/16. 12/18 noted to have low grade fever [General Appearance - Well Developed] : well developed [Heart Rate And Rhythm] : heart rate and rhythm were normal [] : no respiratory distress [Abdomen Soft] : soft [Skin Color & Pigmentation] : normal skin color and pigmentation [No Focal Deficits] : no focal deficits [Oriented To Time, Place, And Person] : oriented to person, place, and time [Normal Station and Gait] : the gait and station were normal for the patient's age [de-identified] : pt deferred exam

## 2025-07-19 NOTE — PLAN
Pt has lc above left eye ,hi on dresser, no  loc   [FreeTextEntry1] :  Plan: -Low Dose CT chest for lung cancer screening